# Patient Record
Sex: MALE | Race: WHITE | Employment: OTHER | URBAN - METROPOLITAN AREA
[De-identification: names, ages, dates, MRNs, and addresses within clinical notes are randomized per-mention and may not be internally consistent; named-entity substitution may affect disease eponyms.]

---

## 2017-06-04 ENCOUNTER — HOSPITAL ENCOUNTER (OUTPATIENT)
Dept: MRI IMAGING | Age: 62
Discharge: HOME | End: 2017-06-04
Attending: ORTHOPAEDIC SURGERY
Payer: MEDICARE

## 2017-06-04 DIAGNOSIS — M25.512 LEFT SHOULDER PAIN: ICD-10-CM

## 2017-06-04 DIAGNOSIS — M75.52 BURSITIS OF LEFT SHOULDER: ICD-10-CM

## 2017-06-04 PROCEDURE — 73221 MRI JOINT UPR EXTREM W/O DYE: CPT

## 2017-07-07 NOTE — PERIOP NOTES
[facility]  Ambulatory Surgery Unit  Pre-operative Instructions    Surgery/Procedure Date  07/14/2017            Tentative Arrival Time 1100      1. On the day of your surgery/procedure, please report to the Ambulatory Surgery Unit Registration Desk and sign in at your designated time. The Ambulatory Surgery Unit is located in [facility] on the [facility] side of the [facility] across from the [address] [place] [address]. Please have all of your health insurance cards and a photo ID. 2. You must have someone with you to drive you home, as you should not drive a car for 24 hours following anesthesia. Please make arrangements for a responsible adult friend or family member to stay with you for at least the first 24 hours after your surgery. 3. Do not have anything to eat or drink (including water, gum, mints, coffee, juice) after midnight   07/13/2017  . This may not apply to medications prescribed by your physician. (Please note below the special instructions with medications to take the morning of surgery, if applicable.)    4. We recommend you do not drink any alcoholic beverages for 24 hours before and after your surgery. 5. Stop all Aspirin and non-steroidal anti-inflammatory drugs (i.e. Advil, Aleve) as directed by your surgeon's office. Stop all vitamins and herbal supplements seven days prior to your surgery. If you are currently taking Plavix, Coumadin, or other blood-thinning agents, contact your surgeon for instructions. 6. In an effort to help prevent surgical site infection, we ask that you shower with an anti-bacterial soap (i.e. Dial or Safeguard) for 3 days prior to and on the morning of surgery, using a fresh towel after each shower. Do not apply any lotions, powders or deodorants after the shower on the day of your procedure. If applicable, please do not shave the operative site for 48 hours prior to surgery. 7. Wear comfortable clothes.   Wear glasses instead of contacts. Do not bring any money or jewelry. Do not wear make-up, particularly mascara, the morning of your surgery. Do not wear nail polish, particularly if you are having foot /hand surgery. Wear your hair loose or down, no ponytails, buns, mathew pins or clips. All body piercings must be removed. 8. You should understand that if you do not follow these instructions your surgery may be cancelled. If your physical condition changes (i.e. fever, cold or flu) please contact your surgeon as soon as possible. 9. It is important that you be on time. If a situation occurs where you may be late, or if you have any questions or problems, please call [phone].    10. Your surgery time may be subject to change. You will receive a phone call the day prior to surgery to confirm your arrival time. 11. Pediatric patients: please bring a change of clothes, diapers, bottle/sippy cup, pacifier, etc.      Special Instructions:    Please take morning meds as normal morning of surgery with a small sip of water. I understand a pre-operative phone call will be made to verify my surgery time. In the event that I am not available, I give permission for a message to be left on my answering service and/or with another person? yes         ___________________      ___________________  _________  Pt verbalized understanding of preop instructions via telephone.   (Signature of Patient)          (Witness)                              (Date and Time)

## 2017-07-10 ENCOUNTER — HOSPITAL ENCOUNTER (OUTPATIENT)
Dept: SURGERY | Age: 62
Setting detail: OUTPATIENT SURGERY
Discharge: HOME | End: 2017-07-10
Payer: MEDICARE

## 2017-07-10 ENCOUNTER — ANESTHESIA EVENT (OUTPATIENT)
Dept: SURGERY | Age: 62
End: 2017-07-10
Payer: MEDICARE

## 2017-07-10 VITALS
DIASTOLIC BLOOD PRESSURE: 58 MMHG | SYSTOLIC BLOOD PRESSURE: 115 MMHG | RESPIRATION RATE: 24 BRPM | TEMPERATURE: 97.9 F | HEART RATE: 52 BPM | OXYGEN SATURATION: 98 %

## 2017-07-10 LAB
ATRIAL RATE: 50 BPM
CALCULATED P AXIS, ECG09: 45 DEGREES
CALCULATED R AXIS, ECG10: 74 DEGREES
CALCULATED T AXIS, ECG11: 81 DEGREES
DIAGNOSIS, 93000: NORMAL
P-R INTERVAL, ECG05: 190 MS
Q-T INTERVAL, ECG07: 422 MS
QRS DURATION, ECG06: 92 MS
QTC CALCULATION (BEZET), ECG08: 384 MS
VENTRICULAR RATE, ECG03: 50 BPM

## 2017-07-10 PROCEDURE — 93005 ELECTROCARDIOGRAM TRACING: CPT

## 2017-07-10 NOTE — PERIOP NOTES
Dr. Patricia Curry reviewed todays EKG, and Ekg 8/22/15 EKG,:Mets >4, she's is aware patient in on O2 at night, still smoker, but will proceed with procedure per Dr. Farooq Cuevas.

## 2017-07-14 ENCOUNTER — HOSPITAL ENCOUNTER (OUTPATIENT)
Age: 62
Setting detail: OUTPATIENT SURGERY
Discharge: HOME | End: 2017-07-14
Attending: ORTHOPAEDIC SURGERY | Admitting: ORTHOPAEDIC SURGERY
Payer: MEDICARE

## 2017-07-14 ENCOUNTER — ANESTHESIA (OUTPATIENT)
Dept: SURGERY | Age: 62
End: 2017-07-14
Payer: MEDICARE

## 2017-07-14 VITALS
TEMPERATURE: 98 F | WEIGHT: 156 LBS | BODY MASS INDEX: 23.11 KG/M2 | HEIGHT: 69 IN | RESPIRATION RATE: 14 BRPM | SYSTOLIC BLOOD PRESSURE: 124 MMHG | OXYGEN SATURATION: 98 % | HEART RATE: 64 BPM | DIASTOLIC BLOOD PRESSURE: 71 MMHG

## 2017-07-14 PROCEDURE — 74011250636 HC RX REV CODE- 250/636

## 2017-07-14 PROCEDURE — 77030020255 HC SOL INJ LR 1000ML BG: Performed by: ORTHOPAEDIC SURGERY

## 2017-07-14 PROCEDURE — 76210000040 HC AMBSU PH I REC FIRST 0.5 HR: Performed by: ORTHOPAEDIC SURGERY

## 2017-07-14 PROCEDURE — 74011000250 HC RX REV CODE- 250: Performed by: ORTHOPAEDIC SURGERY

## 2017-07-14 PROCEDURE — 74011250636 HC RX REV CODE- 250/636: Performed by: ANESTHESIOLOGY

## 2017-07-14 PROCEDURE — 76210000050 HC AMBSU PH II REC 0.5 TO 1 HR: Performed by: ORTHOPAEDIC SURGERY

## 2017-07-14 PROCEDURE — 74011250636 HC RX REV CODE- 250/636: Performed by: ORTHOPAEDIC SURGERY

## 2017-07-14 PROCEDURE — 76060000073 HC AMB SURG ANES FIRST 0.5 HR: Performed by: ORTHOPAEDIC SURGERY

## 2017-07-14 PROCEDURE — 74011250637 HC RX REV CODE- 250/637: Performed by: ANESTHESIOLOGY

## 2017-07-14 PROCEDURE — 77030021352 HC CBL LD SYS DISP COVD -B: Performed by: ORTHOPAEDIC SURGERY

## 2017-07-14 PROCEDURE — 76010000093 HC SPECIAL PROCEDURE: Performed by: ORTHOPAEDIC SURGERY

## 2017-07-14 PROCEDURE — 74011000250 HC RX REV CODE- 250

## 2017-07-14 RX ORDER — SODIUM CHLORIDE 0.9 % (FLUSH) 0.9 %
5-10 SYRINGE (ML) INJECTION AS NEEDED
Status: DISCONTINUED | OUTPATIENT
Start: 2017-07-14 | End: 2017-07-14 | Stop reason: HOSPADM

## 2017-07-14 RX ORDER — LIDOCAINE HYDROCHLORIDE 20 MG/ML
INJECTION, SOLUTION EPIDURAL; INFILTRATION; INTRACAUDAL; PERINEURAL AS NEEDED
Status: DISCONTINUED | OUTPATIENT
Start: 2017-07-14 | End: 2017-07-14 | Stop reason: HOSPADM

## 2017-07-14 RX ORDER — FENTANYL CITRATE 50 UG/ML
INJECTION, SOLUTION INTRAMUSCULAR; INTRAVENOUS AS NEEDED
Status: DISCONTINUED | OUTPATIENT
Start: 2017-07-14 | End: 2017-07-14 | Stop reason: HOSPADM

## 2017-07-14 RX ORDER — MIDAZOLAM HYDROCHLORIDE 1 MG/ML
INJECTION, SOLUTION INTRAMUSCULAR; INTRAVENOUS AS NEEDED
Status: DISCONTINUED | OUTPATIENT
Start: 2017-07-14 | End: 2017-07-14 | Stop reason: HOSPADM

## 2017-07-14 RX ORDER — FENTANYL CITRATE 50 UG/ML
INJECTION, SOLUTION INTRAMUSCULAR; INTRAVENOUS
Status: DISCONTINUED
Start: 2017-07-14 | End: 2017-07-14 | Stop reason: HOSPADM

## 2017-07-14 RX ORDER — LIDOCAINE HYDROCHLORIDE 10 MG/ML
0.1 INJECTION, SOLUTION EPIDURAL; INFILTRATION; INTRACAUDAL; PERINEURAL AS NEEDED
Status: DISCONTINUED | OUTPATIENT
Start: 2017-07-14 | End: 2017-07-14 | Stop reason: HOSPADM

## 2017-07-14 RX ORDER — ONDANSETRON 2 MG/ML
4 INJECTION INTRAMUSCULAR; INTRAVENOUS AS NEEDED
Status: DISCONTINUED | OUTPATIENT
Start: 2017-07-14 | End: 2017-07-14 | Stop reason: HOSPADM

## 2017-07-14 RX ORDER — ROPIVACAINE HYDROCHLORIDE 5 MG/ML
20 INJECTION, SOLUTION EPIDURAL; INFILTRATION; PERINEURAL ONCE
Status: COMPLETED | OUTPATIENT
Start: 2017-07-14 | End: 2017-07-14

## 2017-07-14 RX ORDER — HYDROCODONE BITARTRATE AND ACETAMINOPHEN 5; 325 MG/1; MG/1
1-2 TABLET ORAL
Qty: 40 TAB | Refills: 0 | Status: SHIPPED | OUTPATIENT
Start: 2017-07-14 | End: 2017-08-02

## 2017-07-14 RX ORDER — DIPHENHYDRAMINE HYDROCHLORIDE 50 MG/ML
12.5 INJECTION, SOLUTION INTRAMUSCULAR; INTRAVENOUS AS NEEDED
Status: DISCONTINUED | OUTPATIENT
Start: 2017-07-14 | End: 2017-07-14 | Stop reason: HOSPADM

## 2017-07-14 RX ORDER — MORPHINE SULFATE 10 MG/ML
2 INJECTION, SOLUTION INTRAMUSCULAR; INTRAVENOUS
Status: DISCONTINUED | OUTPATIENT
Start: 2017-07-14 | End: 2017-07-14 | Stop reason: HOSPADM

## 2017-07-14 RX ORDER — HYDROMORPHONE HYDROCHLORIDE 1 MG/ML
.2-.5 INJECTION, SOLUTION INTRAMUSCULAR; INTRAVENOUS; SUBCUTANEOUS ONCE
Status: DISCONTINUED | OUTPATIENT
Start: 2017-07-14 | End: 2017-07-14 | Stop reason: HOSPADM

## 2017-07-14 RX ORDER — SODIUM CHLORIDE, SODIUM LACTATE, POTASSIUM CHLORIDE, CALCIUM CHLORIDE 600; 310; 30; 20 MG/100ML; MG/100ML; MG/100ML; MG/100ML
25 INJECTION, SOLUTION INTRAVENOUS CONTINUOUS
Status: DISCONTINUED | OUTPATIENT
Start: 2017-07-14 | End: 2017-07-14 | Stop reason: HOSPADM

## 2017-07-14 RX ORDER — HYDROCODONE BITARTRATE AND ACETAMINOPHEN 5; 325 MG/1; MG/1
1-2 TABLET ORAL
Qty: 40 TAB | Refills: 0 | Status: SHIPPED | OUTPATIENT
Start: 2017-07-14 | End: 2018-01-11

## 2017-07-14 RX ORDER — SODIUM CHLORIDE 0.9 % (FLUSH) 0.9 %
5-10 SYRINGE (ML) INJECTION EVERY 8 HOURS
Status: DISCONTINUED | OUTPATIENT
Start: 2017-07-14 | End: 2017-07-14 | Stop reason: HOSPADM

## 2017-07-14 RX ORDER — KETOROLAC TROMETHAMINE 30 MG/ML
INJECTION, SOLUTION INTRAMUSCULAR; INTRAVENOUS AS NEEDED
Status: DISCONTINUED | OUTPATIENT
Start: 2017-07-14 | End: 2017-07-14 | Stop reason: HOSPADM

## 2017-07-14 RX ORDER — PROPOFOL 10 MG/ML
INJECTION, EMULSION INTRAVENOUS AS NEEDED
Status: DISCONTINUED | OUTPATIENT
Start: 2017-07-14 | End: 2017-07-14 | Stop reason: HOSPADM

## 2017-07-14 RX ORDER — OXYCODONE AND ACETAMINOPHEN 5; 325 MG/1; MG/1
1 TABLET ORAL ONCE
Status: COMPLETED | OUTPATIENT
Start: 2017-07-14 | End: 2017-07-14

## 2017-07-14 RX ORDER — MIDAZOLAM HYDROCHLORIDE 1 MG/ML
INJECTION, SOLUTION INTRAMUSCULAR; INTRAVENOUS
Status: DISCONTINUED
Start: 2017-07-14 | End: 2017-07-14 | Stop reason: HOSPADM

## 2017-07-14 RX ORDER — FENTANYL CITRATE 50 UG/ML
25 INJECTION, SOLUTION INTRAMUSCULAR; INTRAVENOUS
Status: DISCONTINUED | OUTPATIENT
Start: 2017-07-14 | End: 2017-07-14 | Stop reason: HOSPADM

## 2017-07-14 RX ADMIN — PROPOFOL 50 MG: 10 INJECTION, EMULSION INTRAVENOUS at 12:44

## 2017-07-14 RX ADMIN — LIDOCAINE HYDROCHLORIDE 40 MG: 20 INJECTION, SOLUTION EPIDURAL; INFILTRATION; INTRACAUDAL; PERINEURAL at 12:43

## 2017-07-14 RX ADMIN — MIDAZOLAM HYDROCHLORIDE 2 MG: 1 INJECTION, SOLUTION INTRAMUSCULAR; INTRAVENOUS at 12:42

## 2017-07-14 RX ADMIN — OXYCODONE HYDROCHLORIDE AND ACETAMINOPHEN 1 TABLET: 5; 325 TABLET ORAL at 13:27

## 2017-07-14 RX ADMIN — FENTANYL CITRATE 100 MCG: 50 INJECTION, SOLUTION INTRAMUSCULAR; INTRAVENOUS at 12:42

## 2017-07-14 RX ADMIN — SODIUM CHLORIDE, SODIUM LACTATE, POTASSIUM CHLORIDE, AND CALCIUM CHLORIDE 25 ML/HR: 600; 310; 30; 20 INJECTION, SOLUTION INTRAVENOUS at 11:24

## 2017-07-14 RX ADMIN — KETOROLAC TROMETHAMINE 30 MG: 30 INJECTION, SOLUTION INTRAMUSCULAR; INTRAVENOUS at 12:53

## 2017-07-14 RX ADMIN — PROPOFOL 150 MG: 10 INJECTION, EMULSION INTRAVENOUS at 12:43

## 2017-07-14 NOTE — ANESTHESIA PREPROCEDURE EVALUATION
Anesthetic History   No history of anesthetic complications            Review of Systems / Medical History  Patient summary reviewed, nursing notes reviewed and pertinent labs reviewed    Pulmonary    COPD (on Home O2 at night): severe      Smoker (1/2 ppd)  Asthma        Neuro/Psych         Psychiatric history (Schizophrenia, depression)     Cardiovascular                  Exercise tolerance: <4 METS  Comments: Chronic MENDEZ   GI/Hepatic/Renal             Pertinent negatives: No GERD   Endo/Other  Within defined limits           Other Findings   Comments: Back pain           Physical Exam    Airway  Mallampati: II  TM Distance: < 4 cm  Neck ROM: normal range of motion   Mouth opening: Normal     Cardiovascular    Rhythm: regular  Rate: normal      Pertinent negatives: No murmur   Dental  No notable dental hx       Pulmonary    Rhonchi:bibasilar            Comments: Cleared with cough Abdominal  GI exam deferred       Other Findings            Anesthetic Plan    ASA: 3  Anesthesia type: MAC          Induction: Intravenous  Anesthetic plan and risks discussed with: Patient      Pt is not an appropriate block candidate due to risk of respiratory failure from phrenic nerve block

## 2017-07-14 NOTE — BRIEF OP NOTE
BRIEF OPERATIVE NOTE    Date of Procedure: 7/14/2017   Preoperative Diagnosis: LEFT SHOULDER IMPINGEMENT  Postoperative Diagnosis: LEFT SHOULDER IMPINGEMENT    Procedure(s):  MANIPULATION AND INJECTION LEFT SHOULDER (MAC/BLOCK)  Surgeon(s) and Role:     * Adriana Smith MD - Primary         Assistant Staff:       Surgical Staff:  Circ-1: Enrrique Ohara RN  Event Time In   Incision Start 1246   Incision Close 1250     Anesthesia: MAC   Estimated Blood Loss: 0  Specimens: * No specimens in log *   Findings: 0   Complications: 0  Implants: * No implants in log *

## 2017-07-14 NOTE — IP AVS SNAPSHOT
[address] [facility] 
[phone] Patient: Manuel Eisenberg MRN: [id number] You are allergic to the following Allergen Reactions Allergen (Antipyrine-Benzocaine) Unknown (comments) Avelox (Moxifloxacin) Unknown (comments) Codeine Sulfate Unknown (comments) Pcn (Penicillins) Other (comments) Coma Recent Documentation Height Weight BMI Smoking Status 1.753 m 70.8 kg 23.04 kg/m2 Current Every Day Smoker Emergency Contacts Name Discharge Info Relation Home Work Mobile Valeriy Fry     [phone] [employer] 68 CAREGIVER [3] Caregiver [13] Andreea Hogan NO [2] Sister [23] [phone] About your hospitalization You were admitted on:  July 14, 2017 You last received care in the:  [facility] PACU You were discharged on:  July 14, 2017 Unit phone number:  [phone] Why you were hospitalized Your primary diagnosis was:  Not on File Providers Seen During Your Hospitalizations Provider Role Specialty Primary office phone Leon Yeager MD Attending Provider Orthopedic Surgery [phone] Your Primary Care Physician (PCP) Primary Care Physician Office Phone Office Fax Liberty Núñez [phone] Follow-up Information Follow up With Details Comments Contact Info [address] [address] [phone] Current Discharge Medication List  
  
START taking these medications Dose & Instructions Dispensing Information Comments Morning Noon Evening Bedtime * HYDROcodone-acetaminophen 5-325 mg per tablet Commonly known as:  Alex Austin Your last dose was: Your next dose is:    
   
   
 Dose:  1-2 Tab Take 1-2 Tabs by mouth every four (4) hours as needed for Pain.  Max Daily Amount: 12 Tabs. Quantity:  40 Tab Refills:  0  
     
   
   
   
  
 * HYDROcodone-acetaminophen 5-325 mg per tablet Commonly known as:  Alex Austin Your last dose was: Your next dose is:    
   
   
 Dose:  1-2 Tab Take 1-2 Tabs by mouth every four (4) hours as needed for Pain. Max Daily Amount: 12 Tabs. Quantity:  40 Tab Refills:  0  
     
   
   
   
  
 * Notice: This list has 2 medication(s) that are the same as other medications prescribed for you. Read the directions carefully, and ask your doctor or other care provider to review them with you. CONTINUE these medications which have NOT CHANGED Dose & Instructions Dispensing Information Comments Morning Noon Evening Bedtime  
 albuterol 2.5 mg /3 mL (0.083 %) nebulizer solution Commonly known as:  PROVENTIL VENTOLIN Your last dose was: Your next dose is:    
   
   
 Dose:  1.25 mg  
1.25 mg by Nebulization route every four (4) hours as needed. Refills:  0  
     
   
   
   
  
 albuterol 90 mcg/actuation inhaler Commonly known as:  Coralee Pam Your last dose was: Your next dose is:    
   
   
 Dose:  2 Puff Take 2 Puffs by inhalation three (3) times daily as needed. Refills:  0  
     
   
   
   
  
 OTHER Your last dose was: Your next dose is:    
   
   
 oxygen Refills:  0 SEROquel 300 mg tablet Generic drug:  QUEtiapine Your last dose was: Your next dose is:    
   
   
 Dose:  300 mg Take 300 mg by mouth nightly. Refills:  0 WELLBUTRIN  mg SR tablet Generic drug:  buPROPion SR Your last dose was: Your next dose is: Take  by mouth two (2) times a day. Refills:  0 Where to Get Your Medications Information on where to get these meds will be given to you by the nurse or doctor. ! Ask your nurse or doctor about these medications HYDROcodone-acetaminophen 5-325 mg per tablet HYDROcodone-acetaminophen 5-325 mg per tablet Discharge Instructions Use ice as desired Stretch!!! 
PT is set for Monday at my office, first floor at 8 AM 
 
>>>You received Toradol during your surgery. You may not take any form of NSAIDS (non steroidal anti inflammatory drugs) such as Advil, Ibuprofen, Aleve, Motrin until 9 pm.<<< 
 
 
DO NOT TAKE TYLENOL/ACETAMINOPHEN WITH PERCOCET, LORTAB, NORCO OR VICODEN. TAKE NARCOTIC PAIN MEDICATIONS WITH FOOD Narcotics tend to be constipating, we suggest taking a stool softener such as Colace or Miralax (follow package instructions). DO NOT DRIVE WHILE TAKING NARCOTIC PAIN MEDICATIONS. DO NOT TAKE SLEEPING MEDICATIONS OR ANTIANXIETY MEDICATIONS WHILE TAKING NARCOTIC PAIN MEDICATIONS,  ESPECIALLY THE NIGHT OF ANESTHESIA. CPAP PATIENTS BE SURE TO WEAR MACHINE WHENEVER NAPPING OR SLEEPING. DISCHARGE SUMMARY from Nurse The following personal items collected during your admission are returned to you:  
Dental Appliance: Dental Appliances: None Vision: Visual Aid: Glasses, With patient Hearing Aid:   
Jewelry: Jewelry: None Clothing: Clothing: Other (comment) (belonging bag) Other Valuables: Other Valuables: None Valuables sent to safe:   
 
 
PATIENT INSTRUCTIONS: 
 
 
B - Balance E - Eyes F-  Face looks uneven A-  Arms unable to move or move even S-  Speech slurred or non-existent T-  Time-call 911 as soon as signs and symptoms begin-DO NOT go Back to bed or wait to see if you get better-TIME IS BRAIN. If you have not received your influenza and/or pneumococcal vaccine, please follow up with your primary care physician. The discharge information has been reviewed with the patient and spouse. The patient and spouse verbalized understanding. Discharge Orders None Introducing [facility]! [employer] introduces GPal patient portal. Now you can access parts of your medical record, email your doctor's office, and request medication refills online. 1. In your internet browser, go to [email or web address] 2. Click on the First Time User? Click Here link in the Sign In box. You will see the New Member Sign Up page. 3. Enter your GPal Access Code exactly as it appears below. You will not need to use this code after youve completed the sign-up process.  If you do not sign up before the expiration date, you must request a new code. · Lakala Access Code: [email or web address] Expires: 8/23/2017  2:42 PM 
 
4. Enter the last four digits of your Social Security Number (xxxx) and Date of Birth (mm/dd/yyyy) as indicated and click Submit. You will be taken to the next sign-up page. 5. Create a Lakala ID. This will be your Lakala login ID and cannot be changed, so think of one that is secure and easy to remember. 6. Create a Lakala password. You can change your password at any time. 7. Enter your Password Reset Question and Answer. This can be used at a later time if you forget your password. 8. Enter your e-mail address. You will receive e-mail notification when new information is available in [address] 9. Click Sign Up. You can now view and download portions of your medical record. 10. Click the Download Summary menu link to download a portable copy of your medical information. If you have questions, please visit the Frequently Asked Questions section of the Lakala website. Remember, Lakala is NOT to be used for urgent needs. For medical emergencies, dial 911. Now available from your iPhone and Android! General Information Please provide this summary of care documentation to your next provider. Patient Signature:  ____________________________________________________________ Date:  ____________________________________________________________  
  
Lelia Rob Provider Signature:  ____________________________________________________________ Date:  ____________________________________________________________

## 2017-07-14 NOTE — DISCHARGE INSTRUCTIONS
Use ice as desired  Stretch!!!  PT is set for Monday at my office, first floor at 8 AM    >>>You received Toradol during your surgery. You may not take any form of NSAIDS (non steroidal anti inflammatory drugs) such as Advil, Ibuprofen, Aleve, Motrin until 9 pm.<<<      DO NOT TAKE TYLENOL/ACETAMINOPHEN WITH PERCOCET, LORTAB, NORCO OR VICODEN. TAKE NARCOTIC PAIN MEDICATIONS WITH FOOD   Narcotics tend to be constipating, we suggest taking a stool softener such as Colace or Miralax (follow package instructions). DO NOT DRIVE WHILE TAKING NARCOTIC PAIN MEDICATIONS. DO NOT TAKE SLEEPING MEDICATIONS OR ANTIANXIETY MEDICATIONS WHILE TAKING NARCOTIC PAIN MEDICATIONS,  ESPECIALLY THE NIGHT OF ANESTHESIA. CPAP PATIENTS BE SURE TO WEAR MACHINE WHENEVER NAPPING OR SLEEPING. DISCHARGE SUMMARY from Nurse    The following personal items collected during your admission are returned to you:   Dental Appliance: Dental Appliances: None  Vision: Visual Aid: Glasses, With patient  Hearing Aid:    Jewelry: Jewelry: None  Clothing: Clothing: Other (comment) (belonging bag)  Other Valuables: Other Valuables: None  Valuables sent to safe:        PATIENT INSTRUCTIONS:    After General Anesthesia or Intravenous Sedation, for 24 hours or while taking prescription Narcotics:        Someone should be with you for the next 24 hours. For your own safety, a responsible adult must drive you home. · Limit your activities  · Recommended activity: Rest today, up with assistance today. Do not climb stairs or shower unattended for the next 24 hours. · Please start with a soft bland diet and advance as tolerated (no nausea) to regular diet. · If you have a sore throat you should try the following: fluids, warm salt water gargles, or throat lozenges. If it does not improve after several days please follow up with your primary physician.   · Do not drive and operate hazardous machinery  · Do not make important personal or business decisions  · Do  not drink alcoholic beverages  · If you have not urinated within 8 hours after discharge, please contact your surgeon on call. Report the following to your surgeon:  · Excessive pain, swelling, redness or odor of or around the surgical area  · Temperature over 100.5  · Nausea and vomiting lasting longer than 4 hours or if unable to take medications  · Any signs of decreased circulation or nerve impairment to extremity: change in color, persistent  numbness, tingling, coldness or increase pain      · You will receive a Post Operative Call from one of the Recovery Room Nurses on the day after your surgery to check on you. It is very important for us to know how you are recovering after your surgery. If you have an issue or need to speak with someone, please call your surgeon, do not wait for the post operative call. · You may receive an e-mail or letter in the mail from [facility] regarding your experience with us in the Ambulatory Surgery Unit. Your feedback is valuable to us and we appreciate your participation in the survey. · If the above instructions are not adequate, please contact Malinda Freire RN, Marcela anesthesia Nurse Manager or our Anesthesiologist, at [phone]. If you are having problems after your surgery, call the physician at his office number. · We wish you a speedy recovery ? What to do at Home:      *  Please give a list of your current medications to your Primary Care Provider. *  Please update this list whenever your medications are discontinued, doses are      changed, or new medications (including over-the-counter products) are added. *  Please carry medication information at all times in case of emergency situations.             These are general instructions for a healthy lifestyle:    No smoking/ No tobacco products/ Avoid exposure to second hand smoke    Surgeon General's Warning:  Quitting smoking now greatly reduces serious risk to your health. Obesity, smoking, and sedentary lifestyle greatly increases your risk for illness    A healthy diet, regular physical exercise & weight monitoring are important for maintaining a healthy lifestyle    You may be retaining fluid if you have a history of heart failure or if you experience any of the following symptoms:  Weight gain of 3 pounds or more overnight or 5 pounds in a week, increased swelling in our hands or feet or shortness of breath while lying flat in bed. Please call your doctor as soon as you notice any of these symptoms; do not wait until your next office visit. Recognize signs and symptoms of STROKE:    B - Balance  E - Eyes    F-  Face looks uneven    A-  Arms unable to move or move even    S-  Speech slurred or non-existent    T-  Time-call 911 as soon as signs and symptoms begin-DO NOT go       Back to bed or wait to see if you get better-TIME IS BRAIN. If you have not received your influenza and/or pneumococcal vaccine, please follow up with your primary care physician. The discharge information has been reviewed with the patient and spouse. The patient and spouse verbalized understanding.

## 2017-07-14 NOTE — PERIOP NOTES
Fletcher Barretolalo  1955  [id number]     Present at patient bedside for procedure. Situation:    Verbal report given from: Cristela Haines RN & OLIVE England CRNA  Procedure: Procedure(s):  MANIPULATION AND INJECTION LEFT SHOULDER (MAC/BLOCK)    Background:    Preoperative diagnosis: LEFT SHOULDER IMPINGEMENT    Postoperative diagnosis: LEFT SHOULDER IMPINGEMENT    :  Dr. Saw Pillai    Assistant(s): Circ-1: Marely Newberry RN    Specimens: * No specimens in log *    Assessment:  Intra-procedure medications         Anesthesia gave intra-procedure sedation and medications, see anesthesia flow sheet     Intravenous fluids: LR@ KVO     Vital signs stable       Recommendation:    Permission to share finding with family or friend yes

## 2017-07-14 NOTE — ANESTHESIA POSTPROCEDURE EVALUATION
Post-Anesthesia Evaluation and Assessment    Patient: Josephine Ashby MRN: [id number]  SSN: [id number]    YOB: 1955  Age: 57 y.o. Sex: male       Cardiovascular Function/Vital Signs  Visit Vitals    /71 (BP 1 Location: Right arm, BP Patient Position: Head of bed elevated (Comment degrees))    Pulse 64    Temp 36.7 °C (98 °F)    Resp 14    Ht 5' 9\" (1.753 m)    Wt 70.8 kg (156 lb)    SpO2 98%    BMI 23.04 kg/m2       Patient is status post total IV anesthesia, general anesthesia for Procedure(s):  MANIPULATION AND INJECTION LEFT SHOULDER (MAC/BLOCK). Nausea/Vomiting: None    Postoperative hydration reviewed and adequate. Pain:  Pain Scale 1: Numeric (0 - 10) (07/14/17 1345)  Pain Intensity 1: 8 (07/14/17 1315)   Managed    Neurological Status:   Neuro (WDL): Within Defined Limits (07/14/17 1345)  Neuro  Neurologic State: Drowsy (07/14/17 1300)  LUE Motor Response: Purposeful (07/14/17 1300)  LLE Motor Response: Purposeful (07/14/17 1300)  RUE Motor Response: Purposeful (07/14/17 1300)  RLE Motor Response: Purposeful (07/14/17 1300)   At baseline    Mental Status and Level of Consciousness: Arousable    Pulmonary Status:   O2 Device: Room air (07/14/17 1345)   Adequate oxygenation and airway patent    Complications related to anesthesia: None    Post-anesthesia assessment completed.  No concerns    Signed By: Joy Elliott MD     July 14, 2017

## 2017-07-15 NOTE — OP NOTES
[facility]   [address] [facility], [address]   OP NOTE       Name:  Marcie Brito   MR#:  [id number]   :  1955   Account #:  [de-identified]    Surgery Date:  2017   Date of Adm:  2017       PREOPERATIVE DIAGNOSIS:  Frozen left shoulder. POSTOPERATIVE DIAGNOSIS:  Frozen left shoulder. PROCEDURES PERFORMED:  Manipulation and injection of left   shoulder. ESTIMATED BLOOD LOSS: 0    SPECIMENS REMOVED: None. ANESTHESIA:  Block with MAC. SURGEON: Geronimo Andino. Marlene Valentin MD    DESCRIPTION OF PROCEDURE: The patient was given smooth   induction of IV sedation, supine position on the hospital stretcher. The   left shoulder was examined. He had only 90 degrees of passive   abduction. The shoulder was manipulated into full abduction with the   lysis of adhesions felt. Full abduction was obtained, internal and   external rotation were also manipulated, obtaining near full range of   motion. His shoulder joint was then injected with ropivacaine,   Xylocaine, and Depo-Medrol under sterile technique. He tolerated the   procedure well.          MD Елена Maxwell / Neptali.Jose Luis   D:  2017   12:52   T:  2017   22:31   Job #:  [id number]

## 2017-08-02 NOTE — PERIOP NOTES
[facility]  Ambulatory Surgery Unit  Pre-operative Instructions    Surgery/Procedure Date  08/11/2017            Tentative Arrival Time 8735      1. On the day of your surgery/procedure, please report to the Ambulatory Surgery Unit Registration Desk and sign in at your designated time. The Ambulatory Surgery Unit is located in [facility] on the [facility] side of the [facility] across from the [address] [place] [address]. Please have all of your health insurance cards and a photo ID. 2. You must have someone with you to drive you home, as you should not drive a car for 24 hours following anesthesia. Please make arrangements for a responsible adult friend or family member to stay with you for at least the first 24 hours after your surgery. 3. Do not have anything to eat or drink (including water, gum, mints, coffee, juice) after midnight   08/10/2017  . This may not apply to medications prescribed by your physician. (Please note below the special instructions with medications to take the morning of surgery, if applicable.)    4. We recommend you do not drink any alcoholic beverages for 24 hours before and after your surgery. 5. Stop all Aspirin and non-steroidal anti-inflammatory drugs (i.e. Advil, Aleve) as directed by your surgeon's office. Stop all vitamins and herbal supplements seven days prior to your surgery. If you are currently taking Plavix, Coumadin, or other blood-thinning agents, contact your surgeon for instructions. 6. In an effort to help prevent surgical site infection, we ask that you shower with an anti-bacterial soap (i.e. Dial or Safeguard) for 3 days prior to and on the morning of surgery, using a fresh towel after each shower. Do not apply any lotions, powders or deodorants after the shower on the day of your procedure. If applicable, please do not shave the operative site for 48 hours prior to surgery. 7. Wear comfortable clothes.   Wear glasses instead of contacts. Do not bring any money or jewelry. Do not wear make-up, particularly mascara, the morning of your surgery. Do not wear nail polish, particularly if you are having foot /hand surgery. Wear your hair loose or down, no ponytails, buns, mathew pins or clips. All body piercings must be removed. 8. You should understand that if you do not follow these instructions your surgery may be cancelled. If your physical condition changes (i.e. fever, cold or flu) please contact your surgeon as soon as possible. 9. It is important that you be on time. If a situation occurs where you may be late, or if you have any questions or problems, please call [phone].    10. Your surgery time may be subject to change. You will receive a phone call the day prior to surgery to confirm your arrival time. 11. Pediatric patients: please bring a change of clothes, diapers, bottle/sippy cup, pacifier, etc.      Special Instructions: Take morning meds as prescribed am of surgery with a small sip of water. I understand a pre-operative phone call will be made to verify my surgery time. In the event that I am not available, I give permission for a message to be left on my answering service and/or with another person? yes         ___________________      ___________________  _________  Pt verbalized understanding of preop instructions via telephone.   (Signature of Patient)          (Witness)                              (Date and Time)

## 2017-08-10 ENCOUNTER — ANESTHESIA EVENT (OUTPATIENT)
Dept: SURGERY | Age: 62
End: 2017-08-10
Payer: MEDICARE

## 2017-08-11 ENCOUNTER — HOSPITAL ENCOUNTER (OUTPATIENT)
Age: 62
Setting detail: OUTPATIENT SURGERY
Discharge: HOME | End: 2017-08-11
Attending: ORTHOPAEDIC SURGERY | Admitting: ORTHOPAEDIC SURGERY
Payer: MEDICARE

## 2017-08-11 ENCOUNTER — ANESTHESIA (OUTPATIENT)
Dept: SURGERY | Age: 62
End: 2017-08-11
Payer: MEDICARE

## 2017-08-11 VITALS
DIASTOLIC BLOOD PRESSURE: 82 MMHG | TEMPERATURE: 97.8 F | HEART RATE: 60 BPM | RESPIRATION RATE: 18 BRPM | HEIGHT: 69 IN | BODY MASS INDEX: 23.49 KG/M2 | SYSTOLIC BLOOD PRESSURE: 140 MMHG | WEIGHT: 158.6 LBS | OXYGEN SATURATION: 97 %

## 2017-08-11 PROCEDURE — 74011250636 HC RX REV CODE- 250/636: Performed by: ORTHOPAEDIC SURGERY

## 2017-08-11 PROCEDURE — 74011000250 HC RX REV CODE- 250

## 2017-08-11 PROCEDURE — 76210000046 HC AMBSU PH II REC FIRST 0.5 HR: Performed by: ORTHOPAEDIC SURGERY

## 2017-08-11 PROCEDURE — 76210000040 HC AMBSU PH I REC FIRST 0.5 HR: Performed by: ORTHOPAEDIC SURGERY

## 2017-08-11 PROCEDURE — 76010000093 HC SPECIAL PROCEDURE: Performed by: ORTHOPAEDIC SURGERY

## 2017-08-11 PROCEDURE — 77030020255 HC SOL INJ LR 1000ML BG: Performed by: ORTHOPAEDIC SURGERY

## 2017-08-11 PROCEDURE — 76060000073 HC AMB SURG ANES FIRST 0.5 HR: Performed by: ORTHOPAEDIC SURGERY

## 2017-08-11 PROCEDURE — 74011000250 HC RX REV CODE- 250: Performed by: ORTHOPAEDIC SURGERY

## 2017-08-11 PROCEDURE — 74011250636 HC RX REV CODE- 250/636

## 2017-08-11 PROCEDURE — 74011250636 HC RX REV CODE- 250/636: Performed by: ANESTHESIOLOGY

## 2017-08-11 RX ORDER — HYDROMORPHONE HYDROCHLORIDE 1 MG/ML
INJECTION, SOLUTION INTRAMUSCULAR; INTRAVENOUS; SUBCUTANEOUS AS NEEDED
Status: DISCONTINUED | OUTPATIENT
Start: 2017-08-11 | End: 2017-08-11 | Stop reason: HOSPADM

## 2017-08-11 RX ORDER — SODIUM CHLORIDE, SODIUM LACTATE, POTASSIUM CHLORIDE, CALCIUM CHLORIDE 600; 310; 30; 20 MG/100ML; MG/100ML; MG/100ML; MG/100ML
25 INJECTION, SOLUTION INTRAVENOUS CONTINUOUS
Status: DISCONTINUED | OUTPATIENT
Start: 2017-08-11 | End: 2017-08-11 | Stop reason: HOSPADM

## 2017-08-11 RX ORDER — SODIUM CHLORIDE 0.9 % (FLUSH) 0.9 %
5-10 SYRINGE (ML) INJECTION AS NEEDED
Status: DISCONTINUED | OUTPATIENT
Start: 2017-08-11 | End: 2017-08-11 | Stop reason: HOSPADM

## 2017-08-11 RX ORDER — MORPHINE SULFATE 10 MG/ML
2 INJECTION, SOLUTION INTRAMUSCULAR; INTRAVENOUS
Status: DISCONTINUED | OUTPATIENT
Start: 2017-08-11 | End: 2017-08-11 | Stop reason: HOSPADM

## 2017-08-11 RX ORDER — SODIUM CHLORIDE 0.9 % (FLUSH) 0.9 %
5-10 SYRINGE (ML) INJECTION EVERY 8 HOURS
Status: DISCONTINUED | OUTPATIENT
Start: 2017-08-11 | End: 2017-08-11 | Stop reason: HOSPADM

## 2017-08-11 RX ORDER — PROPOFOL 10 MG/ML
INJECTION, EMULSION INTRAVENOUS AS NEEDED
Status: DISCONTINUED | OUTPATIENT
Start: 2017-08-11 | End: 2017-08-11 | Stop reason: HOSPADM

## 2017-08-11 RX ORDER — METHYLPREDNISOLONE ACETATE 40 MG/ML
INJECTION, SUSPENSION INTRA-ARTICULAR; INTRALESIONAL; INTRAMUSCULAR; SOFT TISSUE AS NEEDED
Status: DISCONTINUED | OUTPATIENT
Start: 2017-08-11 | End: 2017-08-11 | Stop reason: HOSPADM

## 2017-08-11 RX ORDER — ACETAMINOPHEN 10 MG/ML
INJECTION, SOLUTION INTRAVENOUS
Status: DISCONTINUED
Start: 2017-08-11 | End: 2017-08-11 | Stop reason: HOSPADM

## 2017-08-11 RX ORDER — HYDROMORPHONE HYDROCHLORIDE 1 MG/ML
.2-.5 INJECTION, SOLUTION INTRAMUSCULAR; INTRAVENOUS; SUBCUTANEOUS ONCE
Status: DISCONTINUED | OUTPATIENT
Start: 2017-08-11 | End: 2017-08-11 | Stop reason: HOSPADM

## 2017-08-11 RX ORDER — LIDOCAINE HYDROCHLORIDE 20 MG/ML
INJECTION, SOLUTION EPIDURAL; INFILTRATION; INTRACAUDAL; PERINEURAL AS NEEDED
Status: DISCONTINUED | OUTPATIENT
Start: 2017-08-11 | End: 2017-08-11 | Stop reason: HOSPADM

## 2017-08-11 RX ORDER — HYDROMORPHONE HYDROCHLORIDE 1 MG/ML
INJECTION, SOLUTION INTRAMUSCULAR; INTRAVENOUS; SUBCUTANEOUS
Status: DISCONTINUED
Start: 2017-08-11 | End: 2017-08-11 | Stop reason: HOSPADM

## 2017-08-11 RX ORDER — DIPHENHYDRAMINE HYDROCHLORIDE 50 MG/ML
12.5 INJECTION, SOLUTION INTRAMUSCULAR; INTRAVENOUS AS NEEDED
Status: DISCONTINUED | OUTPATIENT
Start: 2017-08-11 | End: 2017-08-11 | Stop reason: HOSPADM

## 2017-08-11 RX ORDER — KETOROLAC TROMETHAMINE 30 MG/ML
INJECTION, SOLUTION INTRAMUSCULAR; INTRAVENOUS AS NEEDED
Status: DISCONTINUED | OUTPATIENT
Start: 2017-08-11 | End: 2017-08-11 | Stop reason: HOSPADM

## 2017-08-11 RX ORDER — FENTANYL CITRATE 50 UG/ML
25 INJECTION, SOLUTION INTRAMUSCULAR; INTRAVENOUS
Status: DISCONTINUED | OUTPATIENT
Start: 2017-08-11 | End: 2017-08-11 | Stop reason: HOSPADM

## 2017-08-11 RX ORDER — HYDROCODONE BITARTRATE AND ACETAMINOPHEN 5; 325 MG/1; MG/1
1-2 TABLET ORAL
Qty: 30 TAB | Refills: 0 | Status: SHIPPED | OUTPATIENT
Start: 2017-08-11 | End: 2017-09-19

## 2017-08-11 RX ORDER — LIDOCAINE HYDROCHLORIDE 10 MG/ML
0.1 INJECTION, SOLUTION EPIDURAL; INFILTRATION; INTRACAUDAL; PERINEURAL AS NEEDED
Status: DISCONTINUED | OUTPATIENT
Start: 2017-08-11 | End: 2017-08-11 | Stop reason: HOSPADM

## 2017-08-11 RX ORDER — ONDANSETRON 2 MG/ML
4 INJECTION INTRAMUSCULAR; INTRAVENOUS AS NEEDED
Status: DISCONTINUED | OUTPATIENT
Start: 2017-08-11 | End: 2017-08-11 | Stop reason: HOSPADM

## 2017-08-11 RX ORDER — LIDOCAINE HYDROCHLORIDE 10 MG/ML
INJECTION INFILTRATION; PERINEURAL AS NEEDED
Status: DISCONTINUED | OUTPATIENT
Start: 2017-08-11 | End: 2017-08-11 | Stop reason: HOSPADM

## 2017-08-11 RX ORDER — ACETAMINOPHEN 10 MG/ML
INJECTION, SOLUTION INTRAVENOUS AS NEEDED
Status: DISCONTINUED | OUTPATIENT
Start: 2017-08-11 | End: 2017-08-11 | Stop reason: HOSPADM

## 2017-08-11 RX ORDER — OXYCODONE AND ACETAMINOPHEN 5; 325 MG/1; MG/1
1 TABLET ORAL ONCE
Status: DISCONTINUED | OUTPATIENT
Start: 2017-08-11 | End: 2017-08-11 | Stop reason: HOSPADM

## 2017-08-11 RX ADMIN — ACETAMINOPHEN 1000 MG: 10 INJECTION, SOLUTION INTRAVENOUS at 12:32

## 2017-08-11 RX ADMIN — SODIUM CHLORIDE, POTASSIUM CHLORIDE, SODIUM LACTATE AND CALCIUM CHLORIDE: 600; 310; 30; 20 INJECTION, SOLUTION INTRAVENOUS at 12:30

## 2017-08-11 RX ADMIN — KETOROLAC TROMETHAMINE 30 MG: 30 INJECTION, SOLUTION INTRAMUSCULAR; INTRAVENOUS at 12:39

## 2017-08-11 RX ADMIN — SODIUM CHLORIDE, SODIUM LACTATE, POTASSIUM CHLORIDE, AND CALCIUM CHLORIDE 25 ML/HR: 600; 310; 30; 20 INJECTION, SOLUTION INTRAVENOUS at 12:24

## 2017-08-11 RX ADMIN — LIDOCAINE HYDROCHLORIDE 40 MG: 20 INJECTION, SOLUTION EPIDURAL; INFILTRATION; INTRACAUDAL; PERINEURAL at 12:35

## 2017-08-11 RX ADMIN — PROPOFOL 150 MG: 10 INJECTION, EMULSION INTRAVENOUS at 12:35

## 2017-08-11 RX ADMIN — HYDROMORPHONE HYDROCHLORIDE 0.5 MG: 1 INJECTION, SOLUTION INTRAMUSCULAR; INTRAVENOUS; SUBCUTANEOUS at 12:34

## 2017-08-11 NOTE — PERIOP NOTES
PACU~  1322-Pt is awake and alert, denies complaints. Discharge instructions reviewed with pt & friend. Encouraged pt to stretch/ do range of motion every hour while awake. Pt discharged home in stable condition via w/c to car.  Pt has his glasses

## 2017-08-11 NOTE — ANESTHESIA POSTPROCEDURE EVALUATION
Post-Anesthesia Evaluation and Assessment    Patient: Massimo Anderson MRN: [id number]  SSN: [id number]    YOB: 1955  Age: 57 y.o. Sex: male       Cardiovascular Function/Vital Signs  Visit Vitals    /82    Pulse 60    Temp 36.6 °C (97.8 °F)    Resp 18    Ht 5' 9\" (1.753 m)    Wt 71.9 kg (158 lb 9.6 oz)    SpO2 97%    BMI 23.42 kg/m2       Patient is status post MAC anesthesia for Procedure(s):  MANIPULATION AND INJECTION LEFT SHOULDER  . Nausea/Vomiting: None    Postoperative hydration reviewed and adequate. Pain:  Pain Scale 1: Numeric (0 - 10) (08/11/17 1308)  Pain Intensity 1: 0 (08/11/17 1308)   Managed    Neurological Status:   Neuro (WDL): Within Defined Limits (08/11/17 1308)  Neuro  Neurologic State: Alert (08/11/17 1308)   At baseline    Mental Status and Level of Consciousness: Arousable    Pulmonary Status:   O2 Device: Room air (08/11/17 1308)   Adequate oxygenation and airway patent    Complications related to anesthesia: None    Post-anesthesia assessment completed.  No concerns    Signed By: Marquita Gagnon MD     August 11, 2017

## 2017-08-11 NOTE — OP NOTES
[address]   OP NOTE       Name:  Carmen Christian   MR#:  [id number]   :  1955   Account #:  [de-identified]    Surgery Date:  2017   Date of Adm:  2017       PREOPERATIVE DIAGNOSIS: Frozen left shoulder. POSTOPERATIVE DIAGNOSIS: Frozen left shoulder. PROCEDURES PERFORMED: Manipulation and injection of left   shoulder. SURGEON: Janiya Mcgovern. Javy Ambriz MD    ANESTHESIA: MAC.    ESTIMATED BLOOD LOSS: **0    SPECIMENS REMOVED: None. DESCRIPTION OF PROCEDURE: The patient was given smooth   induction of IV sedation, supine position on his hospital stretcher. The   left shoulder was examined. He had only 90 degrees of passive   abduction. His shoulder was manipulated into full abduction, with the   lysis of adhesions felt. Internal and external rotation were also   manipulated. The humeral joint was then injected with Depo-  Medrol and Xylocaine under sterile technique. The patient tolerated the   procedure well.         MD KAREY Mccartney / [place]   D:  2017   13:56   T:  2017   14:10   Job #:  [id number]

## 2017-08-11 NOTE — IP AVS SNAPSHOT
[address] [facility] 
[phone] Patient: Anastacio Gentile MRN: [id number] ZPY:[id number] You are allergic to the following Allergen Reactions Allergen (Antipyrine-Benzocaine) Unknown (comments) Avelox (Moxifloxacin) Unknown (comments) Codeine Sulfate Unknown (comments) Pcn (Penicillins) Other (comments) Coma Recent Documentation Height Weight BMI Smoking Status 1.753 m 71.9 kg 23.42 kg/m2 Current Every Day Smoker Emergency Contacts Name Discharge Info Relation Home Work Mobile Valeriy Fry     [phone] [address] CAREGIVER [3] Caregiver [13] Andreea Hogan NO [2] Sister [23] [phone] About your hospitalization You were admitted on:  August 11, 2017 You last received care in the:  \[facility]\"" A[facility] PACU You were discharged on:  August 11, 2017 Unit phone number:  [phone] Why you were hospitalized Your primary diagnosis was:  Not on File Providers Seen During Your Hospitalizations Provider Role Specialty Primary office phone Jesse Duque MD Attending Provider Orthopedic Surgery [phone] Your Primary Care Physician (PCP) Primary Care Physician Office Phone Office Fax [facility] [phone] Follow-up Information Follow up With Details Comments Contact Info Jesse Duque MD Call in 2 week(s)  [address] [facility] [address] [facility] 
[phone] Current Discharge Medication List  
  
CONTINUE these medications which have CHANGED Dose & Instructions Dispensing Information Comments Morning Noon Evening Bedtime * HYDROcodone-acetaminophen 5-325 mg per tablet Commonly known as:  Clara Null What changed:  Another medication with the same name was added.  Make sure you understand how and when to take each. Your last dose was: Your next dose is:    
   
   
 Dose:  1-2 Tab Take 1-2 Tabs by mouth every four (4) hours as needed for Pain. Max Daily Amount: 12 Tabs. Quantity:  40 Tab Refills:  0  
     
   
   
   
  
 * HYDROcodone-acetaminophen 5-325 mg per tablet Commonly known as:  Teresita He What changed: You were already taking a medication with the same name, and this prescription was added. Make sure you understand how and when to take each. Your last dose was: Your next dose is:    
   
   
 Dose:  1-2 Tab Take 1-2 Tabs by mouth every four (4) hours as needed for Pain. Max Daily Amount: 12 Tabs. Quantity:  30 Tab Refills:  0  
     
   
   
   
  
 * Notice: This list has 2 medication(s) that are the same as other medications prescribed for you. Read the directions carefully, and ask your doctor or other care provider to review them with you. CONTINUE these medications which have NOT CHANGED Dose & Instructions Dispensing Information Comments Morning Noon Evening Bedtime  
 albuterol 2.5 mg /3 mL (0.083 %) nebulizer solution Commonly known as:  PROVENTIL VENTOLIN Your last dose was: Your next dose is:    
   
   
 Dose:  1.25 mg  
1.25 mg by Nebulization route every four (4) hours as needed. Refills:  0  
     
   
   
   
  
 albuterol 90 mcg/actuation inhaler Commonly known as:  Vernal Sinner Your last dose was: Your next dose is:    
   
   
 Dose:  2 Puff Take 2 Puffs by inhalation three (3) times daily as needed. Refills:  0  
     
   
   
   
  
 OTHER Your last dose was: Your next dose is:    
   
   
 oxygen Refills:  0 SEROquel 300 mg tablet Generic drug:  QUEtiapine Your last dose was: Your next dose is:    
   
   
 Dose:  300 mg Take 300 mg by mouth nightly. Refills:  0 WELLBUTRIN  mg SR tablet Generic drug:  buPROPion SR Your last dose was: Your next dose is: Take  by mouth two (2) times a day. Refills:  0 Where to Get Your Medications Information on where to get these meds will be given to you by the nurse or doctor. ! Ask your nurse or doctor about these medications HYDROcodone-acetaminophen 5-325 mg per tablet Discharge Instructions Use ice as needed Stretch!!! 
 
 
>>>You received an IV form of Tylenol 1000mg during your surgery, you may take tylenol (or pain medication containing Tylenol or Acetaminophen) in 6 hours at 6:30pm.<<< 
 
DO NOT TAKE TYLENOL/ACETAMINOPHEN WITH  NORCO 
 
TAKE NARCOTIC PAIN MEDICATIONS WITH FOOD Narcotics tend to be constipating, we suggest taking a stool softener such as Colace or Miralax (follow package instructions). DO NOT DRIVE WHILE TAKING NARCOTIC PAIN MEDICATIONS. DO NOT TAKE SLEEPING MEDICATIONS OR ANTIANXIETY MEDICATIONS WHILE TAKING NARCOTIC PAIN MEDICATIONS,  ESPECIALLY THE NIGHT OF ANESTHESIA. CPAP PATIENTS BE SURE TO WEAR MACHINE WHENEVER NAPPING OR SLEEPING. DISCHARGE SUMMARY from Nurse The following personal items collected during your admission are returned to you:  
Dental Appliance: Dental Appliances: None Vision: Visual Aid: Glasses (given to friend) RETURNED TO PT Hearing Aid:   
Jewelry: Jewelry: None Clothing: Clothing: Footwear, Pants, Shirt, Socks, Undergarments, With patient Other Valuables: Other Valuables: Lenoard Donnie, With patient (given to friend) Valuables sent to safe:   
 
 
PATIENT INSTRUCTIONS: 
 
 
B - Balance E - Eyes F-  Face looks uneven A-  Arms unable to move or move even S-  Speech slurred or non-existent T-  Time-call 911 as soon as signs and symptoms begin-DO NOT go Back to bed or wait to see if you get better-TIME IS BRAIN. If you have not received your influenza and/or pneumococcal vaccine, please follow up with your primary care physician. The discharge information has been reviewed with the patient and caregiver. The patient and caregiver verbalized understanding. Discharge Instructions Attachments/References MEFS - HYDROCODONE/ACETAMINOPHEN (VICODIN, Yeyo Zohra, LORTAB) - (BY MOUTH) (ENGLISH) Discharge Orders None Introducing [facility]! Jamil Willis introduces [employer] patient portal. Now you can access parts of your medical record, email your doctor's office, and request medication refills online. 1. In your internet browser, go to [email or web address]. [facility]/[employer] 2. Click on the First Time User? Click Here link in the Sign In box. You will see the New Member Sign Up page. 3. Enter your [employer] Access Code exactly as it appears below. You will not need to use this code after youve completed the sign-up process. If you do not sign up before the expiration date, you must request a new code. · [employer] Access Code: [email or web address] Expires: 8/23/2017  2:42 PM 
 
4. Enter the last four digits of your Social Security Number (xxxx) and Date of Birth (mm/dd/yyyy) as indicated and click Submit. You will be taken to the next sign-up page. 5. Create a [employer] ID. This will be your [employer] login ID and cannot be changed, so think of one that is secure and easy to remember. 6. Create a [employer] password. You can change your password at any time. 7. Enter your Password Reset Question and Answer. This can be used at a later time if you forget your password. 8. Enter your e-mail address. You will receive e-mail notification when new information is available in [address] 9. Click Sign Up. You can now view and download portions of your medical record. 10. Click the Download Summary menu link to download a portable copy of your medical information. If you have questions, please visit the Frequently Asked Questions section of the [employer] website. Remember, [employer] is NOT to be used for urgent needs. For medical emergencies, dial 911. Now available from your iPhone and Android! General Information Please provide this summary of care documentation to your next provider. Patient Signature:  ____________________________________________________________ Date:  ____________________________________________________________  
  
Robby Tank Provider Signature:  ____________________________________________________________ Date:  ____________________________________________________________ More Information Hydrocodone/Acetaminophen (Vicodin, Norco, Lortab) - (By mouth) Why this medicine is used:  
Treats pain. Contact a nurse or doctor right away if you have: · Blistering, peeling, red skin rash · Fast or slow heartbeat, shallow breathing, blue lips, fingernails, or skin · Anxiety, restlessness, muscle spasms, twitching, seeing or hearing things that are not there · Dark urine or pale stools, yellow skin or eyes · Extreme weakness, sweating, seizures, cold or clammy skin · Lightheadedness, dizziness, fainting, fever, sweating Common side effects: 
· Constipation, nausea, vomiting, loss of appetite, stomach pain · Tiredness or sleepiness © 2017 [address] Information is for End User's use only and may not be sold, redistributed or otherwise used for commercial purposes.

## 2017-08-11 NOTE — DISCHARGE INSTRUCTIONS
Use ice as needed  Stretch!!!      >>>You received an IV form of Tylenol 1000mg during your surgery, you may take tylenol (or pain medication containing Tylenol or Acetaminophen) in 6 hours at 6:30pm.<<<    DO NOT TAKE TYLENOL/ACETAMINOPHEN WITH  NORCO    TAKE NARCOTIC PAIN MEDICATIONS WITH FOOD   Narcotics tend to be constipating, we suggest taking a stool softener such as Colace or Miralax (follow package instructions). DO NOT DRIVE WHILE TAKING NARCOTIC PAIN MEDICATIONS. DO NOT TAKE SLEEPING MEDICATIONS OR ANTIANXIETY MEDICATIONS WHILE TAKING NARCOTIC PAIN MEDICATIONS,  ESPECIALLY THE NIGHT OF ANESTHESIA. CPAP PATIENTS BE SURE TO WEAR MACHINE WHENEVER NAPPING OR SLEEPING. DISCHARGE SUMMARY from Nurse    The following personal items collected during your admission are returned to you:   Dental Appliance: Dental Appliances: None  Vision: Visual Aid: Glasses (given to friend) RETURNED TO PT  Hearing Aid:    Jewelry: Jewelry: None  Clothing: Clothing: Footwear, Pants, Shirt, Socks, Undergarments, With patient  Other Valuables: Other Valuables: Jeanie Oneill, With patient (given to friend)  Valuables sent to safe:        PATIENT INSTRUCTIONS:    After General Anesthesia or Intravenous Sedation, for 24 hours or while taking prescription Narcotics:        Someone should be with you for the next 24 hours. For your own safety, a responsible adult must drive you home. · Limit your activities  · Recommended activity: Rest today, up with assistance today. Do not climb stairs or shower unattended for the next 24 hours. · Please start with a soft bland diet and advance as tolerated (no nausea) to regular diet. · If you have a sore throat you should try the following: fluids, warm salt water gargles, or throat lozenges. If it does not improve after several days please follow up with your primary physician.   · Do not drive and operate hazardous machinery  · Do not make important personal or business decisions  · Do  not drink alcoholic beverages  · If you have not urinated within 8 hours after discharge, please contact your surgeon on call. Report the following to your surgeon:  · Excessive pain, swelling, redness or odor of or around the surgical area  · Temperature over 100.5  · Nausea and vomiting lasting longer than 4 hours or if unable to take medications  · Any signs of decreased circulation or nerve impairment to extremity: change in color, persistent  numbness, tingling, coldness or increase pain      · You will receive a Post Operative Call from one of the Recovery Room Nurses on the day after your surgery to check on you. It is very important for us to know how you are recovering after your surgery. If you have an issue or need to speak with someone, please call your surgeon, do not wait for the post operative call. · You may receive an e-mail or letter in the mail from [facility] regarding your experience with us in the Ambulatory Surgery Unit. Your feedback is valuable to us and we appreciate your participation in the survey. · If the above instructions are not adequate, please contact Edenilson Parada RN, Marcela anesthesia Nurse Manager or our Anesthesiologist, at [phone]. If you are having problems after your surgery, call the physician at his office number. · We wish you a speedy recovery ? What to do at Home:      *  Please give a list of your current medications to your Primary Care Provider. *  Please update this list whenever your medications are discontinued, doses are      changed, or new medications (including over-the-counter products) are added. *  Please carry medication information at all times in case of emergency situations.             These are general instructions for a healthy lifestyle:    No smoking/ No tobacco products/ Avoid exposure to second hand smoke    Surgeon General's Warning:  Quitting smoking now greatly reduces serious risk to your health. Obesity, smoking, and sedentary lifestyle greatly increases your risk for illness    A healthy diet, regular physical exercise & weight monitoring are important for maintaining a healthy lifestyle    You may be retaining fluid if you have a history of heart failure or if you experience any of the following symptoms:  Weight gain of 3 pounds or more overnight or 5 pounds in a week, increased swelling in our hands or feet or shortness of breath while lying flat in bed. Please call your doctor as soon as you notice any of these symptoms; do not wait until your next office visit. Recognize signs and symptoms of STROKE:    B - Balance  E - Eyes    F-  Face looks uneven    A-  Arms unable to move or move even    S-  Speech slurred or non-existent    T-  Time-call 911 as soon as signs and symptoms begin-DO NOT go       Back to bed or wait to see if you get better-TIME IS BRAIN. If you have not received your influenza and/or pneumococcal vaccine, please follow up with your primary care physician. The discharge information has been reviewed with the patient and caregiver. The patient and caregiver verbalized understanding.

## 2017-08-11 NOTE — BRIEF OP NOTE
BRIEF OPERATIVE NOTE    Date of Procedure: 8/11/2017   Preoperative Diagnosis: LEFT SHOULDER IMPINGEMENT  Postoperative Diagnosis: LEFT SHOULDER IMPINGEMENT    Procedure(s):  MANIPULATION AND INJECTION LEFT SHOULDER    Surgeon(s) and Role:     * Zahra Bra MD - Primary         Assistant Staff:       Surgical Staff:  Circ-1: Steph Tellez RN  No case tracking events are documented in the log.   Anesthesia: MAC   Estimated Blood Loss: 0  Specimens: * No specimens in log *   Findings: 0   Complications: 0  Implants: * No implants in log *

## 2017-08-11 NOTE — PERIOP NOTES
Yonatan Mcconnell  1955  [id number]    Situation:  Verbal report given from: SHABANA Correia CRNA  Procedure: Procedure(s):  MANIPULATION AND INJECTION LEFT SHOULDER      Background:    Preoperative diagnosis: LEFT SHOULDER IMPINGEMENT    Postoperative diagnosis: LEFT SHOULDER IMPINGEMENT    :  Dr. Chandler Garrett    Assistant(s): Circ-1: Morgan Crespo RN    Specimens: * No specimens in log *    Assessment:  Intra-procedure medications         Anesthesia gave intra-procedure sedation and medications, see anesthesia flow sheet     Intravenous fluids: LR@ KVO     Vital signs stable       Recommendation:    Permission to share finding with family or friend yes

## 2017-09-21 NOTE — PERIOP NOTES
Spoke to the patient and gave TOA and instructions to come to the surgery center since surgery was moved to the Main OR. I also reviewed pre op instructions with patient and verbalized understanding.

## 2017-09-22 ENCOUNTER — HOSPITAL ENCOUNTER (OUTPATIENT)
Age: 62
Setting detail: OUTPATIENT SURGERY
Discharge: HOME | End: 2017-09-22
Attending: ORTHOPAEDIC SURGERY | Admitting: ORTHOPAEDIC SURGERY
Payer: MEDICARE

## 2017-09-22 ENCOUNTER — ANESTHESIA EVENT (OUTPATIENT)
Dept: SURGERY | Age: 62
End: 2017-09-22
Payer: MEDICARE

## 2017-09-22 ENCOUNTER — APPOINTMENT (OUTPATIENT)
Dept: GENERAL RADIOLOGY | Age: 62
End: 2017-09-22
Attending: ORTHOPAEDIC SURGERY
Payer: MEDICARE

## 2017-09-22 ENCOUNTER — ANESTHESIA (OUTPATIENT)
Dept: SURGERY | Age: 62
End: 2017-09-22
Payer: MEDICARE

## 2017-09-22 VITALS
SYSTOLIC BLOOD PRESSURE: 129 MMHG | OXYGEN SATURATION: 96 % | WEIGHT: 156.97 LBS | DIASTOLIC BLOOD PRESSURE: 70 MMHG | BODY MASS INDEX: 21.26 KG/M2 | RESPIRATION RATE: 14 BRPM | TEMPERATURE: 98 F | HEIGHT: 72 IN | HEART RATE: 79 BPM

## 2017-09-22 LAB
ANION GAP SERPL CALC-SCNC: 9 MMOL/L (ref 5–15)
BUN SERPL-MCNC: 17 MG/DL (ref 6–20)
BUN/CREAT SERPL: 18 (ref 12–20)
CALCIUM SERPL-MCNC: 8.9 MG/DL (ref 8.5–10.1)
CHLORIDE SERPL-SCNC: 100 MMOL/L (ref 97–108)
CO2 SERPL-SCNC: 29 MMOL/L (ref 21–32)
CREAT SERPL-MCNC: 0.92 MG/DL (ref 0.7–1.3)
ERYTHROCYTE [DISTWIDTH] IN BLOOD BY AUTOMATED COUNT: 16.7 % (ref 11.5–14.5)
GLUCOSE SERPL-MCNC: 87 MG/DL (ref 65–100)
HCT VFR BLD AUTO: 51.8 % (ref 36.6–50.3)
HGB BLD-MCNC: 17.4 G/DL (ref 12.1–17)
MCH RBC QN AUTO: 29.4 PG (ref 26–34)
MCHC RBC AUTO-ENTMCNC: 33.6 G/DL (ref 30–36.5)
MCV RBC AUTO: 87.6 FL (ref 80–99)
PLATELET # BLD AUTO: 173 K/UL (ref 150–400)
POTASSIUM SERPL-SCNC: 3.5 MMOL/L (ref 3.5–5.1)
RBC # BLD AUTO: 5.91 M/UL (ref 4.1–5.7)
SODIUM SERPL-SCNC: 138 MMOL/L (ref 136–145)
WBC # BLD AUTO: 6.1 K/UL (ref 4.1–11.1)

## 2017-09-22 PROCEDURE — 85027 COMPLETE CBC AUTOMATED: CPT | Performed by: ORTHOPAEDIC SURGERY

## 2017-09-22 PROCEDURE — 77030008684 HC TU ET CUF COVD -B: Performed by: ANESTHESIOLOGY

## 2017-09-22 PROCEDURE — 77030032497 HC WRP SHLDR WO BGS SOLM -B

## 2017-09-22 PROCEDURE — 76210000001 HC OR PH I REC 2.5 TO 3 HR: Performed by: ORTHOPAEDIC SURGERY

## 2017-09-22 PROCEDURE — 76210000020 HC REC RM PH II FIRST 0.5 HR: Performed by: ORTHOPAEDIC SURGERY

## 2017-09-22 PROCEDURE — 74011000250 HC RX REV CODE- 250

## 2017-09-22 PROCEDURE — 77030018835 HC SOL IRR LR ICUM -A: Performed by: ORTHOPAEDIC SURGERY

## 2017-09-22 PROCEDURE — 74011250636 HC RX REV CODE- 250/636

## 2017-09-22 PROCEDURE — 77030003666 HC NDL SPINAL BD -A: Performed by: ORTHOPAEDIC SURGERY

## 2017-09-22 PROCEDURE — 80048 BASIC METABOLIC PNL TOTAL CA: CPT | Performed by: ORTHOPAEDIC SURGERY

## 2017-09-22 PROCEDURE — 77030002916 HC SUT ETHLN J&J -A: Performed by: ORTHOPAEDIC SURGERY

## 2017-09-22 PROCEDURE — 77030006884 HC BLD SHV INCIS S&N -B: Performed by: ORTHOPAEDIC SURGERY

## 2017-09-22 PROCEDURE — 77030004451 HC BUR SHV S&N -B: Performed by: ORTHOPAEDIC SURGERY

## 2017-09-22 PROCEDURE — L3670 SO ACRO/CLAV CAN WEB PRE OTS: HCPCS

## 2017-09-22 PROCEDURE — 74011250637 HC RX REV CODE- 250/637

## 2017-09-22 PROCEDURE — 77030008495 HC TBNG ARTHSC IRR CNMD -B: Performed by: ORTHOPAEDIC SURGERY

## 2017-09-22 PROCEDURE — 77030019908 HC STETH ESOPH SIMS -A: Performed by: ANESTHESIOLOGY

## 2017-09-22 PROCEDURE — 74011250636 HC RX REV CODE- 250/636: Performed by: ANESTHESIOLOGY

## 2017-09-22 PROCEDURE — 74011250636 HC RX REV CODE- 250/636: Performed by: ORTHOPAEDIC SURGERY

## 2017-09-22 PROCEDURE — 76010000153 HC OR TIME 1.5 TO 2 HR: Performed by: ORTHOPAEDIC SURGERY

## 2017-09-22 PROCEDURE — 36415 COLL VENOUS BLD VENIPUNCTURE: CPT | Performed by: ORTHOPAEDIC SURGERY

## 2017-09-22 PROCEDURE — 71010 XR CHEST PORT: CPT

## 2017-09-22 PROCEDURE — 76060000034 HC ANESTHESIA 1.5 TO 2 HR: Performed by: ORTHOPAEDIC SURGERY

## 2017-09-22 PROCEDURE — 77030026438 HC STYL ET INTUB CARD -A: Performed by: ANESTHESIOLOGY

## 2017-09-22 PROCEDURE — 74011000250 HC RX REV CODE- 250: Performed by: ORTHOPAEDIC SURGERY

## 2017-09-22 RX ORDER — MIDAZOLAM HYDROCHLORIDE 1 MG/ML
INJECTION, SOLUTION INTRAMUSCULAR; INTRAVENOUS AS NEEDED
Status: DISCONTINUED | OUTPATIENT
Start: 2017-09-22 | End: 2017-09-22 | Stop reason: HOSPADM

## 2017-09-22 RX ORDER — ONDANSETRON 2 MG/ML
INJECTION INTRAMUSCULAR; INTRAVENOUS AS NEEDED
Status: DISCONTINUED | OUTPATIENT
Start: 2017-09-22 | End: 2017-09-22 | Stop reason: HOSPADM

## 2017-09-22 RX ORDER — BUPROPION HYDROCHLORIDE 150 MG/1
150 TABLET, EXTENDED RELEASE ORAL 2 TIMES DAILY
Status: CANCELLED | OUTPATIENT
Start: 2017-09-22

## 2017-09-22 RX ORDER — SODIUM CHLORIDE 0.9 % (FLUSH) 0.9 %
5-10 SYRINGE (ML) INJECTION EVERY 8 HOURS
Status: CANCELLED | OUTPATIENT
Start: 2017-09-22

## 2017-09-22 RX ORDER — SUCCINYLCHOLINE CHLORIDE 20 MG/ML
INJECTION INTRAMUSCULAR; INTRAVENOUS AS NEEDED
Status: DISCONTINUED | OUTPATIENT
Start: 2017-09-22 | End: 2017-09-22 | Stop reason: HOSPADM

## 2017-09-22 RX ORDER — SODIUM CHLORIDE FOR INHALATION 0.9 %
VIAL, NEBULIZER (ML) INHALATION
Status: COMPLETED
Start: 2017-09-22 | End: 2017-09-22

## 2017-09-22 RX ORDER — DIPHENHYDRAMINE HCL 25 MG
25 CAPSULE ORAL
Status: DISCONTINUED | OUTPATIENT
Start: 2017-09-22 | End: 2017-09-22 | Stop reason: HOSPADM

## 2017-09-22 RX ORDER — SODIUM CHLORIDE 0.9 % (FLUSH) 0.9 %
5-10 SYRINGE (ML) INJECTION AS NEEDED
Status: CANCELLED | OUTPATIENT
Start: 2017-09-22

## 2017-09-22 RX ORDER — OLANZAPINE 10 MG/1
20 TABLET ORAL
Status: CANCELLED | OUTPATIENT
Start: 2017-09-22

## 2017-09-22 RX ORDER — NALOXONE HYDROCHLORIDE 0.4 MG/ML
0.4 INJECTION, SOLUTION INTRAMUSCULAR; INTRAVENOUS; SUBCUTANEOUS AS NEEDED
Status: CANCELLED | OUTPATIENT
Start: 2017-09-22

## 2017-09-22 RX ORDER — ONDANSETRON 4 MG/1
4 TABLET, ORALLY DISINTEGRATING ORAL
Status: DISCONTINUED | OUTPATIENT
Start: 2017-09-22 | End: 2017-09-22 | Stop reason: HOSPADM

## 2017-09-22 RX ORDER — ROCURONIUM BROMIDE 10 MG/ML
INJECTION, SOLUTION INTRAVENOUS AS NEEDED
Status: DISCONTINUED | OUTPATIENT
Start: 2017-09-22 | End: 2017-09-22 | Stop reason: HOSPADM

## 2017-09-22 RX ORDER — PROPOFOL 10 MG/ML
INJECTION, EMULSION INTRAVENOUS AS NEEDED
Status: DISCONTINUED | OUTPATIENT
Start: 2017-09-22 | End: 2017-09-22 | Stop reason: HOSPADM

## 2017-09-22 RX ORDER — FENTANYL CITRATE 50 UG/ML
INJECTION, SOLUTION INTRAMUSCULAR; INTRAVENOUS AS NEEDED
Status: DISCONTINUED | OUTPATIENT
Start: 2017-09-22 | End: 2017-09-22 | Stop reason: HOSPADM

## 2017-09-22 RX ORDER — OXYCODONE HYDROCHLORIDE 5 MG/1
5-10 TABLET ORAL
Status: DISCONTINUED | OUTPATIENT
Start: 2017-09-22 | End: 2017-09-22 | Stop reason: HOSPADM

## 2017-09-22 RX ORDER — ALBUTEROL SULFATE 0.83 MG/ML
1.25 SOLUTION RESPIRATORY (INHALATION)
Status: CANCELLED | OUTPATIENT
Start: 2017-09-22

## 2017-09-22 RX ORDER — IPRATROPIUM BROMIDE AND ALBUTEROL SULFATE 2.5; .5 MG/3ML; MG/3ML
3 SOLUTION RESPIRATORY (INHALATION)
Status: COMPLETED | OUTPATIENT
Start: 2017-09-22 | End: 2017-09-22

## 2017-09-22 RX ORDER — EPINEPHRINE 1 MG/ML
INJECTION INTRAMUSCULAR; INTRAVENOUS; SUBCUTANEOUS AS NEEDED
Status: DISCONTINUED | OUTPATIENT
Start: 2017-09-22 | End: 2017-09-22 | Stop reason: HOSPADM

## 2017-09-22 RX ORDER — SODIUM CHLORIDE, SODIUM LACTATE, POTASSIUM CHLORIDE, CALCIUM CHLORIDE 600; 310; 30; 20 MG/100ML; MG/100ML; MG/100ML; MG/100ML
25 INJECTION, SOLUTION INTRAVENOUS CONTINUOUS
Status: DISCONTINUED | OUTPATIENT
Start: 2017-09-22 | End: 2017-09-22 | Stop reason: HOSPADM

## 2017-09-22 RX ORDER — BUPIVACAINE HYDROCHLORIDE AND EPINEPHRINE 5; 5 MG/ML; UG/ML
INJECTION, SOLUTION EPIDURAL; INTRACAUDAL; PERINEURAL AS NEEDED
Status: DISCONTINUED | OUTPATIENT
Start: 2017-09-22 | End: 2017-09-22 | Stop reason: HOSPADM

## 2017-09-22 RX ORDER — OXYCODONE HYDROCHLORIDE 5 MG/1
5-10 TABLET ORAL
Qty: 40 TAB | Refills: 0 | Status: SHIPPED | OUTPATIENT
Start: 2017-09-22 | End: 2018-01-11

## 2017-09-22 RX ORDER — GLYCOPYRROLATE 0.2 MG/ML
INJECTION INTRAMUSCULAR; INTRAVENOUS AS NEEDED
Status: DISCONTINUED | OUTPATIENT
Start: 2017-09-22 | End: 2017-09-22 | Stop reason: HOSPADM

## 2017-09-22 RX ORDER — NEOSTIGMINE METHYLSULFATE 1 MG/ML
INJECTION INTRAVENOUS AS NEEDED
Status: DISCONTINUED | OUTPATIENT
Start: 2017-09-22 | End: 2017-09-22 | Stop reason: HOSPADM

## 2017-09-22 RX ORDER — CLONAZEPAM 1 MG/1
1 TABLET ORAL
Status: CANCELLED | OUTPATIENT
Start: 2017-09-22

## 2017-09-22 RX ORDER — IPRATROPIUM BROMIDE AND ALBUTEROL SULFATE 2.5; .5 MG/3ML; MG/3ML
SOLUTION RESPIRATORY (INHALATION)
Status: COMPLETED
Start: 2017-09-22 | End: 2017-09-22

## 2017-09-22 RX ORDER — CLINDAMYCIN PHOSPHATE 900 MG/50ML
900 INJECTION INTRAVENOUS ONCE
Status: COMPLETED | OUTPATIENT
Start: 2017-09-22 | End: 2017-09-22

## 2017-09-22 RX ORDER — LIDOCAINE HYDROCHLORIDE 20 MG/ML
INJECTION, SOLUTION EPIDURAL; INFILTRATION; INTRACAUDAL; PERINEURAL AS NEEDED
Status: DISCONTINUED | OUTPATIENT
Start: 2017-09-22 | End: 2017-09-22 | Stop reason: HOSPADM

## 2017-09-22 RX ORDER — CLINDAMYCIN HYDROCHLORIDE 300 MG/1
300 CAPSULE ORAL 3 TIMES DAILY
Qty: 6 CAP | Refills: 0 | Status: SHIPPED | OUTPATIENT
Start: 2017-09-22 | End: 2017-09-24

## 2017-09-22 RX ADMIN — GLYCOPYRROLATE 0.4 MG: 0.2 INJECTION INTRAMUSCULAR; INTRAVENOUS at 13:48

## 2017-09-22 RX ADMIN — PROPOFOL 20 MG: 10 INJECTION, EMULSION INTRAVENOUS at 12:42

## 2017-09-22 RX ADMIN — SODIUM CHLORIDE, SODIUM LACTATE, POTASSIUM CHLORIDE, AND CALCIUM CHLORIDE 25 ML/HR: 600; 310; 30; 20 INJECTION, SOLUTION INTRAVENOUS at 11:44

## 2017-09-22 RX ADMIN — ROCURONIUM BROMIDE 20 MG: 10 INJECTION, SOLUTION INTRAVENOUS at 12:55

## 2017-09-22 RX ADMIN — NEOSTIGMINE METHYLSULFATE 3 MG: 1 INJECTION INTRAVENOUS at 13:48

## 2017-09-22 RX ADMIN — SODIUM CHLORIDE, SODIUM LACTATE, POTASSIUM CHLORIDE, AND CALCIUM CHLORIDE: 600; 310; 30; 20 INJECTION, SOLUTION INTRAVENOUS at 12:28

## 2017-09-22 RX ADMIN — PROPOFOL 140 MG: 10 INJECTION, EMULSION INTRAVENOUS at 12:40

## 2017-09-22 RX ADMIN — CLINDAMYCIN PHOSPHATE 900 MG: 18 INJECTION, SOLUTION INTRAVENOUS at 12:45

## 2017-09-22 RX ADMIN — FENTANYL CITRATE 50 MCG: 50 INJECTION, SOLUTION INTRAMUSCULAR; INTRAVENOUS at 12:30

## 2017-09-22 RX ADMIN — FENTANYL CITRATE 50 MCG: 50 INJECTION, SOLUTION INTRAMUSCULAR; INTRAVENOUS at 12:42

## 2017-09-22 RX ADMIN — ROCURONIUM BROMIDE 30 MG: 10 INJECTION, SOLUTION INTRAVENOUS at 12:45

## 2017-09-22 RX ADMIN — LIDOCAINE HYDROCHLORIDE 60 MG: 20 INJECTION, SOLUTION EPIDURAL; INFILTRATION; INTRACAUDAL; PERINEURAL at 12:40

## 2017-09-22 RX ADMIN — RACEPINEPHRINE HYDROCHLORIDE 0.5 ML: 11.25 SOLUTION RESPIRATORY (INHALATION) at 14:35

## 2017-09-22 RX ADMIN — FENTANYL CITRATE 100 MCG: 50 INJECTION, SOLUTION INTRAMUSCULAR; INTRAVENOUS at 13:15

## 2017-09-22 RX ADMIN — NEOSTIGMINE METHYLSULFATE 1 MG: 1 INJECTION INTRAVENOUS at 13:50

## 2017-09-22 RX ADMIN — MIDAZOLAM HYDROCHLORIDE 2 MG: 1 INJECTION, SOLUTION INTRAMUSCULAR; INTRAVENOUS at 12:30

## 2017-09-22 RX ADMIN — ONDANSETRON 4 MG: 2 INJECTION INTRAMUSCULAR; INTRAVENOUS at 13:31

## 2017-09-22 RX ADMIN — PROPOFOL 20 MG: 10 INJECTION, EMULSION INTRAVENOUS at 13:20

## 2017-09-22 RX ADMIN — Medication 3 ML: at 14:36

## 2017-09-22 RX ADMIN — GLYCOPYRROLATE 0.2 MG: 0.2 INJECTION INTRAMUSCULAR; INTRAVENOUS at 13:50

## 2017-09-22 RX ADMIN — IPRATROPIUM BROMIDE AND ALBUTEROL SULFATE 3 ML: 2.5; .5 SOLUTION RESPIRATORY (INHALATION) at 14:14

## 2017-09-22 RX ADMIN — SODIUM CHLORIDE, SODIUM LACTATE, POTASSIUM CHLORIDE, AND CALCIUM CHLORIDE: 600; 310; 30; 20 INJECTION, SOLUTION INTRAVENOUS at 13:15

## 2017-09-22 RX ADMIN — SUCCINYLCHOLINE CHLORIDE 100 MG: 20 INJECTION INTRAMUSCULAR; INTRAVENOUS at 12:40

## 2017-09-22 RX ADMIN — IPRATROPIUM BROMIDE AND ALBUTEROL SULFATE 3 ML: .5; 3 SOLUTION RESPIRATORY (INHALATION) at 14:14

## 2017-09-22 NOTE — DISCHARGE INSTRUCTIONS
[facility]  Sub-Acromial Decompression Post-Operative Instructions    1. Your first meal should be clear liquids. If you tolerate this without nausea you may resume your regular diet. 2.  Wear sling until nerve block wears off. After that, sling is optional.  3. Use ice for 30 minutes on/off for 48 hours. Remove ice at bedtime. 4. Keep dressing dry today. Remove dressing tomorrow and place band-aids over puncture sites. 5. May shower in 3 days. Change band aids daily after shower. 6. Start stretching shoulder tomorrow to regain full motion. No reaching or heavy lifting or straining. 7. For your safety, you must have a responsible adult drive you home. You can drive when you are no longer wearing the sling and no longer taking pain pills. 8. Someone responsible should stay with you for 24 hours after surgery. If you had medication, or general anesthesia, it is normal to feel drowsy & weak, therefore, it is not recommended that you operate machinery, walk or stand without assistance, drink alcoholic beverages or eat heavy meals (due to nausea), make important decisions/sign important papers; children should not ride bicycles or skateboards. Please do not climb stairs or shower/bathe unattended for at least 24 hours. 9. Pain pills cause constipation and upset stomach. May take ES Tylenol instead pain pills. For additional pain relief may alternate pain pills with Advil 400 mg every 4 hours. 8. Notify your physician if you show signs of infection such as swelling, redness, or pus formation, temperature of 100.5 or greater, or any other disruption of surgical site. Office phone number [phone]. 11. Follow up appointment                             Accompanying Adult     Date                    Nurse       Date    A common side effect of anesthesia following surgery is nausea and/or vomiting.  In order to decrease symptoms, it is wise to avoid foods that are high in fat, greasy foods, milk products, and spicy foods for the first 24 hours. Acceptable foods for the first 24 hours following surgery include but are not limited to:     soup   broth    toast    crackers    applesauce    bananas    mashed potatoes,   soft or scrambled eggs   oatmeal    jello    It is important to eat when taking your pain medication. This will help to prevent nausea. If possible, please try to time your meals with your medications. It is very important to stay hydrated following surgery. Sip fluids frequently while awake. Avoid acidic drinks such as citrus juices and soda for 24 hours. Carbonated beverages may cause bloating and gas. Acceptable fluids include:    - water (flavor packets may add variety)  - coffee or tea (in moderation)  - Gatorade  - Braulio-aid  - apple juice  - cranberry juice    You are encouraged to cough and deep breathe every hour when awake. This will help to prevent respiratory complications following anesthesia. You may want to hug a pillow when coughing and sneezing to add additional support to the surgical area and to decrease discomfort if you had abdominal or chest surgery. If you are discharged home with support stockings, you may remove them after 24 hours. Support stockings are used to help prevent blood clots in the legs following surgery. Please take time to review all of your Home Care Instructions and Medication Information sheets provided in your discharge packet. If you have any questions, please contact your surgeons office. Thank you.     DISCHARGE SUMMARY from Nurse    The following personal items are in your possession at time of discharge:    Dental Appliances: None  Visual Aid: Glasses (Glasses to PACU)  Hearing Aids/Status: Does not own  Home Medications: None  Jewelry: None  Clothing: Undergarments, Socks, Footwear, Pants, Shirt, Belt (To PACU)  Other Valuables: Eyeglasses (To PACU)  Personal Items Sent to Safe: Declines          PATIENT INSTRUCTIONS:    After general anesthesia or intravenous sedation, for 24 hours or while taking prescription Narcotics:  · Limit your activities  · Do not drive and operate hazardous machinery  · Do not make important personal or business decisions  · Do  not drink alcoholic beverages  · If you have not urinated within 8 hours after discharge, please contact your surgeon on call. Report the following to your surgeon:  · Excessive pain, swelling, redness or odor of or around the surgical area  · Temperature over 100.5  · Nausea and vomiting lasting longer than 4 hours or if unable to take medications  · Any signs of decreased circulation or nerve impairment to extremity: change in color, persistent  numbness, tingling, coldness or increase pain  · Any questions        What to do at Home:    These are general instructions for a healthy lifestyle:    No smoking/ No tobacco products/ Avoid exposure to second hand smoke    Surgeon General's Warning:  Quitting smoking now greatly reduces serious risk to your health. Obesity, smoking, and sedentary lifestyle greatly increases your risk for illness    A healthy diet, regular physical exercise & weight monitoring are important for maintaining a healthy lifestyle    You may be retaining fluid if you have a history of heart failure or if you experience any of the following symptoms:  Weight gain of 3 pounds or more overnight or 5 pounds in a week, increased swelling in our hands or feet or shortness of breath while lying flat in bed. Please call your doctor as soon as you notice any of these symptoms; do not wait until your next office visit. Recognize signs and symptoms of STROKE:    F-face looks uneven    A-arms unable to move or move unevenly    S-speech slurred or non-existent    T-time-call 911 as soon as signs and symptoms begin-DO NOT go       Back to bed or wait to see if you get better-TIME IS BRAIN.     Warning Signs of HEART ATTACK     Call 911 if you have these symptoms:   Chest discomfort. Most heart attacks involve discomfort in the center of the chest that lasts more than a few minutes, or that goes away and comes back. It can feel like uncomfortable pressure, squeezing, fullness, or pain.  Discomfort in other areas of the upper body. Symptoms can include pain or discomfort in one or both arms, the back, neck, jaw, or stomach.  Shortness of breath with or without chest discomfort.  Other signs may include breaking out in a cold sweat, nausea, or lightheadedness. Don't wait more than five minutes to call 911 - MINUTES MATTER! Fast action can save your life. Calling 911 is almost always the fastest way to get lifesaving treatment. Emergency Medical Services staff can begin treatment when they arrive -- up to an hour sooner than if someone gets to the hospital by car. The discharge information has been reviewed with the patient and caregiver. The patient and caregiver verbalized understanding. Discharge medications reviewed with the patient and caregiver and appropriate educational materials and side effects teaching were provided. Oxycodone, Rapid Release (By mouth)   Oxycodone Hydrochloride ([id number]-d-VIF-done [id number]-droe-KLOR-heather)  Treats moderate to severe pain. This medicine is a narcotic pain reliever. Brand Name(s): Oxaydo, Oxy IR, Roxicodone   There may be other brand names for this medicine. When This Medicine Should Not Be Used: This medicine is not right for everyone. Do not use it if you had an allergic reaction to oxycodone, codeine, hydrocodone, dihydrocodeine, or morphine, or you have a stomach or bowel blockage. How to Use This Medicine:   Capsule, Liquid, Tablet  · Take your medicine as directed. Your dose may need to be changed several times to find what works best for you. · An overdose can be dangerous. Follow directions carefully so you do not get too much medicine at one time.   · Oral liquid: Measure the oral liquid medicine with a marked measuring spoon, oral syringe, or medicine cup. · Oxaydo® tablet: Swallow it whole with enough water to swallow it completely. Do not break, crush, chew, or dissolve it. Do not wet the tablet before you put it in your mouth. · This medicine should come with a Medication Guide. Ask your pharmacist for a copy if you do not have one. · Missed dose: Take a dose as soon as you remember. If it is almost time for your next dose, wait until then and take a regular dose. Do not take extra medicine to make up for a missed dose. · Store the medicine in a closed container at room temperature, away from heat, moisture, and direct light. Store the medicine in a secure place to prevent others from getting it. Ask your pharmacist about the best way to dispose of medicine you do not use. Drugs and Foods to Avoid:   Ask your doctor or pharmacist before using any other medicine, including over-the-counter medicines, vitamins, and herbal products. · Do not use this medicine if you are using or have used an MAO inhibitor within the past 14 days. · Some medicines can affect how oxycodone works. Tell your doctor if you are using any of the following:  ¨ Amiodarone, carbamazepine, erythromycin, ketoconazole, phenytoin, quinidine, rifampin, ritonavir  ¨ Diuretic (water pill)  ¨ Medicine to treat depression or anxiety  ¨ Medicine to treat migraine headaches  ¨ Phenothiazine medicine  · Tell your doctor if you use anything else that makes you sleepy. Some examples are allergy medicine, narcotic pain medicine, and alcohol. Tell your doctor if you are using buprenorphine, butorphanol, nalbuphine, pentazocine, or a muscle relaxer. · Do not drink alcohol while you are using this medicine.   Warnings While Using This Medicine:   · Tell your doctor if you are pregnant or breastfeeding, or if you have kidney disease, liver disease, heart disease, low blood pressure, lung disease or breathing problems (such as asthma, COPD), scoliosis, an enlarged prostate or trouble urinating, an underactive thyroid, Jamar disease, gallbladder or pancreas problems, or digestion problems. Tell your doctor if you have a history of head injury, brain tumor, mental health problems, seizures, or alcohol or drug addiction. · This medicine may cause the following problems:  ¨ High risk of overdose, which can lead to death  ¨ Respiratory depression (serious breathing problem that can be life-threatening)  ¨ Serotonin syndrome, when used with certain medicines  · This medicine may make you dizzy, drowsy, or faint. Do not drive or do anything else that could be dangerous until you know how this medicine affects you. Sit or lie down if you feel dizzy. Stand up carefully. · This medicine can be habit-forming. Do not use more than your prescribed dose. Call your doctor if you think your medicine is not working. · Do not stop using this medicine suddenly. Your doctor will need to slowly decrease your dose before you stop it completely. · This medicine may cause constipation, especially with long-term use. Ask your doctor if you should use a laxative to prevent and treat constipation. Drink plenty of liquids to help avoid constipation. · This medicine could cause infertility. Talk with your doctor before using this medicine if you plan to have children. · Keep all medicine out of the reach of children. Never share your medicine with anyone.   Possible Side Effects While Using This Medicine:   Call your doctor right away if you notice any of these side effects:  · Allergic reaction: Itching or hives, swelling in your face or hands, swelling or tingling in your mouth or throat, chest tightness, trouble breathing  · Anxiety, restlessness, fast heartbeat, fever, sweating, muscle spasms, twitching, nausea, vomiting, diarrhea, seeing or hearing things that are not there  · Blue lips, fingernails, or skin, trouble breathing  · Extreme dizziness or weakness, shallow breathing, slow heartbeat, sweating, cold or clammy skin, seizures  · Lightheadedness, dizziness, fainting  · Severe constipation, stomach pain  If you notice these less serious side effects, talk with your doctor:   · Mild constipation  · Sleepiness, tiredness  If you notice other side effects that you think are caused by this medicine, tell your doctor. Call your doctor for medical advice about side effects. You may report side effects to FDA at 8-577-VAV-7883  © 2017 [facility] Information is for End User's use only and may not be sold, redistributed or otherwise used for commercial purposes. The above information is an  only. It is not intended as medical advice for individual conditions or treatments. Talk to your doctor, nurse or pharmacist before following any medical regimen to see if it is safe and effective for you.

## 2017-09-22 NOTE — IP AVS SNAPSHOT
[address] [facility] 
[phone] Patient: Nate Lazaro MRN: [id number] QOH:9/6/[id number] You are allergic to the following Allergen Reactions Allergen (Antipyrine-Benzocaine) Unknown (comments) Avelox (Moxifloxacin) Unknown (comments) Codeine Sulfate Unknown (comments) Pcn (Penicillins) Other (comments) Coma Recent Documentation Height Weight BMI Smoking Status 1.829 m 71.2 kg 21.29 kg/m2 Current Every Day Smoker Emergency Contacts Name Discharge Info Relation Home Work Mobile [address] CAREGIVER [3] Girlfriend [18]   [phone] Maksim Rosales DISCHARGE CAREGIVER [3] Caregiver [13] Verna Hogandy NO [2] Sister [23] [phone] About your hospitalization You were admitted on:  September 22, 2017 You last received care in the:  [facility] PACU You were discharged on:  September 22, 2017 Unit phone number:  [phone] Why you were hospitalized Your primary diagnosis was:  Not on File Providers Seen During Your Hospitalizations Provider Role Specialty Primary office phone Alon Cruz MD Attending Provider Orthopedic Surgery [phone] Your Primary Care Physician (PCP) Primary Care Physician Office Phone Office Fax Radha Johnston [phone] Follow-up Information Follow up With Details Comments Contact Info Ryne Fernandes [phone] Current Discharge Medication List  
  
START taking these medications Dose & Instructions Dispensing Information Comments Morning Noon Evening Bedtime  
 clindamycin 300 mg capsule Commonly known as:  CLEOCIN Your last dose was: Your next dose is:    
   
   
 Dose:  300 mg Take 1 Cap by mouth three (3) times daily for 2 days. Quantity:  6 Cap Refills:  0  
     
   
   
   
  
 oxyCODONE IR 5 mg immediate release tablet Commonly known as:  Grisel Frausto Your last dose was: Your next dose is:    
   
   
 Dose:  5-10 mg Take 1-2 Tabs by mouth every four (4) hours as needed for Pain. Max Daily Amount: 60 mg.  
 Quantity:  40 Tab Refills:  0 CONTINUE these medications which have NOT CHANGED Dose & Instructions Dispensing Information Comments Morning Noon Evening Bedtime  
 albuterol 2.5 mg /3 mL (0.083 %) nebulizer solution Commonly known as:  PROVENTIL VENTOLIN Your last dose was: Your next dose is:    
   
   
 Dose:  1.25 mg  
1.25 mg by Nebulization route every four (4) hours as needed. Refills:  0  
     
   
   
   
  
 albuterol 90 mcg/actuation inhaler Commonly known as:  Annemarie Núñez Your last dose was: Your next dose is:    
   
   
 Dose:  2 Puff Take 2 Puffs by inhalation three (3) times daily as needed. Refills:  0  
     
   
   
   
  
 clonazePAM 1 mg tablet Commonly known as:  Noé Rainey Your last dose was: Your next dose is:    
   
   
 Dose:  1 mg Take 1 mg by mouth nightly. Indications: FORREST ASSOCIATED WITH BIPOLAR DISORDER, ADJUNCT Refills:  0 HYDROcodone-acetaminophen 5-325 mg per tablet Commonly known as:  Martin Burdick Your last dose was: Your next dose is:    
   
   
 Dose:  1-2 Tab Take 1-2 Tabs by mouth every four (4) hours as needed for Pain. Max Daily Amount: 12 Tabs. Quantity:  40 Tab Refills:  0  
     
   
   
   
  
 OLANZapine 20 mg tablet Commonly known as:  ZyPREXA Your last dose was: Your next dose is:    
   
   
 Dose:  20 mg Take 20 mg by mouth nightly. Indications: SCHIZOPHRENIA Refills:  0  
     
   
   
   
  
 OTHER Your last dose was: Your next dose is:    
   
   
 oxygen Refills:  0 WELLBUTRIN  mg SR tablet Generic drug:  buPROPion SR Your last dose was: Your next dose is: Take  by mouth two (2) times a day. Refills:  0 Where to Get Your Medications Information on where to get these meds will be given to you by the nurse or doctor. ! Ask your nurse or doctor about these medications  
  clindamycin 300 mg capsule  
 oxyCODONE IR 5 mg immediate release tablet Discharge Instructions 01 [facility],4Th Floor Sub-Acromial Decompression Post-Operative Instructions 1. Your first meal should be clear liquids. If you tolerate this without nausea you may resume your regular diet. 2.  Wear sling until nerve block wears off. After that, sling is optional. 
3. Use ice for 30 minutes on/off for 48 hours. Remove ice at bedtime. 4. Keep dressing dry today. Remove dressing tomorrow and place band-aids over puncture sites. 5. May shower in 3 days. Change band aids daily after shower. 6. Start stretching shoulder tomorrow to regain full motion. No reaching or heavy lifting or straining. 7. For your safety, you must have a responsible adult drive you home. You can drive when you are no longer wearing the sling and no longer taking pain pills. 8. Someone responsible should stay with you for 24 hours after surgery. If you had medication, or general anesthesia, it is normal to feel drowsy & weak, therefore, it is not recommended that you operate machinery, walk or stand without assistance, drink alcoholic beverages or eat heavy meals (due to nausea), make important decisions/sign important papers; children should not ride bicycles or skateboards. Please do not climb stairs or shower/bathe unattended for at least 24 hours. 9. Pain pills cause constipation and upset stomach. May take ES Tylenol instead pain pills.   For additional pain relief may alternate pain pills with Advil 400 mg every 4 hours. 8. Notify your physician if you show signs of infection such as swelling, redness, or pus formation, temperature of 100.5 or greater, or any other disruption of surgical site. Office phone number [phone]. Follow up appointment Accompanying Adult     Date Nurse       Date A common side effect of anesthesia following surgery is nausea and/or vomiting. In order to decrease symptoms, it is wise to avoid foods that are high in fat, greasy foods, milk products, and spicy foods for the first 24 hours. Acceptable foods for the first 24 hours following surgery include but are not limited to: 
 
? soup 
? broth 
?  toast  
? crackers ? applesauce 
? bananas  
? mashed potatoes, 
? soft or scrambled eggs 
? oatmeal 
?  jello It is important to eat when taking your pain medication. This will help to prevent nausea. If possible, please try to time your meals with your medications. It is very important to stay hydrated following surgery. Sip fluids frequently while awake. Avoid acidic drinks such as citrus juices and soda for 24 hours. Carbonated beverages may cause bloating and gas. Acceptable fluids include: 
 
? water (flavor packets may add variety) ? coffee or tea (in moderation) ? Gatorade ? Joe Hilding ? apple juice 
? cranberry juice You are encouraged to cough and deep breathe every hour when awake. This will help to prevent respiratory complications following anesthesia. You may want to hug a pillow when coughing and sneezing to add additional support to the surgical area and to decrease discomfort if you had abdominal or chest surgery. If you are discharged home with support stockings, you may remove them after 24 hours. Support stockings are used to help prevent blood clots in the legs following surgery.  
 
Please take time to review all of your Home Care Instructions and Medication Information sheets provided in your discharge packet. If you have any questions, please contact your surgeons office. Thank you. DISCHARGE SUMMARY from Nurse The following personal items are in your possession at time of discharge: 
 
Dental Appliances: None Visual Aid: Glasses (Glasses to PACU) Hearing Aids/Status: Does not own Home Medications: None Jewelry: None Clothing: Undergarments, Socks, Footwear, Pants, Shirt, Belt (To PACU) Other Valuables: Eyeglasses (To PACU) Personal Items Sent to Safe: Declines PATIENT INSTRUCTIONS: 
 
After general anesthesia or intravenous sedation, for 24 hours or while taking prescription Narcotics: · Limit your activities · Do not drive and operate hazardous machinery · Do not make important personal or business decisions · Do  not drink alcoholic beverages · If you have not urinated within 8 hours after discharge, please contact your surgeon on call. Report the following to your surgeon: 
· Excessive pain, swelling, redness or odor of or around the surgical area · Temperature over 100.5 · Nausea and vomiting lasting longer than 4 hours or if unable to take medications · Any signs of decreased circulation or nerve impairment to extremity: change in color, persistent  numbness, tingling, coldness or increase pain · Any questions What to do at Home: These are general instructions for a healthy lifestyle: No smoking/ No tobacco products/ Avoid exposure to second hand smoke Surgeon General's Warning:  Quitting smoking now greatly reduces serious risk to your health. Obesity, smoking, and sedentary lifestyle greatly increases your risk for illness A healthy diet, regular physical exercise & weight monitoring are important for maintaining a healthy lifestyle You may be retaining fluid if you have a history of heart failure or if you experience any of the following symptoms:  Weight gain of 3 pounds or more overnight or 5 pounds in a week, increased swelling in our hands or feet or shortness of breath while lying flat in bed. Please call your doctor as soon as you notice any of these symptoms; do not wait until your next office visit. Recognize signs and symptoms of STROKE: 
 
F-face looks uneven A-arms unable to move or move unevenly S-speech slurred or non-existent T-time-call 911 as soon as signs and symptoms begin-DO NOT go Back to bed or wait to see if you get better-TIME IS BRAIN. Warning Signs of HEART ATTACK Call 911 if you have these symptoms: 
? Chest discomfort. Most heart attacks involve discomfort in the center of the chest that lasts more than a few minutes, or that goes away and comes back. It can feel like uncomfortable pressure, squeezing, fullness, or pain. ? Discomfort in other areas of the upper body. Symptoms can include pain or discomfort in one or both arms, the back, neck, jaw, or stomach. ? Shortness of breath with or without chest discomfort. ? Other signs may include breaking out in a cold sweat, nausea, or lightheadedness. Don't wait more than five minutes to call [address]! Fast action can save your life. Calling 911 is almost always the fastest way to get lifesaving treatment. Emergency Medical Services staff can begin treatment when they arrive  up to an hour sooner than if someone gets to the hospital by car. The discharge information has been reviewed with the patient and caregiver. The patient and caregiver verbalized understanding. Discharge medications reviewed with the patient and caregiver and appropriate educational materials and side effects teaching were provided. Oxycodone, Rapid Release (By mouth) Oxycodone Hydrochloride (pj-y-LKD-done quinn-droe-KLOR-heather) Treats moderate to severe pain. This medicine is a narcotic pain reliever. Brand Name(s): Oxaydo, Oxy IR, Roxicodone There may be other brand names for this medicine. When This Medicine Should Not Be Used: This medicine is not right for everyone. Do not use it if you had an allergic reaction to oxycodone, codeine, hydrocodone, dihydrocodeine, or morphine, or you have a stomach or bowel blockage. How to Use This Medicine:  
Capsule, Liquid, Tablet · Take your medicine as directed. Your dose may need to be changed several times to find what works best for you. · An overdose can be dangerous. Follow directions carefully so you do not get too much medicine at one time. · Oral liquid: Measure the oral liquid medicine with a marked measuring spoon, oral syringe, or medicine cup. · Oxaydo® tablet: Swallow it whole with enough water to swallow it completely. Do not break, crush, chew, or dissolve it. Do not wet the tablet before you put it in your mouth. · This medicine should come with a Medication Guide. Ask your pharmacist for a copy if you do not have one. · Missed dose: Take a dose as soon as you remember. If it is almost time for your next dose, wait until then and take a regular dose. Do not take extra medicine to make up for a missed dose. · Store the medicine in a closed container at room temperature, away from heat, moisture, and direct light. Store the medicine in a secure place to prevent others from getting it. Ask your pharmacist about the best way to dispose of medicine you do not use. Drugs and Foods to Avoid: Ask your doctor or pharmacist before using any other medicine, including over-the-counter medicines, vitamins, and herbal products. · Do not use this medicine if you are using or have used an MAO inhibitor within the past 14 days. · Some medicines can affect how oxycodone works. Tell your doctor if you are using any of the following: ¨ Amiodarone, carbamazepine, erythromycin, ketoconazole, phenytoin, quinidine, rifampin, ritonavir ¨ Diuretic (water pill) ¨ Medicine to treat depression or anxiety ¨ Medicine to treat migraine headaches ¨ Phenothiazine medicine · Tell your doctor if you use anything else that makes you sleepy. Some examples are allergy medicine, narcotic pain medicine, and alcohol. Tell your doctor if you are using buprenorphine, butorphanol, nalbuphine, pentazocine, or a muscle relaxer. · Do not drink alcohol while you are using this medicine. Warnings While Using This Medicine: · Tell your doctor if you are pregnant or breastfeeding, or if you have kidney disease, liver disease, heart disease, low blood pressure, lung disease or breathing problems (such as asthma, COPD), scoliosis, an enlarged prostate or trouble urinating, an underactive thyroid, Perris disease, gallbladder or pancreas problems, or digestion problems. Tell your doctor if you have a history of head injury, brain tumor, mental health problems, seizures, or alcohol or drug addiction. · This medicine may cause the following problems: 
¨ High risk of overdose, which can lead to death ¨ Respiratory depression (serious breathing problem that can be life-threatening) ¨ Serotonin syndrome, when used with certain medicines · This medicine may make you dizzy, drowsy, or faint. Do not drive or do anything else that could be dangerous until you know how this medicine affects you. Sit or lie down if you feel dizzy. Stand up carefully. · This medicine can be habit-forming. Do not use more than your prescribed dose. Call your doctor if you think your medicine is not working. · Do not stop using this medicine suddenly. Your doctor will need to slowly decrease your dose before you stop it completely. · This medicine may cause constipation, especially with long-term use. Ask your doctor if you should use a laxative to prevent and treat constipation. Drink plenty of liquids to help avoid constipation. · This medicine could cause infertility. Talk with your doctor before using this medicine if you plan to have children. · Keep all medicine out of the reach of children. Never share your medicine with anyone. Possible Side Effects While Using This Medicine:  
Call your doctor right away if you notice any of these side effects: · Allergic reaction: Itching or hives, swelling in your face or hands, swelling or tingling in your mouth or throat, chest tightness, trouble breathing · Anxiety, restlessness, fast heartbeat, fever, sweating, muscle spasms, twitching, nausea, vomiting, diarrhea, seeing or hearing things that are not there · Blue lips, fingernails, or skin, trouble breathing · Extreme dizziness or weakness, shallow breathing, slow heartbeat, sweating, cold or clammy skin, seizures · Lightheadedness, dizziness, fainting · Severe constipation, stomach pain If you notice these less serious side effects, talk with your doctor: · Mild constipation · Sleepiness, tiredness If you notice other side effects that you think are caused by this medicine, tell your doctor. Call your doctor for medical advice about side effects. You may report side effects to FDA at 5-068-FDA-5855 © 2017 [facility] Information is for End User's use only and may not be sold, redistributed or otherwise used for commercial purposes. The above information is an  only. It is not intended as medical advice for individual conditions or treatments. Talk to your doctor, nurse or pharmacist before following any medical regimen to see if it is safe and effective for you. Discharge Orders None Introducing [facility]! Ksenia Osman introduces [employer] patient portal. Now you can access parts of your medical record, email your doctor's office, and request medication refills online. 1. In your internet browser, go to [email or web address] 2. Click on the First Time User? Click Here link in the Sign In box. You will see the New Member Sign Up page. 3. Enter your [employer] Access Code exactly as it appears below. You will not need to use this code after youve completed the sign-up process. If you do not sign up before the expiration date, you must request a new code. · [employer] Access Code: [email or web address] Expires: 12/20/2017  8:05 AM 
 
4. Enter the last four digits of your Social Security Number (xxxx) and Date of Birth (mm/dd/yyyy) as indicated and click Submit. You will be taken to the next sign-up page. 5. Create a Pressglue ID. This will be your [employer] login ID and cannot be changed, so think of one that is secure and easy to remember. 6. Create a [employer] password. You can change your password at any time. 7. Enter your Password Reset Question and Answer. This can be used at a later time if you forget your password. 8. Enter your e-mail address. You will receive e-mail notification when new information is available in [address] 9. Click Sign Up. You can now view and download portions of your medical record. 10. Click the Download Summary menu link to download a portable copy of your medical information. If you have questions, please visit the Frequently Asked Questions section of the Press[employer] website. Remember, [employer] is NOT to be used for urgent needs. For medical emergencies, dial 911. Now available from your iPhone and Android! General Information Please provide this summary of care documentation to your next provider. Patient Signature:  ____________________________________________________________ Date:  ____________________________________________________________  
  
Adrienne Deencer Provider Signature:  ____________________________________________________________ Date:  ____________________________________________________________

## 2017-09-22 NOTE — PERIOP NOTES
Handoff Report from Operating Room to PACU    Report received from Amy Mar RN and Kandace Guevara CRNA regarding Nigel Price. Surgeon(s):  Gertrude Singh MD  And Procedure(s) (LRB):  ARTHROSCOPIC SUBACROMIAL DECOMPRESSION LEFT SHOULDER (Left)  confirmed   with allergies and dressings discussed. Anesthesia type, drugs, patient history, complications, estimated blood loss, vital signs, intake and output, and last pain medication, lines, reversal medications and temperature were reviewed.

## 2017-09-22 NOTE — BRIEF OP NOTE
BRIEF OPERATIVE NOTE    Date of Procedure: 9/22/2017   Preoperative Diagnosis: LEFT SHOULDER BURSITIS  Postoperative Diagnosis: LEFT SHOULDER BURSITIS    Procedure(s):  ARTHROSCOPIC SUBACROMIAL DECOMPRESSION LEFT SHOULDER  Surgeon(s) and Role:     * Kaila Villa MD - Primary         Assistant Staff:       Surgical Staff:  Circ-1: Roberto Crain RN  Scrub Tech-1: Oren Nuñez  Surg Asst-1: Zakia Salguero  Event Time In   Incision Start 1302   Incision Close 1342     Anesthesia: General   Estimated Blood Loss: 0  Specimens: * No specimens in log *   Findings: 0   Complications: 0  Implants: * No implants in log *

## 2017-09-22 NOTE — PERIOP NOTES
1455: Dr. Karmen Andersen notified of resp status of pt. Pt required Duo-neb breathing tx and racemic epi neb as well for stridor and difficulty breathing. Dr. Karmen Andersen will admit overnight, consult pulmonary and order CXR. 1520: Reassessment of pt, pt now alert, breathing feeling better, no complaints of chest tightness or pain, requesting to go home. 1608: Pt cont to feel better, o2 sats  on RA, alert and oriented, conversing with staff, requesting to go home. Dr. Karmen Andersen updated, ok per him for pt to dc. Call placed to Rita Robbins [place], awaiting callback. 1641: 2nd call placed to Rita Robbins, no answer. 1700: Rita Robbins returned phone call, on her way to pick pt back up. Pt VSS, cont to request to go home. 1710: Patient dressed self, taken to [facility], instructions provided. 1725: Instructions provided to Rita Robbins (pts ride/friend), questions answered, IV removed. 1740: Pt d/c home.

## 2017-09-22 NOTE — IP AVS SNAPSHOT
[address] [facility] 
[phone] Patient: Manuel Eisenberg MRN: [id number] DSO:[id number] Current Discharge Medication List  
  
START taking these medications Dose & Instructions Dispensing Information Comments Morning Noon Evening Bedtime  
 clindamycin 300 mg capsule Commonly known as:  CLEOCIN Your last dose was: Your next dose is:    
   
   
 Dose:  300 mg Take 1 Cap by mouth three (3) times daily for 2 days. Quantity:  6 Cap Refills:  0  
     
   
   
   
  
 oxyCODONE IR 5 mg immediate release tablet Commonly known as:  Rita Cristy Your last dose was: Your next dose is:    
   
   
 Dose:  5-10 mg Take 1-2 Tabs by mouth every four (4) hours as needed for Pain. Max Daily Amount: 60 mg.  
 Quantity:  40 Tab Refills:  0 CONTINUE these medications which have NOT CHANGED Dose & Instructions Dispensing Information Comments Morning Noon Evening Bedtime  
 albuterol 2.5 mg /3 mL (0.083 %) nebulizer solution Commonly known as:  PROVENTIL VENTOLIN Your last dose was: Your next dose is:    
   
   
 Dose:  1.25 mg  
1.25 mg by Nebulization route every four (4) hours as needed. Refills:  0  
     
   
   
   
  
 albuterol 90 mcg/actuation inhaler Commonly known as:  Coralee Comment Your last dose was: Your next dose is:    
   
   
 Dose:  2 Puff Take 2 Puffs by inhalation three (3) times daily as needed. Refills:  0  
     
   
   
   
  
 clonazePAM 1 mg tablet Commonly known as:  Meraanitha Carterimer Your last dose was: Your next dose is:    
   
   
 Dose:  1 mg Take 1 mg by mouth nightly. Indications: FORREST ASSOCIATED WITH BIPOLAR DISORDER, ADJUNCT Refills:  0 HYDROcodone-acetaminophen 5-325 mg per tablet Commonly known as:  Fredrica Austin Your last dose was: Your next dose is:    
   
   
 Dose:  1-2 Tab Take 1-2 Tabs by mouth every four (4) hours as needed for Pain. Max Daily Amount: 12 Tabs. Quantity:  40 Tab Refills:  0  
     
   
   
   
  
 OLANZapine 20 mg tablet Commonly known as:  ZyPREXA Your last dose was: Your next dose is:    
   
   
 Dose:  20 mg Take 20 mg by mouth nightly. Indications: SCHIZOPHRENIA Refills:  0  
     
   
   
   
  
 OTHER Your last dose was: Your next dose is:    
   
   
 oxygen Refills:  0 WELLBUTRIN  mg SR tablet Generic drug:  buPROPion SR Your last dose was: Your next dose is: Take  by mouth two (2) times a day. Refills:  0 Where to Get Your Medications Information on where to get these meds will be given to you by the nurse or doctor. ! Ask your nurse or doctor about these medications  
  clindamycin 300 mg capsule  
 oxyCODONE IR 5 mg immediate release tablet

## 2017-09-22 NOTE — ANESTHESIA PREPROCEDURE EVALUATION
Anesthetic History   No history of anesthetic complications            Review of Systems / Medical History  Patient summary reviewed, nursing notes reviewed and pertinent labs reviewed    Pulmonary    COPD (on Home O2 at night): severe      Smoker (1/2 ppd)  Asthma        Neuro/Psych         Psychiatric history (Schizophrenia, depression)     Cardiovascular                  Exercise tolerance: <4 METS  Comments: Chronic MENDEZ   GI/Hepatic/Renal             Pertinent negatives: No GERD   Endo/Other  Within defined limits           Other Findings   Comments: Back pain         Physical Exam    Airway  Mallampati: III  TM Distance: 4 - 6 cm  Neck ROM: normal range of motion   Mouth opening: Normal     Cardiovascular    Rhythm: regular  Rate: normal      Pertinent negatives: No murmur   Dental  No notable dental hx       Pulmonary    Rhonchi:bibasilar            Comments: Cleared with cough Abdominal  GI exam deferred       Other Findings            Anesthetic Plan    ASA: 3  Anesthesia type: general    Monitoring Plan: BIS      Induction: Intravenous  Anesthetic plan and risks discussed with: Patient      Pt is not an appropriate block candidate due to risk of respiratory failure from phrenic nerve block

## 2017-09-22 NOTE — OP NOTES
[facility] [address]   OP NOTE       Name:  Anupam Silva   MR#:  [id number]   :  1955   Account #:  [de-identified]    Surgery Date:  2017   Date of Adm:  2017       PREOPERATIVE DIAGNOSIS: Impingement syndrome and partial   rotator cuff tear, left shoulder. POSTOPERATIVE DIAGNOSIS: Impingement syndrome and partial   rotator cuff tear, left shoulder. PROCEDURES PERFORMED: Arthroscopic subacromial   decompression of left shoulder with debridement of rotator cuff tear. SURGEON: Bhupendra Hill MD    ANESTHESIA: General.    ESTIMATED BLOOD LOSS: Minimal.    SPECIMENS REMOVED: None. DESCRIPTION OF PROCEDURE: The patient was given smooth   induction of general anesthesia, placed in the right lateral decubitus   position on the bean bag. The left upper extremity was prepped and   draped with the extremity free and placed in 10 pounds of traction. A   posterior glenohumeral portal was established. Examination was   begun at the biceps anchor, which was intact. There was mild fraying   of the supraspinatus at the footprint and this area was debrided with a   sucker shaver through an anterior portal made with a switching stick. A   20% tear 5 mm in diameter was the result. Certainly, there was no full-  thickness tear. There was also some tearing of the superior labrum,   which was debrided with the sucker shaver. The joint surfaces were in   good condition, and there were no loose bodies in the inferior recess. The scope was then placed in the subacromial space and a lateral   portal made for the sucker shaver. A bursectomy was performed. There was a hook on the anterior acromion, which was flattened with   the bur as far over as the [facility] joint. The external surface of the rotator   cuff was intact. The bursectomy was completed and the arthroscopic   instruments removed.  The portals were held closed with 3-0 nylon sutures and the shoulder injected with Marcaine and epinephrine. Sterile dressings were applied.  The patient was taken to the recovery   room in stable extubated condition with a correct count and good pulse   to his wrist.        MD KAREY Verduzco / [facility]   D:  09/22/2017   13:57   T:  09/22/2017   14:13   Job #:  [id number]

## 2017-10-18 ENCOUNTER — HOSPITAL ENCOUNTER (OUTPATIENT)
Dept: CT IMAGING | Age: 62
Discharge: HOME | End: 2017-10-18
Attending: OTOLARYNGOLOGY
Payer: MEDICARE

## 2017-10-18 DIAGNOSIS — K11.8 PAROTID MASS: ICD-10-CM

## 2017-10-18 PROCEDURE — 74011250636 HC RX REV CODE- 250/636: Performed by: OTOLARYNGOLOGY

## 2017-10-18 PROCEDURE — 70491 CT SOFT TISSUE NECK W/DYE: CPT

## 2017-10-18 PROCEDURE — 74011636320 HC RX REV CODE- 636/320: Performed by: OTOLARYNGOLOGY

## 2017-10-18 RX ORDER — SODIUM CHLORIDE 9 MG/ML
50 INJECTION, SOLUTION INTRAVENOUS
Status: COMPLETED | OUTPATIENT
Start: 2017-10-18 | End: 2017-10-18

## 2017-10-18 RX ORDER — SODIUM CHLORIDE 0.9 % (FLUSH) 0.9 %
10 SYRINGE (ML) INJECTION
Status: COMPLETED | OUTPATIENT
Start: 2017-10-18 | End: 2017-10-18

## 2017-10-18 RX ADMIN — Medication 10 ML: at 13:02

## 2017-10-18 RX ADMIN — SODIUM CHLORIDE 50 ML/HR: 900 INJECTION, SOLUTION INTRAVENOUS at 13:03

## 2017-10-18 RX ADMIN — IOPAMIDOL 100 ML: 612 INJECTION, SOLUTION INTRAVENOUS at 13:02

## 2018-01-16 NOTE — PERIOP NOTES
[facility]  Preoperative Instructions        Surgery Date 1/19/18         Time of Arrival 9:00am    1. On the day of your surgery, please report to the Surgical Services Registration Desk and sign in at your designated time. The Surgery Center is located to the right of the Emergency Room. 2. You must have someone with you to drive you home. You should not drive a car for 24 hours following surgery. Please make arrangements for a friend or family member to stay with you for the first 24 hours after your surgery. 3. Do not have anything to eat or drink (including water, gum, mints, coffee, juice) after midnight 1/18/18   . ? This may not apply to medications prescribed by your physician. ?(Please note below the special instructions with medications to take the morning of your procedure.)    4. We recommend you do not drink any alcoholic beverages for 24 hours before and after your surgery. 5. Contact your surgeons office for instructions on the following medications: non-steroidal anti-inflammatory drugs (i.e. Advil, Aleve), vitamins, and supplements. (Some surgeons will want you to stop these medications prior to surgery and others may allow you to take them)  **If you are currently taking Plavix, Coumadin, Aspirin and/or other blood-thinning agents, contact your surgeon for instructions. ** Your surgeon will partner with the physician prescribing these medications to determine if it is safe to stop or if you need to continue taking. Please do not stop taking these medications without instructions from your surgeon    6. Wear comfortable clothes. Wear glasses instead of contacts. Do not bring any money or jewelry. Please bring picture ID, insurance card, and any prearranged co-payment or hospital payment. Do not wear make-up, particularly mascara the morning of your surgery. Do not wear nail polish, particularly if you are having foot /hand surgery.   Wear your hair loose or down, no ponytails, buns, mathew pins or clips. All body piercings must be removed. Please shower with antibacterial soap for three consecutive days before and on the morning of surgery, but do not apply any lotions, powders or deodorants after the shower on the day of surgery. Please use a fresh towels after each shower. Please sleep in clean clothes and change bed linens the night before surgery. Please do not shave for 48 hours prior to surgery. Shaving of the face is acceptable. 7. You should understand that if you do not follow these instructions your surgery may be cancelled. If your physical condition changes (I.e. fever, cold or flu) please contact your surgeon as soon as possible. 8. It is important that you be on time. If a situation occurs where you may be late, please call [phone] (OR Holding Area). 9. If you have any questions and or problems, please call [phone] (Pre-admission Testing). 10. Your surgery time may be subject to change. You will receive a phone call the evening prior if your time changes. 11.  If having outpatient surgery, you must have someone to drive you here, stay with you during the duration of your stay, and to drive you home at time of discharge. 12.   In an effort to improve the efficiency, privacy, and safety for all of our Pre-op patients visitors are not allowed in the Holding area. Once you arrive and are registered your family/visitors will be asked to remain in the waiting room. The Pre-op staff will get you from the Surgical Waiting Area and will explain to you and your family/visitors that the Pre-op phase is beginning. The staff will answer any questions and provide instructions for tracking of the patient, by use of the existing tracking number and color-coded status board in the waiting room.   At this time the staff will also ask for your designated spokesperson information in the event that the physician or staff need to provide an update or obtain any pertinent information. The designated spokesperson will be notified if the physician needs to speak to family during the pre-operative phase. If at any time your family/visitors has questions or concerns they may approach the volunteer desk in the waiting area for assistance. Special Instructions:    MEDICATIONS TO TAKE THE MORNING OF SURGERY WITH A SIP OF WATER:Wellbutrin      I understand a pre-operative phone call will be made to verify my surgery time. In the event that I am not available, I give permission for a message to be left on my answering service and/or with another person?  Yes          ___________________      __________   _________    (Signature of Patient)             (Witness)                (Date and Time)

## 2018-01-19 ENCOUNTER — ANESTHESIA (OUTPATIENT)
Dept: SURGERY | Age: 63
End: 2018-01-19
Payer: MEDICARE

## 2018-01-19 ENCOUNTER — HOSPITAL ENCOUNTER (OUTPATIENT)
Age: 63
Setting detail: OUTPATIENT SURGERY
Discharge: HOME | End: 2018-01-19
Attending: OTOLARYNGOLOGY | Admitting: OTOLARYNGOLOGY
Payer: MEDICARE

## 2018-01-19 ENCOUNTER — ANESTHESIA EVENT (OUTPATIENT)
Dept: SURGERY | Age: 63
End: 2018-01-19
Payer: MEDICARE

## 2018-01-19 VITALS
WEIGHT: 156.53 LBS | SYSTOLIC BLOOD PRESSURE: 110 MMHG | OXYGEN SATURATION: 96 % | HEART RATE: 74 BPM | TEMPERATURE: 97.8 F | BODY MASS INDEX: 23.18 KG/M2 | DIASTOLIC BLOOD PRESSURE: 56 MMHG | HEIGHT: 69 IN | RESPIRATION RATE: 16 BRPM

## 2018-01-19 PROCEDURE — 77030011640 HC PAD GRND REM COVD -A: Performed by: OTOLARYNGOLOGY

## 2018-01-19 PROCEDURE — 74011250636 HC RX REV CODE- 250/636: Performed by: OTOLARYNGOLOGY

## 2018-01-19 PROCEDURE — 77030020782 HC GWN BAIR PAWS FLX 3M -B

## 2018-01-19 PROCEDURE — 74011000250 HC RX REV CODE- 250

## 2018-01-19 PROCEDURE — 77010033678 HC OXYGEN DAILY

## 2018-01-19 PROCEDURE — 74011250637 HC RX REV CODE- 250/637: Performed by: ANESTHESIOLOGY

## 2018-01-19 PROCEDURE — 77030002996 HC SUT SLK J&J -A: Performed by: OTOLARYNGOLOGY

## 2018-01-19 PROCEDURE — 76010000161 HC OR TIME 1 TO 1.5 HR INTENSV-TIER 1: Performed by: OTOLARYNGOLOGY

## 2018-01-19 PROCEDURE — 88304 TISSUE EXAM BY PATHOLOGIST: CPT | Performed by: OTOLARYNGOLOGY

## 2018-01-19 PROCEDURE — 77030002888 HC SUT CHRMC J&J -A: Performed by: OTOLARYNGOLOGY

## 2018-01-19 PROCEDURE — 77030008684 HC TU ET CUF COVD -B: Performed by: ANESTHESIOLOGY

## 2018-01-19 PROCEDURE — 77030019655 HC PRB STIM CRAN MEDT -B: Performed by: OTOLARYNGOLOGY

## 2018-01-19 PROCEDURE — 74011250636 HC RX REV CODE- 250/636

## 2018-01-19 PROCEDURE — 77030018836 HC SOL IRR NACL ICUM -A: Performed by: OTOLARYNGOLOGY

## 2018-01-19 PROCEDURE — 74011250636 HC RX REV CODE- 250/636: Performed by: ANESTHESIOLOGY

## 2018-01-19 PROCEDURE — 77030037400 HC ADH TISS HI VISC EXOFIN CHMP -B: Performed by: OTOLARYNGOLOGY

## 2018-01-19 PROCEDURE — 74011000250 HC RX REV CODE- 250: Performed by: OTOLARYNGOLOGY

## 2018-01-19 PROCEDURE — 77030032490 HC SLV COMPR SCD KNE COVD -B: Performed by: OTOLARYNGOLOGY

## 2018-01-19 PROCEDURE — 76060000033 HC ANESTHESIA 1 TO 1.5 HR: Performed by: OTOLARYNGOLOGY

## 2018-01-19 PROCEDURE — 77030018846 HC SOL IRR STRL H20 ICUM -A: Performed by: OTOLARYNGOLOGY

## 2018-01-19 PROCEDURE — 77030019908 HC STETH ESOPH SIMS -A: Performed by: ANESTHESIOLOGY

## 2018-01-19 PROCEDURE — 77030026438 HC STYL ET INTUB CARD -A: Performed by: ANESTHESIOLOGY

## 2018-01-19 PROCEDURE — 76210000017 HC OR PH I REC 1.5 TO 2 HR: Performed by: OTOLARYNGOLOGY

## 2018-01-19 PROCEDURE — 76210000020 HC REC RM PH II FIRST 0.5 HR: Performed by: OTOLARYNGOLOGY

## 2018-01-19 RX ORDER — LIDOCAINE HYDROCHLORIDE AND EPINEPHRINE 10; 10 MG/ML; UG/ML
INJECTION, SOLUTION INFILTRATION; PERINEURAL AS NEEDED
Status: DISCONTINUED | OUTPATIENT
Start: 2018-01-19 | End: 2018-01-19 | Stop reason: HOSPADM

## 2018-01-19 RX ORDER — MIDAZOLAM HYDROCHLORIDE 1 MG/ML
1 INJECTION, SOLUTION INTRAMUSCULAR; INTRAVENOUS AS NEEDED
Status: DISCONTINUED | OUTPATIENT
Start: 2018-01-19 | End: 2018-01-19 | Stop reason: HOSPADM

## 2018-01-19 RX ORDER — FENTANYL CITRATE 50 UG/ML
50 INJECTION, SOLUTION INTRAMUSCULAR; INTRAVENOUS AS NEEDED
Status: DISCONTINUED | OUTPATIENT
Start: 2018-01-19 | End: 2018-01-19 | Stop reason: HOSPADM

## 2018-01-19 RX ORDER — MIDAZOLAM HYDROCHLORIDE 1 MG/ML
INJECTION, SOLUTION INTRAMUSCULAR; INTRAVENOUS AS NEEDED
Status: DISCONTINUED | OUTPATIENT
Start: 2018-01-19 | End: 2018-01-19 | Stop reason: HOSPADM

## 2018-01-19 RX ORDER — LIDOCAINE HYDROCHLORIDE 20 MG/ML
INJECTION, SOLUTION EPIDURAL; INFILTRATION; INTRACAUDAL; PERINEURAL AS NEEDED
Status: DISCONTINUED | OUTPATIENT
Start: 2018-01-19 | End: 2018-01-19 | Stop reason: HOSPADM

## 2018-01-19 RX ORDER — MIDAZOLAM HYDROCHLORIDE 1 MG/ML
0.5 INJECTION, SOLUTION INTRAMUSCULAR; INTRAVENOUS
Status: DISCONTINUED | OUTPATIENT
Start: 2018-01-19 | End: 2018-01-19 | Stop reason: HOSPADM

## 2018-01-19 RX ORDER — MORPHINE SULFATE 10 MG/ML
2 INJECTION, SOLUTION INTRAMUSCULAR; INTRAVENOUS
Status: DISCONTINUED | OUTPATIENT
Start: 2018-01-19 | End: 2018-01-19 | Stop reason: HOSPADM

## 2018-01-19 RX ORDER — FENTANYL CITRATE 50 UG/ML
25 INJECTION, SOLUTION INTRAMUSCULAR; INTRAVENOUS
Status: DISCONTINUED | OUTPATIENT
Start: 2018-01-19 | End: 2018-01-19 | Stop reason: HOSPADM

## 2018-01-19 RX ORDER — SODIUM CHLORIDE, SODIUM LACTATE, POTASSIUM CHLORIDE, CALCIUM CHLORIDE 600; 310; 30; 20 MG/100ML; MG/100ML; MG/100ML; MG/100ML
25 INJECTION, SOLUTION INTRAVENOUS CONTINUOUS
Status: DISCONTINUED | OUTPATIENT
Start: 2018-01-19 | End: 2018-01-19 | Stop reason: HOSPADM

## 2018-01-19 RX ORDER — PROPOFOL 10 MG/ML
INJECTION, EMULSION INTRAVENOUS AS NEEDED
Status: DISCONTINUED | OUTPATIENT
Start: 2018-01-19 | End: 2018-01-19 | Stop reason: HOSPADM

## 2018-01-19 RX ORDER — ONDANSETRON 2 MG/ML
4 INJECTION INTRAMUSCULAR; INTRAVENOUS AS NEEDED
Status: DISCONTINUED | OUTPATIENT
Start: 2018-01-19 | End: 2018-01-19 | Stop reason: HOSPADM

## 2018-01-19 RX ORDER — DIPHENHYDRAMINE HYDROCHLORIDE 50 MG/ML
12.5 INJECTION, SOLUTION INTRAMUSCULAR; INTRAVENOUS AS NEEDED
Status: DISCONTINUED | OUTPATIENT
Start: 2018-01-19 | End: 2018-01-19 | Stop reason: HOSPADM

## 2018-01-19 RX ORDER — SODIUM CHLORIDE 0.9 % (FLUSH) 0.9 %
5-10 SYRINGE (ML) INJECTION AS NEEDED
Status: DISCONTINUED | OUTPATIENT
Start: 2018-01-19 | End: 2018-01-19 | Stop reason: HOSPADM

## 2018-01-19 RX ORDER — LIDOCAINE HYDROCHLORIDE 10 MG/ML
0.1 INJECTION, SOLUTION EPIDURAL; INFILTRATION; INTRACAUDAL; PERINEURAL AS NEEDED
Status: DISCONTINUED | OUTPATIENT
Start: 2018-01-19 | End: 2018-01-19 | Stop reason: HOSPADM

## 2018-01-19 RX ORDER — CLINDAMYCIN PHOSPHATE 900 MG/50ML
900 INJECTION INTRAVENOUS ONCE
Status: COMPLETED | OUTPATIENT
Start: 2018-01-19 | End: 2018-01-19

## 2018-01-19 RX ORDER — FENTANYL CITRATE 50 UG/ML
INJECTION, SOLUTION INTRAMUSCULAR; INTRAVENOUS AS NEEDED
Status: DISCONTINUED | OUTPATIENT
Start: 2018-01-19 | End: 2018-01-19 | Stop reason: HOSPADM

## 2018-01-19 RX ORDER — SODIUM CHLORIDE, SODIUM LACTATE, POTASSIUM CHLORIDE, CALCIUM CHLORIDE 600; 310; 30; 20 MG/100ML; MG/100ML; MG/100ML; MG/100ML
125 INJECTION, SOLUTION INTRAVENOUS CONTINUOUS
Status: DISCONTINUED | OUTPATIENT
Start: 2018-01-19 | End: 2018-01-19 | Stop reason: HOSPADM

## 2018-01-19 RX ORDER — OXYCODONE AND ACETAMINOPHEN 5; 325 MG/1; MG/1
1 TABLET ORAL AS NEEDED
Status: DISCONTINUED | OUTPATIENT
Start: 2018-01-19 | End: 2018-01-19 | Stop reason: HOSPADM

## 2018-01-19 RX ORDER — SODIUM CHLORIDE 9 MG/ML
50 INJECTION, SOLUTION INTRAVENOUS CONTINUOUS
Status: DISCONTINUED | OUTPATIENT
Start: 2018-01-19 | End: 2018-01-19 | Stop reason: HOSPADM

## 2018-01-19 RX ORDER — ACETAMINOPHEN 10 MG/ML
INJECTION, SOLUTION INTRAVENOUS AS NEEDED
Status: DISCONTINUED | OUTPATIENT
Start: 2018-01-19 | End: 2018-01-19 | Stop reason: HOSPADM

## 2018-01-19 RX ORDER — DEXAMETHASONE SODIUM PHOSPHATE 4 MG/ML
10 INJECTION, SOLUTION INTRA-ARTICULAR; INTRALESIONAL; INTRAMUSCULAR; INTRAVENOUS; SOFT TISSUE ONCE
Status: COMPLETED | OUTPATIENT
Start: 2018-01-19 | End: 2018-01-19

## 2018-01-19 RX ORDER — SUCCINYLCHOLINE CHLORIDE 20 MG/ML
INJECTION INTRAMUSCULAR; INTRAVENOUS AS NEEDED
Status: DISCONTINUED | OUTPATIENT
Start: 2018-01-19 | End: 2018-01-19 | Stop reason: HOSPADM

## 2018-01-19 RX ORDER — SODIUM CHLORIDE 0.9 % (FLUSH) 0.9 %
5-10 SYRINGE (ML) INJECTION EVERY 8 HOURS
Status: DISCONTINUED | OUTPATIENT
Start: 2018-01-19 | End: 2018-01-19 | Stop reason: HOSPADM

## 2018-01-19 RX ORDER — ONDANSETRON 2 MG/ML
INJECTION INTRAMUSCULAR; INTRAVENOUS AS NEEDED
Status: DISCONTINUED | OUTPATIENT
Start: 2018-01-19 | End: 2018-01-19 | Stop reason: HOSPADM

## 2018-01-19 RX ORDER — SODIUM CHLORIDE 9 MG/ML
1000 INJECTION, SOLUTION INTRAVENOUS CONTINUOUS
Status: DISCONTINUED | OUTPATIENT
Start: 2018-01-19 | End: 2018-01-19 | Stop reason: HOSPADM

## 2018-01-19 RX ORDER — PHENYLEPHRINE HCL IN 0.9% NACL 0.4MG/10ML
SYRINGE (ML) INTRAVENOUS AS NEEDED
Status: DISCONTINUED | OUTPATIENT
Start: 2018-01-19 | End: 2018-01-19 | Stop reason: HOSPADM

## 2018-01-19 RX ADMIN — LIDOCAINE HYDROCHLORIDE 60 MG: 20 INJECTION, SOLUTION EPIDURAL; INFILTRATION; INTRACAUDAL; PERINEURAL at 10:57

## 2018-01-19 RX ADMIN — SUCCINYLCHOLINE CHLORIDE 160 MG: 20 INJECTION INTRAMUSCULAR; INTRAVENOUS at 10:57

## 2018-01-19 RX ADMIN — ACETAMINOPHEN 1000 MG: 10 INJECTION, SOLUTION INTRAVENOUS at 11:24

## 2018-01-19 RX ADMIN — ONDANSETRON 4 MG: 2 INJECTION INTRAMUSCULAR; INTRAVENOUS at 12:02

## 2018-01-19 RX ADMIN — PROPOFOL 130 MG: 10 INJECTION, EMULSION INTRAVENOUS at 10:57

## 2018-01-19 RX ADMIN — DEXAMETHASONE SODIUM PHOSPHATE 10 MG: 4 INJECTION, SOLUTION INTRAMUSCULAR; INTRAVENOUS at 09:37

## 2018-01-19 RX ADMIN — SODIUM CHLORIDE, SODIUM LACTATE, POTASSIUM CHLORIDE, AND CALCIUM CHLORIDE 25 ML/HR: 600; 310; 30; 20 INJECTION, SOLUTION INTRAVENOUS at 09:37

## 2018-01-19 RX ADMIN — CLINDAMYCIN PHOSPHATE 900 MG: 18 INJECTION, SOLUTION INTRAVENOUS at 11:12

## 2018-01-19 RX ADMIN — MIDAZOLAM HYDROCHLORIDE 2 MG: 1 INJECTION, SOLUTION INTRAMUSCULAR; INTRAVENOUS at 10:52

## 2018-01-19 RX ADMIN — Medication 80 MCG: at 11:30

## 2018-01-19 RX ADMIN — FENTANYL CITRATE 100 MCG: 50 INJECTION, SOLUTION INTRAMUSCULAR; INTRAVENOUS at 10:57

## 2018-01-19 RX ADMIN — OXYCODONE HYDROCHLORIDE AND ACETAMINOPHEN 1 TABLET: 5; 325 TABLET ORAL at 13:44

## 2018-01-19 NOTE — IP AVS SNAPSHOT
[address] [facility] 
[phone] Patient: Stella Sanches MRN: [id number] VALORIE:[birth year] About your hospitalization You were admitted on:  January 19, 2018 You last received care in the:  [facility] PACU You were discharged on:  January 19, 2018 Why you were hospitalized Your primary diagnosis was:  Not on File Follow-up Information Follow up With Details Comments Contact Info [address] [phone] 
[phone] Your Scheduled Appointments Thursday March 08, 2018 10:00 AM EST New Patient with Kinjal Faye, [address] [place] [address],[address] Floor [facility]) [address] [facility] [address] [facility]  
[phone] Discharge Orders None A check alek indicates which time of day the medication should be taken. My Medications CONTINUE taking these medications Instructions Each Dose to Equal  
 Morning Noon Evening Bedtime  
 albuterol 2.5 mg /3 mL (0.083 %) nebulizer solution Commonly known as:  PROVENTIL VENTOLIN Your last dose was: Your next dose is: 1.25 mg by Nebulization route every four (4) hours as needed. 1.25 mg  
    
   
   
   
  
 albuterol 90 mcg/actuation inhaler Commonly known as:  Jose Raul Wilburn Your last dose was: Your next dose is: Take 2 Puffs by inhalation three (3) times daily as needed. 2 Puff  
    
   
   
   
  
 clonazePAM 1 mg tablet Commonly known as:  Evan Merchantn Your last dose was: Your next dose is: Take 1 mg by mouth nightly. Indications: FORREST ASSOCIATED WITH BIPOLAR DISORDER, ADJUNCT  
 1 mg OLANZapine 20 mg tablet Commonly known as:  ZyPREXA Your last dose was: Your next dose is: Take 20 mg by mouth nightly.  Indications: SCHIZOPHRENIA  
 20 mg OTHER Your last dose was: Your next dose is:    
   
   
 oxygen WELLBUTRIN  mg SR tablet Generic drug:  buPROPion SR Your last dose was: Your next dose is: Take  by mouth two (2) times a day. Discharge Instructions [facility] ASSOCIATESSUKHWINDER. Tayler Burns M.D., Ph.D., F.A.C.S. Otolaryngology [address]  [facility] [place] [address] [place], [address] [facility]  
[phone] Facial Surgery Discharge Instructions: 
Prescriptions for Norco and Zithromax have been filled prior to surgery. Take antibiotic as prescribed. Take pain medication as needed. Call Dr. Tayler Burns' office at [phone] for any questions. DISCHARGE SUMMARY from Nurse PATIENT INSTRUCTIONS: 
 
After general anesthesia or intravenous sedation, for 24 hours or while taking prescription Narcotics: · Limit your activities · Do not drive and operate hazardous machinery · Do not make important personal or business decisions · Do  not drink alcoholic beverages · If you have not urinated within 8 hours after discharge, please contact your surgeon on call. Report the following to your surgeon: 
· Excessive pain, swelling, redness or odor of or around the surgical area · Temperature over 100.5 · Nausea and vomiting lasting longer than 4 hours or if unable to take medications · Any signs of decreased circulation or nerve impairment to extremity: change in color, persistent  numbness, tingling, coldness or increase pain · Any questions What to do at Home:A common side effect of anesthesia following surgery is nausea and/or vomiting. In order to decrease symptoms, it is wise to avoid foods that are high in fat, greasy foods, milk products, and spicy foods for the first 24 hours.  
 
Acceptable foods for the first 24 hours following surgery include but are not limited to: 
 
? soup 
? broth 
?  toast  
? crackers ? applesauce 
? bananas  
? mashed potatoes, 
? soft or scrambled eggs 
? oatmeal 
?  jello It is important to eat when taking your pain medication. This will help to prevent nausea. If possible, please try to time your meals with your medications. It is very important to stay hydrated following surgery. Sip fluids frequently while awake. Avoid acidic drinks such as citrus juices and soda for 24 hours. Carbonated beverages may cause bloating and gas. Acceptable fluids include: 
 
? water (flavor packets may add variety) ? coffee or tea (in moderation) ? Gatorade ? Deloris Do ? apple juice 
? cranberry juice You are encouraged to cough and deep breathe every hour when awake. This will help to prevent respiratory complications following anesthesia. You may want to hug a pillow when coughing and sneezing to add additional support to the surgical area and to decrease discomfort if you had abdominal or chest surgery. If you are discharged home with support stockings, you may remove them after 24 hours. Support stockings are used to help prevent blood clots in the legs following surgery. Please take time to review all of your Home Care Instructions and Medication Information sheets provided in your discharge packet. If you have any questions, please contact your surgeons office. Thank you. Narcotic-Analgesic/Acetaminophen (Percocet, Norco, Lorcet HD, Lortab 10/325) - (By mouth) Why this medicine is used:  
Relieves pain. Contact a nurse or doctor right away if you have: 
· Extreme weakness, shallow breathing, slow heartbeat · Severe confusion, lightheadedness, dizziness, fainting · Yellow skin or eyes, dark urine or pale stools · Severe constipation, severe stomach pain, nausea, vomiting, loss of appetite · Sweating or cold, clammy skin Common side effects: · Mild constipation, nausea, vomiting · Sleepiness, tiredness · Itching, rash © 2017 [facility] Information is for End User's use only and may not be sold, redistributed or otherwise used for commercial purposes. Please carry medication information at all times in case of emergency situations. These are general instructions for a healthy lifestyle: No smoking/ No tobacco products/ Avoid exposure to second hand smoke Surgeon General's Warning:  Quitting smoking now greatly reduces serious risk to your health. Obesity, smoking, and sedentary lifestyle greatly increases your risk for illness A healthy diet, regular physical exercise & weight monitoring are important for maintaining a healthy lifestyle You may be retaining fluid if you have a history of heart failure or if you experience any of the following symptoms:  Weight gain of 3 pounds or more overnight or 5 pounds in a week, increased swelling in our hands or feet or shortness of breath while lying flat in bed. Please call your doctor as soon as you notice any of these symptoms; do not wait until your next office visit. Recognize signs and symptoms of STROKE: 
 
F-face looks uneven A-arms unable to move or move unevenly S-speech slurred or non-existent T-time-call 911 as soon as signs and symptoms begin-DO NOT go Back to bed or wait to see if you get better-TIME IS BRAIN. Warning Signs of HEART ATTACK Call 911 if you have these symptoms: 
? Chest discomfort. Most heart attacks involve discomfort in the center of the chest that lasts more than a few minutes, or that goes away and comes back. It can feel like uncomfortable pressure, squeezing, fullness, or pain. ? Discomfort in other areas of the upper body. Symptoms can include pain or discomfort in one or both arms, the back, neck, jaw, or stomach. ? Shortness of breath with or without chest discomfort. ? Other signs may include breaking out in a cold sweat, nausea, or lightheadedness. Don't wait more than five minutes to call [address]! Fast action can save your life. Calling 911 is almost always the fastest way to get lifesaving treatment. Emergency Medical Services staff can begin treatment when they arrive  up to an hour sooner than if someone gets to the hospital by car. The discharge information has been reviewed with the patient and caregiver. The patient and caregiver verbalized understanding. Discharge medications reviewed with the patient and caregiver and appropriate educational materials and side effects teaching were provided. ___________________________________________________________________________________________________________________________________ Introducing [facility]! Susan Crandall introduces iHigh patient portal. Now you can access parts of your medical record, email your doctor's office, and request medication refills online. 1. In your internet browser, go to [email or web address] 2. Click on the First Time User? Click Here link in the Sign In box. You will see the New Member Sign Up page. 3. Enter your iHigh Access Code exactly as it appears below. You will not need to use this code after youve completed the sign-up process. If you do not sign up before the expiration date, you must request a new code. · iHigh Access Code: [email or web address] Expires: 4/9/2018  3:18 PM 
 
4. Enter the last four digits of your Social Security Number (xxxx) and Date of Birth (mm/dd/yyyy) as indicated and click Submit. You will be taken to the next sign-up page. 5. Create a [employer] ID. This will be your iHigh login ID and cannot be changed, so think of one that is secure and easy to remember. 6. Create a MyChart password. You can change your password at any time. 7. Enter your Password Reset Question and Answer. This can be used at a later time if you forget your password. 8. Enter your e-mail address. You will receive e-mail notification when new information is available in [address]. 9. Click Sign Up. You can now view and download portions of your medical record. 10. Click the Download Summary menu link to download a portable copy of your medical information. If you have questions, please visit the Frequently Asked Questions section of the MyChart website. Remember, [employer] is NOT to be used for urgent needs. For medical emergencies, dial 911. Now available from your iPhone and Android! Providers Seen During Your Hospitalization Provider Specialty Primary office phone Anila Joel MD Otolaryngology [phone] Your Primary Care Physician (PCP) Primary Care Physician Office Phone Office Fax Jaimee Cooley [phone] You are allergic to the following Allergen Reactions Allergen (Antipyrine-Benzocaine) Unknown (comments) Avelox (Moxifloxacin) Unknown (comments) Codeine Sulfate Unknown (comments) Pcn (Penicillins) Other (comments) Coma Recent Documentation Height Weight BMI Smoking Status 1.753 m 71 kg 23.11 kg/m2 Current Every Day Smoker Emergency Contacts Name Discharge Info Relation Home Work Mobile [address] [facility] CAREGIVER [3] Girlfriend [18] [phone] ConnieMaksim DISCHARGE CAREGIVER [3] Caregiver [13] [phone] Andreea Hogan N/A  AT THIS TIME [6] Sister [23] [phone] Patient Belongings The following personal items are in your possession at time of discharge: 
  Dental Appliances: None  Visual Aid: None      Home Medications: None   Jewelry: None  Clothing: Undergarments, Pants, Shirt, Footwear, Socks, Jacket/Coat    Other Valuables: None  Personal Items Sent to Safe: declined Please provide this summary of care documentation to your next provider. Signatures-by signing, you are acknowledging that this After Visit Summary has been reviewed with you and you have received a copy. Patient Signature:  ____________________________________________________________ Date:  ____________________________________________________________  
  
Jocelyne Ghassan Provider Signature:  ____________________________________________________________ Date:  ____________________________________________________________

## 2018-01-19 NOTE — BRIEF OP NOTE
BRIEF OPERATIVE NOTE    Date of Procedure: 1/19/2018   Preoperative Diagnosis: RIGHT FACIAL CYST  Postoperative Diagnosis: RIGHT FACIAL CYST    Procedure(s):  EXCISION RIGHT FACIAL CYST  Surgeon(s) and Role:     * Regina Alaniz MD - Primary         Assistant Staff:       Surgical Staff:  Circ-1: Jamila Murcia RN  Scrub Tech-1: Yarely Odonnell  Surg Asst-1: Tessa Payne  Event Time In   Incision Start 1123   Incision Close      Anesthesia: General   Estimated Blood Loss: 5 ml  Specimens:   ID Type Source Tests Collected by Time Destination   1 : RIGHT FACIAL LESION Preservative Face  Regina Alaniz MD 1/19/2018 1140 Pathology      Findings: as expected   Complications: none apparent  Implants: * No implants in log *

## 2018-01-19 NOTE — ANESTHESIA POSTPROCEDURE EVALUATION
Post-Anesthesia Evaluation and Assessment    Patient: Hernán Villanueva MRN: [id number]  SSN: [id number]    YOB: 1955  Age: 60 y.o. Sex: male       Cardiovascular Function/Vital Signs  Visit Vitals    /75    Pulse 83    Temp 37 °C (98.6 °F)    Resp 15    Ht 5' 9\" (1.753 m)    Wt 71 kg (156 lb 8.4 oz)    SpO2 98%    BMI 23.11 kg/m2       Patient is status post general anesthesia for Procedure(s):  EXCISION RIGHT FACE PAROTID CYST. Nausea/Vomiting: None    Postoperative hydration reviewed and adequate. Pain:  Pain Scale 1: Numeric (0 - 10) (01/19/18 0923)  Pain Intensity 1: 0 (01/19/18 0923)   Managed    Neurological Status:   Neuro (WDL): Within Defined Limits (01/19/18 0904)   At baseline    Mental Status and Level of Consciousness: Arousable    Pulmonary Status:   O2 Device: Nasal cannula (01/19/18 1225)   Adequate oxygenation and airway patent    Complications related to anesthesia: None    Post-anesthesia assessment completed.  No concerns    Signed By: Rosmery Ibarra MD     January 19, 2018

## 2018-01-19 NOTE — PERIOP NOTES
Patient meets criteria for discharge to home but friend who is transporting home has left the [facility] & has not yet returned. Phase 2 continues to be very busy. Continue to monitor patient in main PACU until transport available.

## 2018-01-19 NOTE — PERIOP NOTES
Handoff Report from Operating Room to PACU    Report received from JULISA Chand RN and [address] regarding Ryan Rincon. Surgeon(s):  Prashanth Garza MD  And Procedure(s) (LRB):  EXCISION RIGHT FACE PAROTID CYST (Right)  confirmed   with allergies, dressings and local anesthetic discussed. Anesthesia type, drugs, patient history, complications, estimated blood loss, vital signs, intake and output, and last pain medication, lines and temperature were reviewed.

## 2018-01-19 NOTE — ANESTHESIA PREPROCEDURE EVALUATION
Anesthetic History   No history of anesthetic complications            Review of Systems / Medical History  Patient summary reviewed, nursing notes reviewed and pertinent labs reviewed    Pulmonary    COPD: severe      Smoker  Asthma     Comments: Home o2 at bedtime   Neuro/Psych   Within defined limits           Cardiovascular  Within defined limits                     GI/Hepatic/Renal     GERD           Endo/Other  Within defined limits           Other Findings            Physical Exam    Airway  Mallampati: II  TM Distance: > 6 cm  Neck ROM: normal range of motion   Mouth opening: Normal     Cardiovascular  Regular rate and rhythm,  S1 and S2 normal,  no murmur, click, rub, or gallop             Dental  No notable dental hx       Pulmonary  Breath sounds clear to auscultation               Abdominal  GI exam deferred       Other Findings            Anesthetic Plan    ASA: 3  Anesthesia type: general          Induction: Intravenous  Anesthetic plan and risks discussed with: Patient

## 2018-01-19 NOTE — OP NOTES
[facility]  ACUTE CARE OP NOTE    Karen Swenson  MR#: [id number]  : 1955  ACCOUNT #: [de-identified]   DATE OF SERVICE: 2018    PREOPERATIVE DIAGNOSIS:  Right facial mass. POSTOPERATIVE DIAGNOSIS:  Right facial mass. PROCEDURE PERFORMED:  Excision of right facial mass. SURGEON:  Gauri Morton MD.     ANESTHESIA:  GETA. INDICATIONS:  The patient is a 69-year-old male with a long history of increasing difficulty with a right preauricular skin mass. The dermis displayed an indentation with a subcutaneous 2.5 cm ovoid mass lesion. This appeared separate from the parotid, but immediately overlying the parotid soft tissues. Preoperatively, the alternatives, potential benefits and possible risks of the procedure were explained to the patient who understood and requested to proceed. DESCRIPTION OF PROCEDURE:  The patient was brought to the operating room and placed supine on the operating table. Following the smooth induction of general endotracheal tube anesthesia, the table was turned and the patient was positioned for operation. The Xomed nerve integrity monitor was applied to the right facial region, tested, and found to function satisfactorily for facial nerve monitoring. The skin surrounding the palpable right neck lesion was injected with 1% Xylocaine with epinephrine after cleansing the skin with topical alcohol solution. A routine Betadine prep and drape was carried out. A 15 blade was used to make an elliptical incision in the skin overlying the obvious inferior based skin abnormality with an extension superiorly to allow for dissection of the subcutaneous mass. An elliptical portion of skin included the obvious skin abnormality before a capsular dissection allowed removal of the subcutaneous mass. Findings were consistent with a dermoid cyst, benign appearing.   After complete removal, the wound was copiously irrigated and closed using interrupted 3-0 chromic to approximate the deep layers. Care was taken to avoid deep sutures, which might encounter the facial nerve branches. Once the potential space was obliterated, a 4-0 chromic running suture was used to approximate the skin edges. Dermabond was applied to provide additional skin support. The patient was subsequently aroused from general anesthesia, extubated in the operating room and transferred to the recovery area in satisfactory condition. ESTIMATED BLOOD LOSS:  5 mL. COMPLICATIONS:  None apparent. SPECIMENS REMOVED:  Right facial mass. ANESTHESIA:  General endotracheal tube anesthesia.       Anna Solis MD       [address] [place] [address] / [facility]  D: 01/19/2018 12:14     T: 01/19/2018 13:10  JOB #: [id number]  CC: Nick Luz MD

## 2018-01-19 NOTE — PERIOP NOTES
Friend has returned to [address]. Phase 2 room unavailable at present. Monitors removed. Patient dressed with minimal assist.  VSS. Patient denies dizziness, nausea, pain or need to void. Transferred to phase 2 via wheelchair @ 1406.

## 2018-01-19 NOTE — DISCHARGE INSTRUCTIONS
[facility] Shelia Schlatter. Yunier Mcdonough M.D., Ph.D., F.A.C.S. [address] [facility] [place]   [place], [address] [facility]   [phone]       Facial Surgery Discharge Instructions:  Prescriptions for Norco and Zithromax have been filled prior to surgery. Take antibiotic as prescribed. Take pain medication as needed. Call Dr. Yunier Mcdonough' office at [phone] for any questions. DISCHARGE SUMMARY from Nurse    PATIENT INSTRUCTIONS:    After general anesthesia or intravenous sedation, for 24 hours or while taking prescription Narcotics:  · Limit your activities  · Do not drive and operate hazardous machinery  · Do not make important personal or business decisions  · Do  not drink alcoholic beverages  · If you have not urinated within 8 hours after discharge, please contact your surgeon on call. Report the following to your surgeon:  · Excessive pain, swelling, redness or odor of or around the surgical area  · Temperature over 100.5  · Nausea and vomiting lasting longer than 4 hours or if unable to take medications  · Any signs of decreased circulation or nerve impairment to extremity: change in color, persistent  numbness, tingling, coldness or increase pain  · Any questions    What to do at Home:A common side effect of anesthesia following surgery is nausea and/or vomiting. In order to decrease symptoms, it is wise to avoid foods that are high in fat, greasy foods, milk products, and spicy foods for the first 24 hours. Acceptable foods for the first 24 hours following surgery include but are not limited to:     soup   broth    toast    crackers    applesauce    bananas    mashed potatoes,   soft or scrambled eggs   oatmeal    jello    It is important to eat when taking your pain medication. This will help to prevent nausea. If possible, please try to time your meals with your medications.     It is very important to stay hydrated following surgery. Sip fluids frequently while awake. Avoid acidic drinks such as citrus juices and soda for 24 hours. Carbonated beverages may cause bloating and gas. Acceptable fluids include:    - water (flavor packets may add variety)  - coffee or tea (in moderation)  - Gatorade  - Braulio-aid  - apple juice  - cranberry juice    You are encouraged to cough and deep breathe every hour when awake. This will help to prevent respiratory complications following anesthesia. You may want to hug a pillow when coughing and sneezing to add additional support to the surgical area and to decrease discomfort if you had abdominal or chest surgery. If you are discharged home with support stockings, you may remove them after 24 hours. Support stockings are used to help prevent blood clots in the legs following surgery. Please take time to review all of your Home Care Instructions and Medication Information sheets provided in your discharge packet. If you have any questions, please contact your surgeons office. Thank you. Narcotic-Analgesic/Acetaminophen (Percocet, Norco, Lorcet HD, Lortab 10/325) - (By mouth)   Why this medicine is used:   Relieves pain. Contact a nurse or doctor right away if you have:  · Extreme weakness, shallow breathing, slow heartbeat  · Severe confusion, lightheadedness, dizziness, fainting  · Yellow skin or eyes, dark urine or pale stools  · Severe constipation, severe stomach pain, nausea, vomiting, loss of appetite  · Sweating or cold, clammy skin     Common side effects:  · Mild constipation, nausea, vomiting  · Sleepiness, tiredness  · Itching, rash  © 2017 [facility] Information is for End User's use only and may not be sold, redistributed or otherwise used for commercial purposes. Please carry medication information at all times in case of emergency situations.     These are general instructions for a healthy lifestyle:    No smoking/ No tobacco products/ Avoid exposure to second hand smoke  Surgeon General's Warning:  Quitting smoking now greatly reduces serious risk to your health. Obesity, smoking, and sedentary lifestyle greatly increases your risk for illness    A healthy diet, regular physical exercise & weight monitoring are important for maintaining a healthy lifestyle    You may be retaining fluid if you have a history of heart failure or if you experience any of the following symptoms:  Weight gain of 3 pounds or more overnight or 5 pounds in a week, increased swelling in our hands or feet or shortness of breath while lying flat in bed. Please call your doctor as soon as you notice any of these symptoms; do not wait until your next office visit. Recognize signs and symptoms of STROKE:    F-face looks uneven    A-arms unable to move or move unevenly    S-speech slurred or non-existent    T-time-call 911 as soon as signs and symptoms begin-DO NOT go       Back to bed or wait to see if you get better-TIME IS BRAIN. Warning Signs of HEART ATTACK     Call 911 if you have these symptoms:   Chest discomfort. Most heart attacks involve discomfort in the center of the chest that lasts more than a few minutes, or that goes away and comes back. It can feel like uncomfortable pressure, squeezing, fullness, or pain.  Discomfort in other areas of the upper body. Symptoms can include pain or discomfort in one or both arms, the back, neck, jaw, or stomach.  Shortness of breath with or without chest discomfort.  Other signs may include breaking out in a cold sweat, nausea, or lightheadedness. Don't wait more than five minutes to call 911 - MINUTES MATTER! Fast action can save your life. Calling 911 is almost always the fastest way to get lifesaving treatment. Emergency Medical Services staff can begin treatment when they arrive -- up to an hour sooner than if someone gets to the hospital by car.      The discharge information has been reviewed with the patient and caregiver. The patient and caregiver verbalized understanding. Discharge medications reviewed with the patient and caregiver and appropriate educational materials and side effects teaching were provided.   ___________________________________________________________________________________________________________________________________

## 2018-03-05 ENCOUNTER — TELEPHONE (OUTPATIENT)
Dept: INTERNAL MEDICINE CLINIC | Age: 63
End: 2018-03-05

## 2018-03-06 ENCOUNTER — TELEPHONE (OUTPATIENT)
Dept: INTERNAL MEDICINE CLINIC | Age: 63
End: 2018-03-06

## 2018-03-06 NOTE — TELEPHONE ENCOUNTER
Therese Chidi, girlfriend, is following up on if pt's medical records were received from Dr. eMlia Bai at [facility], [place] [address]P) [phone] ([phone]) [phone]. Pt has appt scheduled on 03/08/18, if records have not been received, would like to cancel and reschedule.      Records were originally sent on 01/08/18     Best contact # [phone]          Message copied/pasted from [facility]

## 2018-03-06 NOTE — TELEPHONE ENCOUNTER
Unable to reach patient LVM to return call.  Advised that we had not received his records yet from his former MD

## 2018-03-07 PROBLEM — M75.112 NONTRAUMATIC PARTIAL BILATERAL ROTATOR CUFF TEAR: Status: ACTIVE | Noted: 2017-09-22

## 2018-03-07 PROBLEM — M75.02 ADHESIVE CAPSULITIS OF LEFT SHOULDER: Status: ACTIVE | Noted: 2017-06-14

## 2018-03-07 PROBLEM — M75.111 NONTRAUMATIC PARTIAL BILATERAL ROTATOR CUFF TEAR: Status: ACTIVE | Noted: 2017-09-22

## 2018-03-07 NOTE — TELEPHONE ENCOUNTER
Unable to reach patient LVM to return call.  Advised that we have not yet received the records from his old PCP

## 2018-03-08 ENCOUNTER — OFFICE VISIT (OUTPATIENT)
Dept: INTERNAL MEDICINE CLINIC | Age: 63
End: 2018-03-08

## 2018-03-08 VITALS
HEIGHT: 71 IN | DIASTOLIC BLOOD PRESSURE: 75 MMHG | WEIGHT: 162 LBS | SYSTOLIC BLOOD PRESSURE: 116 MMHG | TEMPERATURE: 98 F | OXYGEN SATURATION: 97 % | HEART RATE: 83 BPM | BODY MASS INDEX: 22.68 KG/M2

## 2018-03-08 DIAGNOSIS — F20.9 SCHIZOPHRENIA, UNSPECIFIED TYPE (HCC): ICD-10-CM

## 2018-03-08 DIAGNOSIS — Z12.5 PROSTATE CANCER SCREENING: ICD-10-CM

## 2018-03-08 DIAGNOSIS — R41.3 MEMORY LOSS: ICD-10-CM

## 2018-03-08 DIAGNOSIS — J44.9 CHRONIC OBSTRUCTIVE PULMONARY DISEASE, UNSPECIFIED COPD TYPE (HCC): Primary | ICD-10-CM

## 2018-03-08 DIAGNOSIS — E78.00 PURE HYPERCHOLESTEROLEMIA: ICD-10-CM

## 2018-03-08 DIAGNOSIS — R07.9 CHEST PAIN, UNSPECIFIED TYPE: ICD-10-CM

## 2018-03-08 NOTE — PROGRESS NOTES
HISTORY OF PRESENT ILLNESS  Anisa Benitez is a 60 y.o. male. HPI   Pt here to establish care  Former PCP Dr Rossy Rivas records available  Retired [employer]- [employer]  Hx schizophrenia, copd on home 02 1l hs , depression, hx throat cancer s/p chemo and xrt--Dr Yunier Mcdonough and Dr Temi Sarkar 5 cigarretes/d --former 1ppd 30-40yrs, requests portable 02 tank  No recent flare of copd    Recent right facial mass excision--epidermal inclusion cyst      Here with friend Mrs Anne-Marie aponte  She is concerned about his forgetfullness  Pt able to live alone and take care of himself    Pt c/o chest pain last 20 min intermittently, not with exertion  Has hx HLD per pt    There are no active problems to display for this patient. Current Outpatient Prescriptions   Medication Sig Dispense Refill    OLANZapine (ZYPREXA) 20 mg tablet Take 20 mg by mouth nightly. Indications: SCHIZOPHRENIA      clonazePAM (KLONOPIN) 1 mg tablet Take 1 mg by mouth nightly. Indications: FORREST ASSOCIATED WITH BIPOLAR DISORDER, ADJUNCT      OTHER oxygen      albuterol (PROVENTIL, VENTOLIN) 90 mcg/Actuation inhaler Take 2 Puffs by inhalation three (3) times daily as needed.  albuterol (PROVENTIL VENTOLIN) 2.5 mg /3 mL (0.083 %) nebulizer solution 1.25 mg by Nebulization route every four (4) hours as needed.  buPROPion SR (WELLBUTRIN SR) 150 mg SR tablet Take  by mouth two (2) times a day.        Allergies   Allergen Reactions    Allergen [Antipyrine-Benzocaine] Unknown (comments)    Avelox [Moxifloxacin] Unknown (comments)    Codeine Sulfate Unknown (comments)    Pcn [Penicillins] Other (comments)     Coma     Past Medical History:   Diagnosis Date    Abdominal pain     Adverse effect of anesthesia     heart rate slows down    Asthma     Back pain     Cancer ([address] 2015    Throat cancer treated with chemo & radiation    Constipation     COPD     managed by PCP    Cyst of pharynx     Depression     Extrinsic asthma, unspecified     Fatigue     GERD (gastroesophageal reflux disease)     Insomnia, unspecified     On home O2     at bedtime    Other chest pain     since 2-    Pain in joint, forearm     Psychiatric disorder     schizophrenia    Smoker     1 ppw     Past Surgical History:   Procedure Laterality Date    HX HEENT      throat CA  - Dr. Rebeca Ames- r/t tumors    HX ORCHIECTOMY      tumors    HX ORTHOPAEDIC      wrist surgery    HX ORTHOPAEDIC Left 07/14/2017    manipulation and injection shoulder    PLACE PERCUT GASTROSTOMY TUBE  9/21/2015         SC COLONOSCOPY FLX DX W/COLLJ SPEC WHEN PFRMD  8/29/2011          Family History   Problem Relation Age of Onset    Cancer Mother     Cancer Father      Social History   Substance Use Topics    Smoking status: Current Every Day Smoker     Packs/day: 0.25     Years: 42.00     Last attempt to quit: 11/1/2010    Smokeless tobacco: Never Used    Alcohol use No      Lab Results  Component Value Date/Time   WBC 6.1 09/22/2017 11:45 AM   HGB 17.4 (H) 09/22/2017 11:45 AM   HCT 51.8 (H) 09/22/2017 11:45 AM   PLATELET 540 48/40/0427 11:45 AM   MCV 87.6 09/22/2017 11:45 AM     Lab Results  Component Value Date/Time   Glucose 87 09/22/2017 11:45 AM   Creatinine 0.92 09/22/2017 11:45 AM      No results found for: CHOL, CHOLPOCT, HDL, LDL, LDLC, LDLCPOC, LDLCEXT, TRIGL, TGLPOCT, CHHD, CHHDX  Lab Results  Component Value Date/Time   GFR est non-AA >60 09/22/2017 11:45 AM   GFR est AA >60 09/22/2017 11:45 AM   Creatinine 0.92 09/22/2017 11:45 AM   BUN 17 09/22/2017 11:45 AM   Sodium 138 09/22/2017 11:45 AM   Potassium 3.5 09/22/2017 11:45 AM   Chloride 100 09/22/2017 11:45 AM   CO2 29 09/22/2017 11:45 AM   Magnesium 2.0 08/22/2015 01:24 PM     No results found for: PSA, Louis Pounds, PSAR3, [id number], [id number], PSALT     Review of Systems   Constitutional: Negative for chills, fever, malaise/fatigue and weight loss. HENT: Negative. Eyes: Negative for blurred vision and double vision. Respiratory: Positive for shortness of breath. Negative for cough. Cardiovascular: Positive for chest pain. Negative for palpitations. Gastrointestinal: Negative. Negative for abdominal pain, blood in stool, constipation, diarrhea, melena, nausea and vomiting. Genitourinary: Negative. Negative for dysuria, frequency, hematuria and urgency. Musculoskeletal: Negative. Negative for back pain, falls, joint pain and myalgias. Skin: Negative. Neurological: Negative for dizziness, tremors and headaches. Endo/Heme/Allergies: Negative. Psychiatric/Behavioral: Positive for depression. Physical Exam   Constitutional: He appears well-developed and well-nourished. No distress. Appears stated age   HENT:   Head: Normocephalic. Mouth/Throat: Oropharynx is clear and moist.   Some induration right face at site of prior surgery   Eyes: Pupils are equal, round, and reactive to light. Neck: Normal range of motion. Cardiovascular: Normal rate, regular rhythm, normal heart sounds and intact distal pulses. Exam reveals no gallop and no friction rub. No murmur heard. Pulmonary/Chest: Effort normal and breath sounds normal. No respiratory distress. He has no wheezes. He has no rales. He exhibits no tenderness. Abdominal: Soft. He exhibits no distension and no mass. There is no rebound and no guarding. Musculoskeletal: He exhibits no edema. Neurological: He is alert. Skin: Skin is warm. Psychiatric: He has a normal mood and affect. Nursing note and vitals reviewed. ASSESSMENT and PLAN  Diagnoses and all orders for this visit:    1. Chronic obstructive pulmonary disease, unspecified COPD type ([facility] [address]   rx for portable 02 tanks   Needs to stop smoking -discussed   Refer to pulmonary MD to establish care  2. Schizophrenia, unspecified type ([facility] [address]  -     F/u hermelinda MURILLO q 3 mos   Stable per pt    3.  Memory loss  -     CBC W/O DIFF  -     METABOLIC PANEL, COMPREHENSIVE  -     TSH 3RD GENERATION  -     VITAMIN B12  -     Michelle Mcadams Neurology ref [facility]    4. Pure hypercholesterolemia  -     CBC W/O DIFF  -     METABOLIC PANEL, COMPREHENSIVE  -     TSH 3RD GENERATION  -     LIPID PANEL   Might need statin    5. Chest pain, unspecified type  -     CBC W/O DIFF  -     METABOLIC PANEL, COMPREHENSIVE  -     AMB POC EKG ROUTINE W/ 12 LEADS, INTER & REP--nsr , no st-t abnormality  -     [facility] Card [facility]    6. Prostate cancer screening  -     PSA SCREENING (SCREENING) ()  7. Colon screen    had colonoscopy 5 yrs ago per pt    Pt will sign release of records for Dr Carmona Shows office    Follow-up Disposition:  Return in about 3 months (around 6/8/2018) for f/u HLD chest pain ,memory loss copd.

## 2018-03-08 NOTE — PROGRESS NOTES
HISTORY OF PRESENT ILLNESS  Ebonie Verdugo is a 60 y.o. male. HPI       Patient Active Problem List    Diagnosis Date Noted    Nontraumatic partial bilateral rotator cuff tear 09/22/2017    Adhesive capsulitis of left shoulder 06/14/2017    Bursitis/tendonitis, shoulder 06/14/2017     Current Outpatient Prescriptions   Medication Sig Dispense Refill    OLANZapine (ZYPREXA) 20 mg tablet Take 20 mg by mouth nightly. Indications: SCHIZOPHRENIA      clonazePAM (KLONOPIN) 1 mg tablet Take 1 mg by mouth nightly. Indications: FORREST ASSOCIATED WITH BIPOLAR DISORDER, ADJUNCT      OTHER oxygen      albuterol (PROVENTIL, VENTOLIN) 90 mcg/Actuation inhaler Take 2 Puffs by inhalation three (3) times daily as needed.  albuterol (PROVENTIL VENTOLIN) 2.5 mg /3 mL (0.083 %) nebulizer solution 1.25 mg by Nebulization route every four (4) hours as needed.  buPROPion SR (WELLBUTRIN SR) 150 mg SR tablet Take  by mouth two (2) times a day.        Allergies   Allergen Reactions    Allergen [Antipyrine-Benzocaine] Unknown (comments)    Avelox [Moxifloxacin] Unknown (comments)    Codeine Sulfate Unknown (comments)    Pcn [Penicillins] Other (comments)     Coma     Past Medical History:   Diagnosis Date    Abdominal pain     Adverse effect of anesthesia     heart rate slows down    Asthma     Back pain     Cancer [address] [address] 2015    Throat cancer treated with chemo & radiation    Constipation     COPD     managed by PCP    Cyst of pharynx     Depression     Extrinsic asthma, unspecified     Fatigue     GERD (gastroesophageal reflux disease)     Insomnia, unspecified     On home O2     at bedtime    Other chest pain     since 2-    Pain in joint, forearm     Psychiatric disorder     schizophrenia    Smoker     1 ppw     Past Surgical History:   Procedure Laterality Date    HX HEENT      throat CA  - Dr. Tyree Klinefelter- r/t tumors    HX ORCHIECTOMY      tumors    HX ORTHOPAEDIC      wrist surgery    HX ORTHOPAEDIC Left 07/14/2017    manipulation and injection shoulder    PLACE PERCUT GASTROSTOMY TUBE  9/21/2015         ID COLONOSCOPY FLX DX W/COLLJ SPEC WHEN PFRMD  8/29/2011          Family History   Problem Relation Age of Onset    Cancer Mother     Cancer Father      Social History   Substance Use Topics    Smoking status: Current Every Day Smoker     Packs/day: 0.25     Years: 42.00     Last attempt to quit: 11/1/2010    Smokeless tobacco: Never Used    Alcohol use No      {Choose one or more Last Lab values; press DELETE if none desired:[id number]}     ROS    Physical Exam    ASSESSMENT and PLAN  {ASSESSMENT/PLAN:16589}

## 2018-03-08 NOTE — MR AVS SNAPSHOT
[address] [place] [address] 
[phone] Patient: Sweetie Rosario MRN: [id number] EUGENE:[birth year] Visit Information Date & Time Provider Department Dept. Phone Encounter #  
 3/8/2018 10:00 AM Wilber Stock, [address] [place] [address],[address] [phone] [id number] Follow-up Instructions Return in about 3 months (around 6/8/2018) for f/u HLD chest pain ,memory loss copd. Upcoming Health Maintenance Date Due Hepatitis C Screening 1955 DTaP/Tdap/Td series (1 - Tdap) 9/7/1975 FOBT Q 1 YEAR AGE 50-75 9/7/2004 ZOSTER VACCINE AGE 60> 7/7/2014 Allergies as of 3/8/2018  Review Complete On: 3/8/2018 By: Cristy Dumont LPN Severity Noted Reaction Type Reactions Allergen [Antipyrine-benzocaine]  11/12/2013    Unknown (comments) Avelox [Moxifloxacin]  11/12/2013    Unknown (comments) Codeine Sulfate  11/12/2013    Unknown (comments) Pcn [Penicillins]  06/01/2010    Other (comments) Coma Current Immunizations  Never Reviewed Name Date Influenza Vaccine 9/27/2017 Pneumococcal Conjugate (PCV-13) 9/10/2015 Not reviewed this visit You Were Diagnosed With   
  
 Codes Comments Chronic obstructive pulmonary disease, unspecified COPD type ([facility]    -  Primary ICD-10-CM: J44.9 ICD-9-CM: 530 Schizophrenia, unspecified type ([facility]     ICD-10-CM: F20.9 ICD-9-CM: 295.90 Memory loss     ICD-10-CM: R41.3 ICD-9-CM: 780.93 Pure hypercholesterolemia     ICD-10-CM: E78.00 ICD-9-CM: 272.0 Chest pain, unspecified type     ICD-10-CM: R07.9 ICD-9-CM: 786.50 Prostate cancer screening     ICD-10-CM: Z12.5 ICD-9-CM: V76.44 Vitals BP Pulse Temp Height(growth percentile) Weight(growth percentile) SpO2  
 116/75 (BP 1 Location: Right arm, BP Patient Position: Sitting) 83 98 °F (36.7 °C) (Oral) 5' 11\" (1.803 m) 162 lb (73.5 kg) 97% BMI Smoking Status 22.59 kg/m2 Current Every Day Smoker BMI and BSA Data Body Mass Index Body Surface Area  
 22.59 kg/m 2 1.92 m 2 Preferred Pharmacy Pharmacy Name Phone [address], [address] [facility] [phone] Your Updated Medication List  
  
   
This list is accurate as of 3/8/18 11:08 AM.  Always use your most recent med list.  
  
  
  
  
 albuterol 2.5 mg /3 mL (0.083 %) nebulizer solution Commonly known as:  PROVENTIL VENTOLIN  
1.25 mg by Nebulization route every four (4) hours as needed. albuterol 90 mcg/actuation inhaler Commonly known as:  Tina Jewel Take 2 Puffs by inhalation three (3) times daily as needed. clonazePAM 1 mg tablet Commonly known as:  Penelope Sues Take 1 mg by mouth nightly. Indications: FORREST ASSOCIATED WITH BIPOLAR DISORDER, ADJUNCT  
  
 OLANZapine 20 mg tablet Commonly known as:  ZyPREXA Take 20 mg by mouth nightly. Indications: SCHIZOPHRENIA  
  
 OTHER  
oxygen WELLBUTRIN  mg SR tablet Generic drug:  buPROPion SR Take  by mouth daily. We Performed the Following CBC W/O DIFF [95625 CPT(R)] LIPID PANEL [42328 CPT(R)] METABOLIC PANEL, COMPREHENSIVE [68974 CPT(R)] PSA SCREENING (SCREENING) [ [facility]] REFERRAL TO NEUROLOGY [PEW20 Custom] REFERRAL TO PULMONARY DISEASE [OUO17 Custom] TSH 3RD GENERATION [65813 CPT(R)] VITAMIN B12 [id number] CPT(R)] Follow-up Instructions Return in about 3 months (around 6/8/2018) for f/u HLD chest pain ,memory loss copd. Referral Information Referral ID Referred By Referred To  
  
 [id number] Aren VICTORIA [facility] [address] [facility] [address] [place], [address] Visits Status Start Date End Date 1 New Request 3/8/18 3/8/19  If your referral has a status of pending review or denied, additional information will be sent to support the outcome of this decision. Referral ID Referred By Referred To  
 [id number] Balta VICTORIA MD  
   [address] [place], [address] Phone: [phone] Fax: [phone] Visits Status Start Date End Date 1 New Request 3/8/18 3/8/19 If your referral has a status of pending review or denied, additional information will be sent to support the outcome of this decision. Introducing [facility]! Ti Walker introduces [employer] patient portal. Now you can access parts of your medical record, email your doctor's office, and request medication refills online. 1. In your internet browser, go to [email or web address] 2. Click on the First Time User? Click Here link in the Sign In box. You will see the New Member Sign Up page. 3. Enter your Barnana Access Code exactly as it appears below. You will not need to use this code after youve completed the sign-up process. If you do not sign up before the expiration date, you must request a new code. · Barn[employer] Access Code: [email or web address] Expires: 4/9/2018  3:18 PM 
 
4. Enter the last four digits of your Social Security Number (xxxx) and Date of Birth (mm/dd/yyyy) as indicated and click Submit. You will be taken to the next sign-up page. 5. Create a Barnana ID. This will be your Barnana login ID and cannot be changed, so think of one that is secure and easy to remember. 6. Create a Barnana password. You can change your password at any time. 7. Enter your Password Reset Question and Answer. This can be used at a later time if you forget your password. 8. Enter your e-mail address. You will receive e-mail notification when new information is available in [address] 9. Click Sign Up. You can now view and download portions of your medical record.  
10. Click the Download Summary menu link to download a portable copy of your medical information. If you have questions, please visit the Frequently Asked Questions section of the Iden[employer] website. Remember, [employer] is NOT to be used for urgent needs. For medical emergencies, dial 911. Now available from your iPhone and Android! Please provide this summary of care documentation to your next provider. Your primary care clinician is listed as Annie VICTORIA. If you have any questions after today's visit, please call [phone].

## 2018-03-09 LAB
ALBUMIN SERPL-MCNC: 4.3 G/DL (ref 3.6–4.8)
ALBUMIN/GLOB SERPL: 1.4 {RATIO} (ref 1.2–2.2)
ALP SERPL-CCNC: 82 IU/L (ref 39–117)
ALT SERPL-CCNC: 13 IU/L (ref 0–44)
AST SERPL-CCNC: 16 IU/L (ref 0–40)
BILIRUB SERPL-MCNC: 0.6 MG/DL (ref 0–1.2)
BUN SERPL-MCNC: 14 MG/DL (ref 8–27)
BUN/CREAT SERPL: 15 (ref 10–24)
CALCIUM SERPL-MCNC: 9.7 MG/DL (ref 8.6–10.2)
CHLORIDE SERPL-SCNC: 97 MMOL/L (ref 96–106)
CHOLEST SERPL-MCNC: 255 MG/DL (ref 100–199)
CO2 SERPL-SCNC: 27 MMOL/L (ref 18–29)
COMMENT, 011824: ABNORMAL
CREAT SERPL-MCNC: 0.94 MG/DL (ref 0.76–1.27)
ERYTHROCYTE [DISTWIDTH] IN BLOOD BY AUTOMATED COUNT: 15.8 % (ref 12.3–15.4)
GFR SERPLBLD CREATININE-BSD FMLA CKD-EPI: 86 ML/MIN/1.73
GFR SERPLBLD CREATININE-BSD FMLA CKD-EPI: 99 ML/MIN/1.73
GLOBULIN SER CALC-MCNC: 3.1 G/DL (ref 1.5–4.5)
GLUCOSE SERPL-MCNC: 82 MG/DL (ref 65–99)
HCT VFR BLD AUTO: 51.3 % (ref 37.5–51)
HDLC SERPL-MCNC: 36 MG/DL
HGB BLD-MCNC: 17.5 G/DL (ref 13–17.7)
LDLC SERPL CALC-MCNC: 198 MG/DL (ref 0–99)
MCH RBC QN AUTO: 30 PG (ref 26.6–33)
MCHC RBC AUTO-ENTMCNC: 34.1 G/DL (ref 31.5–35.7)
MCV RBC AUTO: 88 FL (ref 79–97)
PLATELET # BLD AUTO: 193 X10E3/UL (ref 150–379)
POTASSIUM SERPL-SCNC: 4.7 MMOL/L (ref 3.5–5.2)
PROT SERPL-MCNC: 7.4 G/DL (ref 6–8.5)
PSA SERPL-MCNC: 2.5 NG/ML (ref 0–4)
RBC # BLD AUTO: 5.83 X10E6/UL (ref 4.14–5.8)
SODIUM SERPL-SCNC: 140 MMOL/L (ref 134–144)
TRIGL SERPL-MCNC: 105 MG/DL (ref 0–149)
TSH SERPL DL<=0.005 MIU/L-ACNC: 5.6 UIU/ML (ref 0.45–4.5)
VIT B12 SERPL-MCNC: 1051 PG/ML (ref 232–1245)
VLDLC SERPL CALC-MCNC: 21 MG/DL (ref 5–40)
WBC # BLD AUTO: 5.3 X10E3/UL (ref 3.4–10.8)

## 2018-03-10 LAB
SPECIMEN STATUS REPORT, ROLRST: NORMAL
T4 FREE SERPL-MCNC: 1.25 NG/DL (ref 0.82–1.77)

## 2018-03-11 PROBLEM — R79.89 ELEVATED TSH: Status: ACTIVE | Noted: 2018-03-11

## 2018-03-11 PROBLEM — E78.00 PURE HYPERCHOLESTEROLEMIA: Status: ACTIVE | Noted: 2018-03-11

## 2018-03-11 NOTE — PROGRESS NOTES
Tell pt's friend Mrs Valdes Jovan labs---cbc cmp psa vit b12.   Thyroid level is borderline low but free thyroid level is wnl --I recommend repeatinng at next visit or in 3-6 months  Cholesterol levels are moderate to severely elevated--I recommend starting lipitor 10 mg every day and get repeat lipids and labs at next visit in 3 months--call in med if willing to start please

## 2018-03-11 NOTE — PROGRESS NOTES
Tell Mrs Darian Zaidi I get the chart and reviewed the prior labs---the Tsh is stable to slightly improved and we can monitor. The cholesterol levels have been too high and should be treated with lipitor.

## 2018-03-12 RX ORDER — ATORVASTATIN CALCIUM 10 MG/1
10 TABLET, FILM COATED ORAL DAILY
Qty: 30 TAB | Refills: 3 | Status: SHIPPED | OUTPATIENT
Start: 2018-03-12 | End: 2018-06-09 | Stop reason: DRUGHIGH

## 2018-03-12 NOTE — PROGRESS NOTES
Spoke with patient after 2 patient identifiers being note and advised per Dr. Don Raymond I get the chart and reviewed the prior labs---the Tsh is stable to slightly improved and we can monitor. The cholesterol levels have been too high and should be treated with lipitor. . Patient expressed understanding and has no further questions at this time.

## 2018-03-12 NOTE — PROGRESS NOTES
Spoke with patient after 2 patient identifiers being note and advised per Dr. Adri Marie level is borderline low but free thyroid level is wnl --I recommend repeatinng at next visit or in 3-6 months   Cholesterol levels are moderate to severely elevated--I recommend starting lipitor 10 mg every day and get repeat lipids and labs at next visit in 3 months--call in med if willing to start please  . Patient expressed understanding and has no further questions at this time.

## 2018-03-13 ENCOUNTER — TELEPHONE (OUTPATIENT)
Dept: INTERNAL MEDICINE CLINIC | Age: 63
End: 2018-03-13

## 2018-03-13 NOTE — TELEPHONE ENCOUNTER
Alonso Aldana is returning call for pt and needs to know why he needs medication due to lab results?

## 2018-04-03 ENCOUNTER — OFFICE VISIT (OUTPATIENT)
Dept: NEUROLOGY | Age: 63
End: 2018-04-03

## 2018-04-03 VITALS
DIASTOLIC BLOOD PRESSURE: 70 MMHG | SYSTOLIC BLOOD PRESSURE: 132 MMHG | WEIGHT: 168 LBS | OXYGEN SATURATION: 98 % | BODY MASS INDEX: 22.75 KG/M2 | HEIGHT: 72 IN | HEART RATE: 95 BPM

## 2018-04-03 DIAGNOSIS — R41.3 MEMORY LOSS: Primary | ICD-10-CM

## 2018-04-03 DIAGNOSIS — Q07.9 CONGENITAL ENCEPHALOPATHY (HCC): ICD-10-CM

## 2018-04-03 DIAGNOSIS — F33.1 MODERATE EPISODE OF RECURRENT MAJOR DEPRESSIVE DISORDER (HCC): ICD-10-CM

## 2018-04-03 PROBLEM — F33.9 RECURRENT DEPRESSION (HCC): Status: ACTIVE | Noted: 2018-04-03

## 2018-04-03 RX ORDER — VENLAFAXINE HYDROCHLORIDE 37.5 MG/1
CAPSULE, EXTENDED RELEASE ORAL
Qty: 60 CAP | Refills: 5 | Status: SHIPPED | OUTPATIENT
Start: 2018-04-03 | End: 2018-06-08

## 2018-04-03 NOTE — MR AVS SNAPSHOT
[address], 
[address], [address] [facility] 
[phone] Patient: Josephine Ashby MRN: [id number] FWO:[birth year] Visit Information Date & Time Provider Department Dept. Phone Encounter #  
 4/3/2018  3:00 PM Scooter Anderson MD Neurology [facility] at [facility] [phone] [id number] Your Appointments 5/7/2018  1:30 PM  
New Patient with Jasmin Fournier MD  
[facility]) Appt Note: new patient appt-  cr  
 [phone] [facility]  
[phone] [address]  
  
    
 6/8/2018 11:30 AM  
ROUTINE CARE with Blessing Dsouza, [address] [place] [address],[address] Floor [facility]) Appt Note: 3 month follow up  
 [address] [facility]  
[phone]  
  
   
 [address] [facility] [address] [address]  
  
    
 10/2/2018  3:00 PM  
Follow Up with Scooter Anderson MD  
Neurology Clinic at [facility]) Appt Note: follow up memory loss $ 0 CP jll 4/3/18  
 [address] [facility], 
[address] [place] [address], [address] [address]  
[address] [facility], [address] [place] [address], [address] [place] [address] [address] [address] Upcoming Health Maintenance Date Due Hepatitis C Screening 1955 DTaP/Tdap/Td series (1 - Tdap) 9/7/1975 FOBT Q 1 YEAR AGE 50-75 9/7/2004 ZOSTER VACCINE AGE 60> 7/7/2014 MEDICARE YEARLY EXAM 3/28/2018 Allergies as of 4/3/2018  Review Complete On: 3/8/2018 By: Raghu Soler LPN Severity Noted Reaction Type Reactions Allergen [Antipyrine-benzocaine]  11/12/2013    Unknown (comments) Avelox [Moxifloxacin]  11/12/2013    Unknown (comments) Codeine Sulfate  11/12/2013    Unknown (comments) Pcn [Penicillins]  06/01/2010    Other (comments) Coma Current Immunizations  Never Reviewed Name Date Influenza Vaccine 9/27/2017 Pneumococcal Conjugate (PCV-13) 9/10/2015 Not reviewed this visit You Were Diagnosed With   
  
 Codes Comments Memory loss    -  Primary ICD-10-CM: R41.3 ICD-9-CM: 780.93 Congenital encephalopathy ([facility]     ICD-10-CM: Q07.9 ICD-9-CM: 742.9 Moderate episode of recurrent major depressive disorder (HCC)     ICD-10-CM: F33.1 ICD-9-CM: 296.32 Vitals BP Pulse Height(growth percentile) Weight(growth percentile) SpO2 BMI  
 132/70 95 6' (1.829 m) 168 lb (76.2 kg) 98% 22.78 kg/m2 Smoking Status Current Every Day Smoker Vitals History BMI and BSA Data Body Mass Index Body Surface Area 22.78 kg/m 2 1.97 m 2 Preferred Pharmacy Pharmacy Name Phone [address], [address] [place] [address] [phone] Your Updated Medication List  
  
   
This list is accurate as of 4/3/18  3:54 PM.  Always use your most recent med list.  
  
  
  
  
 albuterol 2.5 mg /3 mL (0.083 %) nebulizer solution Commonly known as:  PROVENTIL VENTOLIN  
1.25 mg by Nebulization route every four (4) hours as needed. albuterol 90 mcg/actuation inhaler Commonly known as:  Andie Chimes Take 2 Puffs by inhalation three (3) times daily as needed. atorvastatin 10 mg tablet Commonly known as:  LIPITOR Take 1 Tab by mouth daily. clonazePAM 1 mg tablet Commonly known as:  Terryl Hamper Take 1 mg by mouth nightly. Indications: FORREST ASSOCIATED WITH BIPOLAR DISORDER, ADJUNCT  
  
 OLANZapine 20 mg tablet Commonly known as:  ZyPREXA Take 20 mg by mouth nightly. Indications: SCHIZOPHRENIA  
  
 OTHER  
oxygen  
  
 venlafaxine-SR 37.5 mg capsule Commonly known as:  EFFEXOR-XR  
1 p.o. every morning for 2 weeks then 2 p.o. every morning  Indications: major depressive disorder WELLBUTRIN  mg SR tablet Generic drug:  buPROPion SR Take  by mouth daily. Prescriptions Sent to Pharmacy Refills  
 venlafaxine-SR (EFFEXOR-XR) 37.5 mg capsule 5 Si p.o. every morning for 2 weeks then 2 p.o. every morning  Indications: major depressive disorder Class: Normal  
 Pharmacy: [address], [address] [place] [address] #: [phone] To-Do List   
 2018 Imaging:  MRI BRAIN WO CONT Patient Instructions PRESCRIPTION REFILL POLICY [facility] Statement to Patients 2014 In an effort to ensure the large volume of patient prescription refills is processed in the most efficient and expeditious manner, we are asking our patients to assist us by calling your Pharmacy for all prescription refills, this will include also your  Mail Order Pharmacy. The pharmacy will contact our office electronically to continue the refill process. Please do not wait until the last minute to call your pharmacy. We need at least 48 hours (2days) to fill prescriptions. We also encourage you to call your pharmacy before going to  your prescription to make sure it is ready. With regard to controlled substance prescription refill requests (narcotic refills) that need to be picked up at our office, we ask your cooperation by providing us with at least 72 hours (3days) notice that you will need a refill. We will not refill narcotic prescription refill requests after 4:00pm on any weekday, Monday through Thursday, or after 2:00pm on , or on the weekends. We encourage everyone to explore another way of getting your prescription refill request processed using C[employer], our patient web portal through our electronic medical record system. Cogito is an efficient and effective way to communicate your medication request directly to the office and  downloadable as an fan on your smart phone .  Cogito also features a review functionality that allows you to view your medication list as well as leave messages for your physician. Are you ready to get connected? If so please review the attatched instructions or speak to any of our staff to get you set up right away! Thank you so much for your cooperation. Should you have any questions please contact our Practice Administrator. The Physicians and Staff,  [facility] Introducing [facility]! [facility] introduces [employer] patient portal. Now you can access parts of your medical record, email your doctor's office, and request medication refills online. 1. In your internet browser, go to [email or web address] 2. Click on the First Time User? Click Here link in the Sign In box. You will see the New Member Sign Up page. 3. Enter your [employer] Access Code exactly as it appears below. You will not need to use this code after youve completed the sign-up process. If you do not sign up before the expiration date, you must request a new code. · [employer] Access Code: [email or web address] Expires: 4/9/2018  4:18 PM 
 
4. Enter the last four digits of your Social Security Number (xxxx) and Date of Birth (mm/dd/yyyy) as indicated and click Submit. You will be taken to the next sign-up page. 5. Create a [employer] ID. This will be your [employer] login ID and cannot be changed, so think of one that is secure and easy to remember. 6. Create a [employer] password. You can change your password at any time. 7. Enter your Password Reset Question and Answer. This can be used at a later time if you forget your password. 8. Enter your e-mail address. You will receive e-mail notification when new information is available in [address] 9. Click Sign Up. You can now view and download portions of your medical record. 10. Click the Download Summary menu link to download a portable copy of your medical information. If you have questions, please visit the Frequently Asked Questions section of the [employer] website. Remember, Data[employer] is NOT to be used for urgent needs. For medical emergencies, dial 911. Now available from your iPhone and Android! Please provide this summary of care documentation to your next provider. Your primary care clinician is listed as Rasheed VICTORIA. If you have any questions after today's visit, please call [phone].

## 2018-04-03 NOTE — PATIENT INSTRUCTIONS
[facility]   Statement to Patients  April 1, 2014      In an effort to ensure the large volume of patient prescription refills is processed in the most efficient and expeditious manner, we are asking our patients to assist us by calling your Pharmacy for all prescription refills, this will include also your  Mail Order Pharmacy. The pharmacy will contact our office electronically to continue the refill process. Please do not wait until the last minute to call your pharmacy. We need at least 48 hours (2days) to fill prescriptions. We also encourage you to call your pharmacy before going to  your prescription to make sure it is ready. With regard to controlled substance prescription refill requests (narcotic refills) that need to be picked up at our office, we ask your cooperation by providing us with at least 72 hours (3days) notice that you will need a refill. We will not refill narcotic prescription refill requests after 4:00pm on any weekday, Monday through Thursday, or after 2:00pm on Fridays, or on the weekends. We encourage everyone to explore another way of getting your prescription refill request processed using Homel[employer], our patient web portal through our electronic medical record system. Homel[employer] is an efficient and effective way to communicate your medication request directly to the office and  downloadable as an fan on your smart phone . [employer] also features a review functionality that allows you to view your medication list as well as leave messages for your physician. Are you ready to get connected? If so please review the attatched instructions or speak to any of our staff to get you set up right away! Thank you so much for your cooperation. Should you have any questions please contact our Practice Administrator.     The Physicians and Staff,  [facility]

## 2018-04-03 NOTE — PROGRESS NOTES
HISTORY OF PRESENT ILLNESS  Jessica Begum is a 60 y.o. male. 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 He is accompanied by a female friend who does not live with him. He does admit to poor memory. He has a history of depression for 15 years and has been taking bupropion 150 mg SR daily for that. He also does take Zyprexa 20 mg a day for an unknown psychiatric disturbance which apparently has been under control. He is on Lipitor as well. He does live by himself. He states that his memory is very poor. He does believe his depression is a little worse now than it was a year or 2 ago. Memory Loss    The history is provided by the patient and friend. This is a chronic problem. Review of Systems   Constitutional:        View of systems is positive for anxiety and depression, fatigue, memory loss, poor appetite, tinnitus, shortness of breath, skipped cardiac beats and weight loss 10 pounds. Complete review of systems done all others negative   Psychiatric/Behavioral: Positive for memory loss. Current Outpatient Prescriptions on File Prior to Visit   Medication Sig Dispense Refill    atorvastatin (LIPITOR) 10 mg tablet Take 1 Tab by mouth daily. 30 Tab 3    OLANZapine (ZYPREXA) 20 mg tablet Take 20 mg by mouth nightly. Indications: SCHIZOPHRENIA      clonazePAM (KLONOPIN) 1 mg tablet Take 1 mg by mouth nightly. Indications: FORREST ASSOCIATED WITH BIPOLAR DISORDER, ADJUNCT      OTHER oxygen      albuterol (PROVENTIL, VENTOLIN) 90 mcg/Actuation inhaler Take 2 Puffs by inhalation three (3) times daily as needed.  albuterol (PROVENTIL VENTOLIN) 2.5 mg /3 mL (0.083 %) nebulizer solution 1.25 mg by Nebulization route every four (4) hours as needed.  buPROPion SR (WELLBUTRIN SR) 150 mg SR tablet Take  by mouth daily. No current facility-administered medications on file prior to visit.       Past Medical History:   Diagnosis Date    Abdominal pain     Adverse effect of anesthesia     heart rate slows down    Asthma     Back pain     Cancer (HCC) 2015    Throat cancer treated with chemo & radiation    Constipation     COPD     managed by PCP    Cyst of pharynx     Depression     Extrinsic asthma, unspecified     Fatigue     GERD (gastroesophageal reflux disease)     Insomnia, unspecified     On home O2     at bedtime    Other chest pain     since 2-    Pain in joint, forearm     Psychiatric disorder     schizophrenia    Smoker     1 ppw     Family History   Problem Relation Age of Onset    Cancer Mother     Cancer Father      Social History   Substance Use Topics    Smoking status: Current Every Day Smoker     Packs/day: 0.25     Years: 42.00     Last attempt to quit: 11/1/2010    Smokeless tobacco: Never Used    Alcohol use No     /70  Pulse 95  Ht 6' (1.829 m)  Wt 168 lb (76.2 kg)  SpO2 98%  BMI 22.78 kg/m2    Physical Exam   Constitutional: He is oriented to person, place, and time. He appears well-developed and well-nourished. No distress. HENT:   Head: Normocephalic and atraumatic. Mouth/Throat: Oropharynx is clear and moist. No oropharyngeal exudate. Eyes: Conjunctivae and EOM are normal. Pupils are equal, round, and reactive to light. No scleral icterus. Neck: Normal range of motion. Neck supple. No thyromegaly present. Cardiovascular: Normal rate, regular rhythm and normal heart sounds. No murmur heard. Musculoskeletal: Normal range of motion. He exhibits no edema, tenderness or deformity. Lymphadenopathy:     He has no cervical adenopathy. Neurological: He is oriented to person, place, and time. He has normal reflexes. He displays normal reflexes. No cranial nerve deficit. He exhibits normal muscle tone.  Coordination normal.   MMSE was not particularly valid because the patient has congenital static encephalopathy and was in special education as a child.  His clock drawing was very good. Skin: Skin is warm and dry. No rash noted. He is not diaphoretic. No erythema. Psychiatric: He has a normal mood and affect. His behavior is normal. Judgment and thought content normal.       ASSESSMENT and PLAN  MEMORY LOSS  It is a little difficult to determine whether this patient has pathologic memory loss are not given his congenital static encephalopathy. I suspect that it is not pathologic, I am a little suspicious his depression is no longer responding to the bupropion which she has been on for 15 years. I am going to switch him over to Effexor XR 37.5 mg a day for 2 weeks and then 75 mg a day. I have asked his friend to call us in 5 weeks and let us know how he is doing. I will plan to see him back in 6 months. I do not believe that neuro imaging is required at this time  CONGENITAL  STATIC ENCEPHALOPATHY  Stable  HYPERLIPIDEMIA  Stroke risk, stable, managed by primary care  PSYCHIATRIC DISEASE  I am not sure what other psychiatric diagnoses besides depression he is on medication for. I do not see evidence that they are significantly active at this time. This note will not be viewable in [address] This note was created using voice recognition software. Despite editing, there may be syntax errors.

## 2018-04-03 NOTE — LETTER
4/3/2018 4:33 PM 
 
Patient:  Rosita Eisenmenger YOB: 1955 Date of Visit: 4/3/2018 Dear MD Boone Pang. Elizxeniadoreenbrianna IBETHharrietkylie [address] [address] VIA In Basket 
 : Thank you for referring Mr. Sunni Briseno to me for evaluation/treatment. Below are the relevant portions of my assessment and plan of care. HISTORY OF PRESENT ILLNESS Rosita Eisenmenger is a 60 y.o. male. 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 He is accompanied by a female friend who does not live with him. He does admit to poor memory. He has a history of depression for 15 years and has been taking bupropion 150 mg SR daily for that. He also does take Zyprexa 20 mg a day for an unknown psychiatric disturbance which apparently has been under control. He is on Lipitor as well. He does live by himself. He states that his memory is very poor. He does believe his depression is a little worse now than it was a year or 2 ago. Memory Loss The history is provided by the patient and friend. This is a chronic problem. Review of Systems Constitutional:  
     View of systems is positive for anxiety and depression, fatigue, memory loss, poor appetite, tinnitus, shortness of breath, skipped cardiac beats and weight loss 10 pounds. Complete review of systems done all others negative Psychiatric/Behavioral: Positive for memory loss. Current Outpatient Prescriptions on File Prior to Visit Medication Sig Dispense Refill  atorvastatin (LIPITOR) 10 mg tablet Take 1 Tab by mouth daily. 30 Tab 3  
 OLANZapine (ZYPREXA) 20 mg tablet Take 20 mg by mouth nightly. Indications: SCHIZOPHRENIA  clonazePAM (KLONOPIN) 1 mg tablet Take 1 mg by mouth nightly. Indications: FORREST ASSOCIATED WITH BIPOLAR DISORDER, ADJUNCT    
 OTHER oxygen  albuterol (PROVENTIL, VENTOLIN) 90 mcg/Actuation inhaler Take 2 Puffs by inhalation three (3) times daily as needed.  albuterol (PROVENTIL VENTOLIN) 2.5 mg /3 mL (0.083 %) nebulizer solution 1.25 mg by Nebulization route every four (4) hours as needed.  buPROPion SR (WELLBUTRIN SR) 150 mg SR tablet Take  by mouth daily. No current facility-administered medications on file prior to visit. Past Medical History:  
Diagnosis Date  Abdominal pain  Adverse effect of anesthesia   
 heart rate slows down  Asthma  Back pain  Cancer ([facility] [address] 2015 Throat cancer treated with chemo & radiation  Constipation  COPD   
 managed by PCP  Cyst of pharynx  Depression  Extrinsic asthma, unspecified  Fatigue  GERD (gastroesophageal reflux disease)  Insomnia, unspecified  On home O2   
 at bedtime  Other chest pain   
 since 2-  Pain in joint, forearm  Psychiatric disorder   
 schizophrenia  Smoker 1 ppw Family History Problem Relation Age of Onset  Cancer Mother  Cancer Father Social History Substance Use Topics  Smoking status: Current Every Day Smoker Packs/day: 0.25 Years: 42.00 Last attempt to quit: 11/1/2010  Smokeless tobacco: Never Used  Alcohol use No  
 
/70  Pulse 95  Ht 6' (1.829 m)  Wt 168 lb (76.2 kg)  SpO2 98%  BMI 22.78 kg/m2 Physical Exam  
Constitutional: He is oriented to person, place, and time. He appears well-developed and well-nourished. No distress. HENT:  
Head: Normocephalic and atraumatic. Mouth/Throat: Oropharynx is clear and moist. No oropharyngeal exudate. Eyes: Conjunctivae and EOM are normal. Pupils are equal, round, and reactive to light. No scleral icterus. Neck: Normal range of motion. Neck supple. No thyromegaly present. Cardiovascular: Normal rate, regular rhythm and normal heart sounds. No murmur heard. Musculoskeletal: Normal range of motion. He exhibits no edema, tenderness or deformity. Lymphadenopathy:  
  He has no cervical adenopathy. Neurological: He is oriented to person, place, and time. He has normal reflexes. He displays normal reflexes. No cranial nerve deficit. He exhibits normal muscle tone. Coordination normal.  
MMSE was not particularly valid because the patient has congenital static encephalopathy and was in special education as a child. His clock drawing was very good. Skin: Skin is warm and dry. No rash noted. He is not diaphoretic. No erythema. Psychiatric: He has a normal mood and affect. His behavior is normal. Judgment and thought content normal.  
 
 
ASSESSMENT and PLAN 
MEMORY LOSS It is a little difficult to determine whether this patient has pathologic memory loss are not given his congenital static encephalopathy. I suspect that it is not pathologic, I am a little suspicious his depression is no longer responding to the bupropion which she has been on for 15 years. I am going to switch him over to Effexor XR 37.5 mg a day for 2 weeks and then 75 mg a day. I have asked his friend to call us in 5 weeks and let us know how he is doing. I will plan to see him back in 6 months. I do not believe that neuro imaging is required at this time CONGENITAL  STATIC ENCEPHALOPATHY Stable HYPERLIPIDEMIA Stroke risk, stable, managed by primary care PSYCHIATRIC DISEASE I am not sure what other psychiatric diagnoses besides depression he is on medication for. I do not see evidence that they are significantly active at this time. This note will not be viewable in [address] This note was created using voice recognition software. Despite editing, there may be syntax errors. If you have questions, please do not hesitate to call me. I look forward to following Mr. Wong along with you. Sincerely, Mariel Johansen MD

## 2018-04-05 ENCOUNTER — HOSPITAL ENCOUNTER (OUTPATIENT)
Dept: GENERAL RADIOLOGY | Age: 63
Discharge: HOME | End: 2018-04-05
Payer: MEDICARE

## 2018-04-05 DIAGNOSIS — J44.9 CHRONIC OBSTRUCTIVE PULMONARY DISEASE (HCC): ICD-10-CM

## 2018-04-05 PROCEDURE — 71046 X-RAY EXAM CHEST 2 VIEWS: CPT

## 2018-04-11 ENCOUNTER — TELEPHONE (OUTPATIENT)
Dept: INTERNAL MEDICINE CLINIC | Age: 63
End: 2018-04-11

## 2018-04-11 NOTE — TELEPHONE ENCOUNTER
Patient's caregiver/girlfriend, Sophie Alonzo called to advise that patient has drainage coming from Ear & medication prescribed for memory loss is not working & if anything Nael Hirsch reports it seems to be worse. Patient has appt scheduled for 4/19/18 but Nael Hirsch would like to be seen sooner & could bring patient in today. Please call to advise.  Thank you

## 2018-04-14 ENCOUNTER — HOSPITAL ENCOUNTER (OUTPATIENT)
Dept: MRI IMAGING | Age: 63
Discharge: HOME | End: 2018-04-14
Attending: PSYCHIATRY & NEUROLOGY
Payer: MEDICARE

## 2018-04-14 DIAGNOSIS — F33.1 MODERATE EPISODE OF RECURRENT MAJOR DEPRESSIVE DISORDER (HCC): ICD-10-CM

## 2018-04-14 DIAGNOSIS — Q07.9 CONGENITAL ENCEPHALOPATHY (HCC): ICD-10-CM

## 2018-04-14 DIAGNOSIS — R41.3 MEMORY LOSS: ICD-10-CM

## 2018-04-14 PROCEDURE — 70551 MRI BRAIN STEM W/O DYE: CPT

## 2018-04-19 ENCOUNTER — OFFICE VISIT (OUTPATIENT)
Dept: INTERNAL MEDICINE CLINIC | Age: 63
End: 2018-04-19

## 2018-04-19 VITALS
HEART RATE: 68 BPM | DIASTOLIC BLOOD PRESSURE: 81 MMHG | TEMPERATURE: 98.1 F | OXYGEN SATURATION: 96 % | BODY MASS INDEX: 22.08 KG/M2 | WEIGHT: 163 LBS | SYSTOLIC BLOOD PRESSURE: 131 MMHG | HEIGHT: 72 IN

## 2018-04-19 DIAGNOSIS — Z11.59 NEED FOR HEPATITIS C SCREENING TEST: ICD-10-CM

## 2018-04-19 DIAGNOSIS — R73.09 ELEVATED GLUCOSE: ICD-10-CM

## 2018-04-19 DIAGNOSIS — F20.9 SCHIZOPHRENIA, UNSPECIFIED TYPE (HCC): ICD-10-CM

## 2018-04-19 DIAGNOSIS — F33.9 RECURRENT DEPRESSION (HCC): ICD-10-CM

## 2018-04-19 DIAGNOSIS — H60.503 ACUTE OTITIS EXTERNA OF BOTH EARS, UNSPECIFIED TYPE: Primary | ICD-10-CM

## 2018-04-19 RX ORDER — OLANZAPINE 20 MG/1
20 TABLET ORAL
Qty: 30 TAB | Refills: 0 | Status: SHIPPED | OUTPATIENT
Start: 2018-04-19 | End: 2018-05-29 | Stop reason: SDUPTHER

## 2018-04-19 RX ORDER — AZITHROMYCIN 250 MG/1
250 TABLET, FILM COATED ORAL SEE ADMIN INSTRUCTIONS
Qty: 6 TAB | Refills: 0 | Status: SHIPPED | OUTPATIENT
Start: 2018-04-19 | End: 2018-04-24

## 2018-04-19 RX ORDER — CLONAZEPAM 1 MG/1
1 TABLET ORAL
Qty: 30 TAB | Refills: 0 | Status: SHIPPED | OUTPATIENT
Start: 2018-04-19 | End: 2018-05-29 | Stop reason: SDUPTHER

## 2018-04-19 RX ORDER — NEOMYCIN SULFATE, POLYMYXIN B SULFATE AND HYDROCORTISONE 10; 3.5; 1 MG/ML; MG/ML; [USP'U]/ML
3 SUSPENSION/ DROPS AURICULAR (OTIC) 4 TIMES DAILY
Qty: 10 ML | Refills: 0 | Status: SHIPPED | OUTPATIENT
Start: 2018-04-19 | End: 2018-06-07 | Stop reason: ALTCHOICE

## 2018-04-19 NOTE — PROGRESS NOTES
HISTORY OF PRESENT ILLNESS  Elke Damian is a 60 y.o. male. HPI   Acute care    C/o brown discharge b/l ear x 5 days  , no hearing loss  His friend request labs for check for Hep C and l2z--zdqbxw has hep c and pt consumes too many carbs    Needs refill of clonazepam and zyprexa until sees new psych MD next month    Saw neurologist for memory loss  wellbutrin changed to effexor for depression, not much change per pt--MRI -no acute process  Thought to have congential static encephalopathy and not pathologic memory loss  lipitor was started for LDL > 190 last visit    Last visit  Pt here to establish care  Former PCP Dr Anthony Lyons records available  Retired [employer]- [employer]  Hx schizophrenia, copd on home 02 1l hs , depression, hx throat cancer s/p chemo and xrt--Dr Sarah Grubbs and Dr Delarosa Morning 5 cigarretes/d --former 1ppd 30-40yrs, requests portable 02 tank  No recent flare of copd     Recent right facial mass excision--epidermal inclusion cyst        Here with friend Mrs Stephanie dhillona  She is concerned about his forgetfullness  Pt able to live alone and take care of himself    Patient Active Problem List    Diagnosis Date Noted    Recurrent depression ([facility] [address] 04/03/2018    Pure hypercholesterolemia 03/11/2018    Elevated TSH 03/11/2018    Nontraumatic partial bilateral rotator cuff tear 09/22/2017    Adhesive capsulitis of left shoulder 06/14/2017    Bursitis/tendonitis, shoulder 06/14/2017     Current Outpatient Prescriptions   Medication Sig Dispense Refill    venlafaxine-SR (EFFEXOR-XR) 37.5 mg capsule 1 p.o. every morning for 2 weeks then 2 p.o. every morning  Indications: major depressive disorder 59 Cap 5    atorvastatin (LIPITOR) 10 mg tablet Take 1 Tab by mouth daily. 30 Tab 3    OLANZapine (ZYPREXA) 20 mg tablet Take 20 mg by mouth nightly. Indications: SCHIZOPHRENIA      clonazePAM (KLONOPIN) 1 mg tablet Take 1 mg by mouth nightly.  Indications: FORREST ASSOCIATED WITH BIPOLAR DISORDER, ADJUNCT      OTHER oxygen      albuterol (PROVENTIL, VENTOLIN) 90 mcg/Actuation inhaler Take 2 Puffs by inhalation three (3) times daily as needed.  albuterol (PROVENTIL VENTOLIN) 2.5 mg /3 mL (0.083 %) nebulizer solution 1.25 mg by Nebulization route every four (4) hours as needed.  buPROPion SR (WELLBUTRIN SR) 150 mg SR tablet Take  by mouth daily. Allergies   Allergen Reactions    Allergen [Antipyrine-Benzocaine] Unknown (comments)    Avelox [Moxifloxacin] Unknown (comments)    Codeine Sulfate Unknown (comments)    Pcn [Penicillins] Other (comments)     Coma      Lab Results  Component Value Date/Time   GFR est non-AA 86 03/08/2018 11:42 AM   GFR est AA 99 03/08/2018 11:42 AM   Creatinine 0.94 03/08/2018 11:42 AM   BUN 14 03/08/2018 11:42 AM   Sodium 140 03/08/2018 11:42 AM   Potassium 4.7 03/08/2018 11:42 AM   Chloride 97 03/08/2018 11:42 AM   CO2 27 03/08/2018 11:42 AM   Magnesium 2.0 08/22/2015 01:24 PM        ROS    Physical Exam   HENT:   TM's clear  Slight erythema right lower ear canal and tenderness b/l ear canals   Cardiovascular: Exam reveals no gallop and no friction rub. No murmur heard. Pulmonary/Chest: No respiratory distress. He has no wheezes. He has no rales. He exhibits no tenderness. ASSESSMENT and PLAN  Diagnoses and all orders for this visit:    1. Acute otitis externa of both ears, unspecified type   Cortisporin otic   zpak  2. Recurrent depression ([address]   Stable on effexor--continue  3. Schizophrenia, unspecified type ([address]  -     OLANZapine (ZYPREXA) 20 mg tablet; Take 1 Tab by mouth nightly. Indications: Schizophrenia  -     clonazePAM (KLONOPIN) 1 mg tablet; Take 1 Tab by mouth nightly. Max Daily Amount: 1 mg. Indications: FORRSET ASSOCIATED WITH BIPOLAR DISORDER, ADJUNCT   Refill for 1 month   hermelinda MURILLO appt next month to establish care  4. Need for hepatitis C screening test  -     HEPATITIS C AB    5.  Elevated glucose  -     HEMOGLOBIN A1C WITH EAG    Other orders  -     neomycin-polymyxin-hydrocortisone, buffered, (PEDIOTIC) 3.5-10,000-1 mg/mL-unit/mL-% otic suspension; Administer 3 Drops in left ear four (4) times daily. -     azithromycin (ZITHROMAX) 250 mg tablet; Take 1 Tab by mouth See Admin Instructions for 5 days. Follow-up Disposition:  Return if symptoms worsen or fail to improve.

## 2018-04-19 NOTE — PROGRESS NOTES
Chief Complaint   Patient presents with    Ear Fullness     both ears    Labs     requesting A1c and Hep.  C    Medication Refill     clonazepm and zyprexa

## 2018-04-19 NOTE — MR AVS SNAPSHOT
[address] [facility] 
[phone] Patient: Elke Damian MRN: [id number] Visit Information Date & Time Provider Department Dept. Phone Encounter #  
 4/19/2018 11:15 AM Barney Barrera, [address] [place] [address],[address] [phone] [id number] Follow-up Instructions Return if symptoms worsen or fail to improve. Your Appointments 5/7/2018  1:30 PM  
New Patient with Rafia Crawford MD  
[facility] [address] [facility]) Appt Note: new patient appt-  cr  
 [phone] [facility]  
[phone] [address]  
  
    
 6/8/2018 11:30 AM  
ROUTINE CARE with Barney , [address] [place] [address],[address]) Appt Note: 3 month follow up  
 [facility] [address] [facility]  
[phone]  
  
   
 [address] [address]  
  
    
 10/2/2018  3:00 PM  
Follow Up with Vishnu Tena MD  
[facility] [address] [facility]) Appt Note: follow up memory loss $ 0 CP jll 4/3/18  
 [address] [place] [address], [address] [address]  
[address] [facility], [address] [place] [address], [address] [place] [address] [address] [address] Upcoming Health Maintenance Date Due Hepatitis C Screening 1955 DTaP/Tdap/Td series (1 - Tdap) 9/7/1975 FOBT Q 1 YEAR AGE 50-75 9/7/2004 ZOSTER VACCINE AGE 60> 7/7/2014 MEDICARE YEARLY EXAM 3/28/2018 Allergies as of 4/19/2018  Review Complete On: 4/19/2018 By: Mala Ash LPN Severity Noted Reaction Type Reactions Allergen [Antipyrine-benzocaine]  11/12/2013    Unknown (comments) Avelox [Moxifloxacin]  11/12/2013    Unknown (comments) Codeine Sulfate  11/12/2013    Unknown (comments) Pcn [Penicillins]  06/01/2010    Other (comments) Coma Current Immunizations  Never Reviewed Name Date Influenza Vaccine 9/27/2017 Pneumococcal Conjugate (PCV-13) 9/10/2015 Not reviewed this visit You Were Diagnosed With   
  
 Codes Comments Acute otitis externa of both ears, unspecified type    -  Primary ICD-10-CM: H60.503 ICD-9-CM: 380.10 Recurrent depression ([facility]     ICD-10-CM: F33.9 ICD-9-CM: 296.30 Schizophrenia, unspecified type ([facility]     ICD-10-CM: F20.9 ICD-9-CM: 295.90 Need for hepatitis C screening test     ICD-10-CM: Z11.59 
ICD-9-CM: V73.89 Elevated glucose     ICD-10-CM: R73.09 
ICD-9-CM: 790.29 Vitals BP Pulse Temp Height(growth percentile) Weight(growth percentile) SpO2  
 131/81 (BP 1 Location: Left arm, BP Patient Position: Sitting) 68 98.1 °F (36.7 °C) (Oral) 6' (1.829 m) 163 lb (73.9 kg) 96% BMI Smoking Status 22.11 kg/m2 Current Every Day Smoker BMI and BSA Data Body Mass Index Body Surface Area  
 22.11 kg/m 2 1.94 m 2 Preferred Pharmacy Pharmacy Name Phone [address], [address] [place] [address] [phone] Your Updated Medication List  
  
   
This list is accurate as of 4/19/18 12:44 PM.  Always use your most recent med list.  
  
  
  
  
 albuterol 2.5 mg /3 mL (0.083 %) nebulizer solution Commonly known as:  PROVENTIL VENTOLIN  
1.25 mg by Nebulization route every four (4) hours as needed. albuterol 90 mcg/actuation inhaler Commonly known as:  [facility] Take 2 Puffs by inhalation three (3) times daily as needed. atorvastatin 10 mg tablet Commonly known as:  LIPITOR Take 1 Tab by mouth daily. azithromycin 250 mg tablet Commonly known as:  Joey Clos Take 1 Tab by mouth See Admin Instructions for 5 days. clonazePAM 1 mg tablet Commonly known as:  Noé Rainey  
 Take 1 Tab by mouth nightly. Max Daily Amount: 1 mg. Indications: FORREST ASSOCIATED WITH BIPOLAR DISORDER, ADJUNCT  
  
 neomycin-polymyxin-hydrocortisone (buffered) 3.5-10,000-1 mg/mL-unit/mL-% otic suspension Commonly known as:  Flaco Rom Administer 3 Drops in left ear four (4) times daily. OLANZapine 20 mg tablet Commonly known as:  ZyPREXA Take 1 Tab by mouth nightly. Indications: Schizophrenia OTHER  
oxygen  
  
 venlafaxine-SR 37.5 mg capsule Commonly known as:  EFFEXOR-XR  
1 p.o. every morning for 2 weeks then 2 p.o. every morning  Indications: major depressive disorder WELLBUTRIN  mg SR tablet Generic drug:  buPROPion SR Take  by mouth daily. Prescriptions Printed Refills  
 clonazePAM (KLONOPIN) 1 mg tablet 0 Sig: Take 1 Tab by mouth nightly. Max Daily Amount: 1 mg. Indications: FORREST ASSOCIATED WITH BIPOLAR DISORDER, ADJUNCT Class: Print Route: Oral  
  
Prescriptions Sent to Pharmacy Refills OLANZapine (ZYPREXA) 20 mg tablet 0 Sig: Take 1 Tab by mouth nightly. Indications: Schizophrenia Class: Normal  
 Pharmacy: [facility] [address] Ph #: [phone] Route: Oral  
 neomycin-polymyxin-hydrocortisone, buffered, (PEDIOTIC) 3.5-10,000-1 mg/mL-unit/mL-% otic suspension 0 Sig: Administer 3 Drops in left ear four (4) times daily. Class: Normal  
 Pharmacy: [address] [place] [address] Ph #: [phone] Route: Left Ear  
 azithromycin (ZITHROMAX) 250 mg tablet 0 Sig: Take 1 Tab by mouth See Admin Instructions for 5 days. Class: Normal  
 Pharmacy: [address] [place] [address] Ph #: [phone] Route: Oral  
  
We Performed the Following HEMOGLOBIN A1C WITH EAG [00601 CPT(R)] HEPATITIS C AB [55059 CPT(R)] Follow-up Instructions Return if symptoms worsen or fail to improve. Introducing [facility]! Michel Perez introduces Podotree patient portal. Now you can access parts of your medical record, email your doctor's office, and request medication refills online. 1. In your internet browser, go to [email or web address] 2. Click on the First Time User? Click Here link in the Sign In box. You will see the New Member Sign Up page. 3. Enter your Podotree Access Code exactly as it appears below. You will not need to use this code after youve completed the sign-up process. If you do not sign up before the expiration date, you must request a new code. · P[employer]ree Access Code: [email or web address] Expires: 7/10/2018  8:21 AM 
 
4. Enter the last four digits of your Social Security Number (xxxx) and Date of Birth (mm/dd/yyyy) as indicated and click Submit. You will be taken to the next sign-up page. 5. Create a Podotree ID. This will be your P[employer]ree login ID and cannot be changed, so think of one that is secure and easy to remember. 6. Create a Podotree password. You can change your password at any time. 7. Enter your Password Reset Question and Answer. This can be used at a later time if you forget your password. 8. Enter your e-mail address. You will receive e-mail notification when new information is available in [address] 9. Click Sign Up. You can now view and download portions of your medical record. 10. Click the Download Summary menu link to download a portable copy of your medical information. If you have questions, please visit the Frequently Asked Questions section of the P[employer]ree website. Remember, P[employer]ree is NOT to be used for urgent needs. For medical emergencies, dial 911. Now available from your iPhone and Android! Please provide this summary of care documentation to your next provider. Your primary care clinician is listed as Conchis VICTORIA.  If you have any questions after today's visit, please call [phone].

## 2018-04-20 LAB
EST. AVERAGE GLUCOSE BLD GHB EST-MCNC: 103 MG/DL
HBA1C MFR BLD: 5.2 % (ref 4.8–5.6)
HCV AB S/CO SERPL IA: <0.1 S/CO RATIO (ref 0–0.9)

## 2018-05-07 ENCOUNTER — OFFICE VISIT (OUTPATIENT)
Dept: CARDIOLOGY CLINIC | Age: 63
End: 2018-05-07

## 2018-05-07 VITALS
HEART RATE: 70 BPM | DIASTOLIC BLOOD PRESSURE: 80 MMHG | BODY MASS INDEX: 22.04 KG/M2 | SYSTOLIC BLOOD PRESSURE: 120 MMHG | HEIGHT: 72 IN | OXYGEN SATURATION: 97 % | RESPIRATION RATE: 16 BRPM | WEIGHT: 162.7 LBS

## 2018-05-07 DIAGNOSIS — Z76.89 ENCOUNTER TO ESTABLISH CARE: ICD-10-CM

## 2018-05-07 DIAGNOSIS — E78.01 FAMILIAL HYPERCHOLESTEROLEMIA: ICD-10-CM

## 2018-05-07 DIAGNOSIS — R07.9 CHEST PAIN, UNSPECIFIED TYPE: Primary | ICD-10-CM

## 2018-05-07 DIAGNOSIS — F17.200 CURRENT SMOKER: ICD-10-CM

## 2018-05-07 RX ORDER — GUAIFENESIN 100 MG/5ML
81 LIQUID (ML) ORAL DAILY
Qty: 30 TAB | Refills: 1 | Status: ON HOLD
Start: 2018-05-07 | End: 2020-01-30 | Stop reason: CLARIF

## 2018-05-07 NOTE — PROGRESS NOTES
[address] [facility] [address] [facility]  [phone]     Subjective:      Fletcher Thorpe is a 60 y.o. male with hx schizophrenia, HLD, COPD on nocturnal O2 therapy, throat cancer s/p chemo and XRT  is here to establish care and further evaluation of intermittent CP unrelated to activity, radiating to arm associated with SOB and dizziness. Denies orthopnea, PND, LE edema, palpitations, syncope, or presyncope. Cardiac risk factors: HLD, M, smoker, sedentary lifestyle  Hx smoking. Denies alcohol, illegal drug use  Family hx: cancer. No hx of heart ds.     Patient Active Problem List    Diagnosis Date Noted    Recurrent depression ([facility] 04/03/2018    Pure hypercholesterolemia 03/11/2018    Elevated TSH 03/11/2018    Nontraumatic partial bilateral rotator cuff tear 09/22/2017    Adhesive capsulitis of left shoulder 06/14/2017    Bursitis/tendonitis, shoulder 06/14/2017      Hernán Nieves MD  Past Medical History:   Diagnosis Date    Abdominal pain     Adverse effect of anesthesia     heart rate slows down    Asthma     Back pain     Cancer ([facility] 2015    Throat cancer treated with chemo & radiation    Constipation     COPD     managed by PCP    Cyst of pharynx     Depression     Extrinsic asthma, unspecified     Fatigue     GERD (gastroesophageal reflux disease)     Insomnia, unspecified     On home O2     at bedtime    Other chest pain     since 2-    Pain in joint, forearm     Psychiatric disorder     schizophrenia    Smoker     1 ppw      Past Surgical History:   Procedure Laterality Date    HX HEENT      throat CA  - Dr. Jacinto Been oncology    HX ORCHIECTOMY      Bilat- r/t tumors    HX ORCHIECTOMY      tumors    HX ORTHOPAEDIC      wrist surgery    HX ORTHOPAEDIC Left 07/14/2017    manipulation and injection shoulder    PLACE PERCUT GASTROSTOMY TUBE  9/21/2015         LA COLONOSCOPY FLX DX W/COLLJ SPEC WHEN PFRMD  8/29/2011          Allergies Allergen Reactions    Allergen [Antipyrine-Benzocaine] Unknown (comments)    Avelox [Moxifloxacin] Unknown (comments)    Codeine Sulfate Unknown (comments)    Pcn [Penicillins] Other (comments)     Coma      Family History   Problem Relation Age of Onset    Cancer Mother     Cancer Father       Social History     Social History    Marital status: SINGLE     Spouse name: N/A    Number of children: N/A    Years of education: N/A     Occupational History    Not on file. Social History Main Topics    Smoking status: Current Every Day Smoker     Packs/day: 0.25     Years: 42.00     Last attempt to quit: 11/1/2010    Smokeless tobacco: Never Used    Alcohol use No    Drug use: Yes     Special: Prescription, OTC    Sexual activity: Not Currently     Other Topics Concern    Not on file     Social History Narrative      Current Outpatient Prescriptions   Medication Sig    OLANZapine (ZYPREXA) 20 mg tablet Take 1 Tab by mouth nightly. Indications: Schizophrenia    clonazePAM (KLONOPIN) 1 mg tablet Take 1 Tab by mouth nightly. Max Daily Amount: 1 mg. Indications: FORREST ASSOCIATED WITH BIPOLAR DISORDER, ADJUNCT    neomycin-polymyxin-hydrocortisone, buffered, (PEDIOTIC) 3.5-10,000-1 mg/mL-unit/mL-% otic suspension Administer 3 Drops in left ear four (4) times daily.  venlafaxine-SR (EFFEXOR-XR) 37.5 mg capsule 1 p.o. every morning for 2 weeks then 2 p.o. every morning  Indications: major depressive disorder    atorvastatin (LIPITOR) 10 mg tablet Take 1 Tab by mouth daily.  OTHER 2 L. oxygen     albuterol (PROVENTIL, VENTOLIN) 90 mcg/Actuation inhaler Take 2 Puffs by inhalation three (3) times daily as needed.  albuterol (PROVENTIL VENTOLIN) 2.5 mg /3 mL (0.083 %) nebulizer solution 1.25 mg by Nebulization route every four (4) hours as needed. No current facility-administered medications for this visit.           Review of Symptoms:  11 systems reviewed, negative other than as stated in the HPI    Physical ExamPhysical Exam:    Vitals:    05/07/18 1548   BP: 120/80   Pulse: 70   Resp: 16   SpO2: 97%   Weight: 162 lb 11.2 oz (73.8 kg)   Height: 6' (1.829 m)     Body mass index is 22.07 kg/(m^2). General PE   Gen:  NAD  Mental Status - Alert. General Appearance - Not in acute distress. Chest and Lung Exam   Inspection: Accessory muscles - No use of accessory muscles in breathing. Auscultation:   Breath sounds: - Normal.   Cardiovascular   Inspection: Jugular vein - Bilateral - Inspection Normal.   Palpation/Percussion:   Apical Impulse: - Normal.   Auscultation: Rhythm - Regular. Heart Sounds - S1 WNL and S2 WNL. No S3 or S4. Murmurs & Other Heart Sounds: Auscultation of the heart reveals - No Murmurs. Peripheral Vascular   Upper Extremity: Inspection - Bilateral - No Cyanotic nailbeds or Digital clubbing. Lower Extremity:   Palpation: Edema - Bilateral - No edema. Abdomen:   Soft, non-tender, bowel sounds are active. Neuro: A&O times 3, CN and motor grossly WNL    Labs:   Lab Results   Component Value Date/Time    Cholesterol, total 255 (H) 03/08/2018 11:42 AM    HDL Cholesterol 36 (L) 03/08/2018 11:42 AM    LDL, calculated 198 (H) 03/08/2018 11:42 AM    Triglyceride 105 03/08/2018 11:42 AM     Lab Results   Component Value Date/Time    CK 80 08/22/2015 01:24 PM     Lab Results   Component Value Date/Time    Sodium 140 03/08/2018 11:42 AM    Potassium 4.7 03/08/2018 11:42 AM    Chloride 97 03/08/2018 11:42 AM    CO2 27 03/08/2018 11:42 AM    Anion gap 9 09/22/2017 11:45 AM    Glucose 82 03/08/2018 11:42 AM    BUN 14 03/08/2018 11:42 AM    Creatinine 0.94 03/08/2018 11:42 AM    BUN/Creatinine ratio 15 03/08/2018 11:42 AM    GFR est AA 99 03/08/2018 11:42 AM    GFR est non-AA 86 03/08/2018 11:42 AM    Calcium 9.7 03/08/2018 11:42 AM    Bilirubin, total 0.6 03/08/2018 11:42 AM    AST (SGOT) 16 03/08/2018 11:42 AM    Alk.  phosphatase 82 03/08/2018 11:42 AM    Protein, total 7.4 03/08/2018 11:42 AM    Albumin 4.3 03/08/2018 11:42 AM    Globulin 4.1 (H) 08/22/2015 01:24 PM    A-G Ratio 1.4 03/08/2018 11:42 AM    ALT (SGPT) 13 03/08/2018 11:42 AM       EKG:  SR     Assessment:     Assessment:      1. Chest pain, unspecified type    2. Familial hypercholesterolemia    3. Current smoker    4. Encounter to establish care        Orders Placed This Encounter    AMB POC EKG ROUTINE W/ 12 LEADS, INTER & REP     Order Specific Question:   Reason for Exam:     Answer:   routine        Plan: This is a a 60 y.o. male with hx schizophrenia, HLD, COPD on nocturnal O2 therapy, throat cancer s/p chemo and XRT  is here to establish care and further evaluation of intermittent CP unrelated to activity, radiating to L arm associated with SOB and dizziness, that occurs about 1-2 a week. Cardiac risk factors: HLD, M, < 0.5 PPD smoker (Tobacco cessation provided), sedentary lifestyle. Noted 3/18 FLP with LDL at 198, started on Atorvastatin 10 mg by PCP. BP in clinic normotensive. EKG showed SR. Start baby ASA. Will obtain baseline Echocardiogram and evaluate for ischemia with Lexiscan. Continue current care and f/u when testing complete. Gisele Cutler NP       [facility]    5/7/2018         Patient seen, examined by me personally. Plan discussed as detailed. Agree with note as outlined by  NP. I confirm findings in history and physical exam. No additional findings noted. Agree with plan as outlined above.      Bhupinder Reza MD

## 2018-05-07 NOTE — MR AVS SNAPSHOT
[address] [facility] 
[phone] Patient: Carla Stewart MRN: [id number] WPC:[birth year] Visit Information Date & Time Provider Department Dept. Phone Encounter #  
 5/7/2018  3:30 PM Nayely Godfrey Proctor, [facility] [phone] Your Appointments 5/17/2018  2:00 PM  
ESTABLISHED PATIENT with Nayely Proctor MD  
[facility] [address] [place] [address]) Appt Note: new patient appt-  cr; patient r.s because he needs a referral 5/7/18 [place]; patient states he is having symptoms and needs to be seen sooner 5/7/18 [facility] [address] [facility]  
[phone] [address] [facility]  
  
    
 5/23/2018 12:30 PM  
ECHO CARDIOGRAMS 2D with [address] [facility] [address] [place] [address]) Appt Note: ronny p/Dr R: 2D ECHO COMPLETE ADULT (TTE) W OR WO CONTR [[id number]] (Order [id number]) (162lbs 6ft) bg  
 [facility]  
[phone] [address] [facility] [address] [address]  
  
    
 5/23/2018  1:30 PM  
NUCLEAR MEDICINE with NUCLEAR, [facility] [address] [facility]) Appt Note: ronny p/ R: 2D ECHO COMPLETE ADULT (TTE) W OR WO CONTR [[id number]] (Order [id number]) (162lbs 6ft) bg  
 [facility]  
[phone] [address] [facility]  
  
    
 6/8/2018 11:30 AM  
ROUTINE CARE with Linwood Painter, [address] [place] [address],[address]) Appt Note: 3 month follow up  
 [facility] [address] [facility]  
[phone]  
  
   
 [facility] [address] [facility] [address] [address]  
  
    
 10/2/2018  3:00 PM  
Follow Up with Bhavya Plaza MD  
Neurology Clinic at [address]) Appt Note: follow up memory loss $ 0 CP jll 4/3/18  
 [address], [address] [phone]  
[address], [address] [place] [address] [address] [phone] Upcoming Health Maintenance Date Due DTaP/Tdap/Td series (1 - Tdap) 9/7/1975 FOBT Q 1 YEAR AGE 50-75 9/7/2004 ZOSTER VACCINE AGE 60> 7/7/2014 MEDICARE YEARLY EXAM 3/28/2018 Influenza Age 5 to Adult 8/1/2018 Allergies as of 5/7/2018  Review Complete On: 5/7/2018 By: Paulette Coleman LPN Severity Noted Reaction Type Reactions Allergen [Antipyrine-benzocaine]  11/12/2013    Unknown (comments) Avelox [Moxifloxacin]  11/12/2013    Unknown (comments) Codeine Sulfate  11/12/2013    Unknown (comments) Pcn [Penicillins]  06/01/2010    Other (comments) Coma Current Immunizations  Never Reviewed Name Date Influenza Vaccine 9/27/2017 Pneumococcal Conjugate (PCV-13) 9/10/2015 Not reviewed this visit You Were Diagnosed With   
  
 Codes Comments Chest pain, unspecified type    -  Primary ICD-10-CM: R07.9 ICD-9-CM: 786.50 Familial hypercholesterolemia     ICD-10-CM: E78.01 
ICD-9-CM: 272.0 Current smoker     ICD-10-CM: F17.200 ICD-9-CM: 305.1 Encounter to establish care     ICD-10-CM: Z76.89 
ICD-9-CM: V65.8 Vitals BP Pulse Resp Height(growth percentile) Weight(growth percentile) SpO2  
 120/80 (BP 1 Location: Right arm, BP Patient Position: Sitting) 70 16 6' (1.829 m) 162 lb 11.2 oz (73.8 kg) 97% BMI Smoking Status 22.07 kg/m2 Current Every Day Smoker BMI and BSA Data Body Mass Index Body Surface Area 22.07 kg/m 2 1.94 m 2 Preferred Pharmacy Pharmacy Name Phone [address], [address] [place] [address] [phone] Your Updated Medication List  
  
   
This list is accurate as of 5/7/18  4:44 PM.  Always use your most recent med list.  
  
  
 albuterol 2.5 mg /3 mL (0.083 %) nebulizer solution Commonly known as:  PROVENTIL VENTOLIN  
1.25 mg by Nebulization route every four (4) hours as needed. albuterol 90 mcg/actuation inhaler Commonly known as:  Jackson Encarnacion Take 2 Puffs by inhalation three (3) times daily as needed. aspirin 81 mg chewable tablet Take 1 Tab by mouth daily. atorvastatin 10 mg tablet Commonly known as:  LIPITOR Take 1 Tab by mouth daily. clonazePAM 1 mg tablet Commonly known as:  Alexia Banner Take 1 Tab by mouth nightly. Max Daily Amount: 1 mg. Indications: FORREST ASSOCIATED WITH BIPOLAR DISORDER, ADJUNCT  
  
 neomycin-polymyxin-hydrocortisone (buffered) 3.5-10,000-1 mg/mL-unit/mL-% otic suspension Commonly known as:  Nancy Lester Administer 3 Drops in left ear four (4) times daily. OLANZapine 20 mg tablet Commonly known as:  ZyPREXA Take 1 Tab by mouth nightly. Indications: Schizophrenia OTHER  
2 L. oxygen  
  
 venlafaxine-SR 37.5 mg capsule Commonly known as:  EFFEXOR-XR  
1 p.o. every morning for 2 weeks then 2 p.o. every morning  Indications: major depressive disorder We Performed the Following 2D ECHO COMPLETE ADULT (TTE) W OR WO CONTR [36416 CPT(R)] AMB POC EKG ROUTINE W/ 12 LEADS, INTER & REP [13295 CPT(R)] To-Do List   
 05/07/2018 ECG:  STRESS TEST LEXISCAN/CARDIOLITE Introducing [facility]! [facility] introduces [employer] patient portal. Now you can access parts of your medical record, email your doctor's office, and request medication refills online. 1. In your internet browser, go to [email or web address] 2. Click on the First Time User? Click Here link in the Sign In box. You will see the New Member Sign Up page. 3. Enter your [employer] Access Code exactly as it appears below. You will not need to use this code after youve completed the sign-up process.  If you do not sign up before the expiration date, you must request a new code. · Instagram Access Code: [email or web address] Expires: 7/10/2018  8:21 AM 
 
4. Enter the last four digits of your Social Security Number (xxxx) and Date of Birth (mm/dd/yyyy) as indicated and click Submit. You will be taken to the next sign-up page. 5. Create a Instagram ID. This will be your Instagram login ID and cannot be changed, so think of one that is secure and easy to remember. 6. Create a Instagram password. You can change your password at any time. 7. Enter your Password Reset Question and Answer. This can be used at a later time if you forget your password. 8. Enter your e-mail address. You will receive e-mail notification when new information is available in [address] 9. Click Sign Up. You can now view and download portions of your medical record. 10. Click the Download Summary menu link to download a portable copy of your medical information. If you have questions, please visit the Frequently Asked Questions section of the Instagram website. Remember, Instagram is NOT to be used for urgent needs. For medical emergencies, dial 911. Now available from your iPhone and Android! Please provide this summary of care documentation to your next provider. Your primary care clinician is listed as Vishnu VICTORIA. If you have any questions after today's visit, please call [phone].

## 2018-05-23 ENCOUNTER — CLINICAL SUPPORT (OUTPATIENT)
Dept: CARDIOLOGY CLINIC | Age: 63
End: 2018-05-23

## 2018-05-23 DIAGNOSIS — F17.200 SMOKER: ICD-10-CM

## 2018-05-23 DIAGNOSIS — R06.02 SOB (SHORTNESS OF BREATH): Primary | ICD-10-CM

## 2018-05-23 DIAGNOSIS — Z76.89 ENCOUNTER TO ESTABLISH CARE: ICD-10-CM

## 2018-05-23 DIAGNOSIS — E78.01 FAMILIAL HYPERCHOLESTEROLEMIA: ICD-10-CM

## 2018-05-23 DIAGNOSIS — R07.9 CHEST PAIN, UNSPECIFIED TYPE: ICD-10-CM

## 2018-05-23 DIAGNOSIS — F17.200 CURRENT SMOKER: ICD-10-CM

## 2018-05-29 DIAGNOSIS — F20.9 SCHIZOPHRENIA, UNSPECIFIED TYPE (HCC): ICD-10-CM

## 2018-05-29 RX ORDER — CLONAZEPAM 1 MG/1
1 TABLET ORAL
Qty: 30 TAB | Refills: 0 | Status: ON HOLD | OUTPATIENT
Start: 2018-05-29 | End: 2020-01-31

## 2018-05-29 RX ORDER — OLANZAPINE 20 MG/1
20 TABLET ORAL
Qty: 30 TAB | Refills: 0 | Status: SHIPPED | OUTPATIENT
Start: 2018-05-29 | End: 2018-06-08 | Stop reason: SDUPTHER

## 2018-06-07 ENCOUNTER — OFFICE VISIT (OUTPATIENT)
Dept: CARDIOLOGY CLINIC | Age: 63
End: 2018-06-07

## 2018-06-07 VITALS
HEIGHT: 72 IN | BODY MASS INDEX: 23.12 KG/M2 | SYSTOLIC BLOOD PRESSURE: 130 MMHG | RESPIRATION RATE: 20 BRPM | WEIGHT: 170.7 LBS | HEART RATE: 72 BPM | DIASTOLIC BLOOD PRESSURE: 62 MMHG | OXYGEN SATURATION: 93 %

## 2018-06-07 DIAGNOSIS — E78.00 PURE HYPERCHOLESTEROLEMIA: ICD-10-CM

## 2018-06-07 DIAGNOSIS — I20.8 OTHER FORMS OF ANGINA PECTORIS (HCC): Primary | ICD-10-CM

## 2018-06-07 RX ORDER — NITROGLYCERIN 0.3 MG/1
0.4 TABLET SUBLINGUAL
Qty: 1 BOTTLE | Refills: 6 | Status: SHIPPED | OUTPATIENT
Start: 2018-06-07 | End: 2019-02-15 | Stop reason: SDUPTHER

## 2018-06-07 NOTE — PROGRESS NOTES
NAME:  Indigo Barney   :   1955   MRN:   [id number]   PCP:  Sahra Coulter MD           Subjective: The patient is a 60y.o. year old male  who returns for a routine follow-up. Since the last visit, patient reports no change in exercise tolerance, chest pain, edema, medication intolerance, palpitations, shortness of breath, PND/orthopnea wheezing, sputum, syncope, dizziness or light headedness. Doing well. Past Medical History:   Diagnosis Date    Abdominal pain     Adverse effect of anesthesia     heart rate slows down    Asthma     Back pain     Cancer (HCC)     Throat cancer treated with chemo & radiation    Constipation     COPD     managed by PCP    Cyst of pharynx     Depression     Extrinsic asthma, unspecified     Fatigue     GERD (gastroesophageal reflux disease)     Insomnia, unspecified     On home O2     at bedtime    Other chest pain     since 2013    Pain in joint, forearm     Psychiatric disorder     schizophrenia    Smoker     1 ppw        ICD-10-CM ICD-9-CM    1. Other forms of angina pectoris ([facility]) I20.8 413.9       Social History   Substance Use Topics    Smoking status: Current Every Day Smoker     Packs/day: 0.25     Years: 42.00     Last attempt to quit: 2010    Smokeless tobacco: Never Used    Alcohol use No      Family History   Problem Relation Age of Onset    Cancer Mother     Cancer Father         Review of Systems  General: Pt denies excessive weight gain or loss. Pt is able to conduct ADL's  HEENT: Denies blurred vision, headaches, epistaxis and difficulty swallowing. Respiratory: Denies shortness of breath, MENDEZ, wheezing or stridor.   Cardiovascular: Denies precordial pain, palpitations, edema or PND  Gastrointestinal: Denies poor appetite, indigestion, abdominal pain or blood in stool  Musculoskeletal: Denies pain or swelling from muscles or joints  Neurologic: Denies tremor, paresthesias, or sensory motor disturbance  Skin: Denies rash, itching or texture change. Objective:       Vitals:    06/07/18 1528 06/07/18 1535   BP: 134/68 130/62   Pulse: 72    Resp: 20    SpO2: 93%    Weight: 170 lb 11.2 oz (77.4 kg)    Height: 6' (1.829 m)     Body mass index is 23.15 kg/(m^2). General PE  Mental Status - Alert. General Appearance - Not in acute distress. Chest and Lung Exam   Inspection: Accessory muscles - No use of accessory muscles in breathing. Auscultation:   Breath sounds: - Normal.    Cardiovascular   Inspection: Jugular vein - Bilateral - Inspection Normal.  Palpation/Percussion:   Apical Impulse: - Normal.  Auscultation: Rhythm - Regular. Heart Sounds - S1 WNL and S2 WNL. No S3 or S4. Murmurs & Other Heart Sounds: Auscultation of the heart reveals - No Murmurs. Peripheral Vascular   Upper Extremity: Inspection - Bilateral - No Cyanotic nailbeds or Digital clubbing. Lower Extremity:   Palpation: Edema - Bilateral - No edema. Data Review:     EKG -EKG: normal EKG, normal sinus rhythm, unchanged from previous tracings. Medications reviewed  Current Outpatient Prescriptions   Medication Sig    nitroglycerin (NITROSTAT) 0.3 mg SL tablet 1 Tab by SubLINGual route every five (5) minutes as needed for Chest Pain.  clonazePAM (KLONOPIN) 1 mg tablet Take 1 Tab by mouth nightly. Max Daily Amount: 1 mg. Indications: FORREST ASSOCIATED WITH BIPOLAR DISORDER, ADJUNCT    OLANZapine (ZYPREXA) 20 mg tablet Take 1 Tab by mouth nightly. Indications: Schizophrenia    aspirin 81 mg chewable tablet Take 1 Tab by mouth daily.  venlafaxine-SR (EFFEXOR-XR) 37.5 mg capsule 1 p.o. every morning for 2 weeks then 2 p.o. every morning  Indications: major depressive disorder    atorvastatin (LIPITOR) 10 mg tablet Take 1 Tab by mouth daily.  OTHER 2 L. oxygen     albuterol (PROVENTIL, VENTOLIN) 90 mcg/Actuation inhaler Take 2 Puffs by inhalation three (3) times daily as needed.     albuterol (PROVENTIL VENTOLIN) 2.5 mg /3 mL (0.083 %) nebulizer solution 1.25 mg by Nebulization route every four (4) hours as needed. No current facility-administered medications for this visit. Assessment:       ICD-10-CM ICD-9-CM    1. Other forms of [facility] p[facility] ([facility] I20.8 413.9         Plan:     Patient presents doing well and stable from cardiac standpoint. Continue current care and follow up in 6 months.     Kamille Ramírez MD

## 2018-06-07 NOTE — MR AVS SNAPSHOT
Ena Jensen Valentina [address] [facility] 
[phone] Patient: Damien Males MRN: [id number] OQC:4/2/6331 Visit Information Date & Time Provider Department Dept. Phone Encounter #  
 6/7/2018  3:00 PM [address], [address] [place] [address] [facility] [phone] [id number] Your Appointments 6/8/2018 11:30 AM  
ROUTINE CARE with Sharri Mendes, [address] [place] [address],[address] [place] [address] Road) Appt Note: 3 month follow up  
 [address] [facility]  
[phone]  
  
   
 [address] [facility] [address] [address]  
  
    
 10/2/2018  3:00 PM  
Follow Up with Ange Gilbert MD  
Neurology Clinic at [address]) Appt Note: follow up memory loss $ 0 CP jll 4/3/18  
 [address] [place] [address], 
[address] [place] [address], [address] [address]  
[address], [address] [place] [address], [address] [address]  
  
    
 12/20/2018  1:15 PM  
ESTABLISHED PATIENT with MD Stephie Flores [facility] [address] [place] [address]) [address]  
[phone] [address] [place] [address] Upcoming Health Maintenance Date Due DTaP/Tdap/Td series (1 - Tdap) 9/7/1975 FOBT Q 1 YEAR AGE 50-75 9/7/2004 ZOSTER VACCINE AGE 60> 7/7/2014 MEDICARE YEARLY EXAM 3/28/2018 Influenza Age 5 to Adult 8/1/2018 Allergies as of 6/7/2018  Review Complete On: 6/7/2018 By: Dominic Castano LPN Severity Noted Reaction Type Reactions Allergen [Antipyrine-benzocaine]  11/12/2013    Unknown (comments) Avelox [Moxifloxacin]  11/12/2013    Unknown (comments) Codeine Sulfate  11/12/2013    Unknown (comments) Pcn [Penicillins]  06/01/2010    Other (comments) Coma Current Immunizations  Never Reviewed Name Date Influenza Vaccine 9/27/2017 Pneumococcal Conjugate (PCV-13) 9/10/2015 Not reviewed this visit You Were Diagnosed With   
  
 Codes Comments Other forms of angina pectoris ([facility]    -  Primary ICD-10-CM: I20.8 ICD-9-CM: 413.9 Vitals BP Pulse Resp Height(growth percentile) Weight(growth percentile) SpO2  
 130/62 (BP 1 Location: Right arm, BP Patient Position: Sitting) 72 20 6' (1.829 m) 170 lb 11.2 oz (77.4 kg) 93% BMI Smoking Status 23.15 kg/m2 Current Every Day Smoker Vitals History BMI and BSA Data Body Mass Index Body Surface Area  
 23.15 kg/m 2 1.98 m 2 Preferred Pharmacy Pharmacy Name Phone [address], [address] [place] [address] [phone] Your Updated Medication List  
  
   
This list is accurate as of 6/7/18  3:51 PM.  Always use your most recent med list.  
  
  
  
  
 albuterol 2.5 mg /3 mL (0.083 %) nebulizer solution Commonly known as:  PROVENTIL VENTOLIN  
1.25 mg by Nebulization route every four (4) hours as needed. albuterol 90 mcg/actuation inhaler Commonly known as:  [place] Gain Take 2 Puffs by inhalation three (3) times daily as needed. aspirin 81 mg chewable tablet Take 1 Tab by mouth daily. atorvastatin 10 mg tablet Commonly known as:  LIPITOR Take 1 Tab by mouth daily. clonazePAM 1 mg tablet Commonly known as:  Marmitchel Micheal Take 1 Tab by mouth nightly. Max Daily Amount: 1 mg. Indications: FORREST ASSOCIATED WITH BIPOLAR DISORDER, ADJUNCT  
  
 OLANZapine 20 mg tablet Commonly known as:  ZyPREXA Take 1 Tab by mouth nightly. Indications: Schizophrenia OTHER  
2 L. oxygen  
  
 venlafaxine-SR 37.5 mg capsule Commonly known as:  EFFEXOR-XR  
1 p.o. every morning for 2 weeks then 2 p.o. every morning  Indications: major depressive disorder Introducing [facility] & HEALTH SERVICES! [employer] introduces [employer] patient portal. Now you can access parts of your medical record, email your doctor's office, and request medication refills online. 1. In your internet browser, go to [email or web address] 2. Click on the First Time User? Click Here link in the Sign In box. You will see the New Member Sign Up page. 3. Enter your [employer] Access Code exactly as it appears below. You will not need to use this code after youve completed the sign-up process. If you do not sign up before the expiration date, you must request a new code. · [employer] Access Code: [email or web address] Expires: 7/10/2018  8:21 AM 
 
4. Enter the last four digits of your Social Security Number (xxxx) and Date of Birth (mm/dd/yyyy) as indicated and click Submit. You will be taken to the next sign-up page. 5. Create a 3 day [employer] ID. This will be your [employer] day [employer] login ID and cannot be changed, so think of one that is secure and easy to remember. 6. Create a [employer] password. You can change your password at any time. 7. Enter your Password Reset Question and Answer. This can be used at a later time if you forget your password. 8. Enter your e-mail address. You will receive e-mail notification when new information is available in [address] 9. Click Sign Up. You can now view and download portions of your medical record. 10. Click the Download Summary menu link to download a portable copy of your medical information. If you have questions, please visit the Frequently Asked Questions section of the [employer] website. Remember, [employer] is NOT to be used for urgent needs. For medical emergencies, dial 911. Now available from your iPhone and Android! Please provide this summary of care documentation to your next provider. Your primary care clinician is listed as Vishnu VICTORIA. If you have any questions after today's visit, please call [phone].

## 2018-06-07 NOTE — PROGRESS NOTES
1. Have you been to the ER, urgent care clinic since your last visit? Hospitalized since your last visit? NO    2. Have you seen or consulted any other health care providers outside of the [address] [place] [address] since your last visit? Include any pap smears or colon screening. NO    RESULTS. NO ADDITIONAL CARDIAC C/O.

## 2018-06-08 ENCOUNTER — OFFICE VISIT (OUTPATIENT)
Dept: INTERNAL MEDICINE CLINIC | Age: 63
End: 2018-06-08

## 2018-06-08 VITALS
HEIGHT: 72 IN | BODY MASS INDEX: 23.16 KG/M2 | SYSTOLIC BLOOD PRESSURE: 115 MMHG | WEIGHT: 171 LBS | OXYGEN SATURATION: 97 % | DIASTOLIC BLOOD PRESSURE: 70 MMHG | TEMPERATURE: 98.4 F | HEART RATE: 67 BPM

## 2018-06-08 DIAGNOSIS — R26.9 GAIT ABNORMALITY: ICD-10-CM

## 2018-06-08 DIAGNOSIS — R79.89 ELEVATED TSH: ICD-10-CM

## 2018-06-08 DIAGNOSIS — R29.3 POOR POSTURE: ICD-10-CM

## 2018-06-08 DIAGNOSIS — Z12.11 COLON CANCER SCREENING: ICD-10-CM

## 2018-06-08 DIAGNOSIS — F20.9 SCHIZOPHRENIA, UNSPECIFIED TYPE (HCC): ICD-10-CM

## 2018-06-08 DIAGNOSIS — E78.01 FAMILIAL HYPERCHOLESTEROLEMIA: Primary | ICD-10-CM

## 2018-06-08 DIAGNOSIS — G93.49 STATIC ENCEPHALOPATHY: ICD-10-CM

## 2018-06-08 RX ORDER — OLANZAPINE 20 MG/1
20 TABLET ORAL
Qty: 30 TAB | Refills: 3 | Status: ON HOLD | OUTPATIENT
Start: 2018-06-08 | End: 2020-01-31

## 2018-06-08 NOTE — MR AVS SNAPSHOT
[address] [facility] 
[phone] Patient: Yonatan Mcconnell MRN: [id number] ZBC:8/1/7586 Visit Information Date & Time Provider Department Dept. Phone Encounter #  
 6/8/2018 11:30 AM Donavan Manriquez, [address] [phone] [id number] Follow-up Instructions Return in about 6 months (around 12/8/2018) for hld hypothyroid schizophrenia. Your Appointments 10/2/2018  3:00 PM  
Follow Up with Joseph Lesches, MD  
Neurology Clinic at [facility]) Appt Note: follow up memory loss $ 0 CP jll 4/3/18  
 [address], 
[address] [place] [address], [address] [address]  
[address] [facility], [address] [place] [address], [address] [facility] [address] [facility] [address] [address]  
  
    
 12/20/2018  1:15 PM  
ESTABLISHED PATIENT with Ellen Crowe MD  
[facility]) [address] [facility]  
[phone] [address] [facility] Upcoming Health Maintenance Date Due DTaP/Tdap/Td series (1 - Tdap) 9/7/1975 FOBT Q 1 YEAR AGE 50-75 9/7/2004 ZOSTER VACCINE AGE 60> 7/7/2014 MEDICARE YEARLY EXAM 3/28/2018 Influenza Age 5 to Adult 8/1/2018 Allergies as of 6/8/2018  Review Complete On: 6/8/2018 By: Jaylene Malone LPN Severity Noted Reaction Type Reactions Allergen [Antipyrine-benzocaine]  11/12/2013    Unknown (comments) Avelox [Moxifloxacin]  11/12/2013    Unknown (comments) Codeine Sulfate  11/12/2013    Unknown (comments) Pcn [Penicillins]  06/01/2010    Other (comments) Coma Current Immunizations  Never Reviewed Name Date Influenza Vaccine 9/27/2017 Pneumococcal Conjugate (PCV-13) 9/10/2015 Not reviewed this visit You Were Diagnosed With   
  
 Codes Comments Familial hypercholesterolemia    -  Primary ICD-10-CM: E78.01 
ICD-9-CM: 272.0 Elevated TSH     ICD-10-CM: R94.6 ICD-9-CM: 794.5 Static encephalopathy     ICD-10-CM: G93.49 
ICD-9-CM: 742.9 Schizophrenia, unspecified type ([facility]     ICD-10-CM: F20.9 ICD-9-CM: 295.90 Colon cancer screening     ICD-10-CM: Z12.11 ICD-9-CM: V76.51 Vitals BP Pulse Temp Height(growth percentile) Weight(growth percentile) SpO2  
 115/70 (BP 1 Location: Left arm, BP Patient Position: Sitting) 67 98.4 °F (36.9 °C) (Oral) 6' (1.829 m) 171 lb (77.6 kg) 97% BMI Smoking Status 23.19 kg/m2 Current Every Day Smoker BMI and BSA Data Body Mass Index Body Surface Area  
 23.19 kg/m 2 1.99 m 2 Preferred Pharmacy Pharmacy Name Phone [address] [place] [address] [phone] Your Updated Medication List  
  
   
This list is accurate as of 6/8/18 12:14 PM.  Always use your most recent med list.  
  
  
  
  
 albuterol 2.5 mg /3 mL (0.083 %) nebulizer solution Commonly known as:  PROVENTIL VENTOLIN  
1.25 mg by Nebulization route every four (4) hours as needed. albuterol 90 mcg/actuation inhaler Commonly known as:  [place] Take 2 Puffs by inhalation three (3) times daily as needed. aspirin 81 mg chewable tablet Take 1 Tab by mouth daily. atorvastatin 10 mg tablet Commonly known as:  LIPITOR Take 1 Tab by mouth daily. clonazePAM 1 mg tablet Commonly known as:  Michelle Fearing Take 1 Tab by mouth nightly. Max Daily Amount: 1 mg. Indications: FORREST ASSOCIATED WITH BIPOLAR DISORDER, ADJUNCT  
  
 nitroglycerin 0.3 mg SL tablet Commonly known as:  NITROSTAT  
1 Tab by SubLINGual route every five (5) minutes as needed for Chest Pain. OLANZapine 20 mg tablet Commonly known as:  ZyPREXA Take 1 Tab by mouth nightly. Indications: Schizophrenia OTHER  
2 L. oxygen Prescriptions Sent to Pharmacy Refills OLANZapine (ZYPREXA) 20 mg tablet 3 Sig: Take 1 Tab by mouth nightly. Indications: Schizophrenia Class: Normal  
 [facility]acy: [facility], [address] [facility] #: [phone] Route: Oral  
  
We Performed the Following ALT H6018490 CPT(R)] AST [id number] CPT(R)] LIPID PANEL [80547 CPT(R)] REFERRAL TO GASTROENTEROLOGY [RXK75 Custom] REFERRAL TO PSYCHIATRY [REF91 Custom] TSH 3RD GENERATION [45853 CPT(R)] Follow-up Instructions Return in about 6 months (around 12/8/2018) for hld hypothyroid schizophrenia. Referral Information Referral ID Referred By Referred To  
  
 [id number] Bakari Srivastava MD   
   [address] [place], [address] Phone: [phone] Fax: [phone] Visits Status Start Date End Date 1 New Request 6/8/18 6/8/19 If your referral has a status of pending review or denied, additional information will be sent to support the outcome of this decision. Referral ID Referred By Referred To  
 [id number] [facility], [address]  
   [address] [facility] [address] [place], [address] Visits Status Start Date End Date 1 New Request 6/8/18 6/8/19 If your referral has a status of pending review or denied, additional information will be sent to support the outcome of this decision. Introducing [facility]! [employer] introduces [employer] patient portal. Now you can access parts of your medical record, email your doctor's office, and request medication refills online. 1. In your internet browser, go to [email or web address] 2. Click on the First Time User? Click Here link in the Sign In box. You will see the New Member Sign Up page. 3. Enter your [employer] Access Code exactly as it appears below.  You will not need to use this code after youve completed the sign-up process. If you do not sign up before the expiration date, you must request a new code. · OnMyBlock Access Code: [email or web address] Expires: 7/10/2018  8:21 AM 
 
4. Enter the last four digits of your Social Security Number (xxxx) and Date of Birth (mm/dd/yyyy) as indicated and click Submit. You will be taken to the next sign-up page. 5. Create a OnMyBlock ID. This will be your OnMyBlock login ID and cannot be changed, so think of one that is secure and easy to remember. 6. Create a [employer] password. You can change your password at any time. 7. Enter your Password Reset Question and Answer. This can be used at a later time if you forget your password. 8. Enter your e-mail address. You will receive e-mail notification when new information is available in [address] 9. Click Sign Up. You can now view and download portions of your medical record. 10. Click the Download Summary menu link to download a portable copy of your medical information. If you have questions, please visit the Frequently Asked Questions section of the [employer] website. Remember, OnMy[employer] is NOT to be used for urgent needs. For medical emergencies, dial 911. Now available from your iPhone and Android! Please provide this summary of care documentation to your next provider. Your primary care clinician is listed as Mojgan VICTORIA. If you have any questions after today's visit, please call [phone].

## 2018-06-08 NOTE — PROGRESS NOTES
HISTORY OF PRESENT ILLNESS  Felix Kirby is a 60 y.o. male. HPI   F/u severe HLD ( ), elevated TSH 7 with normal free t4. Depression, memory loss her with female friend Mrs Dorothea Nicolas neurologist--congental static encephalopathy. Treated for MDD_-effexor started but pt stopped taking d/t nigtmares  Wellbutrin was stopped  Not seeing a pyschiatrist now since the office closed    Saw cardiologist for atypical CP--echo and NST ordered  Started on lipitor for HLD - last month    Friend is concerned about his stooped forward posture   Patient Active Problem List    Diagnosis Date Noted    Other forms of angina pectoris ([facility] 06/07/2018    Recurrent depression ([facility] 04/03/2018    Pure hypercholesterolemia 03/11/2018    Elevated TSH 03/11/2018    Nontraumatic partial bilateral rotator cuff tear 09/22/2017    Adhesive capsulitis of left shoulder 06/14/2017    Bursitis/tendonitis, shoulder 06/14/2017     Current Outpatient Prescriptions   Medication Sig Dispense Refill    nitroglycerin (NITROSTAT) 0.3 mg SL tablet 1 Tab by SubLINGual route every five (5) minutes as needed for Chest Pain. 1 Bottle 6    clonazePAM (KLONOPIN) 1 mg tablet Take 1 Tab by mouth nightly. Max Daily Amount: 1 mg. Indications: FORREST ASSOCIATED WITH BIPOLAR DISORDER, ADJUNCT 30 Tab 0    OLANZapine (ZYPREXA) 20 mg tablet Take 1 Tab by mouth nightly. Indications: Schizophrenia 30 Tab 0    aspirin 81 mg chewable tablet Take 1 Tab by mouth daily. 30 Tab 1    venlafaxine-SR (EFFEXOR-XR) 37.5 mg capsule 1 p.o. every morning for 2 weeks then 2 p.o. every morning  Indications: major depressive disorder 60 Cap 5    atorvastatin (LIPITOR) 10 mg tablet Take 1 Tab by mouth daily. 30 Tab 3    OTHER 2 L. oxygen       albuterol (PROVENTIL, VENTOLIN) 90 mcg/Actuation inhaler Take 2 Puffs by inhalation three (3) times daily as needed.       albuterol (PROVENTIL VENTOLIN) 2.5 mg /3 mL (0.083 %) nebulizer solution 1.25 mg by Nebulization route every four (4) hours as needed. Allergies   Allergen Reactions    Allergen [Antipyrine-Benzocaine] Unknown (comments)    Avelox [Moxifloxacin] Unknown (comments)    Codeine Sulfate Unknown (comments)    Pcn [Penicillins] Other (comments)     Coma     Social History   Substance Use Topics    Smoking status: Current Every Day Smoker     Packs/day: 0.25     Years: 42.00     Last attempt to quit: 11/1/2010    Smokeless tobacco: Never Used    Alcohol use No      Lab Results  Component Value Date/Time   Hemoglobin A1c 5.2 04/19/2018 12:53 PM   Glucose 82 03/08/2018 11:42 AM   LDL, calculated 198 (H) 03/08/2018 11:42 AM   Creatinine 0.94 03/08/2018 11:42 AM      Lab Results  Component Value Date/Time   Cholesterol, total 255 (H) 03/08/2018 11:42 AM   HDL Cholesterol 36 (L) 03/08/2018 11:42 AM   LDL, calculated 198 (H) 03/08/2018 11:42 AM   Triglyceride 105 03/08/2018 11:42 AM     Lab Results  Component Value Date/Time   GFR est non-AA 86 03/08/2018 11:42 AM   GFR est AA 99 03/08/2018 11:42 AM   Creatinine 0.94 03/08/2018 11:42 AM   BUN 14 03/08/2018 11:42 AM   Sodium 140 03/08/2018 11:42 AM   Potassium 4.7 03/08/2018 11:42 AM   Chloride 97 03/08/2018 11:42 AM   CO2 27 03/08/2018 11:42 AM   Magnesium 2.0 08/22/2015 01:24 PM        ROS    Physical Exam   Constitutional: He appears well-developed and well-nourished. No distress. Appears stated age   HENT:   Head: Normocephalic. Cardiovascular: Normal rate, regular rhythm and normal heart sounds. Exam reveals no gallop and no friction rub. No murmur heard. Pulmonary/Chest: Effort normal and breath sounds normal. No respiratory distress. He has no wheezes. He has no rales. He exhibits no tenderness. Abdominal: Soft. Musculoskeletal: He exhibits no edema. Neurological: He is alert. Psychiatric: He has a normal mood and affect. Nursing note and vitals reviewed. ASSESSMENT and PLAN  Diagnoses and all orders for this visit:    1.  Familial hypercholesterolemia  -     LIPID PANEL  -     AST  -     ALT   Tolerating lipitor  2. Elevated TSH  -     TSH 3RD GENERATION   subclinical    3. Static encephalopathy    4. Schizophrenia, unspecified type ([address]  -     [address] [facility]  -     OLANZapine (ZYPREXA) 20 mg tablet; Take 1 Tab by mouth nightly. Indications: Schizophrenia   Needs to establish wit psych MD --will refll medicines until then  5. Colon cancer screening  -     [facility]    6. Gait abnormality   Refer PT  7. Poor posture      Follow-up Disposition:  Return in about 6 months (around 12/8/2018) for hld hypothyroid schizophrenia.

## 2018-06-09 DIAGNOSIS — R79.89 ELEVATED TSH: ICD-10-CM

## 2018-06-09 DIAGNOSIS — E03.8 OTHER SPECIFIED HYPOTHYROIDISM: ICD-10-CM

## 2018-06-09 DIAGNOSIS — E78.00 PURE HYPERCHOLESTEROLEMIA: Primary | ICD-10-CM

## 2018-06-09 LAB
ALT SERPL-CCNC: 15 IU/L (ref 0–44)
AST SERPL-CCNC: 12 IU/L (ref 0–40)
CHOLEST SERPL-MCNC: 245 MG/DL (ref 100–199)
HDLC SERPL-MCNC: 40 MG/DL
LDLC SERPL CALC-MCNC: 185 MG/DL (ref 0–99)
TRIGL SERPL-MCNC: 100 MG/DL (ref 0–149)
TSH SERPL DL<=0.005 MIU/L-ACNC: 8.62 UIU/ML (ref 0.45–4.5)
VLDLC SERPL CALC-MCNC: 20 MG/DL (ref 5–40)

## 2018-06-09 RX ORDER — ATORVASTATIN CALCIUM 20 MG/1
20 TABLET, FILM COATED ORAL DAILY
Qty: 30 TAB | Refills: 5 | Status: SHIPPED | OUTPATIENT
Start: 2018-06-09 | End: 2021-10-22

## 2018-06-09 RX ORDER — LEVOTHYROXINE SODIUM 25 UG/1
25 TABLET ORAL
Qty: 30 TAB | Refills: 5 | Status: SHIPPED | OUTPATIENT
Start: 2018-06-09 | End: 2021-10-22

## 2018-06-09 NOTE — PROGRESS NOTES
D/w Mrs Gonzales Peeling lipitor 20 mg every day and stress med compliance and start levothyroxine 25 mcg every day , get labs again in 6 weeks

## 2018-08-17 ENCOUNTER — HOSPITAL ENCOUNTER (OUTPATIENT)
Dept: CT IMAGING | Age: 63
Discharge: HOME | End: 2018-08-17
Payer: MEDICARE

## 2018-08-17 DIAGNOSIS — C10.9 SQUAMOUS CELL CARCINOMA OF OROPHARYNX (HCC): ICD-10-CM

## 2018-08-17 PROCEDURE — 70491 CT SOFT TISSUE NECK W/DYE: CPT

## 2018-08-17 PROCEDURE — 74011250636 HC RX REV CODE- 250/636

## 2018-08-17 PROCEDURE — 74011636320 HC RX REV CODE- 636/320

## 2018-08-17 RX ORDER — SODIUM CHLORIDE 9 MG/ML
50 INJECTION, SOLUTION INTRAVENOUS
Status: COMPLETED | OUTPATIENT
Start: 2018-08-17 | End: 2018-08-17

## 2018-08-17 RX ORDER — SODIUM CHLORIDE 0.9 % (FLUSH) 0.9 %
10 SYRINGE (ML) INJECTION
Status: COMPLETED | OUTPATIENT
Start: 2018-08-17 | End: 2018-08-17

## 2018-08-17 RX ADMIN — Medication 10 ML: at 09:51

## 2018-08-17 RX ADMIN — IOPAMIDOL 100 ML: 755 INJECTION, SOLUTION INTRAVENOUS at 09:51

## 2018-08-17 RX ADMIN — SODIUM CHLORIDE 50 ML/HR: 900 INJECTION, SOLUTION INTRAVENOUS at 09:51

## 2018-12-05 ENCOUNTER — HOSPITAL ENCOUNTER (OUTPATIENT)
Dept: WOUND CARE | Age: 63
Discharge: HOME | End: 2018-12-05
Payer: MEDICARE

## 2018-12-05 VITALS
RESPIRATION RATE: 16 BRPM | TEMPERATURE: 97.8 F | SYSTOLIC BLOOD PRESSURE: 108 MMHG | DIASTOLIC BLOOD PRESSURE: 78 MMHG | HEART RATE: 92 BPM
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
## 2018-12-05 PROBLEM — M27.2 OSTEORADIONECROSIS OF JAW: Status: ACTIVE | Noted: 2018-12-05

## 2018-12-05 PROCEDURE — 99213 OFFICE O/P EST LOW 20 MIN: CPT

## 2018-12-05 RX ORDER — IBUPROFEN 800 MG/1
800 TABLET ORAL
Status: ON HOLD | COMMUNITY
End: 2020-01-31

## 2018-12-05 RX ORDER — ALBUTEROL SULFATE 90 UG/1
AEROSOL, METERED RESPIRATORY (INHALATION)
Status: ON HOLD | COMMUNITY
End: 2020-01-30 | Stop reason: CLARIF

## 2018-12-05 NOTE — WOUND CARE
OUTPATIENT WOUND CARE DISCHARGE NOTE Patient seen today for consult for Hyperbaric Oxygen Therapy for soft tissue radionecrosis. Pain -  Denies pain. No observable s/s distress or discomfort noted. Ambulatory Aid - None. Steady Gait. Accompanied by - Self Instructed to call The [address] with any questions or concerns. Patient Verbalizes understanding.

## 2018-12-05 NOTE — H&P
HBO Consult/ History and Physical 
 
Subjective:  
 
Rin Gaitan is a 58 y.o.  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 Prior radiation treatment to Jaw/throat on with 33 treatments  Total rads received are unknown. He has been referred here by Dr. Amber Sol. Review of Systems: A comprehensive review of systems was negative except for that written in the History of Present Illness. and PMH. Review of Contraindications: The absolute and relative contraindications for HBO have been reviewed. · No untreated pneumothorax, no history of spontaneous pneumothorax, use of Mafenite Acetate (Sulfamylon) · No history of Blemycin · No concomitant use of Sulfamylon, Duxorubicin, Disulfiram (Antabuse), and Cis-Platinum. · No retinal surgery in preceding 6 weeks with use of intraocular gas. · No current Upper respiratory tract infection or chronic sinusitis. · No known seizure disorder. · No history of Emphysema with CO2 retention. · No high fever > 100 F 
· Not pregnant · Medications: Steroids No (decreases threshold for oxygen seizure), Narcotics No (may lead to decreased respiratory drive), Phenergan No  (predisposes to oxygen toxicity) · Not currently receiving chemotherapy Objective:  
 
Physical Exam:  
Head: Normocephalic, without obvious abnormality, atraumatic Heart: RRR Lungs: No audible wheezing. Abdomen: nondistended. Data Review: No results found for this or any previous visit (from the past 24 hour(s)). Assessment:  
 
Patient candidate for HBO for treatment of osteoradionecrosis per [facility] protocol. Patient to proceed with baseline HBO work-up Plan:  
 
Treatment: HBO at 2.5 SHONDA for 90 min with 2 10-min air breaks. Re-evaluate at 20-30 treatments for improvements. Risks and benefits discussed with patient: including fire, barotrauma, seizure, confinement anxiety and visual changes. Patient understood risks, benefits and common complications. Past Medical History:  
Diagnosis Date  Abdominal pain  Adverse effect of anesthesia   
 heart rate slows down  Asthma  Back pain  Cancer ([address]  Throat cancer treated with chemo & radiation  Constipation  COPD   
 managed by PCP  Cyst of pharynx  Depression  Extrinsic asthma, unspecified  Fatigue  GERD (gastroesophageal reflux disease)  Insomnia, unspecified  On home O2   
 at bedtime  Other chest pain   
 since 2013  Pain in joint, forearm  Psychiatric disorder   
 schizophrenia  Smoker 1 ppw Past Surgical History:  
Procedure Laterality Date  HX HEENT    
 throat CA  - Dr. Eliud Humphries oncology  HX ORCHIECTOMY Bilat- r/t tumors  HX ORCHIECTOMY    
 tumors  HX ORTHOPAEDIC Left   
 wrist surgery  HX ORTHOPAEDIC Left 2017  
 manipulation and injection shoulder  PLACE PERCUT GASTROSTOMY TUBE  2015  NE COLONOSCOPY FLX DX W/COLLJ SPEC WHEN PFRMD  2011 Family History Problem Relation Age of Onset  Cancer Mother  Cancer Father Social History Tobacco Use  Smoking status: Current Every Day Smoker Packs/day: 1.00 Years: 52.00 Pack years: 52.00 Last attempt to quit: 2010 Years since quittin.0  Smokeless tobacco: Never Used  Tobacco comment: Discussed importance of smoking cessation for healing. Substance Use Topics  Alcohol use: No  
   
Prior to Admission medications Medication Sig Start Date End Date Taking? Authorizing Provider  
ibuprofen (MOTRIN) 800 mg tablet Take 800 mg by mouth every eight (8) hours as needed for Pain. Yes Provider, Historical  
albuterol (PROAIR HFA) 90 mcg/actuation inhaler Take  by inhalation.    Yes Provider, Historical  
atorvastatin (LIPITOR) 20 mg tablet Take 1 Tab by mouth daily. 6/9/18   Liset Selby MD  
levothyroxine (SYNTHROID) 25 mcg tablet Take 1 Tab by mouth Daily (before breakfast). 6/9/18   Liset Selby MD  
OLANZapine (ZYPREXA) 20 mg tablet Take 1 Tab by mouth nightly. Indications: Schizophrenia 6/8/18   Liset Selby MD  
nitroglycerin (NITROSTAT) 0.3 mg SL tablet 1 Tab by SubLINGual route every five (5) minutes as needed for Chest Pain. 6/7/18   Haritha Christensen MD  
clonazePAM (KLONOPIN) 1 mg tablet Take 1 Tab by mouth nightly. Max Daily Amount: 1 mg. Indications: FORREST ASSOCIATED WITH BIPOLAR DISORDER, ADJUNCT 5/29/18   Liset Selby MD  
aspirin 81 mg chewable tablet Take 1 Tab by mouth daily. 5/7/18   Keyona Ricci., NP  
albuterol (PROVENTIL, VENTOLIN) 90 mcg/Actuation inhaler Take 2 Puffs by inhalation three (3) times daily as needed. Provider, Historical  
albuterol (PROVENTIL VENTOLIN) 2.5 mg /3 mL (0.083 %) nebulizer solution 1.25 mg by Nebulization route every four (4) hours as needed. Provider, Historical  
 
Allergies Allergen Reactions  Allergen [Antipyrine-Benzocaine] Unknown (comments)  Avelox [Moxifloxacin] Unknown (comments)  Codeine Sulfate Unknown (comments)  Pcn [Penicillins] Other (comments) Coma Signed By: Luisito Zhao MD   
 December 5, 2018

## 2018-12-26 ENCOUNTER — TELEPHONE (OUTPATIENT)
Dept: WOUND CARE | Age: 63
End: 2018-12-26

## 2019-01-26 ENCOUNTER — HOSPITAL ENCOUNTER (EMERGENCY)
Age: 64
Discharge: HOME | End: 2019-01-26
Attending: EMERGENCY MEDICINE
Payer: MEDICARE

## 2019-01-26 ENCOUNTER — APPOINTMENT (OUTPATIENT)
Dept: GENERAL RADIOLOGY | Age: 64
End: 2019-01-26
Attending: EMERGENCY MEDICINE
Payer: MEDICARE

## 2019-01-26 VITALS
OXYGEN SATURATION: 98 % | TEMPERATURE: 98.1 F | HEIGHT: 72 IN | HEART RATE: 100 BPM | SYSTOLIC BLOOD PRESSURE: 137 MMHG | RESPIRATION RATE: 18 BRPM | DIASTOLIC BLOOD PRESSURE: 88 MMHG | BODY MASS INDEX: 20.75 KG/M2 | WEIGHT: 153.22 LBS

## 2019-01-26 DIAGNOSIS — J20.9 ACUTE BRONCHITIS, UNSPECIFIED ORGANISM: Primary | ICD-10-CM

## 2019-01-26 LAB
ALBUMIN SERPL-MCNC: 3.8 G/DL (ref 3.5–5)
ALBUMIN/GLOB SERPL: 0.8 {RATIO} (ref 1.1–2.2)
ALP SERPL-CCNC: 59 U/L (ref 45–117)
ALT SERPL-CCNC: 23 U/L (ref 12–78)
ANION GAP SERPL CALC-SCNC: 5 MMOL/L (ref 5–15)
AST SERPL-CCNC: 21 U/L (ref 15–37)
BASOPHILS # BLD: 0 K/UL (ref 0–0.1)
BASOPHILS NFR BLD: 0 % (ref 0–1)
BILIRUB SERPL-MCNC: 0.8 MG/DL (ref 0.2–1)
BNP SERPL-MCNC: 37 PG/ML (ref 0–125)
BUN SERPL-MCNC: 10 MG/DL (ref 6–20)
BUN/CREAT SERPL: 10 (ref 12–20)
CALCIUM SERPL-MCNC: 9.2 MG/DL (ref 8.5–10.1)
CHLORIDE SERPL-SCNC: 98 MMOL/L (ref 97–108)
CK SERPL-CCNC: 186 U/L (ref 39–308)
CO2 SERPL-SCNC: 29 MMOL/L (ref 21–32)
CREAT SERPL-MCNC: 1.04 MG/DL (ref 0.7–1.3)
DIFFERENTIAL METHOD BLD: ABNORMAL
EOSINOPHIL # BLD: 0 K/UL (ref 0–0.4)
EOSINOPHIL NFR BLD: 1 % (ref 0–7)
ERYTHROCYTE [DISTWIDTH] IN BLOOD BY AUTOMATED COUNT: 15.3 % (ref 11.5–14.5)
FLUAV AG NPH QL IA: NEGATIVE
FLUBV AG NOSE QL IA: NEGATIVE
GLOBULIN SER CALC-MCNC: 4.5 G/DL (ref 2–4)
GLUCOSE SERPL-MCNC: 106 MG/DL (ref 65–100)
HCT VFR BLD AUTO: 51.1 % (ref 36.6–50.3)
HGB BLD-MCNC: 16.7 G/DL (ref 12.1–17)
IMM GRANULOCYTES # BLD AUTO: 0 K/UL (ref 0–0.04)
IMM GRANULOCYTES NFR BLD AUTO: 0 % (ref 0–0.5)
LYMPHOCYTES # BLD: 1 K/UL (ref 0.8–3.5)
LYMPHOCYTES NFR BLD: 24 % (ref 12–49)
MAGNESIUM SERPL-MCNC: 2 MG/DL (ref 1.6–2.4)
MCH RBC QN AUTO: 30.4 PG (ref 26–34)
MCHC RBC AUTO-ENTMCNC: 32.7 G/DL (ref 30–36.5)
MCV RBC AUTO: 93.1 FL (ref 80–99)
MONOCYTES # BLD: 0.4 K/UL (ref 0–1)
MONOCYTES NFR BLD: 9 % (ref 5–13)
NEUTS SEG # BLD: 2.7 K/UL (ref 1.8–8)
NEUTS SEG NFR BLD: 66 % (ref 32–75)
NRBC # BLD: 0 K/UL (ref 0–0.01)
NRBC BLD-RTO: 0 PER 100 WBC
PLATELET # BLD AUTO: 207 K/UL (ref 150–400)
PMV BLD AUTO: 9.4 FL (ref 8.9–12.9)
POTASSIUM SERPL-SCNC: 3.5 MMOL/L (ref 3.5–5.1)
PROT SERPL-MCNC: 8.3 G/DL (ref 6.4–8.2)
RBC # BLD AUTO: 5.49 M/UL (ref 4.1–5.7)
SODIUM SERPL-SCNC: 132 MMOL/L (ref 136–145)
TROPONIN I SERPL-MCNC: <0.05 NG/ML
WBC # BLD AUTO: 4.1 K/UL (ref 4.1–11.1)

## 2019-01-26 PROCEDURE — 71046 X-RAY EXAM CHEST 2 VIEWS: CPT

## 2019-01-26 PROCEDURE — 74011000250 HC RX REV CODE- 250: Performed by: EMERGENCY MEDICINE

## 2019-01-26 PROCEDURE — 82550 ASSAY OF CK (CPK): CPT

## 2019-01-26 PROCEDURE — 83735 ASSAY OF MAGNESIUM: CPT

## 2019-01-26 PROCEDURE — 77030029684 HC NEB SM VOL KT MONA -A

## 2019-01-26 PROCEDURE — 80053 COMPREHEN METABOLIC PANEL: CPT

## 2019-01-26 PROCEDURE — 85025 COMPLETE CBC W/AUTO DIFF WBC: CPT

## 2019-01-26 PROCEDURE — 84484 ASSAY OF TROPONIN QUANT: CPT

## 2019-01-26 PROCEDURE — 99283 EMERGENCY DEPT VISIT LOW MDM: CPT

## 2019-01-26 PROCEDURE — 93005 ELECTROCARDIOGRAM TRACING: CPT

## 2019-01-26 PROCEDURE — 94640 AIRWAY INHALATION TREATMENT: CPT

## 2019-01-26 PROCEDURE — 87804 INFLUENZA ASSAY W/OPTIC: CPT

## 2019-01-26 PROCEDURE — 74011250636 HC RX REV CODE- 250/636: Performed by: EMERGENCY MEDICINE

## 2019-01-26 PROCEDURE — 96374 THER/PROPH/DIAG INJ IV PUSH: CPT

## 2019-01-26 PROCEDURE — 36415 COLL VENOUS BLD VENIPUNCTURE: CPT

## 2019-01-26 PROCEDURE — 83880 ASSAY OF NATRIURETIC PEPTIDE: CPT

## 2019-01-26 RX ORDER — SODIUM CHLORIDE 0.9 % (FLUSH) 0.9 %
5-40 SYRINGE (ML) INJECTION EVERY 8 HOURS
Status: DISCONTINUED | OUTPATIENT
Start: 2019-01-26 | End: 2019-01-26 | Stop reason: HOSPADM

## 2019-01-26 RX ORDER — PREDNISONE 20 MG/1
20 TABLET ORAL 2 TIMES DAILY
Qty: 10 TAB | Refills: 0 | Status: SHIPPED | OUTPATIENT
Start: 2019-01-26 | End: 2019-01-31

## 2019-01-26 RX ORDER — SODIUM CHLORIDE 0.9 % (FLUSH) 0.9 %
5-40 SYRINGE (ML) INJECTION AS NEEDED
Status: DISCONTINUED | OUTPATIENT
Start: 2019-01-26 | End: 2019-01-26 | Stop reason: HOSPADM

## 2019-01-26 RX ORDER — AZITHROMYCIN 250 MG/1
TABLET, FILM COATED ORAL
Qty: 6 TAB | Refills: 0 | Status: SHIPPED | OUTPATIENT
Start: 2019-01-26 | End: 2019-01-31

## 2019-01-26 RX ORDER — IPRATROPIUM BROMIDE AND ALBUTEROL SULFATE 2.5; .5 MG/3ML; MG/3ML
3 SOLUTION RESPIRATORY (INHALATION) ONCE
Status: COMPLETED | OUTPATIENT
Start: 2019-01-26 | End: 2019-01-26

## 2019-01-26 RX ADMIN — METHYLPREDNISOLONE SODIUM SUCCINATE 125 MG: 125 INJECTION, POWDER, FOR SOLUTION INTRAMUSCULAR; INTRAVENOUS at 12:59

## 2019-01-26 RX ADMIN — IPRATROPIUM BROMIDE AND ALBUTEROL SULFATE 3 ML: .5; 3 SOLUTION RESPIRATORY (INHALATION) at 12:59

## 2019-01-26 NOTE — ED NOTES
Dr. Sherren Scala at bedside to provide discharge paperwork. Vital signs stable. Pt in no apparent distress at this time. Mental status at baseline. Ambulatory to waiting room with steady gate, discharge paperwork in hand.

## 2019-01-26 NOTE — ED PROVIDER NOTES
EMERGENCY DEPARTMENT HISTORY AND PHYSICAL EXAM 
 
 
Date: 1/26/2019 Patient Name: Melvi Roberts History of Presenting Illness Chief Complaint Patient presents with  Wheezing Chest tightening with some SOB x7 days. Reports a hx of COPD & pulmonolgist is Dr Markos Cat. Has been using nebulizers and inhalers without relief  Cough Productive cough History Provided By: Patient HPI: Melvi Roberts, 58 y.o. male with PMHx significant for COPD, GERD, presents ambulatory to the ED with cc of persistent SOB, dry cough and chest tightness since the past Monday (1/21/19). Pt notes that the SOB is alleviated when laying down flat on the back, and is aggravated by laying down on the side. Currently, however, pt notes that he is symptom-free and denies any pain. Pt notes that he is followed by a cardiologist, and also has PMHx of COPD and emphysema. Pt notes that he may have had a sick contact. Pt notes regular tobacco use. Pt specifically denies chest pain and fever. There are no other complaints, changes, or physical findings at this time. PCP: Louie Mariscal MD 
 
No current facility-administered medications on file prior to encounter. Current Outpatient Medications on File Prior to Encounter Medication Sig Dispense Refill  ibuprofen (MOTRIN) 800 mg tablet Take 800 mg by mouth every eight (8) hours as needed for Pain.  albuterol (PROAIR HFA) 90 mcg/actuation inhaler Take  by inhalation.  atorvastatin (LIPITOR) 20 mg tablet Take 1 Tab by mouth daily. 30 Tab 5  levothyroxine (SYNTHROID) 25 mcg tablet Take 1 Tab by mouth Daily (before breakfast). 30 Tab 5  OLANZapine (ZYPREXA) 20 mg tablet Take 1 Tab by mouth nightly. Indications: Schizophrenia 30 Tab 3  
 nitroglycerin (NITROSTAT) 0.3 mg SL tablet 1 Tab by SubLINGual route every five (5) minutes as needed for Chest Pain. 1 Bottle 6  clonazePAM (KLONOPIN) 1 mg tablet Take 1 Tab by mouth nightly.  Max Daily Amount: 1 mg. Indications: FORREST ASSOCIATED WITH BIPOLAR DISORDER, ADJUNCT 30 Tab 0  
 aspirin 81 mg chewable tablet Take 1 Tab by mouth daily. 30 Tab 1  
 albuterol (PROVENTIL, VENTOLIN) 90 mcg/Actuation inhaler Take 2 Puffs by inhalation three (3) times daily as needed.  albuterol (PROVENTIL VENTOLIN) 2.5 mg /3 mL (0.083 %) nebulizer solution 1.25 mg by Nebulization route every four (4) hours as needed. Past History Past Medical History: 
Past Medical History:  
Diagnosis Date  Abdominal pain  Adverse effect of anesthesia   
 heart rate slows down  Asthma  Back pain  Cancer ([facility] [address]  Throat cancer treated with chemo & radiation  Constipation  COPD   
 managed by PCP  Cyst of pharynx  Depression  Extrinsic asthma, unspecified  Fatigue  GERD (gastroesophageal reflux disease)  Insomnia, unspecified  On home O2   
 at bedtime  Other chest pain   
 since 2013  Pain in joint, forearm  Psychiatric disorder   
 schizophrenia  Smoker 1 ppw Past Surgical History: 
Past Surgical History:  
Procedure Laterality Date  HX HEENT    
 throat CA  - Dr. [facility]  HX ORCHIECTOMY Bilat- r/t tumors  HX ORCHIECTOMY    
 tumors  HX ORTHOPAEDIC Left   
 wrist surgery  HX ORTHOPAEDIC Left 2017  
 manipulation and injection shoulder  PLACE PERCUT GASTROSTOMY TUBE  2015  [place] COLONOSCOPY FLX DX W/COLLJ SPEC WHEN PFRMD  2011 Family History: 
Family History Problem Relation Age of Onset  Cancer Mother  Cancer Father Social History: 
Social History Tobacco Use  Smoking status: Current Every Day Smoker Packs/day: 1.00 Years: 52.00 Pack years: 52.00 Last attempt to quit: 2010 Years since quittin.2  Smokeless tobacco: Never Used  Tobacco comment: Discussed importance of smoking cessation for healing. Substance Use Topics  Alcohol use: No  
 Drug use: Yes Types: Prescription, OTC Allergies: Allergies Allergen Reactions  Allergen [Antipyrine-Benzocaine] Unknown (comments)  Avelox [Moxifloxacin] Unknown (comments)  Codeine Sulfate Unknown (comments)  Pcn [Penicillins] Other (comments) Coma Review of Systems Review of Systems Constitutional: Negative. Negative for chills and fever. HENT: Negative. Negative for congestion, rhinorrhea and sinus pressure. Eyes: Negative. Negative for discharge and redness. Respiratory: Positive for cough (Dry), chest tightness and shortness of breath. Cardiovascular: Negative. Negative for chest pain. Gastrointestinal: Negative. Negative for abdominal pain and blood in stool. Endocrine: Negative. Genitourinary: Negative. Negative for flank pain and hematuria. Musculoskeletal: Negative. Negative for back pain. Skin: Negative. Negative for rash. Neurological: Negative. Negative for dizziness, seizures, weakness, numbness and headaches. Hematological: Negative. All other systems reviewed and are negative. Physical Exam  
Physical Exam  
Constitutional: He is oriented to person, place, and time. He appears well-developed and well-nourished. No distress. HENT:  
Head: Normocephalic and atraumatic. Nose: Nose normal.  
Mouth/Throat: No oropharyngeal exudate. Eyes: Conjunctivae and EOM are normal. Pupils are equal, round, and reactive to light. No scleral icterus. Neck: Normal range of motion. Neck supple. No JVD present. No thyromegaly present. Cardiovascular: Normal rate, regular rhythm, normal heart sounds, intact distal pulses and normal pulses. PMI is not displaced. Exam reveals no gallop and no friction rub. No murmur heard. Pulmonary/Chest: Effort normal. No stridor. No respiratory distress. He has no decreased breath sounds. He has wheezes. He has no rhonchi. He has no rales. He exhibits no tenderness. Abdominal: Soft. Normal aorta and bowel sounds are normal. He exhibits no distension, no abdominal bruit and no mass. There is no hepatosplenomegaly. There is no tenderness. There is no rebound, no guarding and no CVA tenderness. No hernia. Neurological: He is alert and oriented to person, place, and time. He has normal reflexes. He displays no atrophy and no tremor. No cranial nerve deficit or sensory deficit. He exhibits normal muscle tone. He displays no seizure activity. Coordination normal. GCS eye subscore is 4. GCS verbal subscore is 5. GCS motor subscore is 6. Reflex Scores: 
     Patellar reflexes are 2+ on the right side and 2+ on the left side. Skin: Skin is warm. No rash noted. He is not diaphoretic. No erythema. Nursing note and vitals reviewed. Diagnostic Study Results Labs - Recent Results (from the past 12 hour(s)) EKG, 12 LEAD, INITIAL Collection Time: 01/26/19 11:43 AM  
Result Value Ref Range Ventricular Rate 112 BPM  
 Atrial Rate 112 BPM  
 P-R Interval 148 ms QRS Duration 94 ms Q-T Interval 336 ms  
 QTC Calculation (Bezet) 458 ms Calculated P Axis 58 degrees Calculated R Axis 76 degrees Calculated T Axis 62 degrees Diagnosis Sinus tachycardia Anterior infarct , age undetermined When compared with ECG of 10-JUL-2017 10:32, 
Vent. rate has increased BY  62 BPM 
Anterior infarct is now present CBC WITH AUTOMATED DIFF Collection Time: 01/26/19 11:49 AM  
Result Value Ref Range WBC 4.1 4.1 - 11.1 K/uL  
 RBC 5.49 4. 10 - 5.70 M/uL  
 HGB 16.7 12.1 - 17.0 g/dL HCT 51.1 (H) 36.6 - 50.3 % MCV 93.1 80.0 - 99.0 FL  
 MCH 30.4 26.0 - 34.0 PG  
 MCHC 32.7 30.0 - 36.5 g/dL  
 RDW 15.3 (H) 11.5 - 14.5 % PLATELET 046 946 - 251 K/uL MPV 9.4 8.9 - 12.9 FL  
 NRBC 0.0 0  WBC ABSOLUTE NRBC 0.00 0.00 - 0.01 K/uL NEUTROPHILS 66 32 - 75 % LYMPHOCYTES 24 12 - 49 % MONOCYTES 9 5 - 13 % EOSINOPHILS 1 0 - 7 % BASOPHILS 0 0 - 1 % IMMATURE GRANULOCYTES 0 0.0 - 0.5 % ABS. NEUTROPHILS 2.7 1.8 - 8.0 K/UL  
 ABS. LYMPHOCYTES 1.0 0.8 - 3.5 K/UL  
 ABS. MONOCYTES 0.4 0.0 - 1.0 K/UL  
 ABS. EOSINOPHILS 0.0 0.0 - 0.4 K/UL  
 ABS. BASOPHILS 0.0 0.0 - 0.1 K/UL  
 ABS. IMM. GRANS. 0.0 0.00 - 0.04 K/UL  
 DF AUTOMATED METABOLIC PANEL, COMPREHENSIVE Collection Time: 01/26/19 11:49 AM  
Result Value Ref Range Sodium 132 (L) 136 - 145 mmol/L Potassium 3.5 3.5 - 5.1 mmol/L Chloride 98 97 - 108 mmol/L  
 CO2 29 21 - 32 mmol/L Anion gap 5 5 - 15 mmol/L Glucose 106 (H) 65 - 100 mg/dL BUN 10 6 - 20 MG/DL Creatinine 1.04 0.70 - 1.30 MG/DL  
 BUN/Creatinine ratio 10 (L) 12 - 20 GFR est AA >60 >60 ml/min/1.73m2 GFR est non-AA >60 >60 ml/min/1.73m2 Calcium 9.2 8.5 - 10.1 MG/DL Bilirubin, total 0.8 0.2 - 1.0 MG/DL  
 ALT (SGPT) 23 12 - 78 U/L  
 AST (SGOT) 21 15 - 37 U/L Alk. phosphatase 59 45 - 117 U/L Protein, total 8.3 (H) 6.4 - 8.2 g/dL Albumin 3.8 3.5 - 5.0 g/dL Globulin 4.5 (H) 2.0 - 4.0 g/dL A-G Ratio 0.8 (L) 1.1 - 2.2    
TROPONIN I Collection Time: 01/26/19 11:49 AM  
Result Value Ref Range Troponin-I, Qt. <0.05 <0.05 ng/mL CK W/ REFLX CKMB Collection Time: 01/26/19 11:49 AM  
Result Value Ref Range  39 - 308 U/L  
MAGNESIUM Collection Time: 01/26/19 11:49 AM  
Result Value Ref Range Magnesium 2.0 1.6 - 2.4 mg/dL NT-PRO BNP Collection Time: 01/26/19 11:49 AM  
Result Value Ref Range NT pro-BNP 37 0 - 125 PG/ML  
INFLUENZA A & B AG (RAPID TEST) Collection Time: 01/26/19  1:06 PM  
Result Value Ref Range Influenza A Antigen NEGATIVE  NEG Influenza B Antigen NEGATIVE  NEG Radiologic Studies - CXR Results  (Last 48 hours) 01/26/19 1200  XR CHEST PA LAT Final result Impression:  IMPRESSION: No acute abnormality and no change. Narrative:  EXAM:  XR CHEST PA LAT. INDICATION: SOB. COMPARISON: 4/5/2018. FINDINGS:   
PA and lateral radiographs of the chest were obtained. Lungs: The lungs are clear of mass, nodule, airspace disease or edema. Pleura: There is no pleural effusion or pneumothorax. There is some pleural  
thickening in the apices. Mediastinum: The cardiac and mediastinal contours and pulmonary vascularity are  
normal.  The aorta is atherosclerotic. Bones and soft tissues: There are degenerative changes of the spine. Medical Decision Making I am the first provider for this patient. I reviewed the vital signs, available nursing notes, past medical history, past surgical history, family history and social history. Vital Signs-Reviewed the patient's vital signs. Patient Vitals for the past 12 hrs: 
 Temp Pulse Resp BP SpO2  
01/26/19 1400  100 18 137/88 98 % 01/26/19 1139 98.1 °F (36.7 °C) (!) 117 18 (!) 147/97 98 % Pulse Oximetry Analysis - 98% on RA Records Reviewed: Nursing Notes and Old Medical Records Provider Notes (Medical Decision Making): DDx: CHF, PNA, bronchitis, PE, influenza IP: history of tobacco abuse, COPD, to the ER with one week of worsening SOB, and orthopnea, in the ER is clinically tight with wheezing, will treat with nebulization steroids, and obtain labs and x-ray to assist with disposition ED Course:  
Initial assessment performed. The patients presenting problems have been discussed, and they are in agreement with the care plan formulated and outlined with them. I have encouraged them to ask questions as they arise throughout their visit. PROGRESS NOTE: 
1:58 PM 
Has re-evaluated the pt. Pt reports feeling much better. Pt walked back from x-ray with no desaturation or respiratory distress. Written by Ines Isaac ED Scribe, as dictated by Tono Forte MD. Critical Care Time:  
None Disposition: 
DISCHARGE NOTE: 
1:59 PM 
 The patient is ready for discharge. The patients signs, symptoms, diagnosis, and instructions for discharge have been discussed and the pt has conveyed their understanding. The patient is to follow up as recommended with PCP or return to the ER should their symptoms worsen. Plan has been discussed and patient has conveyed their agreement. PLAN: 
1. Discharge Home Current Discharge Medication List  
  
START taking these medications Details  
predniSONE (DELTASONE) 20 mg tablet Take 20 mg by mouth two (2) times a day for 10 doses. With Breakfast 
Qty: 10 Tab, Refills: 0  
  
azithromycin (ZITHROMAX Z-JLUIS) 250 mg tablet Take 2 tablets by mouth today and then 1 tablet by mouth every day 
Qty: 6 Tab, Refills: 0  
  
  
 
2. Follow-up Information Follow up With Specialties Details Why Contact Info Jose House MD Internal Medicine Schedule an appointment as soon as possible for a visit in 2 days  [address] [facility]. 
[phone] [facility] EMERGENCY DEPT Emergency Medicine  If symptoms worsen [address] [facility] 
[phone] Return to ED if worse Diagnosis Clinical Impression: 1. Acute bronchitis, unspecified organism Attestations: This note is prepared by Lisbet Kate, acting as Scribe for Nicole Mcdonough MD. Nicole Mcdonough MD: The scribe's documentation has been prepared under my direction and personally reviewed by me in its entirety. I confirm that the note above accurately reflects all work, treatment, procedures, and medical decision making performed by me.

## 2019-01-26 NOTE — DISCHARGE INSTRUCTIONS
Patient Education        Bronchitis: Care Instructions  Your Care Instructions    Bronchitis is inflammation of the bronchial tubes, which carry air to the lungs. The tubes swell and produce mucus, or phlegm. The mucus and inflamed bronchial tubes make you cough. You may have trouble breathing. Most cases of bronchitis are caused by viruses like those that cause colds. Antibiotics usually do not help and they may be harmful. Bronchitis usually develops rapidly and lasts about 2 to 3 weeks in otherwise healthy people. Follow-up care is a key part of your treatment and safety. Be sure to make and go to all appointments, and call your doctor if you are having problems. It's also a good idea to know your test results and keep a list of the medicines you take. How can you care for yourself at home? · Take all medicines exactly as prescribed. Call your doctor if you think you are having a problem with your medicine. · Get some extra rest.  · Take an over-the-counter pain medicine, such as acetaminophen (Tylenol), ibuprofen (Advil, Motrin), or naproxen (Aleve) to reduce fever and relieve body aches. Read and follow all instructions on the label. · Do not take two or more pain medicines at the same time unless the doctor told you to. Many pain medicines have acetaminophen, which is Tylenol. Too much acetaminophen (Tylenol) can be harmful. · Take an over-the-counter cough medicine that contains dextromethorphan to help quiet a dry, hacking cough so that you can sleep. Avoid cough medicines that have more than one active ingredient. Read and follow all instructions on the label. · Breathe moist air from a humidifier, hot shower, or sink filled with hot water. The heat and moisture will thin mucus so you can cough it out. · Do not smoke. Smoking can make bronchitis worse. If you need help quitting, talk to your doctor about stop-smoking programs and medicines.  These can increase your chances of quitting for good.  When should you call for help? Call 911 anytime you think you may need emergency care. For example, call if:    · You have severe trouble breathing.    Call your doctor now or seek immediate medical care if:    · You have new or worse trouble breathing.     · You cough up dark brown or bloody mucus (sputum).     · You have a new or higher fever.     · You have a new rash.    Watch closely for changes in your health, and be sure to contact your doctor if:    · You cough more deeply or more often, especially if you notice more mucus or a change in the color of your mucus.     · You are not getting better as expected. Where can you learn more? Go to http://merlene-sierra.info/. Enter H333 in the search box to learn more about \"Bronchitis: Care Instructions. \"  Current as of: September 5, 2018  Content Version: 11.9  © 8724-2868 [employer]. Care instructions adapted under license by [employer] (which disclaims liability or warranty for this information). If you have questions about a medical condition or this instruction, always ask your healthcare professional. [address] any warranty or liability for your use of this information. BEKIZ Activation    Thank you for requesting access to BEKIZ. Please follow the instructions below to securely access and download your online medical record. BEKIZ allows you to send messages to your doctor, view your test results, renew your prescriptions, schedule appointments, and more. How Do I Sign Up? 1. In your internet browser, go to [email or web address]  2. Click on the First Time User? Click Here link in the Sign In box. You will be redirect to the New Member Sign Up page. 3. Enter your BEKIZ Access Code exactly as it appears below. You will not need to use this code after youve completed the sign-up process.  If you do not sign up before the expiration date, you must request a new code.    MarketLive Access Code: [email or web address]  Expires: 3/12/2019 11:39 AM (This is the date your MarketLive access code will )    4. Enter the last four digits of your Social Security Number (xxxx) and Date of Birth (mm/dd/yyyy) as indicated and click Submit. You will be taken to the next sign-up page. 5. Create a MarketLive ID. This will be your MarketLive login ID and cannot be changed, so think of one that is secure and easy to remember. 6. Create a MarketLive password. You can change your password at any time. 7. Enter your Password Reset Question and Answer. This can be used at a later time if you forget your password. 8. Enter your e-mail address. You will receive e-mail notification when new information is available in [address]. 9. Click Sign Up. You can now view and download portions of your medical record. 10. Click the Download Summary menu link to download a portable copy of your medical information. Additional Information    If you have questions, please visit the Frequently Asked Questions section of the MarketLive website at [email or web address] Remember, MarketLive is NOT to be used for urgent needs. For medical emergencies, dial 911.

## 2019-01-27 LAB
ATRIAL RATE: 112 BPM
CALCULATED P AXIS, ECG09: 58 DEGREES
CALCULATED R AXIS, ECG10: 76 DEGREES
CALCULATED T AXIS, ECG11: 62 DEGREES
DIAGNOSIS, 93000: NORMAL
P-R INTERVAL, ECG05: 148 MS
Q-T INTERVAL, ECG07: 336 MS
QRS DURATION, ECG06: 94 MS
QTC CALCULATION (BEZET), ECG08: 458 MS
VENTRICULAR RATE, ECG03: 112 BPM

## 2019-02-14 ENCOUNTER — HOSPITAL ENCOUNTER (OUTPATIENT)
Dept: CT IMAGING | Age: 64
Discharge: HOME | End: 2019-02-14
Attending: UROLOGY
Payer: MEDICARE

## 2019-02-14 DIAGNOSIS — R31.9 HEMATURIA: ICD-10-CM

## 2019-02-14 PROCEDURE — 74011636320 HC RX REV CODE- 636/320: Performed by: UROLOGY

## 2019-02-14 PROCEDURE — 74178 CT ABD&PLV WO CNTR FLWD CNTR: CPT

## 2019-02-14 RX ORDER — SODIUM CHLORIDE 0.9 % (FLUSH) 0.9 %
10 SYRINGE (ML) INJECTION
Status: COMPLETED | OUTPATIENT
Start: 2019-02-14 | End: 2019-02-14

## 2019-02-14 RX ADMIN — IOPAMIDOL 100 ML: 755 INJECTION, SOLUTION INTRAVENOUS at 12:25

## 2019-02-14 RX ADMIN — Medication 10 ML: at 12:25

## 2019-02-15 ENCOUNTER — OFFICE VISIT (OUTPATIENT)
Dept: CARDIOLOGY CLINIC | Age: 64
End: 2019-02-15

## 2019-02-15 VITALS
DIASTOLIC BLOOD PRESSURE: 78 MMHG | SYSTOLIC BLOOD PRESSURE: 130 MMHG | HEIGHT: 72 IN | HEART RATE: 105 BPM | OXYGEN SATURATION: 91 % | WEIGHT: 153.8 LBS | BODY MASS INDEX: 20.83 KG/M2 | RESPIRATION RATE: 16 BRPM

## 2019-02-15 DIAGNOSIS — E78.00 PURE HYPERCHOLESTEROLEMIA: ICD-10-CM

## 2019-02-15 DIAGNOSIS — I20.9 ANGINA, CLASS III (HCC): Primary | ICD-10-CM

## 2019-02-15 DIAGNOSIS — R07.9 CHEST PAIN, UNSPECIFIED TYPE: ICD-10-CM

## 2019-02-15 RX ORDER — ISOSORBIDE MONONITRATE 30 MG/1
30 TABLET, EXTENDED RELEASE ORAL DAILY
Qty: 30 TAB | Refills: 6 | Status: SHIPPED | OUTPATIENT
Start: 2019-02-15 | End: 2020-08-03 | Stop reason: DRUGHIGH

## 2019-02-15 RX ORDER — NITROGLYCERIN 0.4 MG/1
0.4 TABLET SUBLINGUAL
Qty: 1 BOTTLE | Refills: 12 | Status: SHIPPED | OUTPATIENT
Start: 2019-02-15 | End: 2020-06-26

## 2019-02-15 NOTE — PROGRESS NOTES
1. Have you been to the ER, urgent care clinic since your last visit? Hospitalized since your last visit? YES, MONTH AGO, [facility], [facility]. 2. Have you seen or consulted any other health care providers outside of the [address] [place] [address] since your last visit? Include any pap smears or colon screening. YES, PCP. C/O  PRESSURE IN CHEST OFF AND ON, HAVE TAKEN NITRO SEVERAL TIMES, NEED A REFILL, SOB.

## 2019-02-15 NOTE — PROGRESS NOTES
NAME:  Hugo Dai   :   1955   MRN:   [id number]   PCP:  Keanu Armando MD           Subjective: The patient is a 58y.o. year old male  who returns for a routine follow-up. Since the last visit, patient reports no change in exercise tolerance, edema, medication intolerance, palpitations, shortness of breath, PND/orthopnea wheezing, sputum, syncope, dizziness or light headedness. c/o daily chest pains. Relieved with NTG. No rest symptoms. Past Medical History:   Diagnosis Date    Abdominal pain     Adverse effect of anesthesia     heart rate slows down    Asthma     Back pain     Cancer (HCC)     Throat cancer treated with chemo & radiation    Constipation     COPD     managed by PCP    Cyst of pharynx     Depression     Extrinsic asthma, unspecified     Fatigue     GERD (gastroesophageal reflux disease)     Insomnia, unspecified     On home O2     at bedtime    Other chest pain     since 2013    Pain in joint, forearm     Psychiatric disorder     schizophrenia    Smoker     1 ppw        ICD-10-CM ICD-9-CM    1. Angina, class III ([facility]) I20.9 413.9    2. Chest pain, unspecified type R07.9 786.50 AMB POC EKG ROUTINE W/ 12 LEADS, INTER & REP      NUCLEAR CARDIAC STRESS TEST   3. Pure hypercholesterolemia E78.00 272.0       Social History     Tobacco Use    Smoking status: Current Every Day Smoker     Packs/day: 1.00     Years: 52.00     Pack years: 52.00     Last attempt to quit: 2010     Years since quittin.3    Smokeless tobacco: Never Used    Tobacco comment: Discussed importance of smoking cessation for healing. Substance Use Topics    Alcohol use: No      Family History   Problem Relation Age of Onset    Cancer Mother     Cancer Father         Review of Systems  General: Pt denies excessive weight gain or loss.  Pt is able to conduct ADL's  HEENT: Denies blurred vision, headaches, epistaxis and difficulty swallowing. Respiratory: Denies shortness of breath, MENDEZ, wheezing or stridor. Cardiovascular: Denies precordial pain, palpitations, edema or PND  Gastrointestinal: Denies poor appetite, indigestion, abdominal pain or blood in stool  Musculoskeletal: Denies pain or swelling from muscles or joints  Neurologic: Denies tremor, paresthesias, or sensory motor disturbance  Skin: Denies rash, itching or texture change. Objective:       Vitals:    02/15/19 1103 02/15/19 1113   BP: 130/80 130/78   Pulse: (!) 105    Resp: 16    SpO2: 91%    Weight: 153 lb 12.8 oz (69.8 kg)    Height: 6' (1.829 m)     Body mass index is 20.86 kg/m². General PE  Mental Status - Alert. General Appearance - Not in acute distress. Chest and Lung Exam   Inspection: Accessory muscles - No use of accessory muscles in breathing. Auscultation:   Breath sounds: - Normal.    Cardiovascular   Inspection: Jugular vein - Bilateral - Inspection Normal.  Palpation/Percussion:   Apical Impulse: - Normal.  Auscultation: Rhythm - Regular. Heart Sounds - S1 WNL and S2 WNL. No S3 or S4. Murmurs & Other Heart Sounds: Auscultation of the heart reveals - No Murmurs. Peripheral Vascular   Upper Extremity: Inspection - Bilateral - No Cyanotic nailbeds or Digital clubbing. Lower Extremity:   Palpation: Edema - Bilateral - No edema. Data Review:     EKG -EKG: normal EKG, normal sinus rhythm, unchanged from previous tracings. Medications reviewed  Current Outpatient Medications   Medication Sig    nitroglycerin (NITROSTAT) 0.4 mg SL tablet 1 Tab by SubLINGual route every five (5) minutes as needed for Chest Pain.  isosorbide mononitrate ER (IMDUR) 30 mg tablet Take 1 Tab by mouth daily.  albuterol (PROAIR HFA) 90 mcg/actuation inhaler Take  by inhalation.  atorvastatin (LIPITOR) 20 mg tablet Take 1 Tab by mouth daily.  clonazePAM (KLONOPIN) 1 mg tablet Take 1 Tab by mouth nightly. Max Daily Amount: 1 mg.  Indications: FORREST ASSOCIATED WITH BIPOLAR DISORDER, ADJUNCT    aspirin 81 mg chewable tablet Take 1 Tab by mouth daily.  albuterol (PROVENTIL VENTOLIN) 2.5 mg /3 mL (0.083 %) nebulizer solution 1.25 mg by Nebulization route every four (4) hours as needed.  ibuprofen (MOTRIN) 800 mg tablet Take 800 mg by mouth every eight (8) hours as needed for Pain.  levothyroxine (SYNTHROID) 25 mcg tablet Take 1 Tab by mouth Daily (before breakfast).  OLANZapine (ZYPREXA) 20 mg tablet Take 1 Tab by mouth nightly. Indications: Schizophrenia     No current facility-administered medications for this visit. Assessment:       ICD-10-CM ICD-9-CM    1. Angina, class III (HCC) I20.9 413.9    2. Chest pain, unspecified type R07.9 786.50 AMB POC EKG ROUTINE W/ 12 LEADS, INTER & REP      NUCLEAR CARDIAC STRESS TEST   3. Pure hypercholesterolemia E78.00 272.0         Plan:     Patient presents with new onset again. Has known CAD. Will add imdur, prn NTG. Stress test. Advised to go to ER if symptoms worse.  Continue current care and follow up after test.    Owen Alas MD

## 2019-04-25 ENCOUNTER — HOSPITAL ENCOUNTER (OUTPATIENT)
Dept: NON INVASIVE DIAGNOSTICS | Age: 64
Discharge: HOME | End: 2019-04-25
Attending: INTERNAL MEDICINE
Payer: MEDICARE

## 2019-04-25 ENCOUNTER — HOSPITAL ENCOUNTER (OUTPATIENT)
Dept: NUCLEAR MEDICINE | Age: 64
Discharge: HOME | End: 2019-04-25
Attending: INTERNAL MEDICINE
Payer: MEDICARE

## 2019-04-25 DIAGNOSIS — R07.9 CHEST PAIN, UNSPECIFIED TYPE: ICD-10-CM

## 2019-04-25 PROCEDURE — A9500 TC99M SESTAMIBI: HCPCS

## 2019-04-25 PROCEDURE — 93017 CV STRESS TEST TRACING ONLY: CPT

## 2019-04-25 PROCEDURE — 74011250636 HC RX REV CODE- 250/636: Performed by: INTERNAL MEDICINE

## 2019-04-25 RX ORDER — SODIUM CHLORIDE 0.9 % (FLUSH) 0.9 %
10 SYRINGE (ML) INJECTION AS NEEDED
Status: DISCONTINUED | OUTPATIENT
Start: 2019-04-25 | End: 2019-04-28 | Stop reason: HOSPADM

## 2019-04-25 RX ADMIN — REGADENOSON 0.4 MG: 0.08 INJECTION, SOLUTION INTRAVENOUS at 08:29

## 2019-04-25 RX ADMIN — Medication 10 ML: at 08:29

## 2019-04-26 ENCOUNTER — DOCUMENTATION ONLY (OUTPATIENT)
Dept: CARDIOLOGY CLINIC | Age: 64
End: 2019-04-26

## 2019-04-26 LAB
STRESS BASELINE HR: 50 BPM
STRESS ESTIMATED WORKLOAD: 1 METS
STRESS EXERCISE DUR MIN: NORMAL
STRESS PEAK DIAS BP: 76 MMHG
STRESS PEAK SYS BP: 144 MMHG
STRESS PERCENT HR ACHIEVED: 55 %
STRESS POST PEAK HR: 86 BPM
STRESS RATE PRESSURE PRODUCT: NORMAL BPM*MMHG
STRESS TARGET HR: 156 BPM

## 2019-04-26 NOTE — PROGRESS NOTES
Faxed clearance note, stress test results, last office note and ekg to [facility] @ [phone]. Pt is cleared for procedure, needs to hold aspirin 5 days prior to procedure and resume the following day.

## 2019-05-16 ENCOUNTER — HOSPITAL ENCOUNTER (OUTPATIENT)
Dept: CT IMAGING | Age: 64
Discharge: HOME | End: 2019-05-16
Attending: UROLOGY
Payer: MEDICARE

## 2019-05-16 DIAGNOSIS — R10.2 SUPRAPUBIC PAIN, ACUTE: ICD-10-CM

## 2019-05-16 PROCEDURE — 74176 CT ABD & PELVIS W/O CONTRAST: CPT

## 2019-09-17 ENCOUNTER — HOSPITAL ENCOUNTER (OUTPATIENT)
Dept: CT IMAGING | Age: 64
Discharge: HOME | End: 2019-09-17
Attending: RADIOLOGY
Payer: MEDICARE

## 2019-09-17 DIAGNOSIS — C76.0 HEAD AND NECK MALIGNANCY (HCC): ICD-10-CM

## 2019-09-17 LAB — CREAT BLD-MCNC: 1 MG/DL (ref 0.6–1.3)

## 2019-09-17 PROCEDURE — 74011636320 HC RX REV CODE- 636/320: Performed by: RADIOLOGY

## 2019-09-17 PROCEDURE — 82565 ASSAY OF CREATININE: CPT

## 2019-09-17 PROCEDURE — 71260 CT THORAX DX C+: CPT

## 2019-09-17 RX ORDER — SODIUM CHLORIDE 0.9 % (FLUSH) 0.9 %
10 SYRINGE (ML) INJECTION
Status: COMPLETED | OUTPATIENT
Start: 2019-09-17 | End: 2019-09-17

## 2019-09-17 RX ADMIN — IOPAMIDOL 100 ML: 755 INJECTION, SOLUTION INTRAVENOUS at 09:00

## 2019-09-17 RX ADMIN — Medication 10 ML: at 09:00

## 2019-10-23 ENCOUNTER — ANESTHESIA (OUTPATIENT)
Dept: ENDOSCOPY | Age: 64
End: 2019-10-23
Payer: MEDICARE

## 2019-10-23 ENCOUNTER — HOSPITAL ENCOUNTER (OUTPATIENT)
Age: 64
Setting detail: OUTPATIENT SURGERY
Discharge: HOME | End: 2019-10-23
Attending: INTERNAL MEDICINE | Admitting: INTERNAL MEDICINE
Payer: MEDICARE

## 2019-10-23 ENCOUNTER — ANESTHESIA EVENT (OUTPATIENT)
Dept: ENDOSCOPY | Age: 64
End: 2019-10-23
Payer: MEDICARE

## 2019-10-23 VITALS
OXYGEN SATURATION: 98 % | WEIGHT: 142.19 LBS | BODY MASS INDEX: 19.26 KG/M2 | RESPIRATION RATE: 18 BRPM | DIASTOLIC BLOOD PRESSURE: 77 MMHG | TEMPERATURE: 97.6 F | HEIGHT: 72 IN | HEART RATE: 74 BPM | SYSTOLIC BLOOD PRESSURE: 129 MMHG

## 2019-10-23 PROCEDURE — 76040000019: Performed by: INTERNAL MEDICINE

## 2019-10-23 PROCEDURE — 88305 TISSUE EXAM BY PATHOLOGIST: CPT

## 2019-10-23 PROCEDURE — 74011250636 HC RX REV CODE- 250/636: Performed by: ANESTHESIOLOGY

## 2019-10-23 PROCEDURE — 74011000250 HC RX REV CODE- 250: Performed by: ANESTHESIOLOGY

## 2019-10-23 PROCEDURE — 76060000031 HC ANESTHESIA FIRST 0.5 HR: Performed by: INTERNAL MEDICINE

## 2019-10-23 PROCEDURE — 77030019988 HC FCPS ENDOSC DISP BSC -B: Performed by: INTERNAL MEDICINE

## 2019-10-23 PROCEDURE — 74011250636 HC RX REV CODE- 250/636: Performed by: INTERNAL MEDICINE

## 2019-10-23 RX ORDER — DEXTROMETHORPHAN/PSEUDOEPHED 2.5-7.5/.8
1.2 DROPS ORAL
Status: DISCONTINUED | OUTPATIENT
Start: 2019-10-23 | End: 2019-10-23 | Stop reason: HOSPADM

## 2019-10-23 RX ORDER — PROPOFOL 10 MG/ML
INJECTION, EMULSION INTRAVENOUS AS NEEDED
Status: DISCONTINUED | OUTPATIENT
Start: 2019-10-23 | End: 2019-10-23 | Stop reason: HOSPADM

## 2019-10-23 RX ORDER — SODIUM CHLORIDE 0.9 % (FLUSH) 0.9 %
5-40 SYRINGE (ML) INJECTION EVERY 8 HOURS
Status: DISCONTINUED | OUTPATIENT
Start: 2019-10-23 | End: 2019-10-23 | Stop reason: HOSPADM

## 2019-10-23 RX ORDER — SODIUM CHLORIDE 0.9 % (FLUSH) 0.9 %
5-40 SYRINGE (ML) INJECTION AS NEEDED
Status: DISCONTINUED | OUTPATIENT
Start: 2019-10-23 | End: 2019-10-23 | Stop reason: HOSPADM

## 2019-10-23 RX ORDER — EPINEPHRINE 0.1 MG/ML
1 INJECTION INTRACARDIAC; INTRAVENOUS
Status: DISCONTINUED | OUTPATIENT
Start: 2019-10-23 | End: 2019-10-23 | Stop reason: HOSPADM

## 2019-10-23 RX ORDER — ATROPINE SULFATE 0.1 MG/ML
0.5 INJECTION INTRAVENOUS
Status: DISCONTINUED | OUTPATIENT
Start: 2019-10-23 | End: 2019-10-23 | Stop reason: HOSPADM

## 2019-10-23 RX ORDER — NALOXONE HYDROCHLORIDE 0.4 MG/ML
0.4 INJECTION, SOLUTION INTRAMUSCULAR; INTRAVENOUS; SUBCUTANEOUS
Status: DISCONTINUED | OUTPATIENT
Start: 2019-10-23 | End: 2019-10-23 | Stop reason: HOSPADM

## 2019-10-23 RX ORDER — FLUMAZENIL 0.1 MG/ML
0.2 INJECTION INTRAVENOUS
Status: DISCONTINUED | OUTPATIENT
Start: 2019-10-23 | End: 2019-10-23 | Stop reason: HOSPADM

## 2019-10-23 RX ORDER — SODIUM CHLORIDE 9 MG/ML
75 INJECTION, SOLUTION INTRAVENOUS CONTINUOUS
Status: DISCONTINUED | OUTPATIENT
Start: 2019-10-23 | End: 2019-10-23 | Stop reason: HOSPADM

## 2019-10-23 RX ORDER — LIDOCAINE HYDROCHLORIDE 20 MG/ML
INJECTION, SOLUTION EPIDURAL; INFILTRATION; INTRACAUDAL; PERINEURAL AS NEEDED
Status: DISCONTINUED | OUTPATIENT
Start: 2019-10-23 | End: 2019-10-23 | Stop reason: HOSPADM

## 2019-10-23 RX ADMIN — PROPOFOL 120 MG: 10 INJECTION, EMULSION INTRAVENOUS at 12:09

## 2019-10-23 RX ADMIN — SODIUM CHLORIDE 75 ML/HR: 900 INJECTION, SOLUTION INTRAVENOUS at 11:35

## 2019-10-23 RX ADMIN — LIDOCAINE HYDROCHLORIDE 80 MG: 20 INJECTION, SOLUTION EPIDURAL; INFILTRATION; INTRACAUDAL; PERINEURAL at 11:55

## 2019-10-23 NOTE — PROCEDURES
NAME:  Inna Venegas   :   1955   MRN:   [id number]     Date/Time:  10/23/2019 12:07 PM    Esophagogastroduodenoscopy (EGD) Procedure Note    Procedure: Esophagogastroduodenoscopy with biopsy    Indication: Abnormal GI imaging  Pre-operative Diagnosis: see indication above  Post-operative Diagnosis: see findings below  :  Rosio Hollingsworth MD  Referring Provider:   --Shobha Rizo MD    Exam:  Airway: clear, no airway problems anticipated  Heart: RRR, without gallops or rubs  Lungs: clear bilaterally without wheezes, crackles, or rhonchi  Abdomen: soft, nontender, nondistended, bowel sounds present  Mental Status: awake, alert and oriented to person, place and time     Anethesia/Sedation:  MAC anesthesia Propofol  Procedure Details   After informed consent was obtained for the procedure, with all risks and benefits of procedure explained the patient was taken to the endoscopy suite and placed in the left lateral decubitus position. Following sequential administration of sedation as per above, the [id number] gastroscope was inserted into the mouth and advanced under direct vision to second portion of the duodenum. A careful inspection was made as the gastroscope was withdrawn, including a retroflexed view of the proximal stomach; findings and interventions are described below. Findings:   1. Normal proximal and mid esophagus  2. Z-line normal. Notably no mass lesions or esophagitis in distal esophagus. Biopsied  3. Small, sliding hiatal hernia  4. Moderate, patchy, non-erosive antral gastropathy. Biopsied  5. Stomach otherwise normal, including retroflexion  6. Normal duodenal bulb and 2nd portion of the dudoenum    Therapies:  1. Biopsies    Specimens: 1. Gastric 2. GE junction    EBL:  None. Complications:   None; patient tolerated the procedure well. Impression:    1.  Normal proximal and mid esophagus  2. Z-line normal. Notably no mass lesions or esophagitis in distal esophagus. Biopsied  3. Small, sliding hiatal hernia  4. Moderate, patchy, non-erosive antral gastropathy. Biopsied  5. Stomach otherwise normal, including retroflexion  6. Normal duodenal bulb and 2nd portion of the dudoenum    Recommendations:  1. Follow up pathology  2.  Follow up with PCP as scheduled    Discharge disposition:  Home in the company of  when able to ambulate    Ajay Barraza MD

## 2019-10-23 NOTE — PROGRESS NOTES
Anesthesia reports 120mg Propofol, 80mg Lidocaine and 150  mL NS given during procedure. Received report from anesthesia staff on vital signs and status of patient.

## 2019-10-23 NOTE — ANESTHESIA PREPROCEDURE EVALUATION
Anesthetic History   No history of anesthetic complications       Comments: Bradycardia and slow to emerge.      Review of Systems / Medical History  Patient summary reviewed, nursing notes reviewed and pertinent labs reviewed    Pulmonary    COPD: severe      Smoker  Asthma     Comments: Home o2 at bedtime   Neuro/Psych         Psychiatric history     Cardiovascular              CAD and hyperlipidemia    Exercise tolerance: <4 METS     GI/Hepatic/Renal     GERD           Endo/Other      Hypothyroidism  Arthritis     Other Findings              Physical Exam    Airway  Mallampati: II  TM Distance: > 6 cm  Neck ROM: normal range of motion   Mouth opening: Normal     Cardiovascular  Regular rate and rhythm,  S1 and S2 normal,  no murmur, click, rub, or gallop             Dental    Dentition: Edentulous     Pulmonary  Breath sounds clear to auscultation               Abdominal  GI exam deferred       Other Findings            Anesthetic Plan    ASA: 3  Anesthesia type: MAC          Induction: Intravenous  Anesthetic plan and risks discussed with: Patient

## 2019-10-23 NOTE — DISCHARGE INSTRUCTIONS
Beena Virajsuukmar  [id number]  1955    EGD DISCHARGE INSTRUCTIONS  Discomfort:  Sore throat- throat lozenges or warm salt water gargle  redness at IV site- apply warm compress to area; if redness or soreness persist- contact your physician  Gaseous discomfort- walking, belching will help relieve any discomfort  You may not operate a vehicle for 12 hours  You may not engage in an occupation involving machinery or appliances for rest of today  You may not drink alcoholic beverages for at least 12 hours  Avoid making any critical decisions for at least 24 hour  DIET  You may resume your regular diet - however -  remember your colon is empty and a heavy meal will produce gas. Avoid these foods:  vegetables, fried / greasy foods, carbonated drinks    MEDICATIONS:  Per Medication Reconciliation      ACTIVITY  You may resume your normal daily activities until tomorrow AM;  Spend the remainder of the day resting -  avoid any strenuous activity. CALL M.D. ANY SIGN OF   Increasing pain, nausea, vomiting  Abdominal distension (swelling)  New increased bleeding (oral or rectal)  Fever (chills)  Pain in chest area  Bloody discharge from nose or mouth  Shortness of breath      IMPRESSION:  Impression:    1. Normal proximal and mid esophagus  2. Z-line normal. Notably no mass lesions or esophagitis in distal esophagus. Biopsied  3. Small, sliding hiatal hernia  4. Moderate, patchy, non-erosive antral gastropathy. Biopsied  5. Stomach otherwise normal, including retroflexion  6. Normal duodenal bulb and 2nd portion of the dudoenum    Recommendations:  1. Follow up pathology  2.  Follow up with PCP as scheduled      Follow-up Instructions:   Call Dr. Carolina Mckeon for the results of procedure / biopsy in 7-10 days   Telephone # [phone]    Elias Bence, MD

## 2019-10-23 NOTE — ANESTHESIA POSTPROCEDURE EVALUATION
Procedure(s):  ESOPHAGOGASTRODUODENOSCOPY (EGD)  ESOPHAGOGASTRODUODENAL (EGD) BIOPSY. total IV anesthesia    Anesthesia Post Evaluation        Patient location during evaluation: PACU  Note status: Adequate. Level of consciousness: responsive to verbal stimuli and sleepy but conscious  Pain management: satisfactory to patient  Airway patency: patent  Anesthetic complications: no  Cardiovascular status: acceptable  Respiratory status: acceptable  Hydration status: acceptable  Comments: +Post-Anesthesia Evaluation and Assessment    Patient: Uzma Velasco MRN: [id number]  SSN: [id number]   YOB: 1955  Age: 71 y.o. Sex: male      Cardiovascular Function/Vital Signs    /77   Pulse 74   Temp 36.4 °C (97.6 °F)   Resp 18   Ht 6' (1.829 m)   Wt 64.5 kg (142 lb 3 oz)   SpO2 98%   BMI 19.28 kg/m²     Patient is status post Procedure(s):  ESOPHAGOGASTRODUODENOSCOPY (EGD)  ESOPHAGOGASTRODUODENAL (EGD) BIOPSY. Nausea/Vomiting: Controlled. Postoperative hydration reviewed and adequate. Pain:  Pain Scale 1: Numeric (0 - 10) (10/23/19 1236)  Pain Intensity 1: 0 (10/23/19 1236)   Managed. Neurological Status: At baseline. Mental Status and Level of Consciousness: Arousable. Pulmonary Status:   O2 Device: Room air (10/23/19 1236)   Adequate oxygenation and airway patent. Complications related to anesthesia: None    Post-anesthesia assessment completed. No concerns. Signed By: Dorina Garcia DO    10/23/2019  Post anesthesia nausea and vomiting:  controlled      Vitals Value Taken Time   /75 10/23/2019 12:38 PM   Temp     Pulse 0 10/23/2019 12:42 PM   Resp 0 10/23/2019 12:42 PM   SpO2 99 % 10/23/2019 12:38 PM   Vitals shown include unvalidated device data.

## 2019-10-23 NOTE — ROUTINE PROCESS
Wilbert Silva 1955 
[id number] Situation: 
Verbal report received from: Houston Rockwell Procedure: Procedure(s): ESOPHAGOGASTRODUODENOSCOPY (EGD) ESOPHAGOGASTRODUODENAL (EGD) BIOPSY Background: 
 
Preoperative diagnosis: ABNORMAL GI FINDINGS Postoperative diagnosis: gastritis, hiatal hernia :  Dr. Júnior Altamirano Assistant(s): Endoscopy RN-1: Tito Howard RN Endoscopy RN-2: Tremaine Morrison RN Specimens:  
ID Type Source Tests Collected by Time Destination 1 : gastric bx Preservative Gastric  Nancy Holland MD 10/23/2019 1204 Pathology 2 : ge junction  Preservative   Nancy Holland MD 10/23/2019 1207 Pathology H. Pylori  no Assessment: 
Intra-procedure medications Anesthesia gave intra-procedure sedation and medications, see anesthesia flow sheet yes Intravenous fluids: NS@ Magno Cornea Vital signs stable Abdominal assessment: round and soft Recommendation: 
Discharge patient per MD order. Family or Friend friend, Peggy Tobin to share finding with family or friend yes

## 2020-01-30 ENCOUNTER — HOSPITAL ENCOUNTER (INPATIENT)
Age: 65
LOS: 11 days | Discharge: HOME | DRG: 885 | End: 2020-02-10
Attending: PSYCHIATRY & NEUROLOGY | Admitting: PSYCHIATRY & NEUROLOGY
Payer: MEDICARE

## 2020-01-30 ENCOUNTER — HOSPITAL ENCOUNTER (EMERGENCY)
Age: 65
Discharge: HOME | End: 2020-01-30
Attending: EMERGENCY MEDICINE
Payer: MEDICARE

## 2020-01-30 VITALS
RESPIRATION RATE: 16 BRPM | WEIGHT: 142.42 LBS | HEART RATE: 110 BPM | DIASTOLIC BLOOD PRESSURE: 72 MMHG | OXYGEN SATURATION: 100 % | HEIGHT: 69 IN | TEMPERATURE: 98.1 F | BODY MASS INDEX: 21.09 KG/M2 | SYSTOLIC BLOOD PRESSURE: 150 MMHG

## 2020-01-30 DIAGNOSIS — R25.1 TREMOR: ICD-10-CM

## 2020-01-30 DIAGNOSIS — G95.9 CERVICAL MYELOPATHY (HCC): ICD-10-CM

## 2020-01-30 DIAGNOSIS — R45.851 SUICIDAL IDEATIONS: Primary | ICD-10-CM

## 2020-01-30 DIAGNOSIS — F43.21 GRIEF REACTION: ICD-10-CM

## 2020-01-30 DIAGNOSIS — G56.03 BILATERAL CARPAL TUNNEL SYNDROME: ICD-10-CM

## 2020-01-30 DIAGNOSIS — G62.9 NEUROPATHY: ICD-10-CM

## 2020-01-30 LAB
ALBUMIN SERPL-MCNC: 3.5 G/DL (ref 3.5–5)
ALBUMIN/GLOB SERPL: 0.9 {RATIO} (ref 1.1–2.2)
ALP SERPL-CCNC: 61 U/L (ref 45–117)
ALT SERPL-CCNC: 23 U/L (ref 12–78)
AMPHET UR QL SCN: NEGATIVE
ANION GAP SERPL CALC-SCNC: 3 MMOL/L (ref 5–15)
APAP SERPL-MCNC: <2 UG/ML (ref 10–30)
AST SERPL-CCNC: 15 U/L (ref 15–37)
BARBITURATES UR QL SCN: NEGATIVE
BASOPHILS # BLD: 0.1 K/UL (ref 0–0.1)
BASOPHILS NFR BLD: 1 % (ref 0–1)
BENZODIAZ UR QL: NEGATIVE
BILIRUB SERPL-MCNC: 0.6 MG/DL (ref 0.2–1)
BUN SERPL-MCNC: 19 MG/DL (ref 6–20)
BUN/CREAT SERPL: 21 (ref 12–20)
CALCIUM SERPL-MCNC: 9.3 MG/DL (ref 8.5–10.1)
CANNABINOIDS UR QL SCN: POSITIVE
CHLORIDE SERPL-SCNC: 106 MMOL/L (ref 97–108)
CO2 SERPL-SCNC: 31 MMOL/L (ref 21–32)
COCAINE UR QL SCN: NEGATIVE
CREAT SERPL-MCNC: 0.92 MG/DL (ref 0.7–1.3)
DIFFERENTIAL METHOD BLD: ABNORMAL
DRUG SCRN COMMENT,DRGCM: ABNORMAL
EOSINOPHIL # BLD: 0.1 K/UL (ref 0–0.4)
EOSINOPHIL NFR BLD: 1 % (ref 0–7)
ERYTHROCYTE [DISTWIDTH] IN BLOOD BY AUTOMATED COUNT: 14.7 % (ref 11.5–14.5)
ETHANOL SERPL-MCNC: <10 MG/DL
GLOBULIN SER CALC-MCNC: 4 G/DL (ref 2–4)
GLUCOSE SERPL-MCNC: 117 MG/DL (ref 65–100)
HCT VFR BLD AUTO: 47.6 % (ref 36.6–50.3)
HGB BLD-MCNC: 15.4 G/DL (ref 12.1–17)
IMM GRANULOCYTES # BLD AUTO: 0 K/UL (ref 0–0.04)
IMM GRANULOCYTES NFR BLD AUTO: 0 % (ref 0–0.5)
LYMPHOCYTES # BLD: 1.3 K/UL (ref 0.8–3.5)
LYMPHOCYTES NFR BLD: 19 % (ref 12–49)
MCH RBC QN AUTO: 30.6 PG (ref 26–34)
MCHC RBC AUTO-ENTMCNC: 32.4 G/DL (ref 30–36.5)
MCV RBC AUTO: 94.4 FL (ref 80–99)
METHADONE UR QL: NEGATIVE
MONOCYTES # BLD: 0.5 K/UL (ref 0–1)
MONOCYTES NFR BLD: 7 % (ref 5–13)
NEUTS SEG # BLD: 5 K/UL (ref 1.8–8)
NEUTS SEG NFR BLD: 72 % (ref 32–75)
NRBC # BLD: 0 K/UL (ref 0–0.01)
NRBC BLD-RTO: 0 PER 100 WBC
OPIATES UR QL: NEGATIVE
PCP UR QL: NEGATIVE
PLATELET # BLD AUTO: 203 K/UL (ref 150–400)
PMV BLD AUTO: 9 FL (ref 8.9–12.9)
POTASSIUM SERPL-SCNC: 4 MMOL/L (ref 3.5–5.1)
PROT SERPL-MCNC: 7.5 G/DL (ref 6.4–8.2)
RBC # BLD AUTO: 5.04 M/UL (ref 4.1–5.7)
SALICYLATES SERPL-MCNC: 1.9 MG/DL (ref 2.8–20)
SODIUM SERPL-SCNC: 140 MMOL/L (ref 136–145)
WBC # BLD AUTO: 7 K/UL (ref 4.1–11.1)

## 2020-01-30 PROCEDURE — 99283 EMERGENCY DEPT VISIT LOW MDM: CPT

## 2020-01-30 PROCEDURE — 80307 DRUG TEST PRSMV CHEM ANLYZR: CPT

## 2020-01-30 PROCEDURE — 65220000003 HC RM SEMIPRIVATE PSYCH

## 2020-01-30 PROCEDURE — 80053 COMPREHEN METABOLIC PANEL: CPT

## 2020-01-30 PROCEDURE — 74011250637 HC RX REV CODE- 250/637: Performed by: NURSE PRACTITIONER

## 2020-01-30 PROCEDURE — 85025 COMPLETE CBC W/AUTO DIFF WBC: CPT

## 2020-01-30 PROCEDURE — 36415 COLL VENOUS BLD VENIPUNCTURE: CPT

## 2020-01-30 PROCEDURE — 74011250637 HC RX REV CODE- 250/637: Performed by: EMERGENCY MEDICINE

## 2020-01-30 RX ORDER — HYDROXYZINE 25 MG/1
50 TABLET, FILM COATED ORAL
Status: DISCONTINUED | OUTPATIENT
Start: 2020-01-30 | End: 2020-02-10 | Stop reason: HOSPADM

## 2020-01-30 RX ORDER — DIAZEPAM 5 MG/1
5 TABLET ORAL
Status: COMPLETED | OUTPATIENT
Start: 2020-01-30 | End: 2020-01-30

## 2020-01-30 RX ORDER — DIPHENHYDRAMINE HYDROCHLORIDE 50 MG/ML
25 INJECTION, SOLUTION INTRAMUSCULAR; INTRAVENOUS
Status: DISCONTINUED | OUTPATIENT
Start: 2020-01-30 | End: 2020-02-10 | Stop reason: HOSPADM

## 2020-01-30 RX ORDER — BENZTROPINE MESYLATE 1 MG/1
0.5 TABLET ORAL
Status: DISCONTINUED | OUTPATIENT
Start: 2020-01-30 | End: 2020-02-10 | Stop reason: HOSPADM

## 2020-01-30 RX ORDER — GABAPENTIN 300 MG/1
300 CAPSULE ORAL 3 TIMES DAILY
Status: DISCONTINUED | OUTPATIENT
Start: 2020-01-30 | End: 2020-02-07

## 2020-01-30 RX ORDER — ISOSORBIDE MONONITRATE 30 MG/1
30 TABLET, EXTENDED RELEASE ORAL DAILY
Status: DISCONTINUED | OUTPATIENT
Start: 2020-01-31 | End: 2020-02-10 | Stop reason: HOSPADM

## 2020-01-30 RX ORDER — FLUTICASONE PROPIONATE 50 MCG
2 SPRAY, SUSPENSION (ML) NASAL DAILY
COMMUNITY
End: 2020-02-10

## 2020-01-30 RX ORDER — NITROGLYCERIN 0.4 MG/1
0.4 TABLET SUBLINGUAL AS NEEDED
Status: DISCONTINUED | OUTPATIENT
Start: 2020-01-30 | End: 2020-02-10 | Stop reason: HOSPADM

## 2020-01-30 RX ORDER — PANTOPRAZOLE SODIUM 40 MG/1
40 TABLET, DELAYED RELEASE ORAL DAILY
COMMUNITY
End: 2020-02-10

## 2020-01-30 RX ORDER — ADHESIVE BANDAGE
30 BANDAGE TOPICAL DAILY PRN
Status: DISCONTINUED | OUTPATIENT
Start: 2020-01-30 | End: 2020-02-10 | Stop reason: HOSPADM

## 2020-01-30 RX ORDER — PANTOPRAZOLE SODIUM 40 MG/1
40 TABLET, DELAYED RELEASE ORAL
Status: DISCONTINUED | OUTPATIENT
Start: 2020-01-31 | End: 2020-02-10 | Stop reason: HOSPADM

## 2020-01-30 RX ORDER — OLANZAPINE 2.5 MG/1
2.5 TABLET ORAL
Status: DISCONTINUED | OUTPATIENT
Start: 2020-01-30 | End: 2020-02-10 | Stop reason: HOSPADM

## 2020-01-30 RX ORDER — GABAPENTIN 300 MG/1
300 CAPSULE ORAL 3 TIMES DAILY
Status: DISCONTINUED | OUTPATIENT
Start: 2020-01-31 | End: 2020-01-30

## 2020-01-30 RX ORDER — FLUTICASONE PROPIONATE 50 MCG
1 SPRAY, SUSPENSION (ML) NASAL DAILY
Status: DISCONTINUED | OUTPATIENT
Start: 2020-01-31 | End: 2020-02-10 | Stop reason: HOSPADM

## 2020-01-30 RX ORDER — BUDESONIDE AND FORMOTEROL FUMARATE DIHYDRATE 160; 4.5 UG/1; UG/1
2 AEROSOL RESPIRATORY (INHALATION) 2 TIMES DAILY
Status: DISCONTINUED | OUTPATIENT
Start: 2020-01-30 | End: 2020-02-10 | Stop reason: HOSPADM

## 2020-01-30 RX ORDER — GABAPENTIN 300 MG/1
300 CAPSULE ORAL 3 TIMES DAILY
COMMUNITY
End: 2020-02-10

## 2020-01-30 RX ORDER — ATORVASTATIN CALCIUM 10 MG/1
20 TABLET, FILM COATED ORAL
Status: DISCONTINUED | OUTPATIENT
Start: 2020-01-30 | End: 2020-02-10 | Stop reason: HOSPADM

## 2020-01-30 RX ORDER — IBUPROFEN 200 MG
1 TABLET ORAL DAILY
Status: DISCONTINUED | OUTPATIENT
Start: 2020-01-31 | End: 2020-02-10 | Stop reason: HOSPADM

## 2020-01-30 RX ORDER — TRAZODONE HYDROCHLORIDE 50 MG/1
50 TABLET ORAL
Status: DISCONTINUED | OUTPATIENT
Start: 2020-01-30 | End: 2020-02-03

## 2020-01-30 RX ORDER — HALOPERIDOL 5 MG/ML
2.5 INJECTION INTRAMUSCULAR
Status: DISCONTINUED | OUTPATIENT
Start: 2020-01-30 | End: 2020-02-10 | Stop reason: HOSPADM

## 2020-01-30 RX ORDER — ACETAMINOPHEN 325 MG/1
650 TABLET ORAL
Status: DISCONTINUED | OUTPATIENT
Start: 2020-01-30 | End: 2020-02-10 | Stop reason: HOSPADM

## 2020-01-30 RX ADMIN — BUDESONIDE AND FORMOTEROL FUMARATE DIHYDRATE 2 PUFF: 160; 4.5 AEROSOL RESPIRATORY (INHALATION) at 21:04

## 2020-01-30 RX ADMIN — NITROGLYCERIN 0.4 MG: 0.4 TABLET, ORALLY DISINTEGRATING SUBLINGUAL at 19:50

## 2020-01-30 RX ADMIN — DIAZEPAM 5 MG: 5 TABLET ORAL at 10:47

## 2020-01-30 RX ADMIN — ATORVASTATIN CALCIUM 20 MG: 10 TABLET, FILM COATED ORAL at 21:05

## 2020-01-30 RX ADMIN — IPRATROPIUM BROMIDE AND ALBUTEROL 1 PUFF: 20; 100 SPRAY, METERED RESPIRATORY (INHALATION) at 21:05

## 2020-01-30 RX ADMIN — TRAZODONE HYDROCHLORIDE 50 MG: 50 TABLET ORAL at 21:06

## 2020-01-30 RX ADMIN — GABAPENTIN 300 MG: 300 CAPSULE ORAL at 20:38

## 2020-01-30 NOTE — ED PROVIDER NOTES
EMERGENCY DEPARTMENT HISTORY AND PHYSICAL EXAM      Date: 2020  Patient Name: Merline Shaver  Patient Age and Sex: 71 y.o. male    History of Presenting Illness     Chief Complaint   Patient presents with    Mental Health Problem     pt's girlfriend passed away last month and pt has been depressed with si       History Provided By: Patient    Ability to gather history was limited by:     HPI: Merline Shaver, 71 y.o. male complains of suicidal ideations, with vague plans of perhaps jumping off a bridge or overdosing on antifreeze. He states that both his girlfriend and a close friend  in the past couple weeks, he has been unable to sleep and has been grieving constantly. He has a somewhat questionable history of schizophrenia, is not currently managed with any medications. .  He denies any actual toxic ingestions or self injuries. Location:    Quality:      Severity:    Duration:   Timing:      Context:    Modifying factors:   Associated symptoms:     Pt denies any other alleviating or exacerbating factors. There are no other complaints, changes or physical findings at this time.      Past Medical History:   Diagnosis Date    Abdominal pain     Adverse effect of anesthesia     heart rate slows down    Asthma     Back pain     Cancer (HCC)     Throat cancer treated with chemo & radiation    Cancer [facility])     testicular cancer    Constipation     COPD     managed by PCP    Cyst of pharynx     Depression     Extrinsic asthma, unspecified     Fatigue     GERD (gastroesophageal reflux disease)     Insomnia, unspecified     On home O2     at bedtime    Other chest pain     since 2013    Pain in joint, forearm     Psychiatric disorder     schizophrenia    Smoker     1 ppw     Past Surgical History:   Procedure Laterality Date    HX HEENT      throat CA  - Dr. Martines President- r/t tumors    HX ORCHIECTOMY      tumors    HX ORTHOPAEDIC Left wrist surgery    HX ORTHOPAEDIC Left 2017    manipulation and injection shoulder    PLACE PERCUT GASTROSTOMY TUBE  2015         ND COLONOSCOPY FLX DX W/COLLJ SPEC WHEN PFRMD  2011            PCP: Peggy aLng MD    Past History     Past Medical History:  Past Medical History:   Diagnosis Date    Abdominal pain     Adverse effect of anesthesia     heart rate slows down    Asthma     Back pain     Cancer ([address]     Throat cancer treated with chemo & radiation    Cancer [facility])     testicular cancer    Constipation     COPD     managed by PCP    Cyst of pharynx     Depression     Extrinsic asthma, unspecified     Fatigue     GERD (gastroesophageal reflux disease)     Insomnia, unspecified     On home O2     at bedtime    Other chest pain     since 2013    Pain in joint, forearm     Psychiatric disorder     schizophrenia    Smoker     1 ppw       Past Surgical History:  Past Surgical History:   Procedure Laterality Date    HX HEENT      throat CA  - Dr. Mamie Valdez oncology    HX ORCHIECTOMY      Bilat- r/t tumors    HX ORCHIECTOMY      tumors    HX ORTHOPAEDIC Left     wrist surgery    HX ORTHOPAEDIC Left 2017    manipulation and injection shoulder    PLACE PERCUT GASTROSTOMY TUBE  2015         ND COLONOSCOPY FLX DX W/COLLJ SPEC WHEN PFRMD  2011            Family History:  Family History   Problem Relation Age of Onset    Cancer Mother     Cancer Father        Social History:  Social History     Tobacco Use    Smoking status: Current Every Day Smoker     Packs/day: 1.00     Years: 52.00     Pack years: 52.00     Last attempt to quit: 2010     Years since quittin.2    Smokeless tobacco: Never Used    Tobacco comment: Discussed importance of smoking cessation for healing. Substance Use Topics    Alcohol use: No    Drug use: Yes     Types: Prescription, OTC       Allergies:   Allergies   Allergen Reactions    Allergen [Antipyrine-Benzocaine] Unknown (comments)    Avelox [Moxifloxacin] Unknown (comments)    Codeine Sulfate Unknown (comments)    Pcn [Penicillins] Other (comments)     Coma       Current Medications:  No current facility-administered medications on file prior to encounter. Current Outpatient Medications on File Prior to Encounter   Medication Sig Dispense Refill    nitroglycerin (NITROSTAT) 0.4 mg SL tablet 1 Tab by SubLINGual route every five (5) minutes as needed for Chest Pain. 1 Bottle 12    isosorbide mononitrate ER (IMDUR) 30 mg tablet Take 1 Tab by mouth daily. 30 Tab 6    ibuprofen (MOTRIN) 800 mg tablet Take 800 mg by mouth every eight (8) hours as needed for Pain.  albuterol (PROAIR HFA) 90 mcg/actuation inhaler Take  by inhalation.  atorvastatin (LIPITOR) 20 mg tablet Take 1 Tab by mouth daily. 30 Tab 5    levothyroxine (SYNTHROID) 25 mcg tablet Take 1 Tab by mouth Daily (before breakfast). 30 Tab 5    OLANZapine (ZYPREXA) 20 mg tablet Take 1 Tab by mouth nightly. Indications: Schizophrenia 30 Tab 3    clonazePAM (KLONOPIN) 1 mg tablet Take 1 Tab by mouth nightly. Max Daily Amount: 1 mg. Indications: FORREST ASSOCIATED WITH BIPOLAR DISORDER, ADJUNCT 30 Tab 0    aspirin 81 mg chewable tablet Take 1 Tab by mouth daily. 30 Tab 1    albuterol (PROVENTIL VENTOLIN) 2.5 mg /3 mL (0.083 %) nebulizer solution 1.25 mg by Nebulization route every four (4) hours as needed. Review of Systems   Review of Systems   Psychiatric/Behavioral: Positive for dysphoric mood, sleep disturbance and suicidal ideas. All other systems reviewed and are negative. Physical Exam   Vital Signs  Patient Vitals for the past 24 hrs:   Temp Pulse Resp BP SpO2   01/30/20 0919 98.1 °F (36.7 °C) (!) 110 16 150/72 100 %       Physical Exam  Vitals signs and nursing note reviewed. Constitutional:       General: He is not in acute distress. Appearance: He is well-developed.    HENT:      Head: Normocephalic and atraumatic. Eyes:      General:         Right eye: No discharge. Left eye: No discharge. Conjunctiva/sclera: Conjunctivae normal.   Neck:      Musculoskeletal: Normal range of motion and neck supple. Cardiovascular:      Rate and Rhythm: Normal rate and regular rhythm. Heart sounds: Normal heart sounds. No murmur. Pulmonary:      Effort: Pulmonary effort is normal. No respiratory distress. Breath sounds: Normal breath sounds. No wheezing. Abdominal:      General: There is no distension. Palpations: Abdomen is soft. Tenderness: There is no abdominal tenderness. Musculoskeletal: Normal range of motion. General: No deformity. Skin:     General: Skin is warm and dry. Findings: No rash. Neurological:      Mental Status: He is alert and oriented to person, place, and time. Psychiatric:         Behavior: Behavior normal.         Thought Content: Thought content includes suicidal ideation. Cognition and Memory: Cognition normal.      Comments: Tearful. Diagnostic Study Results   Labs  Recent Results (from the past 24 hour(s))   CBC WITH AUTOMATED DIFF    Collection Time: 01/30/20 10:30 AM   Result Value Ref Range    WBC 7.0 4.1 - 11.1 K/uL    RBC 5.04 4.10 - 5.70 M/uL    HGB 15.4 12.1 - 17.0 g/dL    HCT 47.6 36.6 - 50.3 %    MCV 94.4 80.0 - 99.0 FL    MCH 30.6 26.0 - 34.0 PG    MCHC 32.4 30.0 - 36.5 g/dL    RDW 14.7 (H) 11.5 - 14.5 %    PLATELET 589 608 - 092 K/uL    MPV 9.0 8.9 - 12.9 FL    NRBC 0.0 0  WBC    ABSOLUTE NRBC 0.00 0.00 - 0.01 K/uL    NEUTROPHILS 72 32 - 75 %    LYMPHOCYTES 19 12 - 49 %    MONOCYTES 7 5 - 13 %    EOSINOPHILS 1 0 - 7 %    BASOPHILS 1 0 - 1 %    IMMATURE GRANULOCYTES 0 0.0 - 0.5 %    ABS. NEUTROPHILS 5.0 1.8 - 8.0 K/UL    ABS. LYMPHOCYTES 1.3 0.8 - 3.5 K/UL    ABS. MONOCYTES 0.5 0.0 - 1.0 K/UL    ABS. EOSINOPHILS 0.1 0.0 - 0.4 K/UL    ABS. BASOPHILS 0.1 0.0 - 0.1 K/UL    ABS. IMM.  GRANS. 0.0 0.00 - 0.04 K/UL    DF AUTOMATED     METABOLIC PANEL, COMPREHENSIVE    Collection Time: 01/30/20 10:30 AM   Result Value Ref Range    Sodium 140 136 - 145 mmol/L    Potassium 4.0 3.5 - 5.1 mmol/L    Chloride 106 97 - 108 mmol/L    CO2 31 21 - 32 mmol/L    Anion gap 3 (L) 5 - 15 mmol/L    Glucose 117 (H) 65 - 100 mg/dL    BUN 19 6 - 20 MG/DL    Creatinine 0.92 0.70 - 1.30 MG/DL    BUN/Creatinine ratio 21 (H) 12 - 20      GFR est AA >60 >60 ml/min/1.73m2    GFR est non-AA >60 >60 ml/min/1.73m2    Calcium 9.3 8.5 - 10.1 MG/DL    Bilirubin, total 0.6 0.2 - 1.0 MG/DL    ALT (SGPT) 23 12 - 78 U/L    AST (SGOT) 15 15 - 37 U/L    Alk.  phosphatase 61 45 - 117 U/L    Protein, total 7.5 6.4 - 8.2 g/dL    Albumin 3.5 3.5 - 5.0 g/dL    Globulin 4.0 2.0 - 4.0 g/dL    A-G Ratio 0.9 (L) 1.1 - 2.2     SALICYLATE    Collection Time: 01/30/20 10:30 AM   Result Value Ref Range    Salicylate level 1.9 (L) 2.8 - 20.0 MG/DL   ACETAMINOPHEN    Collection Time: 01/30/20 10:30 AM   Result Value Ref Range    Acetaminophen level <2 (L) 10 - 30 ug/mL   ETHYL ALCOHOL    Collection Time: 01/30/20 10:30 AM   Result Value Ref Range    ALCOHOL(ETHYL),SERUM <10 <10 MG/DL   DRUG SCREEN, URINE    Collection Time: 01/30/20 11:19 AM   Result Value Ref Range    AMPHETAMINES NEGATIVE  NEG      BARBITURATES NEGATIVE  NEG      BENZODIAZEPINES NEGATIVE  NEG      COCAINE NEGATIVE  NEG      METHADONE NEGATIVE  NEG      OPIATES NEGATIVE  NEG      PCP(PHENCYCLIDINE) NEGATIVE  NEG      THC (TH-CANNABINOL) POSITIVE (A) NEG      Drug screen comment (NOTE)        Radiologic Studies  No orders to display     CT Results  (Last 48 hours)    None        CXR Results  (Last 48 hours)    None          Procedures   Procedures    Medical Decision Making     Provider Notes (Medical Decision Making):   70-year-old male complaining of suicidal ideations with some specific plans, in the setting of grieving over the death of his girlfriend and another close friend this month.    No actual signs of any self injury. Patient is tearful but cooperative. His laboratories are all reassuring. He is medically cleared for transfer to psychiatric hospital.  He was seen by [facility], they suggest admission and anticipate admission to [facility] when a bed is available. Taisha Domínguez MD  2:19 PM          Consult required? Yes BSMART, suggest admission for SI      Medications Administered During ED Course:  Medications   diazePAM (VALIUM) tablet 5 mg (5 mg Oral Given 1/30/20 1047)          Diagnosis and Disposition     Disposition:      Clinical Impression:   1. Suicidal ideations    2. Grief reaction        Attestation:  I personally performed the services described in this documentation on this date 1/30/2020 for patient Jules Black. Taisha Domínguez MD        I was the first provider for this patient on this visit. To the best of my ability I reviewed relevant prior medical records, electrocardiograms, laboratories, and radiologic studies. The patient's presenting problems were discussed, and the patient was in agreement with the care plan formulated and outlined with them. Taisha Domínguez MD    Please note that this dictation was completed with Dragon voice recognition software. Quite often unanticipated grammatical, syntax, homophones, and other interpretive errors are inadvertently transcribed by the computer software. Please disregard these errors and excuse any errors that have escaped final proofreading.

## 2020-01-30 NOTE — GROUP NOTE
PATRIZIA  GROUP DOCUMENTATION INDIVIDUAL Group Therapy Note Date: 1/30/2020 Group Start Time: 1500 Group End Time: 7100 Group Topic: Recreational/Music Therapy [facility] [place] [address] ACUTE BEHAV [facility] [place], [address] GROUP DOCUMENTATION GROUP Group Therapy Note Attendees: 5 Attendance: Did not attend Patient's Goal: Interventions/techniques Mariusz Dyson

## 2020-01-30 NOTE — PROGRESS NOTES
Report obtained from [address] [place] [address]  [address] at [facility] on a volunteer admission. Principal diagnosis: SI/Depression  Patient admitted to room 321  Oriented to the unit  PMH:COPD/Emphysema/ Neck Spasms/GERD/Chest Pain/Hyperlipidemia   Patient is ambulatory  Admission completed by writer assisted by Charanjit Renteria RN for skin assessment. Tattoo on Rt. Lower FA  Rash noted on Lt. Buttock   Bruised area noted on Rt.  Buttock   Ht 5'9\" weight 140.9 lbs  Labs ordered per protocol  Belongings checked for contraband  Valuables given to security   Home medications noted and given to pharmacy

## 2020-01-30 NOTE — PROGRESS NOTES
Problem: Suicide  Goal: *STG: Remains safe in hospital  Outcome: Progressing Towards Goal  Goal: *STG: Seeks staff when feelings of self harm or harm towards others arise  Outcome: Progressing Towards Goal  Goal: *STG: Attends activities and groups  Outcome: Progressing Towards Goal  Goal: *STG:  Verbalizes alternative ways of dealing with maladaptive feelings/behaviors  Outcome: Progressing Towards Goal  Goal: *STG/LTG: Complies with medication therapy  Outcome: Progressing Towards Goal  Goal: *STG/LTG: No longer expresses self destructive or suicidal thoughts  Outcome: Progressing Towards Goal  Goal: *LTG:  Identifies available community resources  Outcome: Progressing Towards Goal  Goal: *LTG:  Develops proactive suicide prevention plan  Outcome: Progressing Towards Goal  Goal: Interventions  Outcome: Progressing Towards Goal     Problem: Patient Education: Go to Patient Education Activity  Goal: Patient/Family Education  Outcome: Progressing Towards Goal     Problem: Falls - Risk of  Goal: *Absence of Falls  Description  Document Mario Tadeo Fall Risk and appropriate interventions in the flowsheet.   Outcome: Progressing Towards Goal  Note: Fall Risk Interventions:     Problem: Patient Education: Go to Patient Education Activity  Goal: Patient/Family Education  Outcome: Progressing Towards Goal

## 2020-01-30 NOTE — BSMART NOTE
Comprehensive Assessment Form Part 1 Section I - Disposition Axis I - Major Depressive Disorder Axis II - Deferred Axis III - Asthma, COPD, GERD, Hx of cancer Axis IV - Grief/loss, Relationship stressors, Lack of structure [place] V - 35 The Medical Doctor to Psychiatrist conference was not completed. The Medical Doctor is in agreement with Psychiatrist disposition because of (reason) patient is willing to be admitted voluntarily. The plan is admit to [facility], 321 bed A. The on-call Psychiatrist consulted was Dr. Mau Elizalde. The admitting Psychiatrist will be Dr. Mau Elizalde. The admitting Diagnosis is Major Depression. The Payor source is Medicare and Medicaid. Section II - Integrated Summary Summary:  Patient is a 71year old male seen face to face in the ER. He came to the ER reporting suicidal ideation with a plan to drink antifreeze. He reported his girlfriend passed away earlier this month (2020) and then his best friend  in his apartment when he was asleep last Tuesday. He denied homicidal ideation and symptoms of psychosis. He reported he was diagnosed with schizophrenia \"years ago\" and took Zyprexa for some time, however his psychiatrist retired and he has not taken psychiatric medications for \"a while. \"  He reported he is not sleeping and his appetite is poor. He lives alone and has no children. He reported his main support system is a couple friends. He is willing to be admitted voluntarily. The patient has demonstrated mental capacity to provide informed consent. The information is given by the patient. The Chief Complaint is mental health problem. The Precipitant Factors are grief/loss, relationship stressors, lack of structure. Previous Hospitalizations: no The patient has not previously been in restraints. Current Psychiatrist and/or  is NA.  
 
Lethality Assessment: 
 
The potential for suicide is noted by the following: defined plan and ideation. The potential for homicide is not noted. The patient has not been a perpetrator of sexual or physical abuse. There are not pending charges. The patient is felt to be at risk for self harm or harm to others. The attending nurse was advised the patient needs supervision. Section III - Psychosocial 
The patient's overall mood and attitude is depressed. Feelings of helplessness and hopelessness are not observed. Generalized anxiety is not observed. Panic is not observed. Phobias are not observed. Obsessive compulsive tendencies are not observed. Section IV - Mental Status Exam 
The patient's appearance shows no evidence of impairment. The patient's behavior shows no evidence of impairment. The patient is oriented to time, place, person and situation. The patient's speech shows no evidence of impairment. The patient's mood is depressed. The range of affect is flat. The patient's thought content demonstrates no evidence of impairment. The thought process shows no evidence of impairment. The patient's perception shows no evidence of impairment. The patient's memory shows no evidence of impairment. The patient's appetite is decreased. The patient's sleep has evidence of insomnia. The patient's insight shows no evidence of impairment. The patient's judgement shows no evidence of impairment. Section V - Substance Abuse The patient is using substances. The patient is using cannabis by inhalation for greater than 10 years with last use on unknown. The patient has experienced the following withdrawal symptoms: N/A. Section VI - Living Arrangements The patient is single. The patient lives alone. The patient has no children. The patient does plan to return home upon discharge. The patient does not have legal issues pending. The patient's source of income comes from social security. Anglican and cultural practices have not been voiced at this time. The patient's greatest support comes from a friend and this person will not be involved with the treatment. The patient has not been in an event described as horrible or outside the realm of ordinary life experience either currently or in the past. 
The patient has not been a victim of sexual/physical abuse. Section VII - Other Areas of Clinical Concern The highest grade achieved is not assessed with the overall quality of school experience being described as unknown. The patient is currently disabled and speaks [place] as a primary language. The patient has no communication impairments affecting communication. The patient's preference for learning can be described as: can read and write adequately.   The patient's hearing is normal.  The patient's vision is normal. 
 
 
Oriana Macedo MA

## 2020-01-31 PROBLEM — Z63.4 BEREAVEMENT: Status: ACTIVE | Noted: 2020-01-31

## 2020-01-31 PROCEDURE — 74011250637 HC RX REV CODE- 250/637: Performed by: PSYCHIATRY & NEUROLOGY

## 2020-01-31 PROCEDURE — 65220000003 HC RM SEMIPRIVATE PSYCH

## 2020-01-31 PROCEDURE — 74011250637 HC RX REV CODE- 250/637: Performed by: STUDENT IN AN ORGANIZED HEALTH CARE EDUCATION/TRAINING PROGRAM

## 2020-01-31 PROCEDURE — 74011250637 HC RX REV CODE- 250/637: Performed by: NURSE PRACTITIONER

## 2020-01-31 RX ORDER — LEVOTHYROXINE SODIUM 25 UG/1
25 TABLET ORAL
Status: DISCONTINUED | OUTPATIENT
Start: 2020-02-01 | End: 2020-02-10 | Stop reason: HOSPADM

## 2020-01-31 RX ORDER — IBUPROFEN 400 MG/1
400 TABLET ORAL
COMMUNITY
End: 2020-02-10

## 2020-01-31 RX ORDER — ALBUTEROL SULFATE 90 UG/1
2 AEROSOL, METERED RESPIRATORY (INHALATION)
Status: DISCONTINUED | OUTPATIENT
Start: 2020-01-31 | End: 2020-02-10 | Stop reason: HOSPADM

## 2020-01-31 RX ORDER — MIRTAZAPINE 15 MG/1
30 TABLET, FILM COATED ORAL
Status: DISCONTINUED | OUTPATIENT
Start: 2020-01-31 | End: 2020-02-10 | Stop reason: HOSPADM

## 2020-01-31 RX ORDER — MIRTAZAPINE 15 MG/1
15 TABLET, FILM COATED ORAL
COMMUNITY
End: 2020-02-10

## 2020-01-31 RX ORDER — MIRTAZAPINE 15 MG/1
15 TABLET, FILM COATED ORAL
Status: DISCONTINUED | OUTPATIENT
Start: 2020-01-31 | End: 2020-01-31

## 2020-01-31 RX ADMIN — MIRTAZAPINE 30 MG: 15 TABLET, FILM COATED ORAL at 21:03

## 2020-01-31 RX ADMIN — ATORVASTATIN CALCIUM 20 MG: 10 TABLET, FILM COATED ORAL at 21:03

## 2020-01-31 RX ADMIN — PANTOPRAZOLE SODIUM 40 MG: 40 TABLET, DELAYED RELEASE ORAL at 08:28

## 2020-01-31 RX ADMIN — TIOTROPIUM BROMIDE INHALATION SPRAY 2 PUFF: 3.12 SPRAY, METERED RESPIRATORY (INHALATION) at 15:08

## 2020-01-31 RX ADMIN — MAGNESIUM HYDROXIDE 30 ML: 400 SUSPENSION ORAL at 12:26

## 2020-01-31 RX ADMIN — SODIUM PHOSPHATE, DIBASIC AND SODIUM PHOSPHATE, MONOBASIC 1 ENEMA: 7; 19 ENEMA RECTAL at 18:15

## 2020-01-31 RX ADMIN — GABAPENTIN 300 MG: 300 CAPSULE ORAL at 08:28

## 2020-01-31 RX ADMIN — BUDESONIDE AND FORMOTEROL FUMARATE DIHYDRATE 2 PUFF: 160; 4.5 AEROSOL RESPIRATORY (INHALATION) at 08:33

## 2020-01-31 RX ADMIN — ISOSORBIDE MONONITRATE 30 MG: 30 TABLET, EXTENDED RELEASE ORAL at 08:28

## 2020-01-31 RX ADMIN — FLUTICASONE PROPIONATE 1 SPRAY: 50 SPRAY, METERED NASAL at 08:34

## 2020-01-31 RX ADMIN — GABAPENTIN 300 MG: 300 CAPSULE ORAL at 11:30

## 2020-01-31 RX ADMIN — HYDROXYZINE HYDROCHLORIDE 50 MG: 25 TABLET, FILM COATED ORAL at 11:30

## 2020-01-31 RX ADMIN — HYDROXYZINE HYDROCHLORIDE 50 MG: 25 TABLET, FILM COATED ORAL at 04:10

## 2020-01-31 RX ADMIN — NITROGLYCERIN 0.4 MG: 0.4 TABLET, ORALLY DISINTEGRATING SUBLINGUAL at 18:23

## 2020-01-31 RX ADMIN — GABAPENTIN 300 MG: 300 CAPSULE ORAL at 16:40

## 2020-01-31 RX ADMIN — BUDESONIDE AND FORMOTEROL FUMARATE DIHYDRATE 2 PUFF: 160; 4.5 AEROSOL RESPIRATORY (INHALATION) at 16:42

## 2020-01-31 RX ADMIN — NITROGLYCERIN 0.4 MG: 0.4 TABLET, ORALLY DISINTEGRATING SUBLINGUAL at 04:11

## 2020-01-31 NOTE — GROUP NOTE
PATRIZIA  GROUP DOCUMENTATION INDIVIDUAL Group Therapy Note Date: 1/31/2020 Group Start Time: 1100 Group End Time: 2611 Group Topic: Discharge Planning [facility] Michel Mcdonnell [facility] GROUP DOCUMENTATION GROUP Group Therapy Note Attendees: 4 Attendance: Attended Patient's Goal:  Pt processed a \"road map\" of discharge, future plans, and pitfalls of recovery. Pt designed a \"recovery road\" of future plans and goals. Interventions/techniques: Art integration, Informed, Validated and Promoted peer support Follows Directions: Followed directions Interactions: Interacted appropriately Mental Status: Anxious and Depressed Behavior/appearance: Disheveled, Negative and Withdrawn/quiet Goals Achieved: Able to engage in interactions, Able to self-disclose and Discussed discharge plans Additional Notes:  Pt was highly anxious, and left group early. Pt disclosed his past struggles, and stated he has been \"crying all the time\" Candice Rob

## 2020-01-31 NOTE — INTERDISCIPLINARY ROUNDS
Patient goal(s) for today: Meet with treatment team; engage in unit activities Treatment team focus/goals: Complete psychosocial; start medications Progress note: Patient reports symptoms of depression associated with complicated grief. States numerous losses and feeling lonely and hopeless. LOS:  1  Expected LOS: TBD Financial concerns/prescription coverage: [place]; Reasult Medicaid Family contact: None Family requesting physician contact today: No 
Discharge plan: Return home Access to weapons: No 
Outpatient provider(s): To be linked Patient's preferred phone number for follow up call: [phone] Participating treatment team members: ISA Baca; Dr. Chaka Driver MD; Ryan Brennan, KristenD

## 2020-01-31 NOTE — BH NOTES
1930 Assumed care of patient from day shift nurse. 1945 Patient c/o mild chest pain. VSS. Patient requesting Nitrostat. Patient stating <I get this pain at least once a day>  1950 Nitrostat given. 1955 Medication effective. Relief after 5 minutes. Will continue to monitor patient. 2104 patient is med compliant. No c/o pain or discomfort. Patient denies SI/HI/AVH. Patient states <<I don't want to harm myself, I'm just so lonely and sad. Just feel depressed. I really miss my girlfriend>>  Patient is future focused and wants to receive outside services when he leaves the facility. No voiced concerns at this time. 2105 PRN trazodone requested and given. Will monitor for efficacy. 0330 Patient awake. Patient reports that this is usually the time he gets up. No voiced concerns. 0345 Patient sitting in the day room. 0410 Patient c/o mild chest pain. VSS. Nitrostat requested. 0411 Nitrostat given. Patient felt relief after 5 minutes. No other voiced concerns at this time. 0600 Patient slept for 5hrs this shift.

## 2020-01-31 NOTE — BH NOTES
Report received from ANA Xiong. Patient currently in [facility]. 0730  Patient tearful when spoken to, wanting to remove memories of the recent deaths of his fiance and best friend. Patient denies SI/HI at this time. Recommend patient attend groups today, verbalize feelings. 0830  Patient compliant with morning medications. 95 [facility] with Peg Dears regarding patient attending her upcoming coping group. 1133  Patient left group tearful after speaking with SW.  Patient given Atarax for anxiety. 1530  Patient attending coping group with Edwardo Lagunas, 203 SLila Fernández  Patient has remained pleasant and calm throughout entire shift. No issues or concerns. Remains tearful at times. 1815  Patient complained of chest pain, given Nitrostat. When reassessed in 5 minutes, patient stated the pain has subsided. 1820  Patient given Fleet enema, given by Tran Jones RN.      3608  Patient stated enema worked, patient had BM.

## 2020-01-31 NOTE — GROUP NOTE
[facility] GROUP DOCUMENTATION INDIVIDUAL Group Therapy Note Date: 1/31/2020 Group Start Time: 1500 Group End Time: 3622 Group Topic: Process Group - Inpatient [facility] Nevaeh Bustos [facility] GROUP DOCUMENTATION GROUP Group Therapy Note Attendees: 6 Attendance: Attended Patient's Goal:  Pt will attend process group to identify his emotions of grief and loss, as well as how to cope with his daily challenges. Pt will learn to identify ways to manage his emotions in a positive manner. Interventions/techniques: Art integration, Challenged, Promoted peer support and Provide feedback Follows Directions: Followed directions Interactions: Interacted appropriately Mental Status: Anxious and Depressed Behavior/appearance: Attentive and Disheveled Goals Achieved: Able to engage in interactions, Able to listen to others, Able to give feedback to another, Able to receive feedback and Able to self-disclose Additional Notes:  Pt was tearful often. Pt stated that he is frustrated because he can't get a job because he cannot read. Pt stated that he wants to get better. Pt offered feedback to peers, and was able to receive feedback and relate to peers in a positive manner. Brion Ahumada

## 2020-01-31 NOTE — H&P
[address] [facility] HISTORY AND PHYSICAL    Name:  Gordo Mandujano  MR#:  [id number]  :  1955  ACCOUNT #:  [de-identified]  ADMIT DATE:  2020    PSYCHIATRIC INTAKE NOTE    CHIEF COMPLAINT:  \"My fiancee  recently and then my friend  in my home just a few days ago, in my apartment, I don't know what to do with myself. \"    HISTORY OF PRESENT ILLNESS:  This is a 48-year-old  male with history of depression, prior treatments, who reports in the past month he had 2 major losses in his life, and he has no more friends, no more family left to support him. He is helpless, hopeless. He had thoughts of suicide, but no specific plan to kill himself during this interview, per the reports. He is thinking about drinking antifreeze, but he had multiple plans, none specific that he had betted on at this time. He knows that his girlfriend passed away of diabetes at [facility] earlier this month where she was supposedly brain dead, so he got to watch her suffer before they could pull the plug on her, and then eight days after, they pulled the plug, then she passed away, and just buried her and he broke down. His friend was comforting him in his home, and he went to bed one night, the next morning he woke up and his friend was dead in his apartment couch area. He called the police and they came in and got him, but he was already dead. He is very tearful and became distraught during the conversation, and had to take a break before we could continue. He says maybe 4 years ago, his mother  and that was the last person in his family that was living. He no longer has any supports or anyone in his community. He lives alone now, and he feels helpless, hopeless, lost, and so he was brought in to get help.     PAST PSYCHIATRIC HISTORY:  Prior hospitalizations for suicide attempt after his mother passed away back in , so he was started on treatment, no prior hospitalization, so clarification, just prior treatment, where he thought about suicide, but he did not do it. He was on medications temporarily, but not on any currently. He said just Seroquel and some other medication he could not recall. PAST MEDICAL HISTORY:  COPD, testicular cancer with bilateral orchiectomy, he is on testosterone treatments to replenish. He has also had throat cancer. He has had multiple radiation therapies, infertile, which makes him also sad that he is unable to have a kid and the one wife that he had  him because she wanted to have kids and he could not. ALLERGIES:  TO PENICILLIN, CODEINE, AVELOX. SOCIAL HISTORY:  . On disability. No children. Denies alcohol, drugs, or cigarettes. Had occasional marijuana in the past, but none recently. He lives alone. No supports as recently the last that he had passed away. MENTAL STATUS EXAMINATION:  Adult male. Calm and cooperative. Clear, coherent speech of average rate, volume, and tone, with a periodic appropriate tearfulness related to discussion and content of speech. Denies active suicidal or homicidal ideations, no auditory or visual hallucinations at this time. Aware of his surroundings, location, and situation. Here for management of his condition. DIAGNOSES:  Complicated bereavement, rule out major depressive disorder, recurrent, severe versus adjustment disorder, mixed features. PLAN:  Admit for safety and stabilization, medication modification as needed, group therapy, individual therapy. ESTIMATED LENGTH OF STAY:  5-7 days. DISPOSITION:  Planning with Social Work. STRENGTHS:  Willingness for treatment. Collateral information. DISABILITIES:  No support system. Recent losses. VIRIDIANA Gold MD      [id number]  D:  01/31/2020 12:41  T:  01/31/2020 18:09  JOB #:  [id number]

## 2020-01-31 NOTE — PROGRESS NOTES
[facility] Admission Pharmacy Medication Reconciliation     Recommendations/Findings:   1) Suspect patient is non-compliant with medications. Atorvastatin was last filled 19 for 30 day supply  No fill on file for Trelegy Ellipta, however, inhaler was present in belongings  Isosorbide mononitrate ER was last filled 2019. However, patient reports daily chest pain and should likely be taking this medication. Levothyroxine was last filled 19 for 30 day supply  Pantoprazole was last filled 10/11/19 for 90 day supply  2) Patient is a poor medication historian, reports he cannot read or write. 3) Updated and confirmed outpatient [address]    Total Time Spent: 20 minutes    Information obtained from: Patient interview, patient's medication bottles, [facility], [address]    Patient allergies: Allergies as of 2020 - Review Complete 2020   Allergen Reaction Noted    Allergen [antipyrine-benzocaine] Unknown (comments) 2013    Avelox [moxifloxacin] Unknown (comments) 2013    Codeine sulfate Unknown (comments) 2013    Pcn [penicillins] Other (comments) 2010       Prior to Admission Medications   Prescriptions Last Dose Informant Patient Reported? Taking?   atorvastatin (LIPITOR) 20 mg tablet 2020 at Unknown time  No Yes   Sig: Take 1 Tab by mouth daily. fluticasone propionate (FLONASE) 50 mcg/actuation nasal spray 2020 at Unknown time  Yes Yes   Si Sprays by Both Nostrils route daily. Indications: inflammation of the nose due to an allergy   fluticasone-umeclidinium-vilanterol (TRELEGY ELLIPTA) 100-62.5-25 mcg inhaler 2020 at Unknown time  Yes Yes   Sig: Take 1 Puff by inhalation two (2) times a day. Indications: prevention of bronchospasms with emphysema   gabapentin (NEURONTIN) 300 mg capsule 2020 at Unknown time  Yes Yes   Sig: Take 300 mg by mouth three (3) times daily.  Indications: neuropathic pain, neck pain   ibuprofen (MOTRIN) 400 mg tablet   Yes Yes   Sig: Take 400 mg by mouth every six (6) hours as needed for Pain.   ipratropium-albuteroL (COMBIVENT RESPIMAT)  mcg/actuation inhaler 2020 at Unknown time  Yes Yes   Sig: Take 1 Puff by inhalation every six (6) hours as needed. isosorbide mononitrate ER (IMDUR) 30 mg tablet 2020 at Unknown time  No Yes   Sig: Take 1 Tab by mouth daily. levothyroxine (SYNTHROID) 25 mcg tablet   No Yes   Sig: Take 1 Tab by mouth Daily (before breakfast). mirtazapine (REMERON) 15 mg tablet   Yes Yes   Sig: Take 15 mg by mouth nightly. Indications: major depressive disorder   nitroglycerin (NITROSTAT) 0.4 mg SL tablet 2020 at Unknown time  No Yes   Si Tab by SubLINGual route every five (5) minutes as needed for Chest Pain.   pantoprazole (PROTONIX) 40 mg tablet 2020 at Unknown time  Yes Yes   Sig: Take 40 mg by mouth daily.  Indications: gastroesophageal reflux disease      Facility-Administered Medications: None       Thank you,  Molly Murillo [place], [facility]  Desk: [phone] ([id number])  Pharmacy: [phone] ([id number])

## 2020-01-31 NOTE — BH NOTES
PSYCHOSOCIAL ASSESSMENT  :Patient identifying info:  Kathryn Muller is a 71 y.o., male admitted 2020  4:50 PM     Presenting problem and precipitating factors: Patient was transferred to [facility] from [facility] ED for SI with multiple plans, including plan to drink antifreeze. Pt reported that his girlfriend passed away earlier in the month, his friend passed away in his apartment last week, and his mother  in his arms last year. He endorses symptoms of complicated grief. He reports feelings of hopelessness, helplessness, and loneliness. He states, \"I have no one. \"  Pt reported poor sleep, as well. Mental status assessment: Alert, oriented, tearful, cooperative    Strengths: Seeks help    Collateral information: None    Current psychiatric /substance abuse providers and contact info: To be linked    Previous psychiatric/substance abuse providers and response to treatment: 1 ED visit for panic and anxiety    Family history of mental illness or substance abuse: None indicated    Substance abuse history:  None  Social History     Tobacco Use    Smoking status: Current Every Day Smoker     Packs/day: 1.00     Years: 52.00     Pack years: 52.00     Last attempt to quit: 2010     Years since quittin.2    Smokeless tobacco: Never Used    Tobacco comment: Discussed importance of smoking cessation for healing. Substance Use Topics    Alcohol use: No       History of biomedical complications associated with substance abuse: Denies    Patient's current acceptance of treatment or motivation for change: Fair    Family constellation: None    Is significant other involved? No, recent loss      Describe support system: \"No one\"    Describe living arrangements and home environment: Patient lives alone.     Health issues:   Hospital Problems  Date Reviewed: 3/7/2018          Codes Class Noted POA    Bereavement ICD-10-CM: Z63.4  ICD-9-CM: V62.82  2020 Unknown        Suicidal ideations ICD-10-CM: M10.309  ICD-9-CM: V62.84  2020 Unknown        * (Principal) [facility] ([facility] ICD-10-CM: F33.9  ICD-9-CM: 296.30  4/3/2018 Yes              Trauma history: Multiple losses (Patient reports his girlfriend passed away last week, his friend  in his apartment last night, and his mother passed away last year in his arms);  Pt reports a history of bullying as a child    Legal issues: None indicated    History of  service: No    Financial status: SSI    Yarsani/cultural factors: None indicated    Education/work history: Unemployed on disability; special ed classes while in school (never graduated)    Have you been licensed as a health care professional (current or ): No    Leisure and recreation preferences: \"None\"    Describe coping skills: Limited and poor judgement    [employer]  2020

## 2020-01-31 NOTE — GROUP NOTE
PATRIZIA  GROUP DOCUMENTATION INDIVIDUAL Group Therapy Note Date: 1/31/2020 Group Start Time: 1000 Group End Time: 1100 Group Topic: Topic Group [facility] 3 ACUTE BEHAV [facility], [address] GROUP DOCUMENTATION GROUP Group Therapy Note Attendees: 5 Attendance: Attended Patient's Goal:  To identify personal affirmations Interventions/techniques: Supported-worksheet Follows Directions: Followed directions Interactions: Interacted appropriately Mental Status: Calm and Flat Behavior/appearance: Attentive, Cooperative and Needed prompting Goals Achieved: Able to engage in interactions and Able to listen to others Additional Notes:  Pleasant -able to identify a personal affirmation from the reading out loud of handout-pt shared limitations with reading and writing Melvina Chow

## 2020-01-31 NOTE — H&P
Hospitalist Admission Note    NAME: Santos An   :  1955   MRN:  [id number]   Room Number: 132/09  @ [facility]     Date/Time:  2020 6:27 PM    Patient PCP: Freddie Moncada MD  ______________________________________________________________________  Given the patient's current clinical presentation, I have a high level of concern for decompensation if discharged from the emergency department. Complex decision making was performed, which includes reviewing the patient's available past medical records, laboratory results, and x-ray films. My assessment of this patient's clinical condition and my plan of care is as follows. Assessment / Plan:       Principal Problem:    Recurrent depression ([facility] [address] (4/3/2018)    Active Problems:    Suicidal ideations (2020)      Bereavement (2020)        Medical Clearance for psychiatric admission  Constipation   COPD  GERD  tobacco dependence   HLD  HTN     -Psychiatric treatment and management of health issues,Defer to psychiatrist for further management.  -Administer FLEET enema. Senna-docusate 2 tab QHS, Miralax PRN daily use 1st, bisacodyl suppository prn use 2nd.   - Continue home medications  -Medically stable at this time. We will follow up on a p.r.n. basis.  -No VTE prophylaxis indicated or warranted at this time. Subjective:   CHIEF COMPLAINT: depression     HISTORY OF PRESENT ILLNESS:     Kirti Weeks is a 71 y.o.  male with PMH of COPD, depression, GERD, tobacco dependence who presents to ED with c/o suicidal thoughts. Patient has prior history of depression and reports compliance of medications. He recently lost to loved ones last month and since then has been feeling helpless hopeless with suicidal ideation with no specific plan. He had thought about drinking antifreeze but did not go through with it.   His fiancée passed away from complications of diabetes this month after being comatose for few days which is a very traumatic event for him. The next day his friend was found dead in his home which has also traumatized him. Reports longstanding history of constipation and is on daily stool softeners. He has not had a bowel movement for the last 4 days and says \"I am hurting, I feel really backed up \". Patient denies any past medical history except as listed above. Denies fever,chills,chest pain, sob,abd pan,diarrhea,constipation,lighhadedness. No Hx DM  No current medical concerns at this time. Past Medical History:   Diagnosis Date    Abdominal pain     Adverse effect of anesthesia     heart rate slows down    Asthma     Back pain     Cancer (HCC)     Throat cancer treated with chemo & radiation    Cancer [facility])     testicular cancer    Constipation     COPD     managed by PCP    Cyst of pharynx     Depression     Emphysema of lung ([facility] [address]     managed by PCP    Extrinsic asthma, unspecified     Fatigue     GERD (gastroesophageal reflux disease)     Insomnia, unspecified     On home O2     at bedtime    Other chest pain     since 2013    Pain in joint, forearm     Psychiatric disorder     schizophrenia    Smoker     1 ppw        Past Surgical History:   Procedure Laterality Date    HX HEENT      throat CA  - Dr. Ankit Oneill- r/t tumors    HX ORCHIECTOMY      tumors    HX ORTHOPAEDIC Left     wrist surgery    HX ORTHOPAEDIC Left 2017    manipulation and injection shoulder    PLACE PERCUT GASTROSTOMY TUBE  2015         CO COLONOSCOPY FLX DX W/COLLJ SPEC WHEN PFRMD  2011            Social History     Tobacco Use    Smoking status: Current Every Day Smoker     Packs/day: 1.00     Years: 52.00     Pack years: 52.00     Last attempt to quit: 2010     Years since quittin.2    Smokeless tobacco: Never Used    Tobacco comment: Discussed importance of smoking cessation for healing. Substance Use Topics    Alcohol use: No        Family History   Problem Relation Age of Onset    Cancer Mother     Cancer Father      Allergies   Allergen Reactions    Allergen [Antipyrine-Benzocaine] Unknown (comments)    Avelox [Moxifloxacin] Unknown (comments)    Codeine Sulfate Nausea and Vomiting    Pcn [Penicillins] Other (comments)     Coma        Prior to Admission medications    Medication Sig Start Date End Date Taking? Authorizing Provider   mirtazapine (REMERON) 15 mg tablet Take 15 mg by mouth nightly. Indications: major depressive disorder   Yes Provider, Historical   ibuprofen (MOTRIN) 400 mg tablet Take 400 mg by mouth every six (6) hours as needed for Pain. Yes Provider, Historical   gabapentin (NEURONTIN) 300 mg capsule Take 300 mg by mouth three (3) times daily. Indications: neuropathic pain, neck pain   Yes Provider, Historical   pantoprazole (PROTONIX) 40 mg tablet Take 40 mg by mouth daily. Indications: gastroesophageal reflux disease   Yes Provider, Historical   fluticasone propionate (FLONASE) 50 mcg/actuation nasal spray 2 Sprays by Both Nostrils route daily. Indications: inflammation of the nose due to an allergy   Yes Provider, Historical   ipratropium-albuteroL (COMBIVENT RESPIMAT)  mcg/actuation inhaler Take 1 Puff by inhalation every six (6) hours as needed. Yes Provider, Historical   fluticasone-umeclidinium-vilanterol (TRELEGY ELLIPTA) 100-62.5-25 mcg inhaler Take 1 Puff by inhalation two (2) times a day. Indications: prevention of bronchospasms with emphysema   Yes Provider, Historical   nitroglycerin (NITROSTAT) 0.4 mg SL tablet 1 Tab by SubLINGual route every five (5) minutes as needed for Chest Pain. 2/15/19  Yes Walt Alvarez MD   isosorbide mononitrate ER (IMDUR) 30 mg tablet Take 1 Tab by mouth daily. 2/15/19  Yes Walt Alvarez MD   atorvastatin (LIPITOR) 20 mg tablet Take 1 Tab by mouth daily.  6/9/18  Yes Bhakti Bowser MD levothyroxine (SYNTHROID) 25 mcg tablet Take 1 Tab by mouth Daily (before breakfast). 6/9/18  Yes Jake Ross MD       REVIEW OF SYSTEMS:     Total of 12 systems reviewed as follows:         General:  Negative for fever, chills, sweats, generalized weakness, weight loss/gain,      loss of appetite   Eyes:    Negative forblurred vision, eye pain, loss of vision, double vision  ENT:    Negative for rhinorrhea, pharyngitis   Respiratory:   Negative for cough, sputum production, SOB, MENDEZ, wheezing, pleuritic pain   Cardiology:   Negative for chest pain, palpitations, orthopnea, PND, edema, syncope   Gastrointestinal:  + constipation. Negative for abdominal pain , N/V, diarrhea, dysphagia,  bleeding   Genitourinary:  Negative for frequency, urgency, dysuria, hematuria, incontinence   Muskuloskeletal :  Negative for arthralgia, myalgia, back pain  Hematology:  Negative for easy bruising, nose or gum bleeding, lymphadenopathy   Dermatological: Negative for rash, ulceration, pruritis, color change / jaundice  Endocrine:   Negative for hot flashes or polydipsia   Neurological:  Negative for headache, dizziness, confusion, focal weakness, paresthesia,     Speech difficulties, memory loss, gait difficulty  Psychological: + anxiety, depression, agitation    Objective:   VITALS:    Visit Vitals  /87   Pulse 91   Temp 97.6 °F (36.4 °C)   Resp 16   SpO2 100%       PHYSICAL EXAM:    General:    Alert, cooperative, no distress, appears stated age. HEENT: Atraumatic, anicteric sclerae, pink conjunctivae     No oral ulcers, mucosa moist, throat clear, dentition fair  Neck:  Supple, symmetrical,  no JVD, thyroid: non tender  Lungs:   Clear to auscultation bilaterally. No Wheezing or Rhonchi. No rales. Chest wall:  No tenderness  No Accessory muscle use. Heart:   Regular  rhythm,  No  murmur   No edema  Abdomen:   Soft, non-tender. Not distended. Bowel sounds normal  Extremities: No cyanosis.   No clubbing,      Skin turgor normal, Capillary refill normal, Radial dial pulse 2+  Skin:     Not pale. Not Jaundiced  No rashes   Psychiatric:   Mood: depressed  Affect: appropriate  Thoughts: logical  Speech: normal  Sensorium: No A/V hallucinations  Safety: no SI/HI    Neurologic: EOMs intact. No facial asymmetry. No aphasia or slurred speech. Symmetrical strength, Sensation grossly intact. Alert and oriented X 4.     ______________________________________________________________________    Care Plan discussed with:  Patient/Family and Nurse    Expected  Disposition: per primary attending   ________________________________________________________________________  TOTAL TIME:  20 Minutes    Critical Care Provided     Minutes non procedure based      Comments     Reviewed previous records   >50% of visit spent in counseling and coordination of care  Discussion with patient and/or family and questions answered       ________________________________________________________________________  Signed: Yissel Ku MD    Procedures: see electronic medical records for all procedures/Xrays and details which were not copied into this note but were reviewed prior to creation of Plan. LAB DATA REVIEWED:    No results found for this or any previous visit (from the past 24 hour(s)).

## 2020-01-31 NOTE — GROUP NOTE
PATRIZIA  GROUP DOCUMENTATION INDIVIDUAL Group Therapy Note Date: 1/31/2020 Group Start Time: 1400 Group End Time: 1500 Group Topic: Recreational/Music Therapy [address] ACUTE BEHAV [facility] [place], [address] [facility] GROUP DOCUMENTATION GROUP Group Therapy Note Attendees: 4 Attendance: Did not attend Patient's Goal: Interventions/techniques    Mary Raymond

## 2020-01-31 NOTE — PROGRESS NOTES
Problem: Suicide  Goal: *STG: Remains safe in hospital  Outcome: Progressing Towards Goal  Goal: *STG: Attends activities and groups  Outcome: Progressing Towards Goal  Goal: *STG/LTG: No longer expresses self destructive or suicidal thoughts  Outcome: Progressing Towards Goal     Problem: Suicide  Goal: *STG/LTG: Complies with medication therapy  Outcome: Resolved/Met

## 2020-02-01 ENCOUNTER — APPOINTMENT (OUTPATIENT)
Dept: GENERAL RADIOLOGY | Age: 65
DRG: 885 | End: 2020-02-01
Attending: STUDENT IN AN ORGANIZED HEALTH CARE EDUCATION/TRAINING PROGRAM
Payer: MEDICARE

## 2020-02-01 LAB
ANION GAP SERPL CALC-SCNC: 3 MMOL/L (ref 5–15)
BASOPHILS # BLD: 0 K/UL (ref 0–0.1)
BASOPHILS NFR BLD: 1 % (ref 0–1)
BUN SERPL-MCNC: 18 MG/DL (ref 6–20)
BUN/CREAT SERPL: 19 (ref 12–20)
CALCIUM SERPL-MCNC: 9.3 MG/DL (ref 8.5–10.1)
CHLORIDE SERPL-SCNC: 105 MMOL/L (ref 97–108)
CO2 SERPL-SCNC: 34 MMOL/L (ref 21–32)
CREAT SERPL-MCNC: 0.95 MG/DL (ref 0.7–1.3)
DIFFERENTIAL METHOD BLD: ABNORMAL
EOSINOPHIL # BLD: 0.1 K/UL (ref 0–0.4)
EOSINOPHIL NFR BLD: 1 % (ref 0–7)
ERYTHROCYTE [DISTWIDTH] IN BLOOD BY AUTOMATED COUNT: 14.6 % (ref 11.5–14.5)
GLUCOSE SERPL-MCNC: 91 MG/DL (ref 65–100)
HCT VFR BLD AUTO: 48.8 % (ref 36.6–50.3)
HGB BLD-MCNC: 15.7 G/DL (ref 12.1–17)
IMM GRANULOCYTES # BLD AUTO: 0 K/UL (ref 0–0.04)
IMM GRANULOCYTES NFR BLD AUTO: 0 % (ref 0–0.5)
LYMPHOCYTES # BLD: 1.5 K/UL (ref 0.8–3.5)
LYMPHOCYTES NFR BLD: 29 % (ref 12–49)
MCH RBC QN AUTO: 30.7 PG (ref 26–34)
MCHC RBC AUTO-ENTMCNC: 32.2 G/DL (ref 30–36.5)
MCV RBC AUTO: 95.3 FL (ref 80–99)
MONOCYTES # BLD: 0.4 K/UL (ref 0–1)
MONOCYTES NFR BLD: 8 % (ref 5–13)
NEUTS SEG # BLD: 3.1 K/UL (ref 1.8–8)
NEUTS SEG NFR BLD: 61 % (ref 32–75)
NRBC # BLD: 0 K/UL (ref 0–0.01)
NRBC BLD-RTO: 0 PER 100 WBC
PLATELET # BLD AUTO: 213 K/UL (ref 150–400)
PMV BLD AUTO: 9.2 FL (ref 8.9–12.9)
POTASSIUM SERPL-SCNC: 4.8 MMOL/L (ref 3.5–5.1)
RBC # BLD AUTO: 5.12 M/UL (ref 4.1–5.7)
SODIUM SERPL-SCNC: 142 MMOL/L (ref 136–145)
TROPONIN I SERPL-MCNC: <0.05 NG/ML
WBC # BLD AUTO: 5.2 K/UL (ref 4.1–11.1)

## 2020-02-01 PROCEDURE — 36415 COLL VENOUS BLD VENIPUNCTURE: CPT

## 2020-02-01 PROCEDURE — 80048 BASIC METABOLIC PNL TOTAL CA: CPT

## 2020-02-01 PROCEDURE — 85025 COMPLETE CBC W/AUTO DIFF WBC: CPT

## 2020-02-01 PROCEDURE — 84484 ASSAY OF TROPONIN QUANT: CPT

## 2020-02-01 PROCEDURE — 65220000003 HC RM SEMIPRIVATE PSYCH

## 2020-02-01 PROCEDURE — 93005 ELECTROCARDIOGRAM TRACING: CPT

## 2020-02-01 PROCEDURE — 74011250637 HC RX REV CODE- 250/637: Performed by: NURSE PRACTITIONER

## 2020-02-01 PROCEDURE — 74011250637 HC RX REV CODE- 250/637: Performed by: PSYCHIATRY & NEUROLOGY

## 2020-02-01 PROCEDURE — 71045 X-RAY EXAM CHEST 1 VIEW: CPT

## 2020-02-01 RX ADMIN — FLUTICASONE PROPIONATE 1 SPRAY: 50 SPRAY, METERED NASAL at 08:36

## 2020-02-01 RX ADMIN — NITROGLYCERIN 0.4 MG: 0.4 TABLET, ORALLY DISINTEGRATING SUBLINGUAL at 11:24

## 2020-02-01 RX ADMIN — PANTOPRAZOLE SODIUM 40 MG: 40 TABLET, DELAYED RELEASE ORAL at 08:16

## 2020-02-01 RX ADMIN — MIRTAZAPINE 30 MG: 15 TABLET, FILM COATED ORAL at 21:35

## 2020-02-01 RX ADMIN — TIOTROPIUM BROMIDE INHALATION SPRAY 2 PUFF: 3.12 SPRAY, METERED RESPIRATORY (INHALATION) at 11:09

## 2020-02-01 RX ADMIN — HYDROXYZINE HYDROCHLORIDE 50 MG: 25 TABLET, FILM COATED ORAL at 21:35

## 2020-02-01 RX ADMIN — GABAPENTIN 300 MG: 300 CAPSULE ORAL at 08:16

## 2020-02-01 RX ADMIN — ATORVASTATIN CALCIUM 20 MG: 10 TABLET, FILM COATED ORAL at 21:35

## 2020-02-01 RX ADMIN — GABAPENTIN 300 MG: 300 CAPSULE ORAL at 16:41

## 2020-02-01 RX ADMIN — BUDESONIDE AND FORMOTEROL FUMARATE DIHYDRATE 2 PUFF: 160; 4.5 AEROSOL RESPIRATORY (INHALATION) at 08:36

## 2020-02-01 RX ADMIN — ACETAMINOPHEN 650 MG: 325 TABLET ORAL at 21:51

## 2020-02-01 RX ADMIN — TRAZODONE HYDROCHLORIDE 50 MG: 50 TABLET ORAL at 21:35

## 2020-02-01 RX ADMIN — ISOSORBIDE MONONITRATE 30 MG: 30 TABLET, EXTENDED RELEASE ORAL at 08:17

## 2020-02-01 RX ADMIN — LEVOTHYROXINE SODIUM 25 MCG: 25 TABLET ORAL at 08:16

## 2020-02-01 RX ADMIN — GABAPENTIN 300 MG: 300 CAPSULE ORAL at 11:10

## 2020-02-01 NOTE — PROGRESS NOTES
Problem: Suicide  Goal: *STG: Remains safe in hospital  Outcome: Not Progressing Towards Goal     Problem: Suicide  Goal: *STG: Seeks staff when feelings of self harm or harm towards others arise  Outcome: Progressing Towards Goal     Problem: Suicide  Goal: *STG: Attends activities and groups  Outcome: Progressing Towards Goal     Problem: Suicide  Goal: *STG:  Verbalizes alternative ways of dealing with maladaptive feelings/behaviors  Outcome: Progressing Towards Goal     Problem: Suicide  Goal: *STG/LTG: No longer expresses self destructive or suicidal thoughts  Outcome: Progressing Towards Goal  1935- Report was given by Sherry Blackburn. I assume care of the patient. The day shift nurse stated that she gave Nitro earlier for the patient because of chest pain. Patient is in the day room talking to another peer. I asked him if he's experiencing any pain and the patient patient stated \"NO'. Patient denies chest pain at this time. I will continue to monitor the patient. Patient stated that when he leaves the hospital then he will never smoke again. He stated that smoking makes him SOB. Patient denies SI/HI/AH/VH. Patient is discharge focused. 2035- Patient is in the day room talking and snacks. 2100- Patient complies with medication. Patient still denies chest pain at this time. 2200- Patient is in the day room coloring. 2300- Patient is now resting in bed.    0100- Patient is resting in bed.    0200- 0400- Patient is resting in bed.    0600- Patient slept approx 6 hrs.

## 2020-02-01 NOTE — PROGRESS NOTES
Hospitalist   Rapid Response Note    NAME: Jaja Fuentes   :  1955   MRN:  [id number]   Room Number:  949/12  @ [facility]       Interim Hospital Summary: 71 y.o. male whom presented on 2020 with depression w/ suicidal ideation ,treated for constipation yesterday. Rapid response called for chest pain. Assessment / Plan:  Chest pain, likely non cardiac in origin  Atypical chest pain, outpatient cardiology negative. Pain is likely related to anxiety. - EKG, Troponin, CXR to complete work up. - Would try Hydroxyzine/Lorazepam next time instead of nitroglycerin to see response. - Does not need rapid response called each time for chest pain unless there are signs of hemodynamic compromise, symptoms different from prior episodes. Subjective:     Rapid response called at approximately 11:25 AM for chest pain. Patient is laying down in bed. He has had intermittent transient episodes of stabbing chest pain on precordium, non radiating, resolving with nitroglycerin for the past several years with no association with exertion. He has had outpatient cardiology work up with treadmill stress test which has been negative. He has been taking nitroglycerin for intermittent chest pain since then. This morning he had some shortness of breath and coughing d/t underlying COPD but does not feel like he has muscle spasm. Pain started a few minutes before RRT called, 9/10 similar in nature to previous episodes, currently at a 6/10 and decreasing in intensity. He is not diaphoretic. Vital signs within normal limits. He has had such episodes multiple times yesterday. Discussed with RN events overnight. Review of Systems:  No fevers, chills, appetite change, cough, sputum production, shortness of breath, dyspnea on exertion, nausea, vomitting, diarrhea, constipation, leg edema, abdominal pain, joint pain, rash, itching. Tolerating diet.        Objective:     VITALS:   Last 24hrs VS reviewed since prior progress note. Most recent are:  Patient Vitals for the past 24 hrs:   Temp Pulse Resp BP SpO2   02/01/20 0758 98.1 °F (36.7 °C) 88 16 120/83 100 %   01/31/20 2142 98.1 °F (36.7 °C) 92 16 120/78 100 %     No intake or output data in the 24 hours ending 02/01/20 1141     PHYSICAL EXAM:  General: WD, WN. Alert, cooperative, no acute distress    EENT:  EOMI. Anicteric sclerae. MMM  Resp:  CTA bilaterally, no wheezing or rales. No accessory muscle use  CV:  Regular  rhythm,  normal S1/S2, no murmurs rubs gallops, tenderness to palpation on ribs 4-6 in mid clavicular line, No edema  GI:  Soft, Non distended, Non tender. +Bowel sounds  Neurologic:  Alert and oriented X 3, normal speech,   Psych:   Good insight. Not anxious nor agitated  Skin:  No rashes. No jaundice    Reviewed most current lab test results and cultures  YES  Reviewed most current radiology test results   YES  Review and summation of old records today    NO  Reviewed patient's current orders and MAR    YES  PMH/ reviewed - no change compared to H&P  ________________________________________________________________________  Care Plan discussed with:    Comments   Patient x    Family      RN x    Care Manager x    Consultant                        Multidiciplinary team rounds were held today with , nursing, pharmacist and clinical coordinator. Patient's plan of care was discussed; medications were reviewed and discharge planning was addressed.      ________________________________________________________________________  Total NON critical care TIME:   20  Minutes    Total CRITICAL CARE TIME Spent:   Minutes non procedure based      Comments   >50% of visit spent in counseling and coordination of care     ________________________________________________________________________  Bhanu Sinha MD     Procedures: see electronic medical records for all procedures/Xrays and details which were not copied into this note but were reviewed prior to creation of Plan. LABS:  I reviewed today's most current labs and imaging studies.   Pertinent labs include:  Recent Labs     01/30/20  1030   WBC 7.0   HGB 15.4   HCT 47.6        Recent Labs     01/30/20  1030      K 4.0      CO2 31   *   BUN 19   CREA 0.92   CA 9.3   ALB 3.5   TBILI 0.6   SGOT 15   ALT 23       Signed: Trish Flynn MD

## 2020-02-01 NOTE — BH NOTES
PSYCHIATRIC PROGRESS NOTE         Patient Name  Chinmay Gunter   Date of Birth 1955   [facility] [id number]   Medical Record Number  [id number]      Age  71 y.o. PCP Sarah Head MD   Admit date:  2020    Room Number  321/01  @ [facility]   Date of Service  2020         E & M PROGRESS NOTE:         HISTORY       CC:  \"suicidal ideation\"  HISTORY OF PRESENT ILLNESS/INTERVAL HISTORY:  (reviewed/updated 2020). per initial evaluation: This is a 69-year-old  male with history of depression, prior treatments, who reports in the past month he had 2 major losses in his life, and he has no more friends, no more family left to support him. He is helpless, hopeless. He had thoughts of suicide, but no specific plan to kill himself during this interview, per the reports. He is thinking about drinking antifreeze, but he had multiple plans, none specific that he had betted on at this time. He knows that his girlfriend passed away of diabetes at [facility] earlier this month where she was supposedly brain dead, so he got to watch her suffer before they could pull the plug on her, and then eight days after, they pulled the plug, then she passed away, and just buried her and he broke down. His friend was comforting him in his home, and he went to bed one night, the next morning he woke up and his friend was dead in his apartment couch area. He called the police and they came in and got him, but he was already dead. He is very tearful and became distraught during the conversation, and had to take a break before we could continue. He says maybe 4 years ago, his mother  and that was the last person in his family that was living. He no longer has any supports or anyone in his community. He lives alone now, and he feels helpless, hopeless, lost, and so he was brought in to get help.     - patient complained of anxiety and received Atarax. He slept 6 hours overnight.  Patient c/o chest pain, sharp and stabbing was given nitroglycerin with resolution. He denied SI/HI/AVH. This morning, the patient was visible, anxious, and spontaneously c/o chest pain, which he described as a sharp, substernal pain. The patient denied any pressure or radiation. EKG unremarkable. Troponin I negative. SIDE EFFECTS: (reviewed/updated 2/1/2020)  None reported or admitted to. ALLERGIES:(reviewed/updated 2/1/2020)  Allergies   Allergen Reactions    Allergen [Antipyrine-Benzocaine] Unknown (comments)    Avelox [Moxifloxacin] Unknown (comments)    Codeine Sulfate Nausea and Vomiting    Pcn [Penicillins] Other (comments)     Coma      MEDICATIONS PRIOR TO ADMISSION:(reviewed/updated 2/1/2020)  Medications Prior to Admission   Medication Sig    mirtazapine (REMERON) 15 mg tablet Take 15 mg by mouth nightly. Indications: major depressive disorder    ibuprofen (MOTRIN) 400 mg tablet Take 400 mg by mouth every six (6) hours as needed for Pain.  gabapentin (NEURONTIN) 300 mg capsule Take 300 mg by mouth three (3) times daily. Indications: neuropathic pain, neck pain    pantoprazole (PROTONIX) 40 mg tablet Take 40 mg by mouth daily. Indications: gastroesophageal reflux disease    fluticasone propionate (FLONASE) 50 mcg/actuation nasal spray 2 Sprays by Both Nostrils route daily. Indications: inflammation of the nose due to an allergy    ipratropium-albuteroL (COMBIVENT RESPIMAT)  mcg/actuation inhaler Take 1 Puff by inhalation every six (6) hours as needed.  fluticasone-umeclidinium-vilanterol (TRELEGY ELLIPTA) 100-62.5-25 mcg inhaler Take 1 Puff by inhalation two (2) times a day. Indications: prevention of bronchospasms with emphysema    nitroglycerin (NITROSTAT) 0.4 mg SL tablet 1 Tab by SubLINGual route every five (5) minutes as needed for Chest Pain.  isosorbide mononitrate ER (IMDUR) 30 mg tablet Take 1 Tab by mouth daily.     atorvastatin (LIPITOR) 20 mg tablet Take 1 Tab by mouth daily.    levothyroxine (SYNTHROID) 25 mcg tablet Take 1 Tab by mouth Daily (before breakfast). PAST MEDICAL HISTORY: Past medical history from the initial psychiatric evaluation has been reviewed (reviewed/updated 2/1/2020) with no additional updates (I asked patient and no additional past medical history provided). Past Medical History:   Diagnosis Date    Abdominal pain     Adverse effect of anesthesia     heart rate slows down    Asthma     Back pain     Cancer (HCC) 2015    Throat cancer treated with chemo & radiation    Cancer [facility])     testicular cancer    Constipation     COPD     managed by PCP    Cyst of pharynx     Depression     Emphysema of lung ([facility] [address]     managed by PCP    Extrinsic asthma, unspecified     Fatigue     GERD (gastroesophageal reflux disease)     Insomnia, unspecified     On home O2     at bedtime    Other chest pain     since 2-    Pain in joint, forearm     Psychiatric disorder     schizophrenia    Smoker     1 ppw     Past Surgical History:   Procedure Laterality Date    HX HEENT      throat CA  - Dr. Martinez Cantu oncology    HX ORCHIECTOMY      Bilat- r/t tumors    HX ORCHIECTOMY      tumors    HX ORTHOPAEDIC Left     wrist surgery    HX ORTHOPAEDIC Left 07/14/2017    manipulation and injection shoulder    PLACE PERCUT GASTROSTOMY TUBE  9/21/2015         AL COLONOSCOPY FLX DX W/COLLJ SPEC WHEN PFRMD  8/29/2011           SOCIAL HISTORY: Social history from the initial psychiatric evaluation has been reviewed (reviewed/updated 2/1/2020) with no additional updates (I asked patient and no additional social history provided).  Social History     Socioeconomic History    Marital status: SINGLE     Spouse name: Not on file    Number of children: Not on file    Years of education: Not on file    Highest education level: Not on file   Occupational History    Not on file   Social Needs    Financial resource strain: Not on file    Food insecurity: Worry: Not on file     Inability: Not on file    Transportation needs:     Medical: No     Non-medical: No   Tobacco Use    Smoking status: Current Every Day Smoker     Packs/day: 1.00     Years: 52.00     Pack years: 52.00     Last attempt to quit: 2010     Years since quittin.2    Smokeless tobacco: Never Used    Tobacco comment: Discussed importance of smoking cessation for healing. Substance and Sexual Activity    Alcohol use: No    Drug use: Yes     Types: Prescription, OTC    Sexual activity: Not Currently   Lifestyle    Physical activity:     Days per week: Not on file     Minutes per session: Not on file    Stress: Not on file   Relationships    Social connections:     Talks on phone: Not on file     Gets together: Not on file     Attends Restorationist service: Not on file     Active member of club or organization: Not on file     Attends meetings of clubs or organizations: Not on file     Relationship status: Not on file    Intimate partner violence:     Fear of current or ex partner: Not on file     Emotionally abused: Not on file     Physically abused: Not on file     Forced sexual activity: Not on file   Other Topics Concern     Service Not Asked    Blood Transfusions Not Asked    Caffeine Concern Not Asked    Occupational Exposure Not Asked   Ilsa Lorton Hazards Not Asked    Sleep Concern Not Asked    Stress Concern Not Asked    Weight Concern Not Asked    Special Diet Not Asked    Back Care Not Asked    Exercise Not Asked    Bike Helmet Not Asked    Seat Belt Not Asked    Self-Exams Not Asked   Social History Narrative    Not on file      FAMILY HISTORY: Family history from the initial psychiatric evaluation has been reviewed (reviewed/updated 2020) with no additional updates (I asked patient and no additional family history provided).  Family History   Problem Relation Age of Onset    Cancer Mother     Cancer Father        REVIEW OF SYSTEMS: (reviewed/updated 2/1/2020)  Appetite:no change from normal   Sleep: poor with DIMS (difficulty initiating & maintaining sleep)   All other Review of Systems: Negative except per HPI         [facility] (MSE):    MSE FINDINGS ARE WITHIN NORMAL LIMITS (WNL) UNLESS OTHERWISE STATED BELOW. ( ALL OF THE BELOW CATEGORIES OF THE MSE HAVE BEEN REVIEWED (reviewed 2/1/2020) AND UPDATED AS DEEMED APPROPRIATE )  General Presentation age appropriate, cooperative   Orientation oriented to time, place and person   Vital Signs  See below (reviewed 2/1/2020); Vital Signs (BP, Pulse, & Temp) are within normal limits if not listed below.    Gait and Station Stable/steady, no ataxia   Musculoskeletal System No extrapyramidal symptoms (EPS); no abnormal muscular movements or Tardive Dyskinesia (TD); muscle strength and tone are within normal limits   Language No aphasia or dysarthria   Speech:  normal volume and non-pressured   Thought Processes logical; normal rate of thoughts; good abstract reasoning/computation   Thought Associations goal directed   Thought Content internally preoccupied   Suicidal Ideations contracts for safety   Homicidal Ideations none   Mood:  depressed and anhedonia   Affect:  constricted and mood-congruent   Memory recent  intact   Memory remote:  intact   Concentration/Attention:  intact   Fund of Knowledge average   Insight:  limited   Reliability fair   Judgment:  limited          VITALS:     Patient Vitals for the past 24 hrs:   Temp Pulse Resp BP SpO2   02/01/20 0758 98.1 °F (36.7 °C) 88 16 120/83 100 %   01/31/20 2142 98.1 °F (36.7 °C) 92 16 120/78 100 %     Wt Readings from Last 3 Encounters:   01/30/20 64.6 kg (142 lb 6.7 oz)   10/23/19 64.5 kg (142 lb 3 oz)   02/15/19 69.8 kg (153 lb 12.8 oz)     Temp Readings from Last 3 Encounters:   02/01/20 98.1 °F (36.7 °C)   01/30/20 98.1 °F (36.7 °C)   10/23/19 97.6 °F (36.4 °C)     BP Readings from Last 3 Encounters:   02/01/20 120/83 01/30/20 150/72   10/23/19 129/77     Pulse Readings from Last 3 Encounters:   02/01/20 88   01/30/20 (!) 110   10/23/19 74            DATA     LABORATORY DATA:(reviewed/updated 2/1/2020)  No results found for this or any previous visit (from the past 24 hour(s)). No results found for: VALF2, VALAC, VALP, VALPR, DS6, CRBAM, CRBAMP, CARB2, XCRBAM  No results found for: LITHM   RADIOLOGY REPORTS:(reviewed/updated 2/1/2020)  No results found.        MEDICATIONS     ALL MEDICATIONS:   Current Facility-Administered Medications   Medication Dose Route Frequency    albuterol (PROVENTIL HFA, VENTOLIN HFA, PROAIR HFA) inhaler 2 Puff  2 Puff Inhalation Q4H PRN    levothyroxine (SYNTHROID) tablet 25 mcg  25 mcg Oral ACB    mirtazapine (REMERON) tablet 30 mg  30 mg Oral QHS    OLANZapine (ZyPREXA) tablet 2.5 mg  2.5 mg Oral Q6H PRN    haloperidol lactate (HALDOL) injection 2.5 mg  2.5 mg IntraMUSCular Q6H PRN    benztropine (COGENTIN) tablet 0.5 mg  0.5 mg Oral BID PRN    diphenhydrAMINE (BENADRYL) injection 25 mg  25 mg IntraMUSCular BID PRN    acetaminophen (TYLENOL) tablet 650 mg  650 mg Oral Q4H PRN    magnesium hydroxide (MILK OF MAGNESIA) 400 mg/5 mL oral suspension 30 mL  30 mL Oral DAILY PRN    nicotine (NICODERM CQ) 21 mg/24 hr patch 1 Patch  1 Patch TransDERmal DAILY    isosorbide mononitrate ER (IMDUR) tablet 30 mg  30 mg Oral DAILY    atorvastatin (LIPITOR) tablet 20 mg  20 mg Oral QHS    pantoprazole (PROTONIX) tablet 40 mg  40 mg Oral DAILY AFTER BREAKFAST    nitroglycerin (NITROSTAT) tablet 0.4 mg  0.4 mg SubLINGual PRN    fluticasone propionate (FLONASE) 50 mcg/actuation nasal spray 1 Spray  1 Spray Both Nostrils DAILY    budesonide-formoteroL (SYMBICORT) 160-4.5 mcg/actuation HFA inhaler 2 Puff  2 Puff Inhalation BID    tiotropium bromide (SPIRIVA RESPIMAT) 2.5 mcg /actuation  2 Puff Inhalation DAILY    gabapentin (NEURONTIN) capsule 300 mg  300 mg Oral TID    hydroxyzine HCL (ATARAX) tablet 50 mg  50 mg Oral TID PRN    traZODone (DESYREL) tablet 50 mg  50 mg Oral QHS PRN      SCHEDULED MEDICATIONS:   Current Facility-Administered Medications   Medication Dose Route Frequency    levothyroxine (SYNTHROID) tablet 25 mcg  25 mcg Oral ACB    mirtazapine (REMERON) tablet 30 mg  30 mg Oral QHS    nicotine (NICODERM CQ) 21 mg/24 hr patch 1 Patch  1 Patch TransDERmal DAILY    isosorbide mononitrate ER (IMDUR) tablet 30 mg  30 mg Oral DAILY    atorvastatin (LIPITOR) tablet 20 mg  20 mg Oral QHS    pantoprazole (PROTONIX) tablet 40 mg  40 mg Oral DAILY AFTER BREAKFAST    fluticasone propionate (FLONASE) 50 mcg/actuation nasal spray 1 Spray  1 Spray Both Nostrils DAILY    budesonide-formoteroL (SYMBICORT) 160-4.5 mcg/actuation HFA inhaler 2 Puff  2 Puff Inhalation BID    tiotropium bromide (SPIRIVA RESPIMAT) 2.5 mcg /actuation  2 Puff Inhalation DAILY    gabapentin (NEURONTIN) capsule 300 mg  300 mg Oral TID          ASSESSMENT & PLAN     DIAGNOSES REQUIRING ACTIVE TREATMENT AND MONITORING: (reviewed/updated 2/1/2020)  Patient Active Hospital Problem List:  Major depressive disorder    Assessment: patient with ongoing somatic symptoms, is taking nitroglycerin for what appears to be pleuritic pain vs stable angina. Will continue current course of treatment, limit RRTs as patient's symptoms do not appear to be consistent with an MI or dissection; managed with PRN medication without any issue thus far. Suspect somatic anxiety. Plan:  - CONTINUE REMERON 30 mg QHS for MDD  - IGM therapy as tolerated  - Dispo planning    In summary, Oscar Herman, is a 71 y.o.  male who presents with a severe exacerbation of the principal diagnosis of Recurrent depression ([facility] [address]    Patient's condition is not improving. Patient requires continued inpatient hospitalization for further stabilization, safety monitoring and medication management.   I will continue to coordinate the provision of individual, milieu, occupational, group, and substance abuse therapies to address target symptoms/diagnoses as deemed appropriate for the individual patient. A coordinated, multidisplinary treatment team round was conducted with the patient (this team consists of the nurse, psychiatric unit pharmcist,  and writer). Complete current electronic health record for patient has been reviewed today including consultant notes, ancillary staff notes, nurses and psychiatric tech notes. Suicide risk assessment completed and patient deemed to be of low risk for suicide at this time. The following regarding medications was addressed during rounds with patient:   the risks and benefits of the proposed medication. The patient was given the opportunity to ask questions. Informed consent given to the use of the above medications. Will continue to adjust psychiatric and non-psychiatric medications (see above \"medication\" section and orders section for details) as deemed appropriate & based upon diagnoses and response to treatment. I will continue to order blood tests/labs and diagnostic tests as deemed appropriate and review results as they become available (see orders for details and above listed lab/test results). I will order psychiatric records from previous [facility] hospitals to further elucidate the nature of patient's psychopathology and review once available. I will gather additional collateral information from friends, family and o/p treatment team to further elucidate the nature of patient's psychopathology and baselline level of psychiatric functioning.          I certify that this patient's inpatient psychiatric hospital services furnished since the previous certification were, and continue to be, required for treatment that could reasonably be expected to improve the patient's condition, or for diagnostic study, and that the patient continues to need, on a daily basis, active treatment furnished directly by or requiring the supervision of inpatient psychiatric facility personnel. In addition, the hospital records show that services furnished were intensive treatment services, admission or related services, or equivalent services.     EXPECTED DISCHARGE DATE/DAY: TBD     DISPOSITION: Home       Signed By:   Argentina Arcos MD  2/1/2020

## 2020-02-01 NOTE — PROGRESS NOTES
Problem: Suicide  Goal: *STG: Remains safe in hospital  Outcome: Progressing Towards Goal     Problem: Falls - Risk of  Goal: *Absence of Falls  Description  Document Wayne Cabrera Fall Risk and appropriate interventions in the flowsheet.   Outcome: Progressing Towards Goal  Note: Fall Risk Interventions:

## 2020-02-01 NOTE — BH NOTES
0700-Report received from off going nurse. Assumed care of patient    0800-Resident currently denies pain and discomfort. Resident confirms feelings of anxiety. Breakfast was eaten in dayroom with peers. Resident accepted PO medications without issue Will continue to monitor. 0900-Resident in dayroom watching television no issues to note    1000-Resident currently denies all pain and discomfort. Watching tv in dayroom. Will continue to monitor    1124-Resident stated that he had chest pain and a rapid response was called. Resident given nitrotab. VS were WNL /78 HR-81 R-16 [id number] on Room Air. Dr Rhea Fletcher and Dr Carmen Lazaro responded- EKG was ordered and performed by this . EKG showed Normal Sinus Rhythm. CBC, Troponin, and BMP drawn and sent to lab. Chest pains subsided within 10 minutes. No other issues were noted. Will continue to monitor for changes in condition. [id number] resting in room with eyes closed. No issues to note. Will continue to monitor. 1500-Resident in dayroom interacting with peers. Will continue to monitor for changes. 1600-Resident remains in dayroom, watching tv, no issues noted.

## 2020-02-01 NOTE — GROUP NOTE
[facility] GROUP DOCUMENTATION INDIVIDUAL Group Therapy Note Date: 2/1/2020 Group Start Time: 1000 Group End Time: 1100 Group Topic: Social Work Group [facility] Odalys Palomo [facility] GROUP DOCUMENTATION GROUP Group Therapy Note Attendees: 7 Attendance: Attended Patient's Goal:   Pt discussed goals for the day, goals about discharge, and future goals. Pt will identify ways to progress towards those goals. Interventions/techniques: Challenged, Validated and Promoted peer support Follows Directions: Followed directions Interactions: Interacted appropriately Mental Status: Depressed Behavior/appearance: Cooperative Goals Achieved: Able to listen to others Additional Notes:  Pt was actively listening, but needed prompting to respond. Pt offered good feedback to peers. Navin Bullock

## 2020-02-02 PROBLEM — R45.851 SUICIDAL IDEATION: Status: ACTIVE | Noted: 2020-02-02

## 2020-02-02 PROCEDURE — 65220000003 HC RM SEMIPRIVATE PSYCH

## 2020-02-02 PROCEDURE — 74011250637 HC RX REV CODE- 250/637: Performed by: NURSE PRACTITIONER

## 2020-02-02 PROCEDURE — 74011250637 HC RX REV CODE- 250/637: Performed by: PSYCHIATRY & NEUROLOGY

## 2020-02-02 RX ORDER — PRAZOSIN HYDROCHLORIDE 1 MG/1
2 CAPSULE ORAL
Status: DISCONTINUED | OUTPATIENT
Start: 2020-02-02 | End: 2020-02-10 | Stop reason: HOSPADM

## 2020-02-02 RX ADMIN — PANTOPRAZOLE SODIUM 40 MG: 40 TABLET, DELAYED RELEASE ORAL at 08:07

## 2020-02-02 RX ADMIN — ATORVASTATIN CALCIUM 20 MG: 10 TABLET, FILM COATED ORAL at 21:27

## 2020-02-02 RX ADMIN — GABAPENTIN 300 MG: 300 CAPSULE ORAL at 07:46

## 2020-02-02 RX ADMIN — PRAZOSIN HYDROCHLORIDE 2 MG: 1 CAPSULE ORAL at 21:28

## 2020-02-02 RX ADMIN — ISOSORBIDE MONONITRATE 30 MG: 30 TABLET, EXTENDED RELEASE ORAL at 07:46

## 2020-02-02 RX ADMIN — LEVOTHYROXINE SODIUM 25 MCG: 25 TABLET ORAL at 06:29

## 2020-02-02 RX ADMIN — BUDESONIDE AND FORMOTEROL FUMARATE DIHYDRATE 2 PUFF: 160; 4.5 AEROSOL RESPIRATORY (INHALATION) at 14:05

## 2020-02-02 RX ADMIN — MIRTAZAPINE 30 MG: 15 TABLET, FILM COATED ORAL at 21:28

## 2020-02-02 RX ADMIN — GABAPENTIN 300 MG: 300 CAPSULE ORAL at 13:25

## 2020-02-02 RX ADMIN — FLUTICASONE PROPIONATE 1 SPRAY: 50 SPRAY, METERED NASAL at 07:47

## 2020-02-02 RX ADMIN — HYDROXYZINE HYDROCHLORIDE 50 MG: 25 TABLET, FILM COATED ORAL at 21:27

## 2020-02-02 RX ADMIN — GABAPENTIN 300 MG: 300 CAPSULE ORAL at 17:09

## 2020-02-02 RX ADMIN — BUDESONIDE AND FORMOTEROL FUMARATE DIHYDRATE 2 PUFF: 160; 4.5 AEROSOL RESPIRATORY (INHALATION) at 21:29

## 2020-02-02 RX ADMIN — TIOTROPIUM BROMIDE INHALATION SPRAY 2 PUFF: 3.12 SPRAY, METERED RESPIRATORY (INHALATION) at 08:08

## 2020-02-02 RX ADMIN — BUDESONIDE AND FORMOTEROL FUMARATE DIHYDRATE 2 PUFF: 160; 4.5 AEROSOL RESPIRATORY (INHALATION) at 07:47

## 2020-02-02 NOTE — GROUP NOTE
[facility] GROUP DOCUMENTATION INDIVIDUAL Group Therapy Note Date: 2/2/2020 Group Start Time: 1000 Group End Time: 1100 Group Topic: Process Group - Inpatient [address] [facility] Pernell Velazquez [facility] GROUP DOCUMENTATION GROUP Group Therapy Note Attendees: 5 Attendance: Attended Patient's Goal:  Pt discussed and process the topic of suicide with peers. Pt identified moments of regret and shame, moments of despair, and what to do with the recovery process. Interventions/techniques: Challenged, Informed, Validated, Promoted peer support and Provide feedback Follows Directions: Followed directions Interactions: Interacted appropriately Mental Status: Calm and Congruent Behavior/appearance: Attentive and Cooperative Goals Achieved: Able to engage in interactions, Able to listen to others and Able to manage/cope with feelings Additional Notes:  Pt stated \"I'm no longer grieving. I'm over that now. I feel better. \" Pt was informed about the stages of grief, and how he may be moving through the stages. Pt offered good feedback to peers. Marylee Edman

## 2020-02-02 NOTE — BH NOTES
0700-Report received from off going nurse, assumed care of patient     0800-Patient assessment complete, resident currently denies SI/HI, AVH, Resident denies feelings of depression and anxiety. 0900-Resident completed breakfast in dayroom, interacted well with peers. There are no current complaints of pain. Will continue to monitor for changes in condition. 1230-1400-Resting in room during quiet hours, no issues noted. 1500-Currently in dayroom watching television. No issues noted     1600-Resident is interacting with peers in dayroom. There are no issues to note. 1700-Resident having dinner in dayroom with peers. This  viewed a boil on patients left buttock that is currently closed, no pus, broken skin, or drainage to note at this time. Instructed to apply warm compresses. 1800-Currently there are no issues to note. All hourly rounds completed during this shift.

## 2020-02-02 NOTE — BH NOTES
Assumed care and given report by off going nurse Royal Hernandez RN. Pt was in the dayroom coloring, watching tv and socializing with peers. He was in a pleasant mood, smiling and appropriate with staff and peers tonight. He had no episodes of being tearful this shift as he has been. He had no c/o chest pain, VSWNL. Pt does report anxiety and depression that lingers but does better when he is around others and distracted. He has attending groups today. He is eating well. Pt denies any SI/HI or AVH, and had pain 8/10 to lower L buttock where he has two slightly red bumps that are hard and tender. He reports having these before and had to have them drained and given ATB for them to resolve and wanted to have them looked at tomorrow. Pt did have PRN tylenol at 2151; he was resting with eyes closed when I went back to check on him. He also had prn vistaril and trazodone. He has no labs scheduled for am.  Pt resting quietly with eyes close. He remains on 15 min safety checks. Will continue to monitor and treat. Pt slept 7 hours.

## 2020-02-02 NOTE — PROGRESS NOTES
Problem: Suicide  Goal: *STG: Remains safe in hospital  Outcome: Progressing Towards Goal     Problem: Falls - Risk of  Goal: *Absence of Falls  Description  Document Bobby Kirkpatrick Fall Risk and appropriate interventions in the flowsheet.   Outcome: Progressing Towards Goal  Note: Fall Risk Interventions: 08-Dec-2017 00:26

## 2020-02-02 NOTE — GROUP NOTE
[facility]  GROUP DOCUMENTATION INDIVIDUAL Group Therapy Note Date: 2/1/2020 Group Start Time: 1400 Group End Time: 1500 Group Topic: Process Group - Inpatient [facility] Faith Mejia [facility] GROUP DOCUMENTATION GROUP Group Therapy Note Attendees: 7 Attendance: Attended Patient's Goal:  Pt will identify examples of how emotions can take over and how to cope with them in a positive manner. Pt will process times where he experienced difficult emotions. Interventions/techniques: Art integration, Challenged, Validated and Promoted peer support Follows Directions: Followed directions Interactions: Interacted appropriately Mental Status: Anxious and Depressed Behavior/appearance: Attentive and Cooperative Goals Achieved: Able to engage in interactions, Able to listen to others and Able to reflect/comment on own behavior Additional Notes:  Pt stated \"I feel like that sometimes\" (referring to some of the examples) and stated that he was able to put words to some of those emotions and cope with them better. Jessee Ferrera

## 2020-02-02 NOTE — PROGRESS NOTES
Problem: Suicide  Goal: *STG: Remains safe in hospital  Outcome: Progressing Towards Goal  Goal: *STG: Seeks staff when feelings of self harm or harm towards others arise  Outcome: Progressing Towards Goal  Goal: *STG: Attends activities and groups  Outcome: Progressing Towards Goal     Problem: Falls - Risk of  Goal: *Absence of Falls  Description  Document En Severino Fall Risk and appropriate interventions in the flowsheet.   Outcome: Progressing Towards Goal  Note: Fall Risk Interventions:

## 2020-02-02 NOTE — BH NOTES
Assumed care for the patient. Patient was out in the milieu, interacting well with the peers and the staffs. Patient denied S.I/H.I/A//V/H. and depression. Patient stated that he was doing good. Patient is medicine and meal compliant. Patient received PRN Albuterol 2 puffs at 2117 and 0314 for shortness of breath. Will continue to monitor. Patient slept for about 7 hours.

## 2020-02-02 NOTE — BH NOTES
PSYCHIATRIC PROGRESS NOTE         Patient Name  Michael Mcnally   Date of Birth 1955   [facility] [id number]   Medical Record Number  [id number]      Age  71 y.o. PCP Travis Guadalupe MD   Admit date:  2020    Room Number  321/01  @ [facility]   Date of Service  2020         E & M PROGRESS NOTE:         HISTORY       CC:  \"suicidal ideation\"  HISTORY OF PRESENT ILLNESS/INTERVAL HISTORY:  (reviewed/updated 2020). per initial evaluation: This is a 58-year-old  male with history of depression, prior treatments, who reports in the past month he had 2 major losses in his life, and he has no more friends, no more family left to support him. He is helpless, hopeless. He had thoughts of suicide, but no specific plan to kill himself during this interview, per the reports. He is thinking about drinking antifreeze, but he had multiple plans, none specific that he had betted on at this time. He knows that his girlfriend passed away of diabetes at [facility] earlier this month where she was supposedly brain dead, so he got to watch her suffer before they could pull the plug on her, and then eight days after, they pulled the plug, then she passed away, and just buried her and he broke down. His friend was comforting him in his home, and he went to bed one night, the next morning he woke up and his friend was dead in his apartment couch area. He called the police and they came in and got him, but he was already dead. He is very tearful and became distraught during the conversation, and had to take a break before we could continue. He says maybe 4 years ago, his mother  and that was the last person in his family that was living. He no longer has any supports or anyone in his community. He lives alone now, and he feels helpless, hopeless, lost, and so he was brought in to get help.     - patient complained of anxiety and received Atarax. He slept 6 hours overnight.  Patient c/o chest pain, sharp and stabbing was given nitroglycerin with resolution. He denied SI/HI/AVH. This morning, the patient was visible, anxious, and spontaneously c/o chest pain, which he described as a sharp, substernal pain. The patient denied any pressure or radiation. EKG unremarkable. Troponin I negative. 2/02 - no acute overnight events. Patient with no new episodes of chest pain this AM. He is visible, pleasant and cooperative, c/o nightmares and depression. Patient requests restating his Seroquel which he took at 3-400 mg for several years. Discussed cardiac concerns on such an agent and the patient is amenable to trying prazosin before restarting Seroquel. He denies SI/HI/AVH. SIDE EFFECTS: (reviewed/updated 2/2/2020)  None reported or admitted to. ALLERGIES:(reviewed/updated 2/2/2020)  Allergies   Allergen Reactions    Allergen [Antipyrine-Benzocaine] Unknown (comments)    Avelox [Moxifloxacin] Unknown (comments)    Codeine Sulfate Nausea and Vomiting    Pcn [Penicillins] Other (comments)     Coma      MEDICATIONS PRIOR TO ADMISSION:(reviewed/updated 2/2/2020)  Medications Prior to Admission   Medication Sig    mirtazapine (REMERON) 15 mg tablet Take 15 mg by mouth nightly. Indications: major depressive disorder    ibuprofen (MOTRIN) 400 mg tablet Take 400 mg by mouth every six (6) hours as needed for Pain.  gabapentin (NEURONTIN) 300 mg capsule Take 300 mg by mouth three (3) times daily. Indications: neuropathic pain, neck pain    pantoprazole (PROTONIX) 40 mg tablet Take 40 mg by mouth daily. Indications: gastroesophageal reflux disease    fluticasone propionate (FLONASE) 50 mcg/actuation nasal spray 2 Sprays by Both Nostrils route daily. Indications: inflammation of the nose due to an allergy    ipratropium-albuteroL (COMBIVENT RESPIMAT)  mcg/actuation inhaler Take 1 Puff by inhalation every six (6) hours as needed.     fluticasone-umeclidinium-vilanterol (Blessing Cortes) 100-62.5-25 mcg inhaler Take 1 Puff by inhalation two (2) times a day. Indications: prevention of bronchospasms with emphysema    nitroglycerin (NITROSTAT) 0.4 mg SL tablet 1 Tab by SubLINGual route every five (5) minutes as needed for Chest Pain.  isosorbide mononitrate ER (IMDUR) 30 mg tablet Take 1 Tab by mouth daily.  atorvastatin (LIPITOR) 20 mg tablet Take 1 Tab by mouth daily.  levothyroxine (SYNTHROID) 25 mcg tablet Take 1 Tab by mouth Daily (before breakfast). PAST MEDICAL HISTORY: Past medical history from the initial psychiatric evaluation has been reviewed (reviewed/updated 2/2/2020) with no additional updates (I asked patient and no additional past medical history provided).    Past Medical History:   Diagnosis Date    Abdominal pain     Adverse effect of anesthesia     heart rate slows down    Asthma     Back pain     Cancer (HCC) 2015    Throat cancer treated with chemo & radiation    Cancer [facility])     testicular cancer    Constipation     COPD     managed by PCP    Cyst of pharynx     Depression     Emphysema of lung ([facility] [address]     managed by PCP    Extrinsic asthma, unspecified     Fatigue     GERD (gastroesophageal reflux disease)     Insomnia, unspecified     On home O2     at bedtime    Other chest pain     since 2-    Pain in joint, forearm     Psychiatric disorder     schizophrenia    Smoker     1 ppw     Past Surgical History:   Procedure Laterality Date    HX HEENT      throat CA  - Dr. Rosanna Michael oncology    HX ORCHIECTOMY      Bilat- r/t tumors    HX ORCHIECTOMY      tumors    HX ORTHOPAEDIC Left     wrist surgery    HX ORTHOPAEDIC Left 07/14/2017    manipulation and injection shoulder    PLACE PERCUT GASTROSTOMY TUBE  9/21/2015         DE COLONOSCOPY FLX DX W/COLLJ SPEC WHEN PFRMD  8/29/2011           SOCIAL HISTORY: Social history from the initial psychiatric evaluation has been reviewed (reviewed/updated 2/2/2020) with no additional updates (I asked patient and no additional social history provided). Social History     Socioeconomic History    Marital status: SINGLE     Spouse name: Not on file    Number of children: Not on file    Years of education: Not on file    Highest education level: Not on file   Occupational History    Not on file   Social Needs    Financial resource strain: Not on file    Food insecurity:     Worry: Not on file     Inability: Not on file    Transportation needs:     Medical: No     Non-medical: No   Tobacco Use    Smoking status: Current Every Day Smoker     Packs/day: 1.00     Years: 52.00     Pack years: 52.00     Last attempt to quit: 2010     Years since quittin.2    Smokeless tobacco: Never Used    Tobacco comment: Discussed importance of smoking cessation for healing.    Substance and Sexual Activity    Alcohol use: No    Drug use: Yes     Types: Prescription, OTC    Sexual activity: Not Currently   Lifestyle    Physical activity:     Days per week: Not on file     Minutes per session: Not on file    Stress: Not on file   Relationships    Social connections:     Talks on phone: Not on file     Gets together: Not on file     Attends Scientologist service: Not on file     Active member of club or organization: Not on file     Attends meetings of clubs or organizations: Not on file     Relationship status: Not on file    Intimate partner violence:     Fear of current or ex partner: Not on file     Emotionally abused: Not on file     Physically abused: Not on file     Forced sexual activity: Not on file   Other Topics Concern     Service Not Asked    Blood Transfusions Not Asked    Caffeine Concern Not Asked    Occupational Exposure Not Asked   Rina Dusty Hazards Not Asked    Sleep Concern Not Asked    Stress Concern Not Asked    Weight Concern Not Asked    Special Diet Not Asked    Back Care Not Asked    Exercise Not Asked    Bike Helmet Not Asked    [address] Not Asked    Self-Exams Not Asked   Social History Narrative    Not on file      FAMILY HISTORY: Family history from the initial psychiatric evaluation has been reviewed (reviewed/updated 2/2/2020) with no additional updates (I asked patient and no additional family history provided). Family History   Problem Relation Age of Onset    Cancer Mother     Cancer Father        REVIEW OF SYSTEMS: (reviewed/updated 2/2/2020)  Appetite:no change from normal   Sleep: poor with DIMS (difficulty initiating & maintaining sleep)   All other Review of Systems: Negative except per HPI         [address] [facility] (MSE):    MSE FINDINGS ARE WITHIN NORMAL LIMITS (WNL) UNLESS OTHERWISE STATED BELOW. ( ALL OF THE BELOW CATEGORIES OF THE MSE HAVE BEEN REVIEWED (reviewed 2/2/2020) AND UPDATED AS DEEMED APPROPRIATE )  General Presentation age appropriate, cooperative   Orientation oriented to time, place and person   Vital Signs  See below (reviewed 2/2/2020); Vital Signs (BP, Pulse, & Temp) are within normal limits if not listed below.    Gait and Station Stable/steady, no ataxia   Musculoskeletal System No extrapyramidal symptoms (EPS); no abnormal muscular movements or Tardive Dyskinesia (TD); muscle strength and tone are within normal limits   Language No aphasia or dysarthria   Speech:  normal volume and non-pressured   Thought Processes logical; normal rate of thoughts; good abstract reasoning/computation   Thought Associations goal directed   Thought Content internally preoccupied   Suicidal Ideations contracts for safety   Homicidal Ideations none   Mood:  depressed and anhedonia   Affect:  constricted and mood-congruent   Memory recent  intact   Memory remote:  intact   Concentration/Attention:  intact   Fund of Knowledge average   Insight:  limited   Reliability fair   Judgment:  limited          VITALS:     Patient Vitals for the past 24 hrs:   Temp Pulse Resp BP SpO2   02/02/20 0814 97.2 °F (36.2 °C) 97 18 (!) 135/93 100 %   02/01/20 1900 97.8 °F (36.6 °C) 88 20 137/89 100 %     Wt Readings from Last 3 Encounters:   02/01/20 64.6 kg (142 lb 6.7 oz)   01/30/20 64.6 kg (142 lb 6.7 oz)   10/23/19 64.5 kg (142 lb 3 oz)     Temp Readings from Last 3 Encounters:   02/02/20 97.2 °F (36.2 °C)   01/30/20 98.1 °F (36.7 °C)   10/23/19 97.6 °F (36.4 °C)     BP Readings from Last 3 Encounters:   02/02/20 (!) 135/93   01/30/20 150/72   10/23/19 129/77     Pulse Readings from Last 3 Encounters:   02/02/20 97   01/30/20 (!) 110   10/23/19 74            DATA     LABORATORY DATA:(reviewed/updated 2/2/2020)  No results found for this or any previous visit (from the past 24 hour(s)). No results found for: VALF2, VALAC, VALP, VALPR, DS6, CRBAM, CRBAMP, CARB2, XCRBAM  No results found for: LITHM   RADIOLOGY REPORTS:(reviewed/updated 2/2/2020)  No results found.        MEDICATIONS     ALL MEDICATIONS:   Current Facility-Administered Medications   Medication Dose Route Frequency    albuterol (PROVENTIL HFA, VENTOLIN HFA, PROAIR HFA) inhaler 2 Puff  2 Puff Inhalation Q4H PRN    levothyroxine (SYNTHROID) tablet 25 mcg  25 mcg Oral ACB    mirtazapine (REMERON) tablet 30 mg  30 mg Oral QHS    OLANZapine (ZyPREXA) tablet 2.5 mg  2.5 mg Oral Q6H PRN    haloperidol lactate (HALDOL) injection 2.5 mg  2.5 mg IntraMUSCular Q6H PRN    benztropine (COGENTIN) tablet 0.5 mg  0.5 mg Oral BID PRN    diphenhydrAMINE (BENADRYL) injection 25 mg  25 mg IntraMUSCular BID PRN    acetaminophen (TYLENOL) tablet 650 mg  650 mg Oral Q4H PRN    magnesium hydroxide (MILK OF MAGNESIA) 400 mg/5 mL oral suspension 30 mL  30 mL Oral DAILY PRN    nicotine (NICODERM CQ) 21 mg/24 hr patch 1 Patch  1 Patch TransDERmal DAILY    isosorbide mononitrate ER (IMDUR) tablet 30 mg  30 mg Oral DAILY    atorvastatin (LIPITOR) tablet 20 mg  20 mg Oral QHS    pantoprazole (PROTONIX) tablet 40 mg  40 mg Oral DAILY AFTER BREAKFAST    nitroglycerin (NITROSTAT) tablet 0.4 mg  0.4 mg SubLINGual PRN    fluticasone propionate (FLONASE) 50 mcg/actuation nasal spray 1 Spray  1 Spray Both Nostrils DAILY    budesonide-formoteroL (SYMBICORT) 160-4.5 mcg/actuation HFA inhaler 2 Puff  2 Puff Inhalation BID    tiotropium bromide (SPIRIVA RESPIMAT) 2.5 mcg /actuation  2 Puff Inhalation DAILY    gabapentin (NEURONTIN) capsule 300 mg  300 mg Oral TID    hydroxyzine HCL (ATARAX) tablet 50 mg  50 mg Oral TID PRN    traZODone (DESYREL) tablet 50 mg  50 mg Oral QHS PRN      SCHEDULED MEDICATIONS:   Current Facility-Administered Medications   Medication Dose Route Frequency    levothyroxine (SYNTHROID) tablet 25 mcg  25 mcg Oral ACB    mirtazapine (REMERON) tablet 30 mg  30 mg Oral QHS    nicotine (NICODERM CQ) 21 mg/24 hr patch 1 Patch  1 Patch TransDERmal DAILY    isosorbide mononitrate ER (IMDUR) tablet 30 mg  30 mg Oral DAILY    atorvastatin (LIPITOR) tablet 20 mg  20 mg Oral QHS    pantoprazole (PROTONIX) tablet 40 mg  40 mg Oral DAILY AFTER BREAKFAST    fluticasone propionate (FLONASE) 50 mcg/actuation nasal spray 1 Spray  1 Spray Both Nostrils DAILY    budesonide-formoteroL (SYMBICORT) 160-4.5 mcg/actuation HFA inhaler 2 Puff  2 Puff Inhalation BID    tiotropium bromide (SPIRIVA RESPIMAT) 2.5 mcg /actuation  2 Puff Inhalation DAILY    gabapentin (NEURONTIN) capsule 300 mg  300 mg Oral TID          ASSESSMENT & PLAN     DIAGNOSES REQUIRING ACTIVE TREATMENT AND MONITORING: (reviewed/updated 2/2/2020)  Patient Active Hospital Problem List:  Major depressive disorder    Assessment: patient with ongoing somatic symptoms, is taking nitroglycerin for what appears to be pleuritic pain vs stable angina. Will continue current course of treatment, limit RRTs as patient's symptoms do not appear to be consistent with an MI or dissection; managed with PRN medication without any issue thus far. Suspect somatic anxiety.     Plan:  - CONTINUE REMERON 30 mg QHS for MDD  - START Prazosin 2 mg QHS for nightmares  - Consider Seroquel for MDD adjunct  - IGM therapy as tolerated  - Dispo planning    In summary, Kareen Hidalgo, is a 71 y.o.  male who presents with a severe exacerbation of the principal diagnosis of Recurrent depression ([address]    Patient's condition is not improving. Patient requires continued inpatient hospitalization for further stabilization, safety monitoring and medication management. I will continue to coordinate the provision of individual, milieu, occupational, group, and substance abuse therapies to address target symptoms/diagnoses as deemed appropriate for the individual patient. A coordinated, multidisplinary treatment team round was conducted with the patient (this team consists of the nurse, psychiatric unit pharmcist,  and writer). Complete current electronic health record for patient has been reviewed today including consultant notes, ancillary staff notes, nurses and psychiatric tech notes. Suicide risk assessment completed and patient deemed to be of low risk for suicide at this time. The following regarding medications was addressed during rounds with patient:   the risks and benefits of the proposed medication. The patient was given the opportunity to ask questions. Informed consent given to the use of the above medications. Will continue to adjust psychiatric and non-psychiatric medications (see above \"medication\" section and orders section for details) as deemed appropriate & based upon diagnoses and response to treatment. I will continue to order blood tests/labs and diagnostic tests as deemed appropriate and review results as they become available (see orders for details and above listed lab/test results). I will order psychiatric records from previous [facility] hospitals to further elucidate the nature of patient's psychopathology and review once available.     I will gather additional collateral information from friends, family and o/p treatment team to further elucidate the nature of patient's psychopathology and baselline level of psychiatric functioning. I certify that this patient's inpatient psychiatric hospital services furnished since the previous certification were, and continue to be, required for treatment that could reasonably be expected to improve the patient's condition, or for diagnostic study, and that the patient continues to need, on a daily basis, active treatment furnished directly by or requiring the supervision of inpatient psychiatric facility personnel. In addition, the hospital records show that services furnished were intensive treatment services, admission or related services, or equivalent services.     EXPECTED DISCHARGE DATE/DAY: TBD     DISPOSITION: Home       Signed By:   Phil Redding MD  2/2/2020

## 2020-02-03 LAB
ATRIAL RATE: 81 BPM
CALCULATED P AXIS, ECG09: 67 DEGREES
CALCULATED R AXIS, ECG10: 82 DEGREES
CALCULATED T AXIS, ECG11: 75 DEGREES
DIAGNOSIS, 93000: NORMAL
P-R INTERVAL, ECG05: 166 MS
Q-T INTERVAL, ECG07: 374 MS
QRS DURATION, ECG06: 98 MS
QTC CALCULATION (BEZET), ECG08: 434 MS
VENTRICULAR RATE, ECG03: 81 BPM

## 2020-02-03 PROCEDURE — 74011250637 HC RX REV CODE- 250/637: Performed by: PSYCHIATRY & NEUROLOGY

## 2020-02-03 PROCEDURE — 74011250637 HC RX REV CODE- 250/637: Performed by: NURSE PRACTITIONER

## 2020-02-03 PROCEDURE — 65220000003 HC RM SEMIPRIVATE PSYCH

## 2020-02-03 RX ORDER — TRAZODONE HYDROCHLORIDE 50 MG/1
50 TABLET ORAL
Status: DISCONTINUED | OUTPATIENT
Start: 2020-02-03 | End: 2020-02-05

## 2020-02-03 RX ADMIN — TRAZODONE HYDROCHLORIDE 50 MG: 50 TABLET ORAL at 21:33

## 2020-02-03 RX ADMIN — TIOTROPIUM BROMIDE INHALATION SPRAY 2 PUFF: 3.12 SPRAY, METERED RESPIRATORY (INHALATION) at 08:50

## 2020-02-03 RX ADMIN — BUDESONIDE AND FORMOTEROL FUMARATE DIHYDRATE 2 PUFF: 160; 4.5 AEROSOL RESPIRATORY (INHALATION) at 08:50

## 2020-02-03 RX ADMIN — PRAZOSIN HYDROCHLORIDE 2 MG: 1 CAPSULE ORAL at 21:33

## 2020-02-03 RX ADMIN — FLUTICASONE PROPIONATE 1 SPRAY: 50 SPRAY, METERED NASAL at 08:50

## 2020-02-03 RX ADMIN — ALBUTEROL SULFATE 2 PUFF: 90 AEROSOL, METERED RESPIRATORY (INHALATION) at 02:48

## 2020-02-03 RX ADMIN — GABAPENTIN 300 MG: 300 CAPSULE ORAL at 11:39

## 2020-02-03 RX ADMIN — LEVOTHYROXINE SODIUM 25 MCG: 25 TABLET ORAL at 06:34

## 2020-02-03 RX ADMIN — BUDESONIDE AND FORMOTEROL FUMARATE DIHYDRATE 2 PUFF: 160; 4.5 AEROSOL RESPIRATORY (INHALATION) at 15:34

## 2020-02-03 RX ADMIN — MIRTAZAPINE 30 MG: 15 TABLET, FILM COATED ORAL at 21:33

## 2020-02-03 RX ADMIN — ISOSORBIDE MONONITRATE 30 MG: 30 TABLET, EXTENDED RELEASE ORAL at 08:49

## 2020-02-03 RX ADMIN — GABAPENTIN 300 MG: 300 CAPSULE ORAL at 15:35

## 2020-02-03 RX ADMIN — PANTOPRAZOLE SODIUM 40 MG: 40 TABLET, DELAYED RELEASE ORAL at 08:49

## 2020-02-03 RX ADMIN — ATORVASTATIN CALCIUM 20 MG: 10 TABLET, FILM COATED ORAL at 21:33

## 2020-02-03 RX ADMIN — GABAPENTIN 300 MG: 300 CAPSULE ORAL at 08:49

## 2020-02-03 NOTE — PROGRESS NOTES
Problem: Suicide  Goal: *STG: Remains safe in hospital  Outcome: Progressing Towards Goal     Problem: Suicide  Goal: *STG: Seeks staff when feelings of self harm or harm towards others arise  Outcome: Progressing Towards Goal     Problem: Suicide  Goal: *STG:  Verbalizes alternative ways of dealing with maladaptive feelings/behaviors  Outcome: Progressing Towards Goal     Problem: Suicide  Goal: *STG/LTG: No longer expresses self destructive or suicidal thoughts  Outcome: Progressing Towards Goal   1915- Report given by Yeimi AVILA ,I assume care of the patient. Patient is sitting in the day room watching TV, patient is calm and talking to his peers. Patient denies SI/HI/AH/VH. No complaints of pain or discomfort at this time. 2030- Patient is laughing and talking with his peers and eating a snack. Patient is also passing gas and laughing about. 2100- Patient complaining  of trouble with urine flowing, he stated that at times he his trouble with continuously voiding while urinating. His urine will stop and start while voiding. The patient is voiding but problems with the flow of the urine. 2130- Patient given prn Atarax and compliant with medications. 2200- Patient is watching TV. In the dayroom. 2300- Patient is resting in bed.    0100- Patient is resting in bed.    0200- Patient is resting in bed.    0300- Patient is awake walking the hallways. 0400- Patient is awake walking in the hallway. 0500- Patient is back asleep in bed.    0600- Patient slept approx. 5 hrs.

## 2020-02-03 NOTE — PROGRESS NOTES
Problem: Suicide  Goal: *STG: Remains safe in hospital  Outcome: Progressing Towards Goal  Goal: *STG: Seeks staff when feelings of self harm or harm towards others arise  Outcome: Progressing Towards Goal  Goal: *STG: Attends activities and groups  Outcome: Progressing Towards Goal  Goal: *STG:  Verbalizes alternative ways of dealing with maladaptive feelings/behaviors  Outcome: Progressing Towards Goal  Goal: *STG/LTG: No longer expresses self destructive or suicidal thoughts  Outcome: Progressing Towards Goal  Goal: *LTG:  Identifies available community resources  Outcome: Progressing Towards Goal  Goal: *LTG:  Develops proactive suicide prevention plan  Outcome: Progressing Towards Goal  Goal: Interventions  Outcome: Progressing Towards Goal     Problem: Patient Education: Go to Patient Education Activity  Goal: Patient/Family Education  Outcome: Progressing Towards Goal     Problem: Falls - Risk of  Goal: *Absence of Falls  Description  Document Tia Manus Fall Risk and appropriate interventions in the flowsheet.   Outcome: Progressing Towards Goal  Note: Fall Risk Interventions:                                Problem: Patient Education: Go to Patient Education Activity  Goal: Patient/Family Education  Outcome: Progressing Towards Goal

## 2020-02-03 NOTE — INTERDISCIPLINARY ROUNDS
Behavioral Health Interdisciplinary Rounds Patient Name: Delores Bojorquez  Age: 71 y.o. Room/Bed:  321/01 Primary Diagnosis: Recurrent depression ([facility] [address] Admission Status: Voluntary Readmission within 30 days: no 
Power of  in place: n/a Patient requires a blocked bed: no           
Reason for blocked bed: no 
Order for blocked bed obtained: no    
 
Sleep hours: 5 Participation in Care/Groups:  yes Medication Compliant?: Yes PRNS (last 24 hours): Antianxiety Restraints (last 24 hours):  no 
Substance Abuse:  no   
24 hour chart check complete: yes Patient goal(s) for today: Continue taking medications as prescribed; engage in unit activities Treatment team focus/goals: Continue medication regimen Progress note: Patient reports improved mood from last week. Denies SI/HI/AVH. Reports poor sleep. SW to link with mental health skill building services LOS:  4  Expected LOS: TBD Financial concerns/prescription coverage: [facility]; [employer] Family contact: None Family requesting physician contact today: No 
Discharge plan: Return home Access to weapons: No 
Outpatient provider(s): To be linked Patient's preferred phone number for follow up call: [phone] Participating treatment team members: ISA Ratliff; Dr. Aidee Amin MD; Emmy Benjamin PharmD

## 2020-02-03 NOTE — BH NOTES
0800 pt is calm and cooperative. Visible on the unit. Denies si/hi/a/v reyez. States he had poor sleep last nights. Medication and meal complaint. 1000 pt is visible on the unit. Attending groups. 1200 pt is visible on the unit. Ate lunch. 1400 pt is visible on the unit attending groups. 1600 pt is visible on the unit. Ate dinner.      1800 pt is visible on the unit

## 2020-02-03 NOTE — BH NOTES
GROUP THERAPY PROGRESS NOTE    Patient is participating in coping skills group. Group time: 50 minutes    Personal goal for participation: Learn coping skills to reduce negative emotions    Goal orientation: Personal    Group therapy participation: active    Therapeutic interventions reviewed and discussed: Group discussion on why being bored can be a problem and the consequences of boredom that patient have experienced. Patient discussed issues they have had with being bored and ways they have tried to combat boredom. This lead to discussion of coping skills for negative emotions and ways to feel better that each patient has tried or that they have heard of. Discussion of activities that help with boredom, challenges, potential solutions and plans. Impression of participation: Bijal Lai was very focused on not leaving any time soon stating he did not know what would happen if he did due to losing his friend in his residence. He reports typically he prefers music to help with boredom and negative emotions.     Gordon Jimenez LPC LSATP [facility]

## 2020-02-03 NOTE — GROUP NOTE
PATRIZIA  GROUP DOCUMENTATION INDIVIDUAL Group Therapy Note Date: 2/3/2020 Group Start Time: 1400 Group End Time: 1500 Group Topic: Recreational/Music Therapy [facility] [place] [address] ACUTE BEHAV [facility] [place], [address] GROUP DOCUMENTATION GROUP Group Therapy Note Attendees: 6 Attendance: Attended Patient's Goal:  To concentrate on selected task Interventions/techniques: Supported-crafts,games,music Follows Directions: Followed directions Interactions: Interacted appropriately Mental Status: Calm Behavior/appearance: Attentive, Cooperative and Needed prompting Goals Achieved: Able to engage in interactions and Able to listen to others-completed selected task Additional Notes:   
 
Charley Denis

## 2020-02-03 NOTE — GROUP NOTE
PATRIZIA  GROUP DOCUMENTATION INDIVIDUAL Group Therapy Note Date: 2/3/2020 Group Start Time: 1000 Group End Time: 1100 Group Topic: Topic Group [facility] [address] ACUTE BEHAV [facility] [place], [address] GROUP DOCUMENTATION GROUP Group Therapy Note Attendees: 8 Attendance: Attended Patient's Goal:  To develop a personal plan for success Interventions/techniques: Supported-positive coping strategies,goals Follows Directions: Followed directions Interactions: Interacted appropriately Mental Status: Calm and Flat Behavior/appearance: Attentive, Cooperative and Needed prompting Goals Achieved: Able to engage in interactions, Able to listen to others, Able to give feedback to another, Able to self-disclose and Discussed coping Additional Notes:   
 
Mariusz Dyson

## 2020-02-03 NOTE — BH NOTES
PSYCHIATRIC PROGRESS NOTE         Patient Name  Oscar Herman   Date of Birth 1955   [facility] [id number]   Medical Record Number  [id number]      Age  71 y.o. PCP Freya Mendez MD   Admit date:  2020    Room Number  321/01  @ [facility]   Date of Service  2/3/2020         E & M PROGRESS NOTE:         HISTORY       CC:  \"suicidal ideation\"  HISTORY OF PRESENT ILLNESS/INTERVAL HISTORY:  (reviewed/updated 2/3/2020). per initial evaluation: This is a 70-year-old  male with history of depression, prior treatments, who reports in the past month he had 2 major losses in his life, and he has no more friends, no more family left to support him. He is helpless, hopeless. He had thoughts of suicide, but no specific plan to kill himself during this interview, per the reports. He is thinking about drinking antifreeze, but he had multiple plans, none specific that he had betted on at this time. He knows that his [place] of [place] [facility] where she was supposedly brain dead, so he got to watch her suffer before they could pull the plug on her, and then eight days after, they pulled the plug, then she passed away, and just buried her and he broke down. His friend was comforting him in his home, and he went to bed one night, the next morning he woke up and his friend was dead in his apartment couch area. He called the police and they came in and got him, but he was already dead. He is very tearful and became distraught during the conversation, and had to take a break before we could continue. He says maybe 4 years ago, his mother  and that was the last person in his family that was living. He no longer has any supports or anyone in his community. He lives alone now, and he feels helpless, hopeless, lost, and so he was brought in to get help.     - patient complained of anxiety and received Atarax. He slept 6 hours overnight.  Patient c/o chest pain, sharp and stabbing was given nitroglycerin with resolution. He denied SI/HI/AVH. This morning, the patient was visible, anxious, and spontaneously c/o chest pain, which he described as a sharp, substernal pain. The patient denied any pressure or radiation. EKG unremarkable. Troponin I negative. 2/02 - no acute overnight events. Patient with no new episodes of chest pain this AM. He is visible, pleasant and cooperative, c/o nightmares and depression. Patient requests restating his Seroquel which he took at 3-400 mg for several years. Discussed cardiac concerns on such an agent and the patient is amenable to trying prazosin before restarting Seroquel. He denies SI/HI/AVH.    2/03 - Chari Soto reports feeling well and moods are good. Had a good weekend, but notes having poor sleep last evening. Denies SI/HI/AH/VH. No aggression or violence. Appropriately interactive and aware. Tolerating medications well. Eating well with a good appetite. SIDE EFFECTS: (reviewed/updated 2/3/2020)  None reported or admitted to. ALLERGIES:(reviewed/updated 2/3/2020)  Allergies   Allergen Reactions    Allergen [Antipyrine-Benzocaine] Unknown (comments)    Avelox [Moxifloxacin] Unknown (comments)    Codeine Sulfate Nausea and Vomiting    Pcn [Penicillins] Other (comments)     Coma      MEDICATIONS PRIOR TO ADMISSION:(reviewed/updated 2/3/2020)  Medications Prior to Admission   Medication Sig    mirtazapine (REMERON) 15 mg tablet Take 15 mg by mouth nightly. Indications: major depressive disorder    ibuprofen (MOTRIN) 400 mg tablet Take 400 mg by mouth every six (6) hours as needed for Pain.  gabapentin (NEURONTIN) 300 mg capsule Take 300 mg by mouth three (3) times daily. Indications: neuropathic pain, neck pain    pantoprazole (PROTONIX) 40 mg tablet Take 40 mg by mouth daily.  Indications: gastroesophageal reflux disease    fluticasone propionate (FLONASE) 50 mcg/actuation nasal spray 2 Sprays by Both Nostrils route daily. Indications: inflammation of the nose due to an allergy    ipratropium-albuteroL (COMBIVENT RESPIMAT)  mcg/actuation inhaler Take 1 Puff by inhalation every six (6) hours as needed.  fluticasone-umeclidinium-vilanterol (TRELEGY ELLIPTA) 100-62.5-25 mcg inhaler Take 1 Puff by inhalation two (2) times a day. Indications: prevention of bronchospasms with emphysema    nitroglycerin (NITROSTAT) 0.4 mg SL tablet 1 Tab by SubLINGual route every five (5) minutes as needed for Chest Pain.  isosorbide mononitrate ER (IMDUR) 30 mg tablet Take 1 Tab by mouth daily.  atorvastatin (LIPITOR) 20 mg tablet Take 1 Tab by mouth daily.  levothyroxine (SYNTHROID) 25 mcg tablet Take 1 Tab by mouth Daily (before breakfast). PAST MEDICAL HISTORY: Past medical history from the initial psychiatric evaluation has been reviewed (reviewed/updated 2/3/2020) with no additional updates (I asked patient and no additional past medical history provided).    Past Medical History:   Diagnosis Date    Abdominal pain     Adverse effect of anesthesia     heart rate slows down    Asthma     Back pain     Cancer (HCC) 2015    Throat cancer treated with chemo & radiation    Cancer [facility])     testicular cancer    Constipation     COPD     managed by PCP    Cyst of pharynx     Depression     Emphysema of lung ([facility] [address]     managed by PCP    Extrinsic asthma, unspecified     Fatigue     GERD (gastroesophageal reflux disease)     Insomnia, unspecified     On home O2     at bedtime    Other chest pain     since 2-    Pain in joint, forearm     Psychiatric disorder     schizophrenia    Smoker     1 ppw     Past Surgical History:   Procedure Laterality Date    HX HEENT      throat CA  - Dr. Gauri Gallegos- r/t tumors    HX ORCHIECTOMY      tumors    HX ORTHOPAEDIC Left     wrist surgery    HX ORTHOPAEDIC Left 07/14/2017    manipulation and injection shoulder    PLACE PERCUT GASTROSTOMY TUBE  2015         TX COLONOSCOPY FLX DX W/COLLJ SPEC WHEN PFRMD  2011           SOCIAL HISTORY: Social history from the initial psychiatric evaluation has been reviewed (reviewed/updated 2/3/2020) with no additional updates (I asked patient and no additional social history provided). Social History     Socioeconomic History    Marital status: SINGLE     Spouse name: Not on file    Number of children: Not on file    Years of education: Not on file    Highest education level: Not on file   Occupational History    Not on file   Social Needs    Financial resource strain: Not on file    Food insecurity:     Worry: Not on file     Inability: Not on file    Transportation needs:     Medical: No     Non-medical: No   Tobacco Use    Smoking status: Current Every Day Smoker     Packs/day: 1.00     Years: 52.00     Pack years: 52.00     Last attempt to quit: 2010     Years since quittin.2    Smokeless tobacco: Never Used    Tobacco comment: Discussed importance of smoking cessation for healing.    Substance and Sexual Activity    Alcohol use: No    Drug use: Yes     Types: Prescription, OTC    Sexual activity: Not Currently   Lifestyle    Physical activity:     Days per week: Not on file     Minutes per session: Not on file    Stress: Not on file   Relationships    Social connections:     Talks on phone: Not on file     Gets together: Not on file     Attends Latter-day service: Not on file     Active member of club or organization: Not on file     Attends meetings of clubs or organizations: Not on file     Relationship status: Not on file    Intimate partner violence:     Fear of current or ex partner: Not on file     Emotionally abused: Not on file     Physically abused: Not on file     Forced sexual activity: Not on file   Other Topics Concern     Service Not Asked    Blood Transfusions Not Asked    Caffeine Concern Not Asked    Occupational Exposure Not Asked   [place] Hazards Not Asked    Sleep Concern Not Asked    Stress Concern Not Asked    Weight Concern Not Asked    Special Diet Not Asked    Back Care Not Asked    Exercise Not Asked    Bike Helmet Not Asked   [address] Not Asked    Self-Exams Not Asked   Social History Narrative    Not on file      FAMILY HISTORY: Family history from the initial psychiatric evaluation has been reviewed (reviewed/updated 2/3/2020) with no additional updates (I asked patient and no additional family history provided). Family History   Problem Relation Age of Onset    Cancer Mother     Cancer Father        REVIEW OF SYSTEMS: (reviewed/updated 2/3/2020)  Appetite:no change from normal   Sleep: poor with DIMS (difficulty initiating & maintaining sleep)   All other Review of Systems: Negative except per HPI         [employer] (MSE):    MSE FINDINGS ARE WITHIN NORMAL LIMITS (WNL) UNLESS OTHERWISE STATED BELOW. ( ALL OF THE BELOW CATEGORIES OF THE MSE HAVE BEEN REVIEWED (reviewed 2/3/2020) AND UPDATED AS DEEMED APPROPRIATE )  General Presentation age appropriate, cooperative   Orientation oriented to time, place and person   Vital Signs  See below (reviewed 2/3/2020); Vital Signs (BP, Pulse, & Temp) are within normal limits if not listed below.    Gait and Station Stable/steady, no ataxia   Musculoskeletal System No extrapyramidal symptoms (EPS); no abnormal muscular movements or Tardive Dyskinesia (TD); muscle strength and tone are within normal limits   Language No aphasia or dysarthria   Speech:  normal volume and non-pressured   Thought Processes logical; normal rate of thoughts; good abstract reasoning/computation   Thought Associations goal directed   Thought Content internally preoccupied   Suicidal Ideations contracts for safety   Homicidal Ideations none   Mood:  depressed and anhedonia   Affect:  constricted and mood-congruent   Memory recent intact   Memory remote:  intact   Concentration/Attention:  intact   Fund of Knowledge average   Insight:  limited   Reliability fair   Judgment:  limited          VITALS:     Patient Vitals for the past 24 hrs:   Temp Pulse Resp BP SpO2   02/03/20 0810 98 °F (36.7 °C) 70 16 115/78 99 %   02/02/20 1946 98.4 °F (36.9 °C) 73 18 106/69 100 %     Wt Readings from Last 3 Encounters:   02/01/20 64.6 kg (142 lb 6.7 oz)   01/30/20 64.6 kg (142 lb 6.7 oz)   10/23/19 64.5 kg (142 lb 3 oz)     Temp Readings from Last 3 Encounters:   02/03/20 98 °F (36.7 °C)   01/30/20 98.1 °F (36.7 °C)   10/23/19 97.6 °F (36.4 °C)     BP Readings from Last 3 Encounters:   02/03/20 115/78   01/30/20 150/72   10/23/19 129/77     Pulse Readings from Last 3 Encounters:   02/03/20 70   01/30/20 (!) 110   10/23/19 74            DATA     LABORATORY DATA:(reviewed/updated 2/3/2020)  No results found for this or any previous visit (from the past 24 hour(s)). No results found for: VALF2, VALAC, VALP, VALPR, DS6, CRBAM, CRBAMP, CARB2, XCRBAM  No results found for: LITHM   RADIOLOGY REPORTS:(reviewed/updated 2/3/2020)  No results found.        MEDICATIONS     ALL MEDICATIONS:   Current Facility-Administered Medications   Medication Dose Route Frequency    prazosin (MINIPRESS) capsule 2 mg  2 mg Oral QHS    albuterol (PROVENTIL HFA, VENTOLIN HFA, PROAIR HFA) inhaler 2 Puff  2 Puff Inhalation Q4H PRN    levothyroxine (SYNTHROID) tablet 25 mcg  25 mcg Oral ACB    mirtazapine (REMERON) tablet 30 mg  30 mg Oral QHS    OLANZapine (ZyPREXA) tablet 2.5 mg  2.5 mg Oral Q6H PRN    haloperidol lactate (HALDOL) injection 2.5 mg  2.5 mg IntraMUSCular Q6H PRN    benztropine (COGENTIN) tablet 0.5 mg  0.5 mg Oral BID PRN    diphenhydrAMINE (BENADRYL) injection 25 mg  25 mg IntraMUSCular BID PRN    acetaminophen (TYLENOL) tablet 650 mg  650 mg Oral Q4H PRN    magnesium hydroxide (MILK OF MAGNESIA) 400 mg/5 mL oral suspension 30 mL  30 mL Oral DAILY PRN    nicotine (NICODERM CQ) 21 mg/24 hr patch 1 Patch  1 Patch TransDERmal DAILY    isosorbide mononitrate ER (IMDUR) tablet 30 mg  30 mg Oral DAILY    atorvastatin (LIPITOR) tablet 20 mg  20 mg Oral QHS    pantoprazole (PROTONIX) tablet 40 mg  40 mg Oral DAILY AFTER BREAKFAST    nitroglycerin (NITROSTAT) tablet 0.4 mg  0.4 mg SubLINGual PRN    fluticasone propionate (FLONASE) 50 mcg/actuation nasal spray 1 Spray  1 Spray Both Nostrils DAILY    budesonide-formoteroL (SYMBICORT) 160-4.5 mcg/actuation HFA inhaler 2 Puff  2 Puff Inhalation BID    tiotropium bromide (SPIRIVA RESPIMAT) 2.5 mcg /actuation  2 Puff Inhalation DAILY    gabapentin (NEURONTIN) capsule 300 mg  300 mg Oral TID    hydroxyzine HCL (ATARAX) tablet 50 mg  50 mg Oral TID PRN    traZODone (DESYREL) tablet 50 mg  50 mg Oral QHS PRN      SCHEDULED MEDICATIONS:   Current Facility-Administered Medications   Medication Dose Route Frequency    prazosin (MINIPRESS) capsule 2 mg  2 mg Oral QHS    levothyroxine (SYNTHROID) tablet 25 mcg  25 mcg Oral ACB    mirtazapine (REMERON) tablet 30 mg  30 mg Oral QHS    nicotine (NICODERM CQ) 21 mg/24 hr patch 1 Patch  1 Patch TransDERmal DAILY    isosorbide mononitrate ER (IMDUR) tablet 30 mg  30 mg Oral DAILY    atorvastatin (LIPITOR) tablet 20 mg  20 mg Oral QHS    pantoprazole (PROTONIX) tablet 40 mg  40 mg Oral DAILY AFTER BREAKFAST    fluticasone propionate (FLONASE) 50 mcg/actuation nasal spray 1 Spray  1 Spray Both Nostrils DAILY    budesonide-formoteroL (SYMBICORT) 160-4.5 mcg/actuation HFA inhaler 2 Puff  2 Puff Inhalation BID    tiotropium bromide (SPIRIVA RESPIMAT) 2.5 mcg /actuation  2 Puff Inhalation DAILY    gabapentin (NEURONTIN) capsule 300 mg  300 mg Oral TID          ASSESSMENT & PLAN     DIAGNOSES REQUIRING ACTIVE TREATMENT AND MONITORING: (reviewed/updated 2/3/2020)  Patient Active Hospital Problem List:  Major depressive disorder    Assessment: patient with ongoing somatic symptoms, is taking nitroglycerin for what appears to be pleuritic pain vs stable angina. Will continue current course of treatment, limit RRTs as patient's symptoms do not appear to be consistent with an MI or dissection; managed with PRN medication without any issue thus far. Suspect somatic anxiety. Plan:  - CONTINUE REMERON 30 mg QHS for MDD  - START Prazosin 2 mg QHS for nightmares  - Schedule trazodone for tonight and if still poor results with sleep then. ..  - Consider Seroquel for MDD adjunct  - IGM therapy as tolerated  - Dispo planning    In summary, Lonny Cintron, is a 71 y.o.  male who presents with a severe exacerbation of the principal diagnosis of Recurrent depression ([facility] [address]    Patient's condition is not improving. Patient requires continued inpatient hospitalization for further stabilization, safety monitoring and medication management. I will continue to coordinate the provision of individual, milieu, occupational, group, and substance abuse therapies to address target symptoms/diagnoses as deemed appropriate for the individual patient. A coordinated, multidisplinary treatment team round was conducted with the patient (this team consists of the nurse, psychiatric unit pharmcist,  and writer). Complete current electronic health record for patient has been reviewed today including consultant notes, ancillary staff notes, nurses and psychiatric tech notes. Suicide risk assessment completed and patient deemed to be of low risk for suicide at this time. The following regarding medications was addressed during rounds with patient:   the risks and benefits of the proposed medication. The patient was given the opportunity to ask questions. Informed consent given to the use of the above medications.  Will continue to adjust psychiatric and non-psychiatric medications (see above \"medication\" section and orders section for details) as deemed appropriate & based upon diagnoses and response to treatment. I will continue to order blood tests/labs and diagnostic tests as deemed appropriate and review results as they become available (see orders for details and above listed lab/test results). I will order psychiatric records from previous [facility] hospitals to further elucidate the nature of patient's psychopathology and review once available. I will gather additional collateral information from friends, family and o/p treatment team to further elucidate the nature of patient's psychopathology and baselline level of psychiatric functioning. I certify that this patient's inpatient psychiatric hospital services furnished since the previous certification were, and continue to be, required for treatment that could reasonably be expected to improve the patient's condition, or for diagnostic study, and that the patient continues to need, on a daily basis, active treatment furnished directly by or requiring the supervision of inpatient psychiatric facility personnel. In addition, the hospital records show that services furnished were intensive treatment services, admission or related services, or equivalent services.     EXPECTED DISCHARGE DATE/DAY: TBD     DISPOSITION: Home       Signed By:   Aliya Sierra MD  2/3/2020

## 2020-02-04 PROCEDURE — 74011250637 HC RX REV CODE- 250/637: Performed by: PSYCHIATRY & NEUROLOGY

## 2020-02-04 PROCEDURE — 74011250637 HC RX REV CODE- 250/637: Performed by: NURSE PRACTITIONER

## 2020-02-04 PROCEDURE — 65220000003 HC RM SEMIPRIVATE PSYCH

## 2020-02-04 RX ADMIN — GABAPENTIN 300 MG: 300 CAPSULE ORAL at 11:29

## 2020-02-04 RX ADMIN — MIRTAZAPINE 30 MG: 15 TABLET, FILM COATED ORAL at 21:05

## 2020-02-04 RX ADMIN — LEVOTHYROXINE SODIUM 25 MCG: 25 TABLET ORAL at 06:37

## 2020-02-04 RX ADMIN — ALBUTEROL SULFATE 2 PUFF: 90 AEROSOL, METERED RESPIRATORY (INHALATION) at 06:39

## 2020-02-04 RX ADMIN — BUDESONIDE AND FORMOTEROL FUMARATE DIHYDRATE 2 PUFF: 160; 4.5 AEROSOL RESPIRATORY (INHALATION) at 16:29

## 2020-02-04 RX ADMIN — GABAPENTIN 300 MG: 300 CAPSULE ORAL at 07:57

## 2020-02-04 RX ADMIN — ISOSORBIDE MONONITRATE 30 MG: 30 TABLET, EXTENDED RELEASE ORAL at 07:57

## 2020-02-04 RX ADMIN — TRAZODONE HYDROCHLORIDE 50 MG: 50 TABLET ORAL at 21:06

## 2020-02-04 RX ADMIN — TIOTROPIUM BROMIDE INHALATION SPRAY 2 PUFF: 3.12 SPRAY, METERED RESPIRATORY (INHALATION) at 08:03

## 2020-02-04 RX ADMIN — BUDESONIDE AND FORMOTEROL FUMARATE DIHYDRATE 2 PUFF: 160; 4.5 AEROSOL RESPIRATORY (INHALATION) at 08:01

## 2020-02-04 RX ADMIN — ATORVASTATIN CALCIUM 20 MG: 10 TABLET, FILM COATED ORAL at 21:06

## 2020-02-04 RX ADMIN — GABAPENTIN 300 MG: 300 CAPSULE ORAL at 16:28

## 2020-02-04 RX ADMIN — ALBUTEROL SULFATE 2 PUFF: 90 AEROSOL, METERED RESPIRATORY (INHALATION) at 21:17

## 2020-02-04 RX ADMIN — FLUTICASONE PROPIONATE 1 SPRAY: 50 SPRAY, METERED NASAL at 08:00

## 2020-02-04 RX ADMIN — PANTOPRAZOLE SODIUM 40 MG: 40 TABLET, DELAYED RELEASE ORAL at 08:02

## 2020-02-04 RX ADMIN — PRAZOSIN HYDROCHLORIDE 2 MG: 1 CAPSULE ORAL at 21:05

## 2020-02-04 RX ADMIN — ALBUTEROL SULFATE 2 PUFF: 90 AEROSOL, METERED RESPIRATORY (INHALATION) at 10:26

## 2020-02-04 NOTE — BH NOTES
1930  Received report from JOSE CRUZ, RN. Assumed care of the patient. 2000  Pt in day reyez working on a puzzle and socializing with peers. Pt pleasant and cooperative upon interview. Pt endorses stomach pain 9/10. Pt reports it's gas cramps-requesting Maalox. Pt denies anxiety/depression/AVH/SI/HI. Pt rates mood as pretty good. Pt stated his last bm was yesterday after he received an enema. 2030  Pt in day reyez, ate evening snack    2100  Pt in day reyez socializing with peers    2130  Pt compliant with evening medications    2200  Pt in day reyez socializing with peers    2230  Pt in bed asleep    2300 Pt in bed asleep    0000  Pt in bed asleep    0100  Pt in bed asleep    0200  Pt in bed asleep    0300 Pt in bed asleep    0400  Pt in bed asleep    0415  Pt up in the hallway at the nurse's station requesting ice water    0500  Pt up standing in between his room and hallway    0600  Pt awake in room    0630  Pt compliant with am medication. Pt slept approximately 6 hours this shift. 9744  Pt received prn albuterol as requested.

## 2020-02-04 NOTE — PROGRESS NOTES
Problem: Suicide  Goal: *STG: Remains safe in hospital  Outcome: Progressing Towards Goal  Goal: *STG: Seeks staff when feelings of self harm or harm towards others arise  Outcome: Progressing Towards Goal  Goal: *STG: Attends activities and groups  Outcome: Progressing Towards Goal  Goal: *STG:  Verbalizes alternative ways of dealing with maladaptive feelings/behaviors  Outcome: Progressing Towards Goal  Goal: *STG/LTG: No longer expresses self destructive or suicidal thoughts  Outcome: Progressing Towards Goal  Goal: *LTG:  Identifies available community resources  Outcome: Progressing Towards Goal  Goal: *LTG:  Develops proactive suicide prevention plan  Outcome: Progressing Towards Goal  Goal: Interventions  Outcome: Progressing Towards Goal     0728 This writer received report from the outgoing nurse.     6236 Patient ambulating hallway. Patient presents with a cooperative attitude, smiling affect, and euthymic mood. Patient denied SI, HI, and AVH. Patient is alert and oriented x 3.    0920 Patient ate most of his breakfast. Patient interacting well with staff and peers. 1010 Patient in room resting. 1026 Patient complained of shortness of breath/wheezing. This writer provided the patient with prn albuterol. 1235 Patient ate most of his lunch. Patient interacting well with others. 1525 Patient in group. Patient actively interacting with group members.

## 2020-02-04 NOTE — BH NOTES
Behavioral Health Treatment Team Note     Patient goal(s) for today: Continue taking medications and attending groups  Treatment team focus/goals: Continue adjusting medications as needed    Progress note: Tiffanie Palafox reports feeling better and hopes to be discharged by Friday    LOS:  5  Expected LOS: TBD    Insurance info/prescription coverage:  [facility]; [employer]  Date of last family contact:  None  Family requesting physician contact today:  no  Discharge plan:  Return home  Guns in the home:  no   Outpatient provider(s):   To be linked    Participating treatment team members: Dr Tomasz Muñoz MD, Sean Toscano PharmD

## 2020-02-04 NOTE — GROUP NOTE
PATRIZIA  GROUP DOCUMENTATION INDIVIDUAL Group Therapy Note Date: 2/4/2020 Group Start Time: 1000 Group End Time: 1100 Group Topic: Topic Group 137 [facility] 3 ACUTE BEHAV [facility], [address] GROUP DOCUMENTATION GROUP Group Therapy Note Attendees: 7 Attendance: Attended Patient's Goal:  To participate in coping skills memory game Interventions/techniques: Supported-positive coping strategies a-z Follows Directions: Followed directions Interactions: Interacted appropriately Mental Status: Calm Behavior/appearance: Cooperative and Needed prompting Goals Achieved: Able to engage in interactions and Able to listen to others Additional Notes:  Pt arrived late-elected to listen Crystal Hudson

## 2020-02-04 NOTE — GROUP NOTE
PATRIZIA  GROUP DOCUMENTATION INDIVIDUAL Group Therapy Note Date: 2/4/2020 Group Start Time: 1400 Group End Time: 1500 Group Topic: Recreational/Music Therapy [facility] 3 ACUTE BEHAV [facility] [place], [address] GROUP DOCUMENTATION GROUP Group Therapy Note Attendees: 7 Attendance: Attended Patient's Goal:  To concentrate on selected task Interventions/techniques: Supported-crafts,games,music Follows Directions: Followed directions Interactions: Interacted appropriately Mental Status: Calm Behavior/appearance: Attentive and Cooperative Goals Achieved: Able to engage in interactions and Able to listen to others Additional Notes:   
 
Trisha Marcano

## 2020-02-04 NOTE — BH NOTES
GROUP THERAPY PROGRESS NOTE    Patient is participating in Coping Skills group. Group time: 45 minutes    Personal goal for participation: To come up with a plan of things to help maintain wellness of the whole person    Goal orientation: Personal    Group therapy participation: active    Therapeutic interventions reviewed and discussed: Group discussion of the wellness wheel and how they plan to balance themselves by looking at spiritual, physical, occupations, intellectual, social/family, and emotional aspects of their lives. Impression of participation: Phillip Sales shared that he is concerned about going home though he feels better than he was. He reports hoping that he can cope without his friend and that he struggles with maintaining support due to lack of transportation. He is hopeful that he will have a counselor that can come see him and that will provide support. He reports fishing and coloring are skills he will use to maintain stability despite stressors.     Fang Donaldson LPC LSATP [facility]

## 2020-02-04 NOTE — BH NOTES
PSYCHIATRIC PROGRESS NOTE         Patient Name  Jose Juan Treviño   Date of Birth 1955   [facility] [id number]   Medical Record Number  [id number]      Age  71 y.o. PCP Rod Cabral MD   Admit date:  2020    Room Number  321/01  @ [facility]   Date of Service  2020         E & M PROGRESS NOTE:         HISTORY       CC:  \"suicidal ideation\"  HISTORY OF PRESENT ILLNESS/INTERVAL HISTORY:  (reviewed/updated 2020). per initial evaluation: This is a 59-year-old  male with history of depression, prior treatments, who reports in the past month he had 2 major losses in his life, and he has no more friends, no more family left to support him. He is helpless, hopeless. He had thoughts of suicide, but no specific plan to kill himself during this interview, per the reports. He is thinking about drinking antifreeze, but he had multiple plans, none specific that he had betted on at this time. He knows that his girlfriend passed away of diabetes at [facility] earlier this month where she was supposedly brain dead, so he got to watch her suffer before they could pull the plug on her, and then eight days after, they pulled the plug, then she passed away, and just buried her and he broke down. His friend was comforting him in his home, and he went to bed one night, the next morning he woke up and his friend was dead in his apartment couch area. He called the police and they came in and got him, but he was already dead. He is very tearful and became distraught during the conversation, and had to take a break before we could continue. He says maybe 4 years ago, his mother  and that was the last person in his family that was living. He no longer has any supports or anyone in his community. He lives alone now, and he feels helpless, hopeless, lost, and so he was brought in to get help.     - patient complained of anxiety and received Atarax. He slept 6 hours overnight.  Patient c/o chest pain, sharp and stabbing was given nitroglycerin with resolution. He denied SI/HI/AVH. This morning, the patient was visible, anxious, and spontaneously c/o chest pain, which he described as a sharp, substernal pain. The patient denied any pressure or radiation. EKG unremarkable. Troponin I negative. 2/02 - no acute overnight events. Patient with no new episodes of chest pain this AM. He is visible, pleasant and cooperative, c/o nightmares and depression. Patient requests restating his Seroquel which he took at 3-400 mg for several years. Discussed cardiac concerns on such an agent and the patient is amenable to trying prazosin before restarting Seroquel. He denies SI/HI/AVH.    2/03 - Lokesh Swain reports feeling well and moods are good. Had a good weekend, but notes having poor sleep last evening. Denies SI/HI/AH/VH. No aggression or violence. Appropriately interactive and aware. Tolerating medications well. Eating well with a good appetite. 2/04 - Lokesh Swain reports feeling good today and moods are better than on admission. Denies SI/HI/AH/VH. No aggression or violence. Appropriately interactive and aware. Tolerating medications well. Eating and sleeping fairly. Working on discharge planning concerns. Made statements of wanting to kill himself when he leaves the hospital during groups yesterday. SIDE EFFECTS: (reviewed/updated 2/4/2020)  None reported or admitted to. ALLERGIES:(reviewed/updated 2/4/2020)  Allergies   Allergen Reactions    Allergen [Antipyrine-Benzocaine] Unknown (comments)    Avelox [Moxifloxacin] Unknown (comments)    Codeine Sulfate Nausea and Vomiting    Pcn [Penicillins] Other (comments)     Coma      MEDICATIONS PRIOR TO ADMISSION:(reviewed/updated 2/4/2020)  Medications Prior to Admission   Medication Sig    mirtazapine (REMERON) 15 mg tablet Take 15 mg by mouth nightly.  Indications: major depressive disorder    ibuprofen (MOTRIN) 400 mg tablet Take 400 mg by mouth every six (6) hours as needed for Pain.  gabapentin (NEURONTIN) 300 mg capsule Take 300 mg by mouth three (3) times daily. Indications: neuropathic pain, neck pain    pantoprazole (PROTONIX) 40 mg tablet Take 40 mg by mouth daily. Indications: gastroesophageal reflux disease    fluticasone propionate (FLONASE) 50 mcg/actuation nasal spray 2 Sprays by Both Nostrils route daily. Indications: inflammation of the nose due to an allergy    ipratropium-albuteroL (COMBIVENT RESPIMAT)  mcg/actuation inhaler Take 1 Puff by inhalation every six (6) hours as needed.  fluticasone-umeclidinium-vilanterol (TRELEGY ELLIPTA) 100-62.5-25 mcg inhaler Take 1 Puff by inhalation two (2) times a day. Indications: prevention of bronchospasms with emphysema    nitroglycerin (NITROSTAT) 0.4 mg SL tablet 1 Tab by SubLINGual route every five (5) minutes as needed for Chest Pain.  isosorbide mononitrate ER (IMDUR) 30 mg tablet Take 1 Tab by mouth daily.  atorvastatin (LIPITOR) 20 mg tablet Take 1 Tab by mouth daily.  levothyroxine (SYNTHROID) 25 mcg tablet Take 1 Tab by mouth Daily (before breakfast). PAST MEDICAL HISTORY: Past medical history from the initial psychiatric evaluation has been reviewed (reviewed/updated 2/4/2020) with no additional updates (I asked patient and no additional past medical history provided).    Past Medical History:   Diagnosis Date    Abdominal pain     Adverse effect of anesthesia     heart rate slows down    Asthma     Back pain     Cancer (HCC) 2015    Throat cancer treated with chemo & radiation    Cancer [facility])     testicular cancer    Constipation     COPD     managed by PCP    Cyst of pharynx     Depression     Emphysema of lung ([facility] [address]     managed by PCP    Extrinsic asthma, unspecified     Fatigue     GERD (gastroesophageal reflux disease)     Insomnia, unspecified     On home O2     at bedtime    Other chest pain     since 2-  Pain in joint, forearm     Psychiatric disorder     schizophrenia    Smoker     1 ppw     Past Surgical History:   Procedure Laterality Date    HX HEENT      throat CA  - Dr. Conway Arch- r/t tumors    HX ORCHIECTOMY      tumors    HX ORTHOPAEDIC Left     wrist surgery    HX ORTHOPAEDIC Left 2017    manipulation and injection shoulder    PLACE PERCUT GASTROSTOMY TUBE  2015         LA COLONOSCOPY FLX DX W/COLLJ SPEC WHEN PFRMD  2011           SOCIAL HISTORY: Social history from the initial psychiatric evaluation has been reviewed (reviewed/updated 2020) with no additional updates (I asked patient and no additional social history provided). Social History     Socioeconomic History    Marital status: SINGLE     Spouse name: Not on file    Number of children: Not on file    Years of education: Not on file    Highest education level: Not on file   Occupational History    Not on file   Social Needs    Financial resource strain: Not on file    Food insecurity:     Worry: Not on file     Inability: Not on file    Transportation needs:     Medical: No     Non-medical: No   Tobacco Use    Smoking status: Current Every Day Smoker     Packs/day: 1.00     Years: 52.00     Pack years: 52.00     Last attempt to quit: 2010     Years since quittin.2    Smokeless tobacco: Never Used    Tobacco comment: Discussed importance of smoking cessation for healing.    Substance and Sexual Activity    Alcohol use: No    Drug use: Yes     Types: Prescription, OTC    Sexual activity: Not Currently   Lifestyle    Physical activity:     Days per week: Not on file     Minutes per session: Not on file    Stress: Not on file   Relationships    Social connections:     Talks on phone: Not on file     Gets together: Not on file     Attends Protestant service: Not on file     Active member of club or organization: Not on file     Attends meetings of clubs or organizations: Not on file     Relationship status: Not on file    Intimate partner violence:     Fear of current or ex partner: Not on file     Emotionally abused: Not on file     Physically abused: Not on file     Forced sexual activity: Not on file   Other Topics Concern     Service Not Asked    Blood Transfusions Not Asked    Caffeine Concern Not Asked    Occupational Exposure Not Asked    Hobby Hazards Not Asked    Sleep Concern Not Asked    Stress Concern Not Asked    Weight Concern Not Asked    Special Diet Not Asked    Back Care Not Asked    Exercise Not Asked    Bike Helmet Not Asked   [address] Not Asked    Self-Exams Not Asked   Social History Narrative    Not on file      FAMILY HISTORY: Family history from the initial psychiatric evaluation has been reviewed (reviewed/updated 2/4/2020) with no additional updates (I asked patient and no additional family history provided). Family History   Problem Relation Age of Onset    Cancer Mother     Cancer Father        REVIEW OF SYSTEMS: (reviewed/updated 2/4/2020)  Appetite:no change from normal   Sleep: poor with DIMS (difficulty initiating & maintaining sleep)   All other Review of Systems: Negative except per HPI         [facility] (MSE):    MSE FINDINGS ARE WITHIN NORMAL LIMITS (WNL) UNLESS OTHERWISE STATED BELOW. ( ALL OF THE BELOW CATEGORIES OF THE MSE HAVE BEEN REVIEWED (reviewed 2/4/2020) AND UPDATED AS DEEMED APPROPRIATE )  General Presentation age appropriate, cooperative   Orientation oriented to time, place and person   Vital Signs  See below (reviewed 2/4/2020); Vital Signs (BP, Pulse, & Temp) are within normal limits if not listed below.    Gait and Station Stable/steady, no ataxia   Musculoskeletal System No extrapyramidal symptoms (EPS); no abnormal muscular movements or Tardive Dyskinesia (TD); muscle strength and tone are within normal limits   Language No aphasia or dysarthria Speech:  normal volume and non-pressured   Thought Processes logical; normal rate of thoughts; good abstract reasoning/computation   Thought Associations goal directed   Thought Content free of delusions and free of hallucinations   Suicidal Ideations contracts for safety   Homicidal Ideations none   Mood:  depressed   Affect:  constricted and mood-congruent   Memory recent  intact   Memory remote:  intact   Concentration/Attention:  intact   Fund of Knowledge average   Insight:  limited   Reliability fair   Judgment:  limited          VITALS:     Patient Vitals for the past 24 hrs:   Temp Pulse Resp BP SpO2   02/04/20 0735 97.5 °F (36.4 °C) 95 18 111/77 98 %   02/03/20 1959 98.2 °F (36.8 °C) 94 17 113/76 100 %     Wt Readings from Last 3 Encounters:   02/01/20 64.6 kg (142 lb 6.7 oz)   01/30/20 64.6 kg (142 lb 6.7 oz)   10/23/19 64.5 kg (142 lb 3 oz)     Temp Readings from Last 3 Encounters:   02/04/20 97.5 °F (36.4 °C)   01/30/20 98.1 °F (36.7 °C)   10/23/19 97.6 °F (36.4 °C)     BP Readings from Last 3 Encounters:   02/04/20 111/77   01/30/20 150/72   10/23/19 129/77     Pulse Readings from Last 3 Encounters:   02/04/20 95   01/30/20 (!) 110   10/23/19 74            DATA     LABORATORY DATA:(reviewed/updated 2/4/2020)  No results found for this or any previous visit (from the past 24 hour(s)). No results found for: VALF2, VALAC, VALP, VALPR, DS6, CRBAM, CRBAMP, CARB2, XCRBAM  No results found for: LITHM   RADIOLOGY REPORTS:(reviewed/updated 2/4/2020)  No results found.        MEDICATIONS     ALL MEDICATIONS:   Current Facility-Administered Medications   Medication Dose Route Frequency    traZODone (DESYREL) tablet 50 mg  50 mg Oral QHS    prazosin (MINIPRESS) capsule 2 mg  2 mg Oral QHS    albuterol (PROVENTIL HFA, VENTOLIN HFA, PROAIR HFA) inhaler 2 Puff  2 Puff Inhalation Q4H PRN    levothyroxine (SYNTHROID) tablet 25 mcg  25 mcg Oral ACB    mirtazapine (REMERON) tablet 30 mg  30 mg Oral QHS    OLANZapine (ZyPREXA) tablet 2.5 mg  2.5 mg Oral Q6H PRN    haloperidol lactate (HALDOL) injection 2.5 mg  2.5 mg IntraMUSCular Q6H PRN    benztropine (COGENTIN) tablet 0.5 mg  0.5 mg Oral BID PRN    diphenhydrAMINE (BENADRYL) injection 25 mg  25 mg IntraMUSCular BID PRN    acetaminophen (TYLENOL) tablet 650 mg  650 mg Oral Q4H PRN    magnesium hydroxide (MILK OF MAGNESIA) 400 mg/5 mL oral suspension 30 mL  30 mL Oral DAILY PRN    nicotine (NICODERM CQ) 21 mg/24 hr patch 1 Patch  1 Patch TransDERmal DAILY    isosorbide mononitrate ER (IMDUR) tablet 30 mg  30 mg Oral DAILY    atorvastatin (LIPITOR) tablet 20 mg  20 mg Oral QHS    pantoprazole (PROTONIX) tablet 40 mg  40 mg Oral DAILY AFTER BREAKFAST    nitroglycerin (NITROSTAT) tablet 0.4 mg  0.4 mg SubLINGual PRN    fluticasone propionate (FLONASE) 50 mcg/actuation nasal spray 1 Spray  1 Spray Both Nostrils DAILY    budesonide-formoteroL (SYMBICORT) 160-4.5 mcg/actuation HFA inhaler 2 Puff  2 Puff Inhalation BID    tiotropium bromide (SPIRIVA RESPIMAT) 2.5 mcg /actuation  2 Puff Inhalation DAILY    gabapentin (NEURONTIN) capsule 300 mg  300 mg Oral TID    hydroxyzine HCL (ATARAX) tablet 50 mg  50 mg Oral TID PRN      SCHEDULED MEDICATIONS:   Current Facility-Administered Medications   Medication Dose Route Frequency    traZODone (DESYREL) tablet 50 mg  50 mg Oral QHS    prazosin (MINIPRESS) capsule 2 mg  2 mg Oral QHS    levothyroxine (SYNTHROID) tablet 25 mcg  25 mcg Oral ACB    mirtazapine (REMERON) tablet 30 mg  30 mg Oral QHS    nicotine (NICODERM CQ) 21 mg/24 hr patch 1 Patch  1 Patch TransDERmal DAILY    isosorbide mononitrate ER (IMDUR) tablet 30 mg  30 mg Oral DAILY    atorvastatin (LIPITOR) tablet 20 mg  20 mg Oral QHS    pantoprazole (PROTONIX) tablet 40 mg  40 mg Oral DAILY AFTER BREAKFAST    fluticasone propionate (FLONASE) 50 mcg/actuation nasal spray 1 Spray  1 Spray Both Nostrils DAILY    budesonide-formoteroL (SYMBICORT) 160-4.5 mcg/actuation HFA inhaler 2 Puff  2 Puff Inhalation BID    tiotropium bromide (SPIRIVA RESPIMAT) 2.5 mcg /actuation  2 Puff Inhalation DAILY    gabapentin (NEURONTIN) capsule 300 mg  300 mg Oral TID          ASSESSMENT & PLAN     DIAGNOSES REQUIRING ACTIVE TREATMENT AND MONITORING: (reviewed/updated 2/4/2020)  Patient Active Hospital Problem List:  Major depressive disorder    Assessment: patient with ongoing somatic symptoms, is taking nitroglycerin for what appears to be pleuritic pain vs stable angina. Will continue current course of treatment, limit RRTs as patient's symptoms do not appear to be consistent with an MI or dissection; managed with PRN medication without any issue thus far. Suspect somatic anxiety. Plan:  - CONTINUE REMERON 30 mg QHS for MDD  - Continue Prazosin 2 mg QHS for nightmares  - IGM therapy as tolerated  - Dispo planning with social work for the next few days    In summary, Chari Soto, is a 71 y.o.  male who presents with a severe exacerbation of the principal diagnosis of Recurrent depression ([facility] [address]    Patient's condition is not improving. Patient requires continued inpatient hospitalization for further stabilization, safety monitoring and medication management. I will continue to coordinate the provision of individual, milieu, occupational, group, and substance abuse therapies to address target symptoms/diagnoses as deemed appropriate for the individual patient. A coordinated, multidisplinary treatment team round was conducted with the patient (this team consists of the nurse, psychiatric unit pharmcist,  and writer). Complete current electronic health record for patient has been reviewed today including consultant notes, ancillary staff notes, nurses and psychiatric tech notes. Suicide risk assessment completed and patient deemed to be of low risk for suicide at this time.      The following regarding medications was addressed during rounds with patient:   the risks and benefits of the proposed medication. The patient was given the opportunity to ask questions. Informed consent given to the use of the above medications. Will continue to adjust psychiatric and non-psychiatric medications (see above \"medication\" section and orders section for details) as deemed appropriate & based upon diagnoses and response to treatment. I will continue to order blood tests/labs and diagnostic tests as deemed appropriate and review results as they become available (see orders for details and above listed lab/test results). I will order psychiatric records from previous [facility] hospitals to further elucidate the nature of patient's psychopathology and review once available. I will gather additional collateral information from friends, family and o/p treatment team to further elucidate the nature of patient's psychopathology and baselline level of psychiatric functioning. I certify that this patient's inpatient psychiatric hospital services furnished since the previous certification were, and continue to be, required for treatment that could reasonably be expected to improve the patient's condition, or for diagnostic study, and that the patient continues to need, on a daily basis, active treatment furnished directly by or requiring the supervision of inpatient psychiatric facility personnel. In addition, the hospital records show that services furnished were intensive treatment services, admission or related services, or equivalent services.     EXPECTED DISCHARGE DATE/DAY: TBD     DISPOSITION: Home       Signed By:   Jeyson Palmer MD  2/4/2020

## 2020-02-04 NOTE — PROGRESS NOTES
Problem: Suicide  Goal: *STG: Remains safe in hospital  Outcome: Progressing Towards Goal  Goal: *STG: Seeks staff when feelings of self harm or harm towards others arise  Outcome: Progressing Towards Goal  Goal: *STG: Attends activities and groups  Outcome: Progressing Towards Goal  Goal: *STG:  Verbalizes alternative ways of dealing with maladaptive feelings/behaviors  Outcome: Progressing Towards Goal  Goal: *STG/LTG: No longer expresses self destructive or suicidal thoughts  Outcome: Progressing Towards Goal

## 2020-02-05 PROCEDURE — 74011250637 HC RX REV CODE- 250/637: Performed by: NURSE PRACTITIONER

## 2020-02-05 PROCEDURE — 74011250637 HC RX REV CODE- 250/637: Performed by: PSYCHIATRY & NEUROLOGY

## 2020-02-05 PROCEDURE — 65220000003 HC RM SEMIPRIVATE PSYCH

## 2020-02-05 RX ORDER — QUETIAPINE FUMARATE 25 MG/1
50 TABLET, FILM COATED ORAL
Status: DISCONTINUED | OUTPATIENT
Start: 2020-02-05 | End: 2020-02-10 | Stop reason: HOSPADM

## 2020-02-05 RX ADMIN — PRAZOSIN HYDROCHLORIDE 2 MG: 1 CAPSULE ORAL at 21:13

## 2020-02-05 RX ADMIN — MIRTAZAPINE 30 MG: 15 TABLET, FILM COATED ORAL at 21:13

## 2020-02-05 RX ADMIN — ALBUTEROL SULFATE 2 PUFF: 90 AEROSOL, METERED RESPIRATORY (INHALATION) at 12:00

## 2020-02-05 RX ADMIN — GABAPENTIN 300 MG: 300 CAPSULE ORAL at 08:38

## 2020-02-05 RX ADMIN — GABAPENTIN 300 MG: 300 CAPSULE ORAL at 11:55

## 2020-02-05 RX ADMIN — PANTOPRAZOLE SODIUM 40 MG: 40 TABLET, DELAYED RELEASE ORAL at 08:38

## 2020-02-05 RX ADMIN — ATORVASTATIN CALCIUM 20 MG: 10 TABLET, FILM COATED ORAL at 21:13

## 2020-02-05 RX ADMIN — BUDESONIDE AND FORMOTEROL FUMARATE DIHYDRATE 2 PUFF: 160; 4.5 AEROSOL RESPIRATORY (INHALATION) at 08:40

## 2020-02-05 RX ADMIN — GABAPENTIN 300 MG: 300 CAPSULE ORAL at 16:43

## 2020-02-05 RX ADMIN — ALBUTEROL SULFATE 2 PUFF: 90 AEROSOL, METERED RESPIRATORY (INHALATION) at 03:14

## 2020-02-05 RX ADMIN — TIOTROPIUM BROMIDE INHALATION SPRAY 2 PUFF: 3.12 SPRAY, METERED RESPIRATORY (INHALATION) at 08:41

## 2020-02-05 RX ADMIN — FLUTICASONE PROPIONATE 1 SPRAY: 50 SPRAY, METERED NASAL at 08:39

## 2020-02-05 RX ADMIN — QUETIAPINE FUMARATE 50 MG: 25 TABLET ORAL at 21:13

## 2020-02-05 RX ADMIN — ISOSORBIDE MONONITRATE 30 MG: 30 TABLET, EXTENDED RELEASE ORAL at 08:38

## 2020-02-05 RX ADMIN — MAGNESIUM HYDROXIDE 30 ML: 400 SUSPENSION ORAL at 14:53

## 2020-02-05 RX ADMIN — BUDESONIDE AND FORMOTEROL FUMARATE DIHYDRATE 2 PUFF: 160; 4.5 AEROSOL RESPIRATORY (INHALATION) at 16:43

## 2020-02-05 RX ADMIN — LEVOTHYROXINE SODIUM 25 MCG: 25 TABLET ORAL at 06:15

## 2020-02-05 NOTE — BH NOTES
PSYCHIATRIC PROGRESS NOTE         Patient Name  Fili Razo   Date of Birth 1955   [facility] [id number]   Medical Record Number  [id number]      Age  71 y.o. PCP Sp Mckinney MD   Admit date:  2020    Room Number  321/01  @ [facility]   Date of Service  2020         E & M PROGRESS NOTE:         HISTORY       CC:  \"suicidal ideation\"  HISTORY OF PRESENT ILLNESS/INTERVAL HISTORY:  (reviewed/updated 2020). per initial evaluation: This is a 58-year-old  male with history of depression, prior treatments, who reports in the past month he had 2 major losses in his life, and he has no more friends, no more family left to support him. He is helpless, hopeless. He had thoughts of suicide, but no specific plan to kill himself during this interview, per the reports. He is thinking about drinking antifreeze, but he had multiple plans, none specific that he had betted on at this time. He knows that his girlfriend passed away of diabetes at [facility] earlier this month where she was supposedly brain dead, so he got to watch her suffer before they could pull the plug on her, and then eight days after, they pulled the plug, then she passed away, and just buried her and he broke down. His friend was comforting him in his home, and he went to bed one night, the next morning he woke up and his friend was dead in his apartment couch area. He called the police and they came in and got him, but he was already dead. He is very tearful and became distraught during the conversation, and had to take a break before we could continue. He says maybe 4 years ago, his mother  and that was the last person in his family that was living. He no longer has any supports or anyone in his community. He lives alone now, and he feels helpless, hopeless, lost, and so he was brought in to get help.     - patient complained of anxiety and received Atarax. He slept 6 hours overnight.  Patient c/o chest pain, sharp and stabbing was given nitroglycerin with resolution. He denied SI/HI/AVH. This morning, the patient was visible, anxious, and spontaneously c/o chest pain, which he described as a sharp, substernal pain. The patient denied any pressure or radiation. EKG unremarkable. Troponin I negative. 2/02 - no acute overnight events. Patient with no new episodes of chest pain this AM. He is visible, pleasant and cooperative, c/o nightmares and depression. Patient requests restating his Seroquel which he took at 3-400 mg for several years. Discussed cardiac concerns on such an agent and the patient is amenable to trying prazosin before restarting Seroquel. He denies SI/HI/AVH.    2/03 - Astrid Rene reports feeling well and moods are good. Had a good weekend, but notes having poor sleep last evening. Denies SI/HI/AH/VH. No aggression or violence. Appropriately interactive and aware. Tolerating medications well. Eating well with a good appetite. 2/04 - Astrid Rene reports feeling good today and moods are better than on admission. Denies SI/HI/AH/VH. No aggression or violence. Appropriately interactive and aware. Tolerating medications well. Eating and sleeping fairly. Working on discharge planning concerns. Made statements of wanting to kill himself when he leaves the hospital during groups yesterday. 2/05 - Astrid Rene reports doing great and moods are good. Denies SI/HI/AH/VH. No aggression or violence. Appropriately interactive and aware. Tolerating medications well. Eating well, however notes not sleeping well. Has been having difficulty with falling and staying asleep recently. Interested in out-patient counseling. Has been on Seroquel effectively in the past.        SIDE EFFECTS: (reviewed/updated 2/5/2020)  None reported or admitted to.    ALLERGIES:(reviewed/updated 2/5/2020)  Allergies   Allergen Reactions    Allergen [Antipyrine-Benzocaine] Unknown (comments)  Avelox [Moxifloxacin] Unknown (comments)    Codeine Sulfate Nausea and Vomiting    Pcn [Penicillins] Other (comments)     Coma      MEDICATIONS PRIOR TO ADMISSION:(reviewed/updated 2/5/2020)  Medications Prior to Admission   Medication Sig    mirtazapine (REMERON) 15 mg tablet Take 15 mg by mouth nightly. Indications: major depressive disorder    ibuprofen (MOTRIN) 400 mg tablet Take 400 mg by mouth every six (6) hours as needed for Pain.  gabapentin (NEURONTIN) 300 mg capsule Take 300 mg by mouth three (3) times daily. Indications: neuropathic pain, neck pain    pantoprazole (PROTONIX) 40 mg tablet Take 40 mg by mouth daily. Indications: gastroesophageal reflux disease    fluticasone propionate (FLONASE) 50 mcg/actuation nasal spray 2 Sprays by Both Nostrils route daily. Indications: inflammation of the nose due to an allergy    ipratropium-albuteroL (COMBIVENT RESPIMAT)  mcg/actuation inhaler Take 1 Puff by inhalation every six (6) hours as needed.  fluticasone-umeclidinium-vilanterol (TRELEGY ELLIPTA) 100-62.5-25 mcg inhaler Take 1 Puff by inhalation two (2) times a day. Indications: prevention of bronchospasms with emphysema    nitroglycerin (NITROSTAT) 0.4 mg SL tablet 1 Tab by SubLINGual route every five (5) minutes as needed for Chest Pain.  isosorbide mononitrate ER (IMDUR) 30 mg tablet Take 1 Tab by mouth daily.  atorvastatin (LIPITOR) 20 mg tablet Take 1 Tab by mouth daily.  levothyroxine (SYNTHROID) 25 mcg tablet Take 1 Tab by mouth Daily (before breakfast). PAST MEDICAL HISTORY: Past medical history from the initial psychiatric evaluation has been reviewed (reviewed/updated 2/5/2020) with no additional updates (I asked patient and no additional past medical history provided).    Past Medical History:   Diagnosis Date    Abdominal pain     Adverse effect of anesthesia     heart rate slows down    Asthma     Back pain     Cancer ([facility] [address] 2015    Throat cancer treated with chemo & radiation    Cancer [facility])     testicular cancer    Constipation     COPD     managed by PCP    Cyst of pharynx     Depression     Emphysema of lung ([facility] [address]     managed by PCP    Extrinsic asthma, unspecified     Fatigue     GERD (gastroesophageal reflux disease)     Insomnia, unspecified     On home O2     at bedtime    Other chest pain     since 2013    Pain in joint, forearm     Psychiatric disorder     schizophrenia    Smoker     1 ppw     Past Surgical History:   Procedure Laterality Date    HX HEENT      throat CA  - Dr. Gauri Gallegos- r/t tumors    HX ORCHIECTOMY      tumors    HX ORTHOPAEDIC Left     wrist surgery    HX ORTHOPAEDIC Left 2017    manipulation and injection shoulder    PLACE PERCUT GASTROSTOMY TUBE  2015         MA COLONOSCOPY FLX DX W/COLLJ SPEC WHEN PFRMD  2011           SOCIAL HISTORY: Social history from the initial psychiatric evaluation has been reviewed (reviewed/updated 2020) with no additional updates (I asked patient and no additional social history provided). Social History     Socioeconomic History    Marital status: SINGLE     Spouse name: Not on file    Number of children: Not on file    Years of education: Not on file    Highest education level: Not on file   Occupational History    Not on file   Social Needs    Financial resource strain: Not on file    Food insecurity:     Worry: Not on file     Inability: Not on file    Transportation needs:     Medical: No     Non-medical: No   Tobacco Use    Smoking status: Current Every Day Smoker     Packs/day: 1.00     Years: 52.00     Pack years: 52.00     Last attempt to quit: 2010     Years since quittin.2    Smokeless tobacco: Never Used    Tobacco comment: Discussed importance of smoking cessation for healing.    Substance and Sexual Activity    Alcohol use: No    Drug use: Yes     Types: Prescription, OTC    Sexual activity: Not Currently   Lifestyle    Physical activity:     Days per week: Not on file     Minutes per session: Not on file    Stress: Not on file   Relationships    Social connections:     Talks on phone: Not on file     Gets together: Not on file     Attends Jainism service: Not on file     Active member of club or organization: Not on file     Attends meetings of clubs or organizations: Not on file     Relationship status: Not on file    Intimate partner violence:     Fear of current or ex partner: Not on file     Emotionally abused: Not on file     Physically abused: Not on file     Forced sexual activity: Not on file   Other Topics Concern     Service Not Asked    Blood Transfusions Not Asked    Caffeine Concern Not Asked    Occupational Exposure Not Asked   [employer] Hazards Not Asked    Sleep Concern Not Asked    Stress Concern Not Asked    Weight Concern Not Asked    Special Diet Not Asked    Back Care Not Asked    Exercise Not Asked    Bike Helmet Not Asked   [address] Not Asked    Self-Exams Not Asked   Social History Narrative    Not on file      FAMILY HISTORY: Family history from the initial psychiatric evaluation has been reviewed (reviewed/updated 2/5/2020) with no additional updates (I asked patient and no additional family history provided).    Family History   Problem Relation Age of Onset    Cancer Mother     Cancer Father        REVIEW OF SYSTEMS: (reviewed/updated 2/5/2020)  Appetite:no change from normal   Sleep: poor with DIMS (difficulty initiating & maintaining sleep)   All other Review of Systems: Negative except per HPI         [facility] (MSE):    MSE FINDINGS ARE WITHIN NORMAL LIMITS (WNL) UNLESS OTHERWISE STATED BELOW. ( ALL OF THE BELOW CATEGORIES OF THE MSE HAVE BEEN REVIEWED (reviewed 2/5/2020) AND UPDATED AS DEEMED APPROPRIATE )  General Presentation age appropriate, cooperative   Orientation oriented to time, place and person   Vital Signs  See below (reviewed 2/5/2020); Vital Signs (BP, Pulse, & Temp) are within normal limits if not listed below. Gait and Station Stable/steady, no ataxia   Musculoskeletal System No extrapyramidal symptoms (EPS); no abnormal muscular movements or Tardive Dyskinesia (TD); muscle strength and tone are within normal limits   Language No aphasia or dysarthria   Speech:  normal volume and non-pressured   Thought Processes logical; normal rate of thoughts; good abstract reasoning/computation   Thought Associations goal directed   Thought Content free of delusions and free of hallucinations   Suicidal Ideations contracts for safety   Homicidal Ideations none   Mood:  depressed   Affect:  constricted and mood-congruent   Memory recent  intact   Memory remote:  intact   Concentration/Attention:  intact   Fund of Knowledge average   Insight:  limited   Reliability fair   Judgment:  limited          VITALS:     Patient Vitals for the past 24 hrs:   Temp Pulse Resp BP SpO2   02/05/20 0834  86  120/79    02/05/20 0737 97.9 °F (36.6 °C) 92 20 98/62 98 %   02/05/20 0314  98  115/90    02/04/20 1934 98.1 °F (36.7 °C) 98 18 98/86 98 %     Wt Readings from Last 3 Encounters:   02/01/20 64.6 kg (142 lb 6.7 oz)   01/30/20 64.6 kg (142 lb 6.7 oz)   10/23/19 64.5 kg (142 lb 3 oz)     Temp Readings from Last 3 Encounters:   02/05/20 97.9 °F (36.6 °C)   01/30/20 98.1 °F (36.7 °C)   10/23/19 97.6 °F (36.4 °C)     BP Readings from Last 3 Encounters:   02/05/20 120/79   01/30/20 150/72   10/23/19 129/77     Pulse Readings from Last 3 Encounters:   02/05/20 86   01/30/20 (!) 110   10/23/19 74            DATA     LABORATORY DATA:(reviewed/updated 2/5/2020)  No results found for this or any previous visit (from the past 24 hour(s)). No results found for: VALF2, VALAC, VALP, VALPR, DS6, CRBAM, CRBAMP, CARB2, XCRBAM  No results found for: LITHM   RADIOLOGY REPORTS:(reviewed/updated 2/5/2020)  No results found. MEDICATIONS     ALL MEDICATIONS:   Current Facility-Administered Medications   Medication Dose Route Frequency    traZODone (DESYREL) tablet 50 mg  50 mg Oral QHS    prazosin (MINIPRESS) capsule 2 mg  2 mg Oral QHS    albuterol (PROVENTIL HFA, VENTOLIN HFA, PROAIR HFA) inhaler 2 Puff  2 Puff Inhalation Q4H PRN    levothyroxine (SYNTHROID) tablet 25 mcg  25 mcg Oral ACB    mirtazapine (REMERON) tablet 30 mg  30 mg Oral QHS    OLANZapine (ZyPREXA) tablet 2.5 mg  2.5 mg Oral Q6H PRN    haloperidol lactate (HALDOL) injection 2.5 mg  2.5 mg IntraMUSCular Q6H PRN    benztropine (COGENTIN) tablet 0.5 mg  0.5 mg Oral BID PRN    diphenhydrAMINE (BENADRYL) injection 25 mg  25 mg IntraMUSCular BID PRN    acetaminophen (TYLENOL) tablet 650 mg  650 mg Oral Q4H PRN    magnesium hydroxide (MILK OF MAGNESIA) 400 mg/5 mL oral suspension 30 mL  30 mL Oral DAILY PRN    nicotine (NICODERM CQ) 21 mg/24 hr patch 1 Patch  1 Patch TransDERmal DAILY    isosorbide mononitrate ER (IMDUR) tablet 30 mg  30 mg Oral DAILY    atorvastatin (LIPITOR) tablet 20 mg  20 mg Oral QHS    pantoprazole (PROTONIX) tablet 40 mg  40 mg Oral DAILY AFTER BREAKFAST    nitroglycerin (NITROSTAT) tablet 0.4 mg  0.4 mg SubLINGual PRN    fluticasone propionate (FLONASE) 50 mcg/actuation nasal spray 1 Spray  1 Spray Both Nostrils DAILY    budesonide-formoteroL (SYMBICORT) 160-4.5 mcg/actuation HFA inhaler 2 Puff  2 Puff Inhalation BID    tiotropium bromide (SPIRIVA RESPIMAT) 2.5 mcg /actuation  2 Puff Inhalation DAILY    gabapentin (NEURONTIN) capsule 300 mg  300 mg Oral TID    hydroxyzine HCL (ATARAX) tablet 50 mg  50 mg Oral TID PRN      SCHEDULED MEDICATIONS:   Current Facility-Administered Medications   Medication Dose Route Frequency    traZODone (DESYREL) tablet 50 mg  50 mg Oral QHS    prazosin (MINIPRESS) capsule 2 mg  2 mg Oral QHS    levothyroxine (SYNTHROID) tablet 25 mcg  25 mcg Oral ACB    mirtazapine (REMERON) tablet 30 mg 30 mg Oral QHS    nicotine (NICODERM CQ) 21 mg/24 hr patch 1 Patch  1 Patch TransDERmal DAILY    isosorbide mononitrate ER (IMDUR) tablet 30 mg  30 mg Oral DAILY    atorvastatin (LIPITOR) tablet 20 mg  20 mg Oral QHS    pantoprazole (PROTONIX) tablet 40 mg  40 mg Oral DAILY AFTER BREAKFAST    fluticasone propionate (FLONASE) 50 mcg/actuation nasal spray 1 Spray  1 Spray Both Nostrils DAILY    budesonide-formoteroL (SYMBICORT) 160-4.5 mcg/actuation HFA inhaler 2 Puff  2 Puff Inhalation BID    tiotropium bromide (SPIRIVA RESPIMAT) 2.5 mcg /actuation  2 Puff Inhalation DAILY    gabapentin (NEURONTIN) capsule 300 mg  300 mg Oral TID          ASSESSMENT & PLAN     DIAGNOSES REQUIRING ACTIVE TREATMENT AND MONITORING: (reviewed/updated 2/5/2020)  Patient Active Hospital Problem List:  Major depressive disorder    Assessment: patient with ongoing somatic symptoms, is taking nitroglycerin for what appears to be pleuritic pain vs stable angina. Will continue current course of treatment, limit RRTs as patient's symptoms do not appear to be consistent with an MI or dissection; managed with PRN medication without any issue thus far. Suspect somatic anxiety. Plan:  - CONTINUE REMERON 30 mg QHS for MDD  - Continue Prazosin 2 mg QHS for nightmares  - Seroquel 50 mg PO Q hs for insomnia and depression adjunct  - IGM therapy as tolerated  - Neurology consult for neuropathic pain evaluation  - Dispo planning with social work for the next few days    In summary, Corwin Evans, is a 71 y.o.  male who presents with a severe exacerbation of the principal diagnosis of Recurrent depression ([facility] [address]    Patient's condition is not improving. Patient requires continued inpatient hospitalization for further stabilization, safety monitoring and medication management.   I will continue to coordinate the provision of individual, milieu, occupational, group, and substance abuse therapies to address target symptoms/diagnoses as deemed appropriate for the individual patient. A coordinated, multidisplinary treatment team round was conducted with the patient (this team consists of the nurse, psychiatric unit pharmcist,  and writer). Complete current electronic health record for patient has been reviewed today including consultant notes, ancillary staff notes, nurses and psychiatric tech notes. Suicide risk assessment completed and patient deemed to be of low risk for suicide at this time. The following regarding medications was addressed during rounds with patient:   the risks and benefits of the proposed medication. The patient was given the opportunity to ask questions. Informed consent given to the use of the above medications. Will continue to adjust psychiatric and non-psychiatric medications (see above \"medication\" section and orders section for details) as deemed appropriate & based upon diagnoses and response to treatment. I will continue to order blood tests/labs and diagnostic tests as deemed appropriate and review results as they become available (see orders for details and above listed lab/test results). I will order psychiatric records from previous [facility] hospitals to further elucidate the nature of patient's psychopathology and review once available. I will gather additional collateral information from friends, family and o/p treatment team to further elucidate the nature of patient's psychopathology and baselline level of psychiatric functioning. I certify that this patient's inpatient psychiatric hospital services furnished since the previous certification were, and continue to be, required for treatment that could reasonably be expected to improve the patient's condition, or for diagnostic study, and that the patient continues to need, on a daily basis, active treatment furnished directly by or requiring the supervision of inpatient psychiatric facility personnel.  In addition, the hospital records show that services furnished were intensive treatment services, admission or related services, or equivalent services.     EXPECTED DISCHARGE DATE/DAY: TBD     DISPOSITION: Home       Signed By:   Judy Baig MD  2/5/2020

## 2020-02-05 NOTE — BH NOTES
1400-Assumed patient from off going nurse    1600-Patient has attended groups    1800-Patient ate dinner and was med compliant and interacting with peers in the dayroom

## 2020-02-05 NOTE — PROGRESS NOTES
Rody Kong  actively participated in [address] [place] [address] about [address] [facility] on the [facility] unit. Rev.  Nato Portillo MDiv   For  Assistance Page [phone])

## 2020-02-05 NOTE — GROUP NOTE
PATRIZIA  GROUP DOCUMENTATION INDIVIDUAL Group Therapy Note Date: 2/5/2020 Group Start Time: 1500 Group End Time: 4008 Group Topic: Recreational/Music Therapy [address] [facility] 3 ACUTE BEHAV [facility], [facility] GROUP DOCUMENTATION GROUP Group Therapy Note Attendees: 6 Attendance: Attended Patient's Goal:  To concentrate on selected task Interventions/techniques: Supported-crafts,games,music Follows Directions: Followed directions Interactions: Interacted appropriately Mental Status: Calm Behavior/appearance: Attentive and Cooperative Goals Achieved: Able to engage in interactions and Able to listen to others Additional Notes:   
 
Lilibeth Medeiros

## 2020-02-05 NOTE — BH NOTES
Assumed care for the patient. Patient was out in the milieu, interacting well with the staffs and the peers. Patient denied S.I/H. I/A/V/H., depression and anxiety. Patient appeared pleasant. Patient was medicine and meal compliant. No aggressive behavior noted. Will continue to monitor. Patient received PRN Albuterol puffs at 2117 and 0314. Patient slept for about 7 hours.

## 2020-02-05 NOTE — INTERDISCIPLINARY ROUNDS
Patient goal(s) for today: Continue taking medications as prescribed; engage in unit activities Treatment team focus/goals: Start Seroquel for sleep; refer for mental health skill building services Progress note: Patient reports feeling better, denies SI/HI/AVH. Still complaining of poor sleep. LOS:  6  Expected LOS: 8 Financial concerns/prescription coverage: [place] Sleissa; [place] Medicaid Family contact: None Family requesting physician contact today: No 
Discharge plan: Return home Access to weapons: No 
Outpatient provider(s): Refer to [facility] Patient's preferred phone number for follow up call: [phone] Participating treatment team members: ISA Ruiz; Dr. Ngoc Valle MD; Evelin Fry, KristenD

## 2020-02-05 NOTE — PROGRESS NOTES
Problem: Suicide  Goal: *STG: Remains safe in hospital  Outcome: Progressing Towards Goal  Goal: *STG: Seeks staff when feelings of self harm or harm towards others arise  Outcome: Progressing Towards Goal  Goal: *STG: Attends activities and groups  Outcome: Progressing Towards Goal  Goal: *STG/LTG: No longer expresses self destructive or suicidal thoughts  Outcome: Progressing Towards Goal  Goal: Interventions  Outcome: Progressing Towards Goal     0715 This writer received report from the outgoing nurse. 7112 Patient in dayroom. Patient presents with a cooperative attitude, smiling affect, and euthymic mood. Patient is calm and cooperative. Patient denied SI, HI, and AVH. Patient is alert and oriented x 4.     0840 Patient ate all of his breakfast. Patient stated that he is ready to be discharged home. Patient interacting well with staff and peers. Patient is med and meal compliant. 1030 Patient in room resting quietly. 1045 Patient exited from his room stating that he took a shower. 1050 Patient actively participating in spirituality group. Patient with orders for a neurology consult for neuropathic pain. 1107 Patient in dayroom actively participating in substance abuse group. 1200 Patient complained of shortness of breath. This writer provided the patient with prn albuterol     1325 Patient breathing better. Prn albuterol effective. [phone] This writer contacted neurologist regarding the patient's neuro consult.

## 2020-02-05 NOTE — BH NOTES
GROUP THERAPY PROGRESS NOTE    Patient is participating in Substance abuse group. Group time: 45 minutes    Personal goal for participation: To understand addiction and relapse and identify triggers that increase urges and relapse. Goal orientation: Personal    Group therapy participation: active    Therapeutic interventions reviewed and discussed: Group discussion of substance use, abuse, and dependence and the urge cycle. Patients were able to explore their own urges to use various substances including cigarettes, alcohol, heroin, cocaine, and others. Group discussed how they feel when they are unable to use and ways substance use has hindered their lives. Triggers for use and coping skills to avoid use or manage symptoms until craving subsides were discussed. Those without addiction issues used the urges handout to write down and draw pictures of things that make them anxious, angry, or sad. Impression of participation: Amol shared that he feels more negative when he is alone and when he does not feel well. He discussed things he is doing to help himself stay positive and find a support system.     Radha Lou LPC LSATP [facility]

## 2020-02-05 NOTE — PROGRESS NOTES
Problem: Suicide  Goal: *STG: Remains safe in hospital  Outcome: Progressing Towards Goal     Problem: Suicide  Goal: *STG: Attends activities and groups  Outcome: Progressing Towards Goal     Problem: Suicide  Goal: *STG/LTG: No longer expresses self destructive or suicidal thoughts  Outcome: Progressing Towards Goal

## 2020-02-05 NOTE — BH NOTES
GROUP THERAPY PROGRESS NOTE    Patient is participating in Process Group. Group time: 45 minutes    Personal goal for participation: Process changes they are hoping to make for the year by focusing on goals for today. Goal orientation: Personal    Group therapy participation: active    Therapeutic interventions reviewed and discussed: Group discussion was focused [address]. Patients then gave a goal that they home to focus on or accomplish today. Impression of participation: Severo Spaniel came to group very upset that he was unable to leave until Friday. This was processed and he eventually agreed that leaving Friday would be better but he is concerned about rotting potatoes. He did admit to being concerned about limited support and wanting to get more sleep.  He reports enjoying what he calls quiet music that is just soft sounds and nothing that is popular or mainstream.    Savannah Gardiner [facility] LSATP [facility]

## 2020-02-06 PROCEDURE — 74011250637 HC RX REV CODE- 250/637: Performed by: PSYCHIATRY & NEUROLOGY

## 2020-02-06 PROCEDURE — 65220000003 HC RM SEMIPRIVATE PSYCH

## 2020-02-06 PROCEDURE — 74011250637 HC RX REV CODE- 250/637: Performed by: NURSE PRACTITIONER

## 2020-02-06 RX ADMIN — TIOTROPIUM BROMIDE INHALATION SPRAY 2 PUFF: 3.12 SPRAY, METERED RESPIRATORY (INHALATION) at 08:00

## 2020-02-06 RX ADMIN — ALBUTEROL SULFATE 2 PUFF: 90 AEROSOL, METERED RESPIRATORY (INHALATION) at 03:47

## 2020-02-06 RX ADMIN — ISOSORBIDE MONONITRATE 30 MG: 30 TABLET, EXTENDED RELEASE ORAL at 08:01

## 2020-02-06 RX ADMIN — QUETIAPINE FUMARATE 50 MG: 25 TABLET ORAL at 21:06

## 2020-02-06 RX ADMIN — HYDROXYZINE HYDROCHLORIDE 50 MG: 25 TABLET, FILM COATED ORAL at 16:39

## 2020-02-06 RX ADMIN — MIRTAZAPINE 30 MG: 15 TABLET, FILM COATED ORAL at 21:07

## 2020-02-06 RX ADMIN — GABAPENTIN 300 MG: 300 CAPSULE ORAL at 12:39

## 2020-02-06 RX ADMIN — BUDESONIDE AND FORMOTEROL FUMARATE DIHYDRATE 2 PUFF: 160; 4.5 AEROSOL RESPIRATORY (INHALATION) at 07:58

## 2020-02-06 RX ADMIN — BUDESONIDE AND FORMOTEROL FUMARATE DIHYDRATE 2 PUFF: 160; 4.5 AEROSOL RESPIRATORY (INHALATION) at 16:39

## 2020-02-06 RX ADMIN — PRAZOSIN HYDROCHLORIDE 2 MG: 1 CAPSULE ORAL at 21:06

## 2020-02-06 RX ADMIN — ATORVASTATIN CALCIUM 20 MG: 10 TABLET, FILM COATED ORAL at 21:06

## 2020-02-06 RX ADMIN — GABAPENTIN 300 MG: 300 CAPSULE ORAL at 08:00

## 2020-02-06 RX ADMIN — FLUTICASONE PROPIONATE 1 SPRAY: 50 SPRAY, METERED NASAL at 07:54

## 2020-02-06 RX ADMIN — PANTOPRAZOLE SODIUM 40 MG: 40 TABLET, DELAYED RELEASE ORAL at 10:36

## 2020-02-06 RX ADMIN — LEVOTHYROXINE SODIUM 25 MCG: 25 TABLET ORAL at 06:31

## 2020-02-06 RX ADMIN — ALBUTEROL SULFATE 2 PUFF: 90 AEROSOL, METERED RESPIRATORY (INHALATION) at 07:57

## 2020-02-06 RX ADMIN — GABAPENTIN 300 MG: 300 CAPSULE ORAL at 16:39

## 2020-02-06 NOTE — BH NOTES
Behavioral Health Interdisciplinary Rounds Patient Name: Maria Teresa Bentley  Age: 71 y.o. Room/Bed:  321/01 Primary [facility]: [facility] Admission Status: Voluntary Readmission within 30 days: no 
Power of  in place: Unknown Patient requires a blocked bed: no           
Reason for blocked bed: Not required at this time Order for blocked bed obtained: no    
 
Sleep hours: *** Participation in Care/Groups:  yes Medication Compliant?: Yes PRNS (last 24 hours): None Restraints (last 24 hours):  no 
Substance Abuse:  yes 24 hour chart check complete: yes

## 2020-02-06 NOTE — BH NOTES
0800 Received report from off going nurse. Patient lying in bed, eyes closed, easily awaken to verbal stimuli. Patient denies any SI/SA/HI/HA denies any SH urges. Denies any AH/VH. Denies any pain or discomfort. ROS within normal limits. Continue with safety precautions. 12:00 Patient in day room, eating lunch and conversing with peers. NAD noted at this time. Continue with safety precautions. 1500 Patient in day room, drawing and conversing with peers. Patient has had an uneventful day. NAD noted at this time. Continue safety precautions. 1700 Patient is sitting in day room eating dinner and conversing with peers. NAD noted at this time. Continue with safety precautions. 1900 Report given to oncoming nurse. Hourly rounds completed. Shift unremarkable. Continue with safety precautions. TSH 3rd Generation, Hgb A1C with EAG and Lipid Panel early morning draw.

## 2020-02-06 NOTE — PROGRESS NOTES
GROUP THERAPY PROGRESS NOTE      Trey Cintron was present for medication group. GROUP TIME: 45 minutes, Thursdays 2pm    PERSONAL GOAL FOR PARTICIPATION: To be present for group, participate in discussion, and answer patient-directed questions. GOAL ORIENTATION: Personal    THERAPEUTIC INTERVENTIONS REVIEWED AND DISCUSSED: The following topics were presented: storage of medications, how to remember to refill medications and keep up with doctor appointments, relapse prevention, keeping a record of all medication including prescription and non-prescription drugs, and who to contact with medication questions. Patients were given time to ask questions regarding their current therapy. IMPRESSION OF PARTICIPATION: Mr. Mike Locke was present for group and participated in the beginning of group. He and another participant had multiple  side conversations throughout group and eventually stopped participating in group.        Marcy Crespo

## 2020-02-06 NOTE — PROGRESS NOTES
Laboratory monitoring for mood stabilizer and antipsychotics:    Recommended baseline monitoring has been completed based on this patient's current medication regimen. The patient is currently taking the following medication(s):   Current Facility-Administered Medications   Medication Dose Route Frequency    dexAMETHasone (DECADRON) tablet 4 mg  4 mg Oral Q12H    gabapentin (NEURONTIN) capsule 400 mg  400 mg Oral TID    QUEtiapine (SEROquel) tablet 50 mg  50 mg Oral QHS    prazosin (MINIPRESS) capsule 2 mg  2 mg Oral QHS    levothyroxine (SYNTHROID) tablet 25 mcg  25 mcg Oral ACB    mirtazapine (REMERON) tablet 30 mg  30 mg Oral QHS    nicotine (NICODERM CQ) 21 mg/24 hr patch 1 Patch  1 Patch TransDERmal DAILY    isosorbide mononitrate ER (IMDUR) tablet 30 mg  30 mg Oral DAILY    atorvastatin (LIPITOR) tablet 20 mg  20 mg Oral QHS    pantoprazole (PROTONIX) tablet 40 mg  40 mg Oral DAILY AFTER BREAKFAST    fluticasone propionate (FLONASE) 50 mcg/actuation nasal spray 1 Spray  1 Spray Both Nostrils DAILY    budesonide-formoteroL (SYMBICORT) 160-4.5 mcg/actuation HFA inhaler 2 Puff  2 Puff Inhalation BID    tiotropium bromide (SPIRIVA RESPIMAT) 2.5 mcg /actuation  2 Puff Inhalation DAILY       Height, Weight, BMI Estimation  Estimated body mass index is 21.03 kg/m² as calculated from the following:    Height as of this encounter: 175.3 cm (69\"). Weight as of this encounter: 64.6 kg (142 lb 6.7 oz). Renal Function, Hepatic Function and Chemistry  Estimated Creatinine Clearance: 70.8 mL/min (by C-G formula based on SCr of 0.95 mg/dL).     Lab Results   Component Value Date/Time    Sodium 142 02/01/2020 12:30 PM    Potassium 4.8 02/01/2020 12:30 PM    Chloride 105 02/01/2020 12:30 PM    CO2 34 (H) 02/01/2020 12:30 PM    Anion gap 3 (L) 02/01/2020 12:30 PM    Glucose 91 02/01/2020 12:30 PM    BUN 18 02/01/2020 12:30 PM    Creatinine 0.95 02/01/2020 12:30 PM    BUN/Creatinine ratio 19 02/01/2020 12:30 PM    GFR est AA >60 02/01/2020 12:30 PM    GFR est non-AA >60 02/01/2020 12:30 PM    Calcium 9.3 02/01/2020 12:30 PM    ALT (SGPT) 23 01/30/2020 10:30 AM    AST (SGOT) 15 01/30/2020 10:30 AM    Alk.  phosphatase 61 01/30/2020 10:30 AM    Protein, total 7.5 01/30/2020 10:30 AM    Albumin 3.5 01/30/2020 10:30 AM    Globulin 4.0 01/30/2020 10:30 AM    A-G Ratio 0.9 (L) 01/30/2020 10:30 AM    Bilirubin, total 0.6 01/30/2020 10:30 AM       Lab Results   Component Value Date/Time    Glucose 91 02/01/2020 12:30 PM       Lab Results   Component Value Date/Time    Hemoglobin A1c 5.7 (H) 02/07/2020 05:06 AM       Hematology  Lab Results   Component Value Date/Time    WBC 5.2 02/01/2020 12:30 PM    HGB 15.7 02/01/2020 12:30 PM    HCT 48.8 02/01/2020 12:30 PM    PLATELET 263 87/99/3934 12:30 PM    MCV 95.3 02/01/2020 12:30 PM       Lipids  Lab Results   Component Value Date/Time    Cholesterol, total 130 02/07/2020 05:06 AM    HDL Cholesterol 52 02/07/2020 05:06 AM    LDL, calculated 60.2 02/07/2020 05:06 AM    Triglyceride 89 02/07/2020 05:06 AM    CHOL/HDL Ratio 2.5 02/07/2020 05:06 AM       Thyroid Function    Lab Results   Component Value Date/Time    TSH 7.88 (H) 02/07/2020 05:06 AM    T4, Free 1.25 03/08/2018 11:42 AM     Vitals  Visit Vitals  /60 (BP 1 Location: Right arm, BP Patient Position: Sitting)   Pulse 93   Temp 97.4 °F (36.3 °C)   Resp 18   Ht 175.3 cm (69\")   Wt 64.6 kg (142 lb 6.7 oz)   SpO2 99%   BMI 21.03 kg/m²       Patricia Rodriges, PharmD, BCPS  [phone] (pharmacy)

## 2020-02-06 NOTE — PROGRESS NOTES
Problem: Suicide  Goal: *STG: Remains safe in hospital  Outcome: Progressing Towards Goal  Goal: *STG: Seeks staff when feelings of self harm or harm towards others arise  Outcome: Progressing Towards Goal  Goal: *STG: Attends activities and groups  Outcome: Progressing Towards Goal  Goal: *STG:  Verbalizes alternative ways of dealing with maladaptive feelings/behaviors  Outcome: Progressing Towards Goal  Goal: *STG/LTG: No longer expresses self destructive or suicidal thoughts  Outcome: Progressing Towards Goal  Goal: *LTG:  Identifies available community resources  Outcome: Progressing Towards Goal  Goal: *LTG:  Develops proactive suicide prevention plan  Outcome: Progressing Towards Goal  Goal: Interventions  Outcome: Progressing Towards Goal     Problem: Patient Education: Go to Patient Education Activity  Goal: Patient/Family Education  Outcome: Progressing Towards Goal     Problem: Falls - Risk of  Goal: *Absence of Falls  Description  Document Shelia Porter Fall Risk and appropriate interventions in the flowsheet.   Outcome: Progressing Towards Goal  Note: Fall Risk Interventions:     Problem: Patient Education: Go to Patient Education Activity  Goal: Patient/Family Education  Outcome: Progressing Towards Goal

## 2020-02-06 NOTE — BH NOTES
1930  Assumed care of patient from [facility] , ANA    Patient found to be in the day room, social and pleasant. Visible on the unit. Denies any suicidal or homicidal ideation. Denies any auditory or visual hallucinations. Contracts for safety on the unit. Alert and oriented to person, place, time, date and situation. States his only complaint is that of poor sleep. Denies any shortness of breath. States he believes he is to be discharged on Friday and is \"looking forward to it\". Otherwise he has been pleasant, calm and cooperative. 2113  Patient given prescribed HS Medication. Tolerated well. 2230  Patient resting quietly at this time. Respirations  Are easy and non labored. Appears to be sleeping. Will continue to monitor per patient plan of care    2300  Patient resting quietly at this time    0100  Patient continues to rest quietly    0233  Patient resting quietly  Appears to be sleeping    0430  Patient resting quietly    0611  Patient rested approximately 8.5 hours this evening.

## 2020-02-06 NOTE — INTERDISCIPLINARY ROUNDS
Behavioral Health Interdisciplinary Rounds Patient Name: Josie Cherry  Age: 71 y.o. Room/Bed:  321/01 [facility]: [facility] Admission Status: Voluntary Readmission within 30 days: no 
Power of  in place: Unknown Patient requires a blocked bed: no           
Reason for blocked bed: Not required at this time Order for blocked bed obtained: no    
 
Sleep hours: 8.5 Participation in Care/Groups:  yes Medication Compliant?: Yes PRNS (last 24 hours): None Restraints (last 24 hours):  no 
Substance Abuse:  yes 24 hour chart check complete: yes Patient goal(s) for today: Continue taking medications as prescribed; engage in unit activities; prepare for discharge tomorrow Treatment team focus/goals: Continue taking medications as prescribed; schedule follow-up Progress note: Patient was tearful in the morning, reporting feelings of grief over the loss of his friends. Reported he is looking forward to discharge tomorrow and beginning outpatient treatment. LOS:  7  Expected LOS: 8 Financial concerns/prescription coverage: [employer] Family contact: None Family requesting physician contact today: No 
Discharge plan: Return home Access to weapons: No 
Outpatient provider(s): [facility] Patient's preferred phone number for follow up call: [phone] Participating treatment team members: ISA Palacio; Dr. Magda Mari MD; Manuela Garza, PharmD

## 2020-02-06 NOTE — BH NOTES
PSYCHIATRIC PROGRESS NOTE         Patient Name  Janene Pelayo   Date of Birth 1955   [facility] [id number]   Medical Record Number  [id number]      Age  71 y.o. PCP Krista Connelly MD   Admit date:  2020    Room Number  321/01  @ [facility]   Date of Service  2020         E & M PROGRESS NOTE:         HISTORY       CC:  \"suicidal ideation\"  HISTORY OF PRESENT ILLNESS/INTERVAL HISTORY:  (reviewed/updated 2020). per initial evaluation: This is a 57-year-old  male with history of depression, prior treatments, who reports in the past month he had 2 major losses in his life, and he has no more friends, no more family left to support him. He is helpless, hopeless. He had thoughts of suicide, but no specific plan to kill himself during this interview, per the reports. He is thinking about drinking antifreeze, but he had multiple plans, none specific that he had betted on at this time. He knows that his girlfriend passed away of diabetes at [facility] earlier this month where she was supposedly brain dead, so he got to watch her suffer before they could pull the plug on her, and then eight days after, they pulled the plug, then she passed away, and just buried her and he broke down. His friend was comforting him in his home, and he went to bed one night, the next morning he woke up and his friend was dead in his apartment couch area. He called the police and they came in and got him, but he was already dead. He is very tearful and became distraught during the conversation, and had to take a break before we could continue. He says maybe 4 years ago, his mother  and that was the last person in his family that was living. He no longer has any supports or anyone in his community. He lives alone now, and he feels helpless, hopeless, lost, and so he was brought in to get help.     - patient complained of anxiety and received Atarax. He slept 6 hours overnight.  Patient c/o chest pain, sharp and stabbing was given nitroglycerin with resolution. He denied SI/HI/AVH. This morning, the patient was visible, anxious, and spontaneously c/o chest pain, which he described as a sharp, substernal pain. The patient denied any pressure or radiation. EKG unremarkable. Troponin I negative. 2/02 - no acute overnight events. Patient with no new episodes of chest pain this AM. He is visible, pleasant and cooperative, c/o nightmares and depression. Patient requests restating his Seroquel which he took at 3-400 mg for several years. Discussed cardiac concerns on such an agent and the patient is amenable to trying prazosin before restarting Seroquel. He denies SI/HI/AVH.    2/03 - Oscar Herman reports feeling well and moods are good. Had a good weekend, but notes having poor sleep last evening. Denies SI/HI/AH/VH. No aggression or violence. Appropriately interactive and aware. Tolerating medications well. Eating well with a good appetite. 2/04 - Oscar Herman reports feeling good today and moods are better than on admission. Denies SI/HI/AH/VH. No aggression or violence. Appropriately interactive and aware. Tolerating medications well. Eating and sleeping fairly. Working on discharge planning concerns. Made statements of wanting to kill himself when he leaves the hospital during groups yesterday. 2/05 - Oscar Herman reports doing great and moods are good. Denies SI/HI/AH/VH. No aggression or violence. Appropriately interactive and aware. Tolerating medications well. Eating well, however notes not sleeping well. Has been having difficulty with falling and staying asleep recently. Interested in out-patient counseling. Has been on Seroquel effectively in the past.    2/06 - Oscar Herman reports feeling down, grieving over the loss of his girlfriend and good friend and moods are sad  Denies SI/HI/AH/VH. No aggression or violence. Appropriately interactive and aware. Tolerating medications well. Eating and sleeping well. Attending to ADL's well. SIDE EFFECTS: (reviewed/updated 2/6/2020)  None reported or admitted to. ALLERGIES:(reviewed/updated 2/6/2020)  Allergies   Allergen Reactions    Allergen [Antipyrine-Benzocaine] Unknown (comments)    Avelox [Moxifloxacin] Unknown (comments)    Codeine Sulfate Nausea and Vomiting    Pcn [Penicillins] Other (comments)     Coma      MEDICATIONS PRIOR TO ADMISSION:(reviewed/updated 2/6/2020)  Medications Prior to Admission   Medication Sig    mirtazapine (REMERON) 15 mg tablet Take 15 mg by mouth nightly. Indications: major depressive disorder    ibuprofen (MOTRIN) 400 mg tablet Take 400 mg by mouth every six (6) hours as needed for Pain.  gabapentin (NEURONTIN) 300 mg capsule Take 300 mg by mouth three (3) times daily. Indications: neuropathic pain, neck pain    pantoprazole (PROTONIX) 40 mg tablet Take 40 mg by mouth daily. Indications: gastroesophageal reflux disease    fluticasone propionate (FLONASE) 50 mcg/actuation nasal spray 2 Sprays by Both Nostrils route daily. Indications: inflammation of the nose due to an allergy    ipratropium-albuteroL (COMBIVENT RESPIMAT)  mcg/actuation inhaler Take 1 Puff by inhalation every six (6) hours as needed.  fluticasone-umeclidinium-vilanterol (TRELEGY ELLIPTA) 100-62.5-25 mcg inhaler Take 1 Puff by inhalation two (2) times a day. Indications: prevention of bronchospasms with emphysema    nitroglycerin (NITROSTAT) 0.4 mg SL tablet 1 Tab by SubLINGual route every five (5) minutes as needed for Chest Pain.  isosorbide mononitrate ER (IMDUR) 30 mg tablet Take 1 Tab by mouth daily.  atorvastatin (LIPITOR) 20 mg tablet Take 1 Tab by mouth daily.  levothyroxine (SYNTHROID) 25 mcg tablet Take 1 Tab by mouth Daily (before breakfast).       PAST MEDICAL HISTORY: Past medical history from the initial psychiatric evaluation has been reviewed (reviewed/updated 2/6/2020) with no additional updates (I asked patient and no additional past medical history provided). Past Medical History:   Diagnosis Date    Abdominal pain     Adverse effect of anesthesia     heart rate slows down    Asthma     Back pain     Cancer (HCC) 2015    Throat cancer treated with chemo & radiation    Cancer [facility])     testicular cancer    Constipation     COPD     managed by PCP    Cyst of pharynx     Depression     Emphysema of lung ([address]     managed by PCP    Extrinsic asthma, unspecified     Fatigue     GERD (gastroesophageal reflux disease)     Insomnia, unspecified     On home O2     at bedtime    Other chest pain     since 2-    Pain in joint, forearm     Psychiatric disorder     schizophrenia    Smoker     1 ppw     Past Surgical History:   Procedure Laterality Date    HX HEENT      throat CA  - Dr. Rosanna Michael oncology    HX ORCHIECTOMY      Bilat- r/t tumors    HX ORCHIECTOMY      tumors    HX ORTHOPAEDIC Left     wrist surgery    HX ORTHOPAEDIC Left 07/14/2017    manipulation and injection shoulder    PLACE PERCUT GASTROSTOMY TUBE  9/21/2015         TX COLONOSCOPY FLX DX W/COLLJ SPEC WHEN PFRMD  8/29/2011           SOCIAL HISTORY: Social history from the initial psychiatric evaluation has been reviewed (reviewed/updated 2/6/2020) with no additional updates (I asked patient and no additional social history provided).    Social History     Socioeconomic History    Marital status: SINGLE     Spouse name: Not on file    Number of children: Not on file    Years of education: Not on file    Highest education level: Not on file   Occupational History    Not on file   Social Needs    Financial resource strain: Not on file    Food insecurity:     Worry: Not on file     Inability: Not on file    Transportation needs:     Medical: No     Non-medical: No   Tobacco Use    Smoking status: Current Every Day Smoker     Packs/day: 1.00     Years: 52.00     Pack years: 52.00     Last attempt to quit: 2010     Years since quittin.2    Smokeless tobacco: Never Used    Tobacco comment: Discussed importance of smoking cessation for healing. Substance and Sexual Activity    Alcohol use: No    Drug use: Yes     Types: Prescription, OTC    Sexual activity: Not Currently   Lifestyle    Physical activity:     Days per week: Not on file     Minutes per session: Not on file    Stress: Not on file   Relationships    Social connections:     Talks on phone: Not on file     Gets together: Not on file     Attends Sabianism service: Not on file     Active member of club or organization: Not on file     Attends meetings of clubs or organizations: Not on file     Relationship status: Not on file    Intimate partner violence:     Fear of current or ex partner: Not on file     Emotionally abused: Not on file     Physically abused: Not on file     Forced sexual activity: Not on file   Other Topics Concern     Service Not Asked    Blood Transfusions Not Asked    Caffeine Concern Not Asked    Occupational Exposure Not Asked   Hettie Model Hazards Not Asked    Sleep Concern Not Asked    Stress Concern Not Asked    Weight Concern Not Asked    Special Diet Not Asked    Back Care Not Asked    Exercise Not Asked    Bike Helmet Not Asked    [facility] Not Asked    Self-Exams Not Asked   Social History Narrative    Not on file      FAMILY HISTORY: Family history from the initial psychiatric evaluation has been reviewed (reviewed/updated 2020) with no additional updates (I asked patient and no additional family history provided).    Family History   Problem Relation Age of Onset    Cancer Mother     Cancer Father        REVIEW OF SYSTEMS: (reviewed/updated 2020)  Appetite:no change from normal   Sleep: poor with DIMS (difficulty initiating & maintaining sleep)   All other Review of Systems: Negative except per HPI         [address] EXAM (MSE):    MSE FINDINGS ARE WITHIN NORMAL LIMITS (WNL) UNLESS OTHERWISE STATED BELOW. ( ALL OF THE BELOW CATEGORIES OF THE MSE HAVE BEEN REVIEWED (reviewed 2/6/2020) AND UPDATED AS DEEMED APPROPRIATE )  General Presentation age appropriate, cooperative   Orientation oriented to time, place and person   Vital Signs  See below (reviewed 2/6/2020); Vital Signs (BP, Pulse, & Temp) are within normal limits if not listed below.    Gait and Station Stable/steady, no ataxia   Musculoskeletal System No extrapyramidal symptoms (EPS); no abnormal muscular movements or Tardive Dyskinesia (TD); muscle strength and tone are within normal limits   Language No aphasia or dysarthria   Speech:  normal volume and non-pressured   Thought Processes logical; normal rate of thoughts; good abstract reasoning/computation   Thought Associations goal directed   Thought Content free of delusions and free of hallucinations   Suicidal Ideations contracts for safety   Homicidal Ideations none   Mood:  depressed   Affect:  constricted and mood-congruent   Memory recent  intact   Memory remote:  intact   Concentration/Attention:  intact   Fund of Knowledge average   Insight:  limited   Reliability fair   Judgment:  limited          VITALS:     Patient Vitals for the past 24 hrs:   Temp Pulse Resp BP SpO2   02/06/20 0900 97.8 °F (36.6 °C) 84 14 99/62    02/06/20 0700 97.8 °F (36.6 °C) 84 14 99/62 99 %   02/05/20 1907 98.1 °F (36.7 °C) 96 18 122/70 99 %     Wt Readings from Last 3 Encounters:   02/01/20 64.6 kg (142 lb 6.7 oz)   01/30/20 64.6 kg (142 lb 6.7 oz)   10/23/19 64.5 kg (142 lb 3 oz)     Temp Readings from Last 3 Encounters:   02/06/20 97.8 °F (36.6 °C)   01/30/20 98.1 °F (36.7 °C)   10/23/19 97.6 °F (36.4 °C)     BP Readings from Last 3 Encounters:   02/06/20 99/62   01/30/20 150/72   10/23/19 129/77     Pulse Readings from Last 3 Encounters:   02/06/20 84   01/30/20 (!) 110   10/23/19 74            DATA     LABORATORY DATA:(reviewed/updated 2/6/2020)  No results found for this or any previous visit (from the past 24 hour(s)). No results found for: VALF2, VALAC, VALP, VALPR, DS6, CRBAM, CRBAMP, CARB2, XCRBAM  No results found for: LITHM   RADIOLOGY REPORTS:(reviewed/updated 2/6/2020)  No results found.        MEDICATIONS     ALL MEDICATIONS:   Current Facility-Administered Medications   Medication Dose Route Frequency    QUEtiapine (SEROquel) tablet 50 mg  50 mg Oral QHS    prazosin (MINIPRESS) capsule 2 mg  2 mg Oral QHS    albuterol (PROVENTIL HFA, VENTOLIN HFA, PROAIR HFA) inhaler 2 Puff  2 Puff Inhalation Q4H PRN    levothyroxine (SYNTHROID) tablet 25 mcg  25 mcg Oral ACB    mirtazapine (REMERON) tablet 30 mg  30 mg Oral QHS    OLANZapine (ZyPREXA) tablet 2.5 mg  2.5 mg Oral Q6H PRN    haloperidol lactate (HALDOL) injection 2.5 mg  2.5 mg IntraMUSCular Q6H PRN    benztropine (COGENTIN) tablet 0.5 mg  0.5 mg Oral BID PRN    diphenhydrAMINE (BENADRYL) injection 25 mg  25 mg IntraMUSCular BID PRN    acetaminophen (TYLENOL) tablet 650 mg  650 mg Oral Q4H PRN    magnesium hydroxide (MILK OF MAGNESIA) 400 mg/5 mL oral suspension 30 mL  30 mL Oral DAILY PRN    nicotine (NICODERM CQ) 21 mg/24 hr patch 1 Patch  1 Patch TransDERmal DAILY    isosorbide mononitrate ER (IMDUR) tablet 30 mg  30 mg Oral DAILY    atorvastatin (LIPITOR) tablet 20 mg  20 mg Oral QHS    pantoprazole (PROTONIX) tablet 40 mg  40 mg Oral DAILY AFTER BREAKFAST    nitroglycerin (NITROSTAT) tablet 0.4 mg  0.4 mg SubLINGual PRN    fluticasone propionate (FLONASE) 50 mcg/actuation nasal spray 1 Spray  1 Spray Both Nostrils DAILY    budesonide-formoteroL (SYMBICORT) 160-4.5 mcg/actuation HFA inhaler 2 Puff  2 Puff Inhalation BID    tiotropium bromide (SPIRIVA RESPIMAT) 2.5 mcg /actuation  2 Puff Inhalation DAILY    gabapentin (NEURONTIN) capsule 300 mg  300 mg Oral TID    hydroxyzine HCL (ATARAX) tablet 50 mg  50 mg Oral TID PRN      SCHEDULED MEDICATIONS:   Current Facility-Administered Medications   Medication Dose Route Frequency    QUEtiapine (SEROquel) tablet 50 mg  50 mg Oral QHS    prazosin (MINIPRESS) capsule 2 mg  2 mg Oral QHS    levothyroxine (SYNTHROID) tablet 25 mcg  25 mcg Oral ACB    mirtazapine (REMERON) tablet 30 mg  30 mg Oral QHS    nicotine (NICODERM CQ) 21 mg/24 hr patch 1 Patch  1 Patch TransDERmal DAILY    isosorbide mononitrate ER (IMDUR) tablet 30 mg  30 mg Oral DAILY    atorvastatin (LIPITOR) tablet 20 mg  20 mg Oral QHS    pantoprazole (PROTONIX) tablet 40 mg  40 mg Oral DAILY AFTER BREAKFAST    fluticasone propionate (FLONASE) 50 mcg/actuation nasal spray 1 Spray  1 Spray Both Nostrils DAILY    budesonide-formoteroL (SYMBICORT) 160-4.5 mcg/actuation HFA inhaler 2 Puff  2 Puff Inhalation BID    tiotropium bromide (SPIRIVA RESPIMAT) 2.5 mcg /actuation  2 Puff Inhalation DAILY    gabapentin (NEURONTIN) capsule 300 mg  300 mg Oral TID          ASSESSMENT & PLAN     DIAGNOSES REQUIRING ACTIVE TREATMENT AND MONITORING: (reviewed/updated 2/6/2020)  Patient Active Hospital Problem List:  Major depressive disorder    Assessment: patient with ongoing somatic symptoms, is taking nitroglycerin for what appears to be pleuritic pain vs stable angina. Will continue current course of treatment, limit RRTs as patient's symptoms do not appear to be consistent with an MI or dissection; managed with PRN medication without any issue thus far. Suspect somatic anxiety. Plan:  - CONTINUE REMERON 30 mg QHS for MDD  - Continue Prazosin 2 mg QHS for nightmares  - Seroquel 50 mg PO Q hs for insomnia and depression adjunct  - IGM therapy as tolerated  - Neurology consult Pending for neuropathic pain evaluation spoke with Dr. Siva Le today.   - Dispo planning with social work for the next few days    In summary, Inna Rdz, is a 71 y.o.  male who presents with a severe exacerbation of the principal diagnosis of Recurrent depression [facility])    Patient's condition is not improving. Patient requires continued inpatient hospitalization for further stabilization, safety monitoring and medication management. I will continue to coordinate the provision of individual, milieu, occupational, group, and substance abuse therapies to address target symptoms/diagnoses as deemed appropriate for the individual patient. A coordinated, multidisplinary treatment team round was conducted with the patient (this team consists of the nurse, psychiatric unit pharmcist,  and writer). Complete current electronic health record for patient has been reviewed today including consultant notes, ancillary staff notes, nurses and psychiatric tech notes. Suicide risk assessment completed and patient deemed to be of low risk for suicide at this time. The following regarding medications was addressed during rounds with patient:   the risks and benefits of the proposed medication. The patient was given the opportunity to ask questions. Informed consent given to the use of the above medications. Will continue to adjust psychiatric and non-psychiatric medications (see above \"medication\" section and orders section for details) as deemed appropriate & based upon diagnoses and response to treatment. I will continue to order blood tests/labs and diagnostic tests as deemed appropriate and review results as they become available (see orders for details and above listed lab/test results). I will order psychiatric records from previous [facility] hospitals to further elucidate the nature of patient's psychopathology and review once available. I will gather additional collateral information from friends, family and o/p treatment team to further elucidate the nature of patient's psychopathology and baselline level of psychiatric functioning.          I certify that this patient's inpatient psychiatric hospital services furnished since the previous certification were, and continue to be, required for treatment that could reasonably be expected to improve the patient's condition, or for diagnostic study, and that the patient continues to need, on a daily basis, active treatment furnished directly by or requiring the supervision of inpatient psychiatric facility personnel. In addition, the hospital records show that services furnished were intensive treatment services, admission or related services, or equivalent services.     EXPECTED DISCHARGE DATE/DAY: TBD     DISPOSITION: Home       Signed By:   Franci Santacruz MD  2/6/2020

## 2020-02-06 NOTE — GROUP NOTE
PATRIZIA  GROUP DOCUMENTATION INDIVIDUAL Group Therapy Note Date: 2/6/2020 Group Start Time: 1500 Group End Time: 4833 Group Topic: Recreational/Music Therapy [facility] [place] [address] ACUTE BEHAV [facility] [place], [address] GROUP DOCUMENTATION GROUP Group Therapy Note Attendees: 7 Attendance: Attended Patient's Goal:  To concentrate on selected task Interventions/techniques: Supported-crafts,games,music Follows Directions: Followed directions Interactions: Interacted appropriately Mental Status: Calm and Flat Behavior/appearance: Attentive and Cooperative Goals Achieved: Able to engage in interactions and Able to listen to others-completed selected task Additional Notes:   
 
Anjel Friend

## 2020-02-06 NOTE — GROUP NOTE
PATRIZIA  GROUP DOCUMENTATION INDIVIDUAL Group Therapy Note Date: 2/6/2020 Group Start Time: 65 Group End Time: 0500 Group Topic: Nursing [facility] [place] [address] [address] [place] Alexandre ACHARYA  GROUP DOCUMENTATION GROUP Group Therapy Note Attendees: 3 Attendance: Did not attend Carlos Cotto

## 2020-02-06 NOTE — GROUP NOTE
PATRIZIA  GROUP DOCUMENTATION INDIVIDUAL Group Therapy Note Date: 2/6/2020 Group Start Time: 1000 Group End Time: 1100 Group Topic: Topic Group [address] [facility], [address] GROUP DOCUMENTATION GROUP Group Therapy Note Attendees: 6 Attendance: Attended Patient's Goal:  To participate in self esteem bingo Interventions/techniques: Supported-things pertaining to self esteem Follows Directions: Followed directions Interactions: Interacted appropriately Mental Status: Calm and Flat Behavior/appearance: Needed prompting Goals Achieved: Able to listen to others Additional Notes:  Pt declined active participation-opted to listen Melvina Chow

## 2020-02-06 NOTE — BH NOTES
GROUP THERAPY PROGRESS NOTE    Patient is participating in Process group. Group time: 45 minutes    Personal goal for participation: To discussion stressful situations and ways to cope. Goal orientation: Personal    Group therapy participation: active    Therapeutic interventions reviewed and discussed: Group discussion was focused issues that arise during the day and in normal life that can cause anxiety but also things that can increase anxiety to the point that it is a problem and ways to reduce stress through daily activities. Impression of participation: Rubio Brody reports going home tomorrow is making him nervous. He reports the fear of how he will feel going home and what will happen if he gets overwhelmed. He reports he has a plan to come back to the hospital if he gets overwhelmed and has thoughts of hurting himself.     Alexei Merino LPC LSATP [facility]

## 2020-02-07 ENCOUNTER — APPOINTMENT (OUTPATIENT)
Dept: MRI IMAGING | Age: 65
DRG: 885 | End: 2020-02-07
Attending: PSYCHIATRY & NEUROLOGY
Payer: MEDICARE

## 2020-02-07 LAB
CHOLEST SERPL-MCNC: 130 MG/DL
CK SERPL-CCNC: 154 U/L (ref 39–308)
ERYTHROCYTE [SEDIMENTATION RATE] IN BLOOD: 25 MM/HR (ref 0–20)
EST. AVERAGE GLUCOSE BLD GHB EST-MCNC: 117 MG/DL
HBA1C MFR BLD: 5.7 % (ref 4–5.6)
HDLC SERPL-MCNC: 52 MG/DL
HDLC SERPL: 2.5 {RATIO} (ref 0–5)
LDLC SERPL CALC-MCNC: 60.2 MG/DL (ref 0–100)
LIPID PROFILE,FLP: NORMAL
TRIGL SERPL-MCNC: 89 MG/DL (ref ?–150)
TSH SERPL DL<=0.05 MIU/L-ACNC: 7.88 UIU/ML (ref 0.36–3.74)
VIT B12 SERPL-MCNC: 714 PG/ML (ref 193–986)
VLDLC SERPL CALC-MCNC: 17.8 MG/DL

## 2020-02-07 PROCEDURE — 74011250637 HC RX REV CODE- 250/637: Performed by: PSYCHIATRY & NEUROLOGY

## 2020-02-07 PROCEDURE — 85652 RBC SED RATE AUTOMATED: CPT

## 2020-02-07 PROCEDURE — 84443 ASSAY THYROID STIM HORMONE: CPT

## 2020-02-07 PROCEDURE — 83036 HEMOGLOBIN GLYCOSYLATED A1C: CPT

## 2020-02-07 PROCEDURE — 80061 LIPID PANEL: CPT

## 2020-02-07 PROCEDURE — 82607 VITAMIN B-12: CPT

## 2020-02-07 PROCEDURE — 82164 ANGIOTENSIN I ENZYME TEST: CPT

## 2020-02-07 PROCEDURE — 36415 COLL VENOUS BLD VENIPUNCTURE: CPT

## 2020-02-07 PROCEDURE — 82550 ASSAY OF CK (CPK): CPT

## 2020-02-07 PROCEDURE — 74011250637 HC RX REV CODE- 250/637: Performed by: NURSE PRACTITIONER

## 2020-02-07 PROCEDURE — 74011250636 HC RX REV CODE- 250/636: Performed by: PSYCHIATRY & NEUROLOGY

## 2020-02-07 PROCEDURE — A9575 INJ GADOTERATE MEGLUMI 0.1ML: HCPCS | Performed by: PSYCHIATRY & NEUROLOGY

## 2020-02-07 PROCEDURE — 72156 MRI NECK SPINE W/O & W/DYE: CPT

## 2020-02-07 PROCEDURE — 65220000003 HC RM SEMIPRIVATE PSYCH

## 2020-02-07 RX ORDER — GADOTERATE MEGLUMINE 376.9 MG/ML
14 INJECTION INTRAVENOUS
Status: COMPLETED | OUTPATIENT
Start: 2020-02-07 | End: 2020-02-07

## 2020-02-07 RX ORDER — DEXAMETHASONE 4 MG/1
4 TABLET ORAL EVERY 12 HOURS
Status: DISCONTINUED | OUTPATIENT
Start: 2020-02-07 | End: 2020-02-10 | Stop reason: HOSPADM

## 2020-02-07 RX ADMIN — GABAPENTIN 400 MG: 300 CAPSULE ORAL at 17:09

## 2020-02-07 RX ADMIN — ISOSORBIDE MONONITRATE 30 MG: 30 TABLET, EXTENDED RELEASE ORAL at 08:16

## 2020-02-07 RX ADMIN — QUETIAPINE FUMARATE 50 MG: 25 TABLET ORAL at 21:03

## 2020-02-07 RX ADMIN — PRAZOSIN HYDROCHLORIDE 2 MG: 1 CAPSULE ORAL at 21:03

## 2020-02-07 RX ADMIN — GADOTERATE MEGLUMINE 14 ML: 376.9 INJECTION INTRAVENOUS at 10:24

## 2020-02-07 RX ADMIN — ALBUTEROL SULFATE 2 PUFF: 90 AEROSOL, METERED RESPIRATORY (INHALATION) at 02:45

## 2020-02-07 RX ADMIN — BUDESONIDE AND FORMOTEROL FUMARATE DIHYDRATE 2 PUFF: 160; 4.5 AEROSOL RESPIRATORY (INHALATION) at 08:19

## 2020-02-07 RX ADMIN — FLUTICASONE PROPIONATE 1 SPRAY: 50 SPRAY, METERED NASAL at 08:32

## 2020-02-07 RX ADMIN — BUDESONIDE AND FORMOTEROL FUMARATE DIHYDRATE 2 PUFF: 160; 4.5 AEROSOL RESPIRATORY (INHALATION) at 17:12

## 2020-02-07 RX ADMIN — ALBUTEROL SULFATE 2 PUFF: 90 AEROSOL, METERED RESPIRATORY (INHALATION) at 17:11

## 2020-02-07 RX ADMIN — ATORVASTATIN CALCIUM 20 MG: 10 TABLET, FILM COATED ORAL at 21:03

## 2020-02-07 RX ADMIN — DEXAMETHASONE 4 MG: 4 TABLET ORAL at 08:16

## 2020-02-07 RX ADMIN — PANTOPRAZOLE SODIUM 40 MG: 40 TABLET, DELAYED RELEASE ORAL at 08:16

## 2020-02-07 RX ADMIN — GABAPENTIN 400 MG: 300 CAPSULE ORAL at 12:30

## 2020-02-07 RX ADMIN — DEXAMETHASONE 4 MG: 4 TABLET ORAL at 21:03

## 2020-02-07 RX ADMIN — MIRTAZAPINE 30 MG: 15 TABLET, FILM COATED ORAL at 21:03

## 2020-02-07 RX ADMIN — TIOTROPIUM BROMIDE INHALATION SPRAY 2 PUFF: 3.12 SPRAY, METERED RESPIRATORY (INHALATION) at 08:00

## 2020-02-07 RX ADMIN — LEVOTHYROXINE SODIUM 25 MCG: 25 TABLET ORAL at 06:34

## 2020-02-07 RX ADMIN — GABAPENTIN 400 MG: 300 CAPSULE ORAL at 08:16

## 2020-02-07 NOTE — BH NOTES
1900  Assumed care of patient from Cesar Garner RN    2000  Patient found to be in the day room. Social, bright. Stated he is having no suicidal or homicidal ideation. Denies any auditory or visual hallucinations. Contracts for safety on the unit. Patient states he is due to go home today and is anxious but ready to move forward    2100  Patient in hallway, very tearful, sad. Almost sobbing. Stating he is afraid of going back and facing all the death and grieving and loneliness in his apartment. Very tearful, states he knows he needs to move forward but he is very sad. Patient remained visibly upset for approximately thirty minutes. 2106  Patient compliant with evening medications. 2200  Patient resting quietly in bed at this time. Appears to be sleeping. 0000 patient resting quietly    0200  Patient continues to rest quietly    0245  Patient out on unit requesting inhaler.     0400  Patient resting quietly    0458  Patient continues to rest quietly    0500  Patient compliant with lab draw.    4848  Patient slept approximately 8 hours

## 2020-02-07 NOTE — BH NOTES
PSYCHIATRIC PROGRESS NOTE         Patient Name  Arlene Encinas   Date of Birth 1955   [facility] [id number]   Medical Record Number  [id number]      Age  71 y.o. PCP Dane Mar MD   Admit date:  2020    Room Number  321/01  @ [facility]   Date of Service  2020         E & M PROGRESS NOTE:         HISTORY       CC:  \"suicidal ideation\"  HISTORY OF PRESENT ILLNESS/INTERVAL HISTORY:  (reviewed/updated 2020). per initial evaluation: This is a 41-year-old  male with history of depression, prior treatments, who reports in the past month he had 2 major losses in his life, and he has no more friends, no more family left to support him. He is helpless, hopeless. He had thoughts of suicide, but no specific plan to kill himself during this interview, per the reports. He is thinking about drinking antifreeze, but he had multiple plans, none specific that he had betted on at this time. He knows that his girlfriend passed away of diabetes at [facility] earlier this month where she was supposedly brain dead, so he got to watch her suffer before they could pull the plug on her, and then eight days after, they pulled the plug, then she passed away, and just buried her and he broke down. His friend was comforting him in his home, and he went to bed one night, the next morning he woke up and his friend was dead in his apartment couch area. He called the police and they came in and got him, but he was already dead. He is very tearful and became distraught during the conversation, and had to take a break before we could continue. He says maybe 4 years ago, his mother  and that was the last person in his family that was living. He no longer has any supports or anyone in his community. He lives alone now, and he feels helpless, hopeless, lost, and so he was brought in to get help.     - patient complained of anxiety and received Atarax. He slept 6 hours overnight.  Patient c/o chest pain, sharp and stabbing was given nitroglycerin with resolution. He denied SI/HI/AVH. This morning, the patient was visible, anxious, and spontaneously c/o chest pain, which he described as a sharp, substernal pain. The patient denied any pressure or radiation. EKG unremarkable. Troponin I negative. 2/02 - no acute overnight events. Patient with no new episodes of chest pain this AM. He is visible, pleasant and cooperative, c/o nightmares and depression. Patient requests restating his Seroquel which he took at 3-400 mg for several years. Discussed cardiac concerns on such an agent and the patient is amenable to trying prazosin before restarting Seroquel. He denies SI/HI/AVH.    2/03 - Victoria Talamantes reports feeling well and moods are good. Had a good weekend, but notes having poor sleep last evening. Denies SI/HI/AH/VH. No aggression or violence. Appropriately interactive and aware. Tolerating medications well. Eating well with a good appetite. 2/04 - Victoria Talamantes reports feeling good today and moods are better than on admission. Denies SI/HI/AH/VH. No aggression or violence. Appropriately interactive and aware. Tolerating medications well. Eating and sleeping fairly. Working on discharge planning concerns. Made statements of wanting to kill himself when he leaves the hospital during groups yesterday. 2/05 - Victoria Talamantes reports doing great and moods are good. Denies SI/HI/AH/VH. No aggression or violence. Appropriately interactive and aware. Tolerating medications well. Eating well, however notes not sleeping well. Has been having difficulty with falling and staying asleep recently. Interested in out-patient counseling. Has been on Seroquel effectively in the past.    2/06 - Victoria Talamantes reports feeling down, grieving over the loss of his girlfriend and good friend and moods are sad  Denies SI/HI/AH/VH. No aggression or violence. Appropriately interactive and aware. Tolerating medications well. Eating and sleeping well. Attending to ADL's well. 2/07 - Vandana Go reports feeling a little better today though was tearful with staff. Moods are fair. Saw neurology and is being worked up for his numbness and tingling in his fingers with neck and shoulder spasms. MRI completed and pending read. Denies SI/HI/AH/VH. No aggression or violence. Appropriately interactive and aware. Tolerating medications well. Eating and sleeping fairly. Labs pending. SIDE EFFECTS: (reviewed/updated 2/7/2020)  None reported or admitted to. ALLERGIES:(reviewed/updated 2/7/2020)  Allergies   Allergen Reactions    Allergen [Antipyrine-Benzocaine] Unknown (comments)    Avelox [Moxifloxacin] Unknown (comments)    Codeine Sulfate Nausea and Vomiting    Pcn [Penicillins] Other (comments)     Coma      MEDICATIONS PRIOR TO ADMISSION:(reviewed/updated 2/7/2020)  Medications Prior to Admission   Medication Sig    mirtazapine (REMERON) 15 mg tablet Take 15 mg by mouth nightly. Indications: major depressive disorder    ibuprofen (MOTRIN) 400 mg tablet Take 400 mg by mouth every six (6) hours as needed for Pain.  gabapentin (NEURONTIN) 300 mg capsule Take 300 mg by mouth three (3) times daily. Indications: neuropathic pain, neck pain    pantoprazole (PROTONIX) 40 mg tablet Take 40 mg by mouth daily. Indications: gastroesophageal reflux disease    fluticasone propionate (FLONASE) 50 mcg/actuation nasal spray 2 Sprays by Both Nostrils route daily. Indications: inflammation of the nose due to an allergy    ipratropium-albuteroL (COMBIVENT RESPIMAT)  mcg/actuation inhaler Take 1 Puff by inhalation every six (6) hours as needed.  fluticasone-umeclidinium-vilanterol (TRELEGY ELLIPTA) 100-62.5-25 mcg inhaler Take 1 Puff by inhalation two (2) times a day.  Indications: prevention of bronchospasms with emphysema    nitroglycerin (NITROSTAT) 0.4 mg SL tablet 1 Tab by SubLINGual route every five (5) minutes as needed for Chest Pain.  isosorbide mononitrate ER (IMDUR) 30 mg tablet Take 1 Tab by mouth daily.  atorvastatin (LIPITOR) 20 mg tablet Take 1 Tab by mouth daily.  levothyroxine (SYNTHROID) 25 mcg tablet Take 1 Tab by mouth Daily (before breakfast). PAST MEDICAL HISTORY: Past medical history from the initial psychiatric evaluation has been reviewed (reviewed/updated 2/7/2020) with no additional updates (I asked patient and no additional past medical history provided). Past Medical History:   Diagnosis Date    Abdominal pain     Adverse effect of anesthesia     heart rate slows down    Asthma     Back pain     Cancer (HCC) 2015    Throat cancer treated with chemo & radiation    Cancer [facility])     testicular cancer    Constipation     COPD     managed by PCP    Cyst of pharynx     Depression     Emphysema of lung ([facility] [address]     managed by PCP    Extrinsic asthma, unspecified     Fatigue     GERD (gastroesophageal reflux disease)     Insomnia, unspecified     On home O2     at bedtime    Other chest pain     since 2-    Pain in joint, forearm     Psychiatric disorder     schizophrenia    Smoker     1 ppw     Past Surgical History:   Procedure Laterality Date    HX HEENT      throat CA  - Dr. Robert Tilley oncology    HX ORCHIECTOMY      Bilat- r/t tumors    HX ORCHIECTOMY      tumors    HX ORTHOPAEDIC Left     wrist surgery    HX ORTHOPAEDIC Left 07/14/2017    manipulation and injection shoulder    PLACE PERCUT GASTROSTOMY TUBE  9/21/2015         CT COLONOSCOPY FLX DX W/COLLJ SPEC WHEN PFRMD  8/29/2011           SOCIAL HISTORY: Social history from the initial psychiatric evaluation has been reviewed (reviewed/updated 2/7/2020) with no additional updates (I asked patient and no additional social history provided).    Social History     Socioeconomic History    Marital status: SINGLE     Spouse name: Not on file    Number of children: Not on file    Years of education: Not on file    Highest education level: Not on file   Occupational History    Not on file   Social Needs    Financial resource strain: Not on file    Food insecurity:     Worry: Not on file     Inability: Not on file    Transportation needs:     Medical: No     Non-medical: No   Tobacco Use    Smoking status: Current Every Day Smoker     Packs/day: 1.00     Years: 52.00     Pack years: 52.00     Last attempt to quit: 2010     Years since quittin.2    Smokeless tobacco: Never Used    Tobacco comment: Discussed importance of smoking cessation for healing.    Substance and Sexual Activity    Alcohol use: No    Drug use: Yes     Types: Prescription, OTC    Sexual activity: Not Currently   Lifestyle    Physical activity:     Days per week: Not on file     Minutes per session: Not on file    Stress: Not on file   Relationships    Social connections:     Talks on phone: Not on file     Gets together: Not on file     Attends Zoroastrian service: Not on file     Active member of club or organization: Not on file     Attends meetings of clubs or organizations: Not on file     Relationship status: Not on file    Intimate partner violence:     Fear of current or ex partner: Not on file     Emotionally abused: Not on file     Physically abused: Not on file     Forced sexual activity: Not on file   Other Topics Concern     Service Not Asked    Blood Transfusions Not Asked    Caffeine Concern Not Asked    Occupational Exposure Not Asked   [place] [employer] Hazards Not Asked    Sleep Concern Not Asked    Stress Concern Not Asked    Weight Concern Not Asked    Special Diet Not Asked    Back Care Not Asked    Exercise Not Asked    Bike Helmet Not Asked    [address] Not Asked    Self-Exams Not Asked   Social History Narrative    Not on file      FAMILY HISTORY: Family history from the initial psychiatric evaluation has been reviewed (reviewed/updated 2020) with no additional updates (I asked patient and no additional family history provided). Family History   Problem Relation Age of Onset    Cancer Mother     Cancer Father        REVIEW OF SYSTEMS: (reviewed/updated 2/7/2020)  Appetite:no change from normal   Sleep: poor with DIMS (difficulty initiating & maintaining sleep)   All other Review of Systems: Negative except per HPI         [facility] (MSE):    MSE FINDINGS ARE WITHIN NORMAL LIMITS (WNL) UNLESS OTHERWISE STATED BELOW. ( ALL OF THE BELOW CATEGORIES OF THE MSE HAVE BEEN REVIEWED (reviewed 2/7/2020) AND UPDATED AS DEEMED APPROPRIATE )  General Presentation age appropriate, cooperative   Orientation oriented to time, place and person   Vital Signs  See below (reviewed 2/7/2020); Vital Signs (BP, Pulse, & Temp) are within normal limits if not listed below.    Gait and Station Stable/steady, no ataxia   Musculoskeletal System No extrapyramidal symptoms (EPS); no abnormal muscular movements or Tardive Dyskinesia (TD); muscle strength and tone are within normal limits   Language No aphasia or dysarthria   Speech:  normal volume and non-pressured   Thought Processes logical; normal rate of thoughts; good abstract reasoning/computation   Thought Associations goal directed   Thought Content free of delusions and free of hallucinations   Suicidal Ideations contracts for safety   Homicidal Ideations none   Mood:  depressed   Affect:  constricted and mood-congruent   Memory recent  intact   Memory remote:  intact   Concentration/Attention:  intact   Fund of Knowledge average   Insight:  limited   Reliability fair   Judgment:  limited          VITALS:     Patient Vitals for the past 24 hrs:   Temp Pulse Resp BP SpO2   02/07/20 0720 97.4 °F (36.3 °C) 93 18 100/60 99 %   02/07/20 0700     99 %   02/06/20 2100 98.3 °F (36.8 °C) 99 16 (!) 82/51 98 %     Wt Readings from Last 3 Encounters:   02/01/20 64.6 kg (142 lb 6.7 oz)   02/07/20 64.6 kg (142 lb 6.7 oz)   01/30/20 64.6 kg (142 lb 6.7 oz)     Temp Readings from Last 3 Encounters:   02/07/20 97.4 °F (36.3 °C)   01/30/20 98.1 °F (36.7 °C)   10/23/19 97.6 °F (36.4 °C)     BP Readings from Last 3 Encounters:   02/07/20 100/60   01/30/20 150/72   10/23/19 129/77     Pulse Readings from Last 3 Encounters:   02/07/20 93   01/30/20 (!) 110   10/23/19 74            DATA     LABORATORY DATA:(reviewed/updated 2/7/2020)  Recent Results (from the past 24 hour(s))   TSH 3RD GENERATION    Collection Time: 02/07/20  5:06 AM   Result Value Ref Range    TSH 7.88 (H) 0.36 - 3.74 uIU/mL   HEMOGLOBIN A1C WITH EAG    Collection Time: 02/07/20  5:06 AM   Result Value Ref Range    Hemoglobin A1c 5.7 (H) 4.0 - 5.6 %    Est. average glucose 117 mg/dL   LIPID PANEL    Collection Time: 02/07/20  5:06 AM   Result Value Ref Range    LIPID PROFILE          Cholesterol, total 130 <200 MG/DL    Triglyceride 89 <150 MG/DL    HDL Cholesterol 52 MG/DL    LDL, calculated 60.2 0 - 100 MG/DL    VLDL, calculated 17.8 MG/DL    CHOL/HDL Ratio 2.5 0.0 - 5.0     CK    Collection Time: 02/07/20  5:06 AM   Result Value Ref Range     39 - 308 U/L   SED RATE (ESR)    Collection Time: 02/07/20  5:06 AM   Result Value Ref Range    Sed rate, automated 25 (H) 0 - 20 mm/hr   VITAMIN B12    Collection Time: 02/07/20  6:30 AM   Result Value Ref Range    Vitamin B12 714 193 - 986 pg/mL     No results found for: VALF2, VALAC, VALP, VALPR, DS6, CRBAM, CRBAMP, CARB2, XCRBAM  No results found for: LITHM   RADIOLOGY REPORTS:(reviewed/updated 2/7/2020)  No results found.        MEDICATIONS     ALL MEDICATIONS:   Current Facility-Administered Medications   Medication Dose Route Frequency    dexAMETHasone (DECADRON) tablet 4 mg  4 mg Oral Q12H    gabapentin (NEURONTIN) capsule 400 mg  400 mg Oral TID    QUEtiapine (SEROquel) tablet 50 mg  50 mg Oral QHS    prazosin (MINIPRESS) capsule 2 mg  2 mg Oral QHS    albuterol (PROVENTIL HFA, VENTOLIN HFA, PROAIR HFA) inhaler 2 Puff  2 Puff Inhalation Q4H PRN    levothyroxine (SYNTHROID) tablet 25 mcg  25 mcg Oral ACB    mirtazapine (REMERON) tablet 30 mg  30 mg Oral QHS    OLANZapine (ZyPREXA) tablet 2.5 mg  2.5 mg Oral Q6H PRN    haloperidol lactate (HALDOL) injection 2.5 mg  2.5 mg IntraMUSCular Q6H PRN    benztropine (COGENTIN) tablet 0.5 mg  0.5 mg Oral BID PRN    diphenhydrAMINE (BENADRYL) injection 25 mg  25 mg IntraMUSCular BID PRN    acetaminophen (TYLENOL) tablet 650 mg  650 mg Oral Q4H PRN    magnesium hydroxide (MILK OF MAGNESIA) 400 mg/5 mL oral suspension 30 mL  30 mL Oral DAILY PRN    nicotine (NICODERM CQ) 21 mg/24 hr patch 1 Patch  1 Patch TransDERmal DAILY    isosorbide mononitrate ER (IMDUR) tablet 30 mg  30 mg Oral DAILY    atorvastatin (LIPITOR) tablet 20 mg  20 mg Oral QHS    pantoprazole (PROTONIX) tablet 40 mg  40 mg Oral DAILY AFTER BREAKFAST    nitroglycerin (NITROSTAT) tablet 0.4 mg  0.4 mg SubLINGual PRN    fluticasone propionate (FLONASE) 50 mcg/actuation nasal spray 1 Spray  1 Spray Both Nostrils DAILY    budesonide-formoteroL (SYMBICORT) 160-4.5 mcg/actuation HFA inhaler 2 Puff  2 Puff Inhalation BID    tiotropium bromide (SPIRIVA RESPIMAT) 2.5 mcg /actuation  2 Puff Inhalation DAILY    hydroxyzine HCL (ATARAX) tablet 50 mg  50 mg Oral TID PRN      SCHEDULED MEDICATIONS:   Current Facility-Administered Medications   Medication Dose Route Frequency    dexAMETHasone (DECADRON) tablet 4 mg  4 mg Oral Q12H    gabapentin (NEURONTIN) capsule 400 mg  400 mg Oral TID    QUEtiapine (SEROquel) tablet 50 mg  50 mg Oral QHS    prazosin (MINIPRESS) capsule 2 mg  2 mg Oral QHS    levothyroxine (SYNTHROID) tablet 25 mcg  25 mcg Oral ACB    mirtazapine (REMERON) tablet 30 mg  30 mg Oral QHS    nicotine (NICODERM CQ) 21 mg/24 hr patch 1 Patch  1 Patch TransDERmal DAILY    isosorbide mononitrate ER (IMDUR) tablet 30 mg  30 mg Oral DAILY    atorvastatin (LIPITOR) tablet 20 mg  20 mg Oral QHS  pantoprazole (PROTONIX) tablet 40 mg  40 mg Oral DAILY AFTER BREAKFAST    fluticasone propionate (FLONASE) 50 mcg/actuation nasal spray 1 Spray  1 Spray Both Nostrils DAILY    budesonide-formoteroL (SYMBICORT) 160-4.5 mcg/actuation HFA inhaler 2 Puff  2 Puff Inhalation BID    tiotropium bromide (SPIRIVA RESPIMAT) 2.5 mcg /actuation  2 Puff Inhalation DAILY          ASSESSMENT & PLAN     DIAGNOSES REQUIRING ACTIVE TREATMENT AND MONITORING: (reviewed/updated 2/7/2020)  Patient Active Hospital Problem List:  Major depressive disorder    Assessment: patient with ongoing somatic symptoms, is taking nitroglycerin for what appears to be pleuritic pain vs stable angina. Will continue current course of treatment, limit RRTs as patient's symptoms do not appear to be consistent with an MI or dissection; managed with PRN medication without any issue thus far. Suspect somatic anxiety. Plan:  - CONTINUE REMERON 30 mg QHS for MDD  - Continue Prazosin 2 mg QHS for nightmares  - Seroquel 50 mg PO Q hs for insomnia and depression adjunct  - IGM therapy as tolerated  - Neurology work up for condition being done by Dr. Goldie Houser with social work    In summary, Astrid Rene, is a 71 y.o.  male who presents with a severe exacerbation of the principal diagnosis of Recurrent depression ([address]    Patient's condition is not improving. Patient requires continued inpatient hospitalization for further stabilization, safety monitoring and medication management. I will continue to coordinate the provision of individual, milieu, occupational, group, and substance abuse therapies to address target symptoms/diagnoses as deemed appropriate for the individual patient. A coordinated, multidisplinary treatment team round was conducted with the patient (this team consists of the nurse, psychiatric unit pharmcist,  and writer).      Complete current electronic health record for patient has been reviewed today including consultant notes, ancillary staff notes, nurses and psychiatric tech notes. Suicide risk assessment completed and patient deemed to be of low risk for suicide at this time. The following regarding medications was addressed during rounds with patient:   the risks and benefits of the proposed medication. The patient was given the opportunity to ask questions. Informed consent given to the use of the above medications. Will continue to adjust psychiatric and non-psychiatric medications (see above \"medication\" section and orders section for details) as deemed appropriate & based upon diagnoses and response to treatment. I will continue to order blood tests/labs and diagnostic tests as deemed appropriate and review results as they become available (see orders for details and above listed lab/test results). I will order psychiatric records from previous [facility] hospitals to further elucidate the nature of patient's psychopathology and review once available. I will gather additional collateral information from friends, family and o/p treatment team to further elucidate the nature of patient's psychopathology and baselline level of psychiatric functioning. I certify that this patient's inpatient psychiatric hospital services furnished since the previous certification were, and continue to be, required for treatment that could reasonably be expected to improve the patient's condition, or for diagnostic study, and that the patient continues to need, on a daily basis, active treatment furnished directly by or requiring the supervision of inpatient psychiatric facility personnel. In addition, the hospital records show that services furnished were intensive treatment services, admission or related services, or equivalent services.     EXPECTED DISCHARGE DATE/DAY: TBD     DISPOSITION: Home       Signed By:   Stephen Rogers MD  2/7/2020

## 2020-02-07 NOTE — PROGRESS NOTES

## 2020-02-07 NOTE — PROGRESS NOTES
Diet as tolerated. Cont supplements per pt preference. May benefit from CC diet as A1c c/w prediabetes (5.7). Other hx notable for hypothyroidism (TSH 7.88), COPD, throat cancer s/pt chemo rx and radiation, constipation, hyperlipidemia.   Ht:5'9\"  Wt: 142 lb 6.7 oz  BMI: 21.03 kg/(m^2) c/w normal weight  Est energy needs: 1825 kcal, 70 g protein 1 mL/kcal fluids  Pt will consume > 75% of meals at follow up 7-10 days  LOS

## 2020-02-07 NOTE — PROGRESS NOTES
Problem: Suicide  Goal: *STG: Remains safe in hospital  Outcome: Progressing Towards Goal     Problem: Suicide  Goal: *STG: Seeks staff when feelings of self harm or harm towards others arise  Outcome: Progressing Towards Goal     Problem: Suicide  Goal: *STG: Attends activities and groups  Outcome: Progressing Towards Goal     Problem: Suicide  Goal: *STG:  Verbalizes alternative ways of dealing with maladaptive feelings/behaviors  Outcome: Progressing Towards Goal     Problem: Suicide  Goal: *STG/LTG: No longer expresses self destructive or suicidal thoughts  Outcome: Progressing Towards Goal

## 2020-02-07 NOTE — GROUP NOTE
PATRIZIA  GROUP DOCUMENTATION INDIVIDUAL Group Therapy Note Date: 2/7/2020 Group Start Time: 1400 Group End Time: 1500 Group Topic: Recreational/Music Therapy [facility] [place] [address] ACUTE BEHAV [facility] [place], [address] GROUP DOCUMENTATION GROUP Group Therapy Note Attendees: 7 Attendance: Attended Patient's Goal:  To concentrate on selected task Interventions/techniques: Supported-crafts,games,music Follows Directions: Followed directions Interactions: Interacted appropriately Mental Status: Calm Behavior/appearance: Attentive and Cooperative Goals Achieved: Able to engage in interactions and Able to listen to others-completed selected task Additional Notes:   
 
Jerome Cunha

## 2020-02-07 NOTE — GROUP NOTE
PATRIZIA  GROUP DOCUMENTATION INDIVIDUAL Group Therapy Note Date: 2/7/2020 Group Start Time: 1000 Group End Time: 1100 Group Topic: Topic Group [facility] [place] [place] - [place] [address] ACUTE BEHAV [facility] [place], [address] [facility] GROUP DOCUMENTATION GROUP Group Therapy Note Attendees: 5 Attendance: Did not attend Patient's Goal: Interventions/techniques:Dee Younger

## 2020-02-07 NOTE — PROGRESS NOTES
Problem: Suicide  Goal: *STG: Remains safe in hospital  Outcome: Progressing Towards Goal  Goal: *STG: Seeks staff when feelings of self harm or harm towards others arise  Outcome: Progressing Towards Goal  Goal: *STG: Attends activities and groups  Outcome: Progressing Towards Goal  Goal: *STG/LTG: No longer expresses self destructive or suicidal thoughts  Outcome: Progressing Towards Goal  Goal: *LTG:  Identifies available community resources  Outcome: Progressing Towards Goal  Goal: *LTG:  Develops proactive suicide prevention plan  Outcome: Progressing Towards Goal  Goal: Interventions  Outcome: Progressing Towards Goal     0728 This writer received report from the outgoing nurse. 2991 Patient in dayroom. Patient presents with a cooperative attitude, smiling affect, anxious mood. Patient denied SI, HI, AVH. Patient is alert and oriented x 4. Patient ate most of his breakfast. Patient interacting well with staff and peers. Patient endorsed anxiety 9/10 related to being discharged soon. 5156 This writer completed the MRI screening form with the patient. 2714 Patient completing MRI.     1006 Patient back on unit from undergoing MRI. 1228 Patient interacting well with others. Patient ate most of his lunch. 1711 Patient complained of wheezing. Patient received prn albuterol for wheezing.

## 2020-02-07 NOTE — BH NOTES
Met with Bijal Lai face to face for a 30 minute individual session to discussion how he was coping with the loss of his friend and his fiance and planning for going home. He reports that he is afraid to see his friend laying on the floor when he opens the door. He is concerned that he will get overwhelmed. He reports the glass of antifreeze is sitting on the counter and the gallon on the floor and he is nervous how he will react. He was very tearful and honest. He reports hoping to have support and needing to find a way to get food. He asked if he could discharge after dinner tomorrow since he will have limited transport and does not have food in the house. He has money but has not shopped. He plans to try to stay busy with cleaning the apartment and going to get markers and coloring pages that he found helpful while he has been on the unit. Discussed some of the plans that social work have in place for him including an appointment with a potential skill building company.     Gordon Jimenez LPC LSATP [facility], [place]

## 2020-02-07 NOTE — INTERDISCIPLINARY ROUNDS
Behavioral Health Interdisciplinary Rounds Patient Name: Inna Rdz  Age: 71 y.o. Room/Bed:  321/01 Primary Diagnosis: Recurrent depression ([facility] [address] Admission Status: Voluntary Readmission within 30 days: no 
Power of  in place: Unknown Patient requires a blocked bed: no           
Reason for blocked bed: Not required at this time Order for blocked bed obtained: no    
 
Sleep hours: 8 Participation in Care/Groups:  yes Medication Compliant?: Yes PRNS (last 24 hours): None Restraints (last 24 hours):  no 
Substance Abuse:  yes 24 hour chart check complete: yes Patient goal(s) for today: Continue taking medications as prescribed; engage in unit activities; MRI Treatment team focus/goals: Continue medication regimen; schedule follow-up Progress note: Patient received consult from neurology who recommended several tests. Pt reported anxiety about discharging and about getting test results. Presented as tearful. Discharge delayed until Monday so neurology recommended tests can be run and Pt will have social supports over the weekend. LOS:  8  Expected LOS: 11 Financial concerns/prescription coverage: [facility]; [employer] Family contact: None Family requesting physician contact today: No 
Discharge plan: Return home Access to weapons: No 
[facility] Patient's preferred phone number for follow up call: [phone] Participating treatment team members: ISA Luque; Dr. Bryanna Jacob MD; Jackson Lal, KristenD

## 2020-02-08 LAB — ACE SERPL-CCNC: 46 U/L (ref 14–82)

## 2020-02-08 PROCEDURE — 36415 COLL VENOUS BLD VENIPUNCTURE: CPT

## 2020-02-08 PROCEDURE — 84155 ASSAY OF PROTEIN SERUM: CPT

## 2020-02-08 PROCEDURE — 74011250637 HC RX REV CODE- 250/637: Performed by: PSYCHIATRY & NEUROLOGY

## 2020-02-08 PROCEDURE — 74011250636 HC RX REV CODE- 250/636: Performed by: PSYCHIATRY & NEUROLOGY

## 2020-02-08 PROCEDURE — 65220000003 HC RM SEMIPRIVATE PSYCH

## 2020-02-08 PROCEDURE — 74011250637 HC RX REV CODE- 250/637: Performed by: NURSE PRACTITIONER

## 2020-02-08 RX ADMIN — MIRTAZAPINE 30 MG: 15 TABLET, FILM COATED ORAL at 21:09

## 2020-02-08 RX ADMIN — DEXAMETHASONE 4 MG: 4 TABLET ORAL at 21:09

## 2020-02-08 RX ADMIN — GABAPENTIN 400 MG: 300 CAPSULE ORAL at 08:56

## 2020-02-08 RX ADMIN — FLUTICASONE PROPIONATE 1 SPRAY: 50 SPRAY, METERED NASAL at 08:58

## 2020-02-08 RX ADMIN — DEXAMETHASONE 4 MG: 4 TABLET ORAL at 08:56

## 2020-02-08 RX ADMIN — ALBUTEROL SULFATE 2 PUFF: 90 AEROSOL, METERED RESPIRATORY (INHALATION) at 02:08

## 2020-02-08 RX ADMIN — GABAPENTIN 400 MG: 300 CAPSULE ORAL at 11:40

## 2020-02-08 RX ADMIN — GABAPENTIN 400 MG: 300 CAPSULE ORAL at 16:56

## 2020-02-08 RX ADMIN — LEVOTHYROXINE SODIUM 25 MCG: 25 TABLET ORAL at 06:28

## 2020-02-08 RX ADMIN — PANTOPRAZOLE SODIUM 40 MG: 40 TABLET, DELAYED RELEASE ORAL at 08:56

## 2020-02-08 RX ADMIN — ISOSORBIDE MONONITRATE 30 MG: 30 TABLET, EXTENDED RELEASE ORAL at 08:56

## 2020-02-08 RX ADMIN — QUETIAPINE FUMARATE 50 MG: 25 TABLET ORAL at 21:09

## 2020-02-08 RX ADMIN — TIOTROPIUM BROMIDE INHALATION SPRAY 2 PUFF: 3.12 SPRAY, METERED RESPIRATORY (INHALATION) at 08:59

## 2020-02-08 RX ADMIN — ATORVASTATIN CALCIUM 20 MG: 10 TABLET, FILM COATED ORAL at 21:09

## 2020-02-08 RX ADMIN — PRAZOSIN HYDROCHLORIDE 2 MG: 1 CAPSULE ORAL at 21:08

## 2020-02-08 RX ADMIN — BUDESONIDE AND FORMOTEROL FUMARATE DIHYDRATE 2 PUFF: 160; 4.5 AEROSOL RESPIRATORY (INHALATION) at 08:58

## 2020-02-08 RX ADMIN — BUDESONIDE AND FORMOTEROL FUMARATE DIHYDRATE 2 PUFF: 160; 4.5 AEROSOL RESPIRATORY (INHALATION) at 16:56

## 2020-02-08 NOTE — INTERDISCIPLINARY ROUNDS
Behavioral Health Interdisciplinary Rounds Patient Name: Vaughn Damico  Age: 71 y.o. Room/Bed:  321/01 Primary [facility]: [facility] Admission Status: Voluntary Readmission within 30 days: no 
Power of  in place:Unknown Patient requires a blocked bed: no           
Reason for blocked bed: Not required at this time Order for blocked bed obtained: no    
 
Sleep hours: 8 Participation in Care/Groups:  yes Medication Compliant?: Yes PRNS (last 24 hours): None Restraints (last 24 hours):  no 
Substance Abuse:  yes 24 hour chart check complete: yes

## 2020-02-08 NOTE — BH NOTES
Hourly rounding completed, resident denied all SI/HI, AVH. Medication and meal compliant. Denies any pain or discomfort. Has set goals to attend groups and activities for the day. Interacts well with peers and is visible on the milieu. No other issues were noted.

## 2020-02-08 NOTE — PROGRESS NOTES
Problem: Suicide  Goal: *STG: Remains safe in hospital  Outcome: Progressing Towards Goal  Goal: *STG: Attends activities and groups  Outcome: Progressing Towards Goal

## 2020-02-08 NOTE — GROUP NOTE
[facility] GROUP DOCUMENTATION INDIVIDUAL Group Therapy Note Date: 2/8/2020 Group Start Time: 1400 Group End Time: 1500 Group Topic: Social Work Group [address] [facility] Guero Munoz [facility] GROUP DOCUMENTATION GROUP Group Therapy Note Attendees: 5 Attendance: Attended Patient's Goal:  To cope with idea and the ramifications of substance abuse. To be able to identify emotions that trigger a relapse, and to learn ways to cope with those emotions. Interventions/techniques: Challenged, Informed, Validated and Promoted peer support Follows Directions: Followed directions Interactions: Interacted appropriately Mental Status: Anxious and Elevated Behavior/appearance: Agitated Goals Achieved: Able to engage in interactions, Able to listen to others, Able to give feedback to another and Able to reflect/comment on own behavior Additional Notes:  Pt stated that he was \"dreading but then excited about\" going home and seeing where his friend has passed away in his apartment. Pt spoke on this multiple times, but said he couldn't identify a specific emotion about the event. \"I just try not to think about it. \" Gonzalo Black

## 2020-02-08 NOTE — GROUP NOTE
[facility] GROUP DOCUMENTATION INDIVIDUAL Group Therapy Note Date: 2/8/2020 Group Start Time: 1000 Group End Time: 1250 Group Topic: Process Group - [facility] Tarah Chun [facility] GROUP DOCUMENTATION GROUP Group Therapy Note Attendees: 5 Attendance: Attended Patient's Goal:  To help identify the stages of grief as they apply to areas of loss, substance use, and change. Pt will be able to identify what stage he is in currently, and how to express those feelings in an effective manner. Interventions/techniques: Challenged, Informed, Validated and Promoted peer support Follows Directions: Followed directions Interactions: Interacted appropriately Mental Status: Anxious and Elevated Behavior/appearance: Agitated and Cooperative Goals Achieved: Able to listen to others and Able to give feedback to another Additional Notes:  Pt needed redirection a few times for talking over a peer, and complaining loudly about the temperature. Pt stated that he often dreads going home \"to my empty apartment and remembering there was a dead man there. \" Pt was able to process what grief and change may look like from here on out. Pt states that \"I know I'll never stop grieving, but it has to be different. I can't keep going on like this. \"  
 
Trang Bhatia

## 2020-02-08 NOTE — BH NOTES
[address] care of patient from Maty Eduardo, RN    9786  Patient found to be in the day room, coloring. Social with peers. States he stayed today and over the weekend to ViVu myself together\". Stated he feels better today but knows he will need to take the time to grieve. Denies the presence of suicidal or homicidal ideation. Denies the presence of auditory or visual hallucinations. He does contract for safety on the unit. He is alert and oriented to person, place, time date and situation. 2130  Patient ate snack. Was compliant with MRI today. Compliant with evening medications. Calm and cooperative. Denies any needs at this time. 2300  Patient resting quietly in bed. Appears to be sleeping. Respirations are even and non labored. Will continue to monitor per patient plan of care    0138  Patient resting quietly in bed at this time. Respirations remain even and non labored. Will continue to monitor per patient plan of care. Patient does appear to be sleeping    0208  Patient administered Inhaler for c/o Shortness of breath. Tolerated well. Will continue to monitor    [id number] patient resting quietly at this time. Respirations are even and non labored. Appears to be sleeping. Will continue to monitor    [phone] patient continues to rest quietly    0602  Patient currently in day room. Cooperative and compliant with blood draw.    Patient slept approximately 8 hours

## 2020-02-08 NOTE — BH NOTES
PSYCHIATRIC PROGRESS NOTE         Patient Name  Merline Shaver   Date of Birth 1955   [facility] [id number]   Medical Record Number  [id number]      Age  71 y.o. PCP Lidya Jones MD   Admit date:  2020    Room Number  321/01  @ [facility]   Date of Service  2020         E & M PROGRESS NOTE:         HISTORY       CC:  \"suicidal ideation\"  HISTORY OF PRESENT ILLNESS/INTERVAL HISTORY:  (reviewed/updated 2020). per initial evaluation: This is a 43-year-old  male with history of depression, prior treatments, who reports in the past month he had 2 major losses in his life, and he has no more friends, no more family left to support him. He is helpless, hopeless. He had thoughts of suicide, but no specific plan to kill himself during this interview, per the reports. He is thinking about drinking antifreeze, but he had multiple plans, none specific that he had betted on at this time. He knows that his girlfriend passed away of diabetes at [facility] earlier this month where she was supposedly brain dead, so he got to watch her suffer before they could pull the plug on her, and then eight days after, they pulled the plug, then she passed away, and just buried her and he broke down. His friend was comforting him in his home, and he went to bed one night, the next morning he woke up and his friend was dead in his apartment couch area. He called the police and they came in and got him, but he was already dead. He is very tearful and became distraught during the conversation, and had to take a break before we could continue. He says maybe 4 years ago, his mother  and that was the last person in his family that was living. He no longer has any supports or anyone in his community. He lives alone now, and he feels helpless, hopeless, lost, and so he was brought in to get help.     - patient complained of anxiety and received Atarax. He slept 6 hours overnight.  Patient c/o chest pain, sharp and stabbing was given nitroglycerin with resolution. He denied SI/HI/AVH. This morning, the patient was visible, anxious, and spontaneously c/o chest pain, which he described as a sharp, substernal pain. The patient denied any pressure or radiation. EKG unremarkable. Troponin I negative. 2/02 - no acute overnight events. Patient with no new episodes of chest pain this AM. He is visible, pleasant and cooperative, c/o nightmares and depression. Patient requests restating his Seroquel which he took at 3-400 mg for several years. Discussed cardiac concerns on such an agent and the patient is amenable to trying prazosin before restarting Seroquel. He denies SI/HI/AVH.    2/03 - KareenCelly reports feeling well and moods are good. Had a good weekend, but notes having poor sleep last evening. Denies SI/HI/AH/VH. No aggression or violence. Appropriately interactive and aware. Tolerating medications well. Eating well with a good appetite. 2/04 - KareenCelly reports feeling good today and moods are better than on admission. Denies SI/HI/AH/VH. No aggression or violence. Appropriately interactive and aware. Tolerating medications well. Eating and sleeping fairly. Working on discharge planning concerns. Made statements of wanting to kill himself when he leaves the hospital during groups yesterday. 2/05 - KareenCelly reports doing great and moods are good. Denies SI/HI/AH/VH. No aggression or violence. Appropriately interactive and aware. Tolerating medications well. Eating well, however notes not sleeping well. Has been having difficulty with falling and staying asleep recently. Interested in out-patient counseling. Has been on Seroquel effectively in the past.    2/06 - [place] reports feeling down, grieving over the loss of his girlfriend and good friend and moods are sad  Denies SI/HI/AH/VH. No aggression or violence. Appropriately interactive and aware. Tolerating medications well. Eating and sleeping well. Attending to ADL's well. 2/07 - Kathryn Muller reports feeling a little better today though was tearful with staff. Moods are fair. Saw neurology and is being worked up for his numbness and tingling in his fingers with neck and shoulder spasms. MRI completed and pending read. Denies SI/HI/AH/VH. No aggression or violence. Appropriately interactive and aware. Tolerating medications well. Eating and sleeping fairly. Labs pending. 2/8/20  Priti Mcdaniels is doing better. Says he is sleeping well with Seroquel. He slept about 7-8 hours but did wake up once for his inhaler. Denies any SI or plan. No AH or VH are noted. Pleasant and cheerful. Feels he is much improved. No adverse events are noted from his medications. SIDE EFFECTS: (reviewed/updated 2/8/2020)  None reported or admitted to. ALLERGIES:(reviewed/updated 2/8/2020)  Allergies   Allergen Reactions    Allergen [Antipyrine-Benzocaine] Unknown (comments)    Avelox [Moxifloxacin] Unknown (comments)    Codeine Sulfate Nausea and Vomiting    Pcn [Penicillins] Other (comments)     Coma      MEDICATIONS PRIOR TO ADMISSION:(reviewed/updated 2/8/2020)  Medications Prior to Admission   Medication Sig    mirtazapine (REMERON) 15 mg tablet Take 15 mg by mouth nightly. Indications: major depressive disorder    ibuprofen (MOTRIN) 400 mg tablet Take 400 mg by mouth every six (6) hours as needed for Pain.  gabapentin (NEURONTIN) 300 mg capsule Take 300 mg by mouth three (3) times daily. Indications: neuropathic pain, neck pain    pantoprazole (PROTONIX) 40 mg tablet Take 40 mg by mouth daily. Indications: gastroesophageal reflux disease    fluticasone propionate (FLONASE) 50 mcg/actuation nasal spray 2 Sprays by Both Nostrils route daily.  Indications: inflammation of the nose due to an allergy    ipratropium-albuteroL (COMBIVENT RESPIMAT)  mcg/actuation inhaler Take 1 Puff by inhalation every six (6) hours as needed.  fluticasone-umeclidinium-vilanterol (TRELEGY ELLIPTA) 100-62.5-25 mcg inhaler Take 1 Puff by inhalation two (2) times a day. Indications: prevention of bronchospasms with emphysema    nitroglycerin (NITROSTAT) 0.4 mg SL tablet 1 Tab by SubLINGual route every five (5) minutes as needed for Chest Pain.  isosorbide mononitrate ER (IMDUR) 30 mg tablet Take 1 Tab by mouth daily.  atorvastatin (LIPITOR) 20 mg tablet Take 1 Tab by mouth daily.  levothyroxine (SYNTHROID) 25 mcg tablet Take 1 Tab by mouth Daily (before breakfast). PAST MEDICAL HISTORY: Past medical history from the initial psychiatric evaluation has been reviewed (reviewed/updated 2/8/2020) with no additional updates (I asked patient and no additional past medical history provided).    Past Medical History:   Diagnosis Date    Abdominal pain     Adverse effect of anesthesia     heart rate slows down    Asthma     Back pain     Cancer (HCC) 2015    Throat cancer treated with chemo & radiation    Cancer [facility])     testicular cancer    Constipation     COPD     managed by PCP    Cyst of pharynx     Depression     Emphysema of lung ([facility] [address]     managed by PCP    Extrinsic asthma, unspecified     Fatigue     GERD (gastroesophageal reflux disease)     Insomnia, unspecified     On home O2     at bedtime    Other chest pain     since 2-    Pain in joint, forearm     Psychiatric disorder     schizophrenia    Smoker     1 ppw     Past Surgical History:   Procedure Laterality Date    HX HEENT      throat CA  - Dr. Dora Holm oncology    HX ORCHIECTOMY      Bilat- r/t tumors    HX ORCHIECTOMY      tumors    HX ORTHOPAEDIC Left     wrist surgery    HX ORTHOPAEDIC Left 07/14/2017    manipulation and injection shoulder    PLACE PERCUT GASTROSTOMY TUBE  9/21/2015         TX COLONOSCOPY FLX DX W/COLLJ SPEC WHEN PFRMD  8/29/2011           SOCIAL HISTORY: Social history from the initial psychiatric evaluation has been reviewed (reviewed/updated 2020) with no additional updates (I asked patient and no additional social history provided). Social History     Socioeconomic History    Marital status: SINGLE     Spouse name: Not on file    Number of children: Not on file    Years of education: Not on file    Highest education level: Not on file   Occupational History    Not on file   Social Needs    Financial resource strain: Not on file    Food insecurity:     Worry: Not on file     Inability: Not on file    Transportation needs:     Medical: No     Non-medical: No   Tobacco Use    Smoking status: Current Every Day Smoker     Packs/day: 1.00     Years: 52.00     Pack years: 52.00     Last attempt to quit: 2010     Years since quittin.2    Smokeless tobacco: Never Used    Tobacco comment: Discussed importance of smoking cessation for healing.    Substance and Sexual Activity    Alcohol use: No    Drug use: Yes     Types: Prescription, OTC    Sexual activity: Not Currently   Lifestyle    Physical activity:     Days per week: Not on file     Minutes per session: Not on file    Stress: Not on file   Relationships    Social connections:     Talks on phone: Not on file     Gets together: Not on file     Attends Judaism service: Not on file     Active member of club or organization: Not on file     Attends meetings of clubs or organizations: Not on file     Relationship status: Not on file    Intimate partner violence:     Fear of current or ex partner: Not on file     Emotionally abused: Not on file     Physically abused: Not on file     Forced sexual activity: Not on file   Other Topics Concern     Service Not Asked    Blood Transfusions Not Asked    Caffeine Concern Not Asked    Occupational Exposure Not Asked   Lonita Ely Hazards Not Asked    Sleep Concern Not Asked    Stress Concern Not Asked    Weight Concern Not Asked    Special Diet Not Asked    Back Care Not Asked  Exercise Not Asked    Bike Helmet Not Asked    Seat Belt Not Asked    Self-Exams Not Asked   Social History Narrative    Not on file      FAMILY HISTORY: Family history from the initial psychiatric evaluation has been reviewed (reviewed/updated 2/8/2020) with no additional updates (I asked patient and no additional family history provided). Family History   Problem Relation Age of Onset    Cancer Mother     Cancer Father        REVIEW OF SYSTEMS: (reviewed/updated 2/8/2020)  Appetite:no change from normal   Sleep: poor with DIMS (difficulty initiating & maintaining sleep)   All other Review of Systems: Negative except per HPI         [facility] (MSE):    MSE FINDINGS ARE WITHIN NORMAL LIMITS (WNL) UNLESS OTHERWISE STATED BELOW. ( ALL OF THE BELOW CATEGORIES OF THE MSE HAVE BEEN REVIEWED (reviewed 2/8/2020) AND UPDATED AS DEEMED APPROPRIATE )  General Presentation age appropriate, cooperative   Orientation oriented to time, place and person   Vital Signs  See below (reviewed 2/8/2020); Vital Signs (BP, Pulse, & Temp) are within normal limits if not listed below.    Gait and Station Stable/steady, no ataxia   Musculoskeletal System No extrapyramidal symptoms (EPS); no abnormal muscular movements or Tardive Dyskinesia (TD); muscle strength and tone are within normal limits   Language No aphasia or dysarthria   Speech:  normal volume and non-pressured   Thought Processes logical; normal rate of thoughts; good abstract reasoning/computation   Thought Associations goal directed   Thought Content free of delusions and free of hallucinations   Suicidal Ideations contracts for safety   Homicidal Ideations none   Mood:  depressed   Affect:  constricted and mood-congruent   Memory recent  intact   Memory remote:  intact   Concentration/Attention:  intact   Fund of Knowledge average   Insight:  limited   Reliability fair   Judgment:  limited          VITALS:     Patient Vitals for the past 24 hrs:   Temp Pulse Resp BP SpO2   02/08/20 0733 97.3 °F (36.3 °C) 91 18 113/72 100 %   02/07/20 2000 98.1 °F (36.7 °C) 98 16 113/82 98 %     Wt Readings from Last 3 Encounters:   02/01/20 64.6 kg (142 lb 6.7 oz)   02/07/20 64.6 kg (142 lb 6.7 oz)   01/30/20 64.6 kg (142 lb 6.7 oz)     Temp Readings from Last 3 Encounters:   02/08/20 97.3 °F (36.3 °C)   01/30/20 98.1 °F (36.7 °C)   10/23/19 97.6 °F (36.4 °C)     BP Readings from Last 3 Encounters:   02/08/20 113/72   01/30/20 150/72   10/23/19 129/77     Pulse Readings from Last 3 Encounters:   02/08/20 91   01/30/20 (!) 110   10/23/19 74            DATA     LABORATORY DATA:(reviewed/updated 2/8/2020)  No results found for this or any previous visit (from the past 24 hour(s)). No results found for: VALF2, VALAC, VALP, VALPR, DS6, CRBAM, CRBAMP, CARB2, XCRBAM  No results found for: LITHM   RADIOLOGY REPORTS:(reviewed/updated 2/8/2020)  No results found.        MEDICATIONS     ALL MEDICATIONS:   Current Facility-Administered Medications   Medication Dose Route Frequency    dexAMETHasone (DECADRON) tablet 4 mg  4 mg Oral Q12H    gabapentin (NEURONTIN) capsule 400 mg  400 mg Oral TID    QUEtiapine (SEROquel) tablet 50 mg  50 mg Oral QHS    prazosin (MINIPRESS) capsule 2 mg  2 mg Oral QHS    albuterol (PROVENTIL HFA, VENTOLIN HFA, PROAIR HFA) inhaler 2 Puff  2 Puff Inhalation Q4H PRN    levothyroxine (SYNTHROID) tablet 25 mcg  25 mcg Oral ACB    mirtazapine (REMERON) tablet 30 mg  30 mg Oral QHS    OLANZapine (ZyPREXA) tablet 2.5 mg  2.5 mg Oral Q6H PRN    haloperidol lactate (HALDOL) injection 2.5 mg  2.5 mg IntraMUSCular Q6H PRN    benztropine (COGENTIN) tablet 0.5 mg  0.5 mg Oral BID PRN    diphenhydrAMINE (BENADRYL) injection 25 mg  25 mg IntraMUSCular BID PRN    acetaminophen (TYLENOL) tablet 650 mg  650 mg Oral Q4H PRN    magnesium hydroxide (MILK OF MAGNESIA) 400 mg/5 mL oral suspension 30 mL  30 mL Oral DAILY PRN    nicotine (NICODERM CQ) 21 mg/24 hr patch 1 Patch  1 Patch TransDERmal DAILY    isosorbide mononitrate ER (IMDUR) tablet 30 mg  30 mg Oral DAILY    atorvastatin (LIPITOR) tablet 20 mg  20 mg Oral QHS    pantoprazole (PROTONIX) tablet 40 mg  40 mg Oral DAILY AFTER BREAKFAST    nitroglycerin (NITROSTAT) tablet 0.4 mg  0.4 mg SubLINGual PRN    fluticasone propionate (FLONASE) 50 mcg/actuation nasal spray 1 Spray  1 Spray Both Nostrils DAILY    budesonide-formoteroL (SYMBICORT) 160-4.5 mcg/actuation HFA inhaler 2 Puff  2 Puff Inhalation BID    tiotropium bromide (SPIRIVA RESPIMAT) 2.5 mcg /actuation  2 Puff Inhalation DAILY    hydroxyzine HCL (ATARAX) tablet 50 mg  50 mg Oral TID PRN      SCHEDULED MEDICATIONS:   Current Facility-Administered Medications   Medication Dose Route Frequency    dexAMETHasone (DECADRON) tablet 4 mg  4 mg Oral Q12H    gabapentin (NEURONTIN) capsule 400 mg  400 mg Oral TID    QUEtiapine (SEROquel) tablet 50 mg  50 mg Oral QHS    prazosin (MINIPRESS) capsule 2 mg  2 mg Oral QHS    levothyroxine (SYNTHROID) tablet 25 mcg  25 mcg Oral ACB    mirtazapine (REMERON) tablet 30 mg  30 mg Oral QHS    nicotine (NICODERM CQ) 21 mg/24 hr patch 1 Patch  1 Patch TransDERmal DAILY    isosorbide mononitrate ER (IMDUR) tablet 30 mg  30 mg Oral DAILY    atorvastatin (LIPITOR) tablet 20 mg  20 mg Oral QHS    pantoprazole (PROTONIX) tablet 40 mg  40 mg Oral DAILY AFTER BREAKFAST    fluticasone propionate (FLONASE) 50 mcg/actuation nasal spray 1 Spray  1 Spray Both Nostrils DAILY    budesonide-formoteroL (SYMBICORT) 160-4.5 mcg/actuation HFA inhaler 2 Puff  2 Puff Inhalation BID    tiotropium bromide (SPIRIVA RESPIMAT) 2.5 mcg /actuation  2 Puff Inhalation DAILY          ASSESSMENT & PLAN     DIAGNOSES REQUIRING ACTIVE TREATMENT AND MONITORING: (reviewed/updated 2/8/2020)  Patient Active Hospital Problem List:  Major depressive disorder    Assessment: patient with ongoing somatic symptoms, is taking nitroglycerin for what appears to be pleuritic pain vs stable angina. Will continue current course of treatment, limit RRTs as patient's symptoms do not appear to be consistent with an MI or dissection; managed with PRN medication without any issue thus far. Suspect somatic anxiety. Plan:  - CONTINUE REMERON 30 mg QHS for MDD  - Continue Prazosin 2 mg QHS for nightmares  - Seroquel 50 mg PO Q hs for insomnia and depression adjunct  - IGM therapy as tolerated  - Neurology work up for condition being done by Dr. Trina Minor with social work    In summary, Lai Murphy, is a 71 y.o.  male who presents with a severe exacerbation of the principal diagnosis of Recurrent depression ([facility] [address]    Patient's condition is not improving. Patient requires continued inpatient hospitalization for further stabilization, safety monitoring and medication management. I will continue to coordinate the provision of individual, milieu, occupational, group, and substance abuse therapies to address target symptoms/diagnoses as deemed appropriate for the individual patient. A coordinated, multidisplinary treatment team round was conducted with the patient (this team consists of the nurse, psychiatric unit pharmcist,  and writer). Complete current electronic health record for patient has been reviewed today including consultant notes, ancillary staff notes, nurses and psychiatric tech notes. Suicide risk assessment completed and patient deemed to be of low risk for suicide at this time. The following regarding medications was addressed during rounds with patient:   the risks and benefits of the proposed medication. The patient was given the opportunity to ask questions. Informed consent given to the use of the above medications.  Will continue to adjust psychiatric and non-psychiatric medications (see above \"medication\" section and orders section for details) as deemed appropriate & based upon diagnoses and response to treatment. I will continue to order blood tests/labs and diagnostic tests as deemed appropriate and review results as they become available (see orders for details and above listed lab/test results). I will order psychiatric records from previous [facility] hospitals to further elucidate the nature of patient's psychopathology and review once available. I will gather additional collateral information from friends, family and o/p treatment team to further elucidate the nature of patient's psychopathology and baselline level of psychiatric functioning. I certify that this patient's inpatient psychiatric hospital services furnished since the previous certification were, and continue to be, required for treatment that could reasonably be expected to improve the patient's condition, or for diagnostic study, and that the patient continues to need, on a daily basis, active treatment furnished directly by or requiring the supervision of inpatient psychiatric facility personnel. In addition, the hospital records show that services furnished were intensive treatment services, admission or related services, or equivalent services.     EXPECTED DISCHARGE DATE/DAY: TBD     DISPOSITION: Home       Signed By:   Belinda Esteban MD  2/8/2020

## 2020-02-09 PROCEDURE — 74011250637 HC RX REV CODE- 250/637: Performed by: PSYCHIATRY & NEUROLOGY

## 2020-02-09 PROCEDURE — 74011250636 HC RX REV CODE- 250/636: Performed by: PSYCHIATRY & NEUROLOGY

## 2020-02-09 PROCEDURE — 74011250637 HC RX REV CODE- 250/637: Performed by: NURSE PRACTITIONER

## 2020-02-09 PROCEDURE — 65220000003 HC RM SEMIPRIVATE PSYCH

## 2020-02-09 RX ADMIN — ALBUTEROL SULFATE 2 PUFF: 90 AEROSOL, METERED RESPIRATORY (INHALATION) at 11:25

## 2020-02-09 RX ADMIN — GABAPENTIN 400 MG: 300 CAPSULE ORAL at 11:39

## 2020-02-09 RX ADMIN — MIRTAZAPINE 30 MG: 15 TABLET, FILM COATED ORAL at 21:19

## 2020-02-09 RX ADMIN — HYDROXYZINE HYDROCHLORIDE 50 MG: 25 TABLET, FILM COATED ORAL at 11:41

## 2020-02-09 RX ADMIN — LEVOTHYROXINE SODIUM 25 MCG: 25 TABLET ORAL at 06:48

## 2020-02-09 RX ADMIN — GABAPENTIN 400 MG: 300 CAPSULE ORAL at 16:27

## 2020-02-09 RX ADMIN — BUDESONIDE AND FORMOTEROL FUMARATE DIHYDRATE 2 PUFF: 160; 4.5 AEROSOL RESPIRATORY (INHALATION) at 08:07

## 2020-02-09 RX ADMIN — DEXAMETHASONE 4 MG: 4 TABLET ORAL at 21:20

## 2020-02-09 RX ADMIN — PRAZOSIN HYDROCHLORIDE 2 MG: 1 CAPSULE ORAL at 21:20

## 2020-02-09 RX ADMIN — BUDESONIDE AND FORMOTEROL FUMARATE DIHYDRATE 2 PUFF: 160; 4.5 AEROSOL RESPIRATORY (INHALATION) at 16:28

## 2020-02-09 RX ADMIN — ATORVASTATIN CALCIUM 20 MG: 10 TABLET, FILM COATED ORAL at 21:19

## 2020-02-09 RX ADMIN — QUETIAPINE FUMARATE 50 MG: 25 TABLET ORAL at 21:19

## 2020-02-09 RX ADMIN — PANTOPRAZOLE SODIUM 40 MG: 40 TABLET, DELAYED RELEASE ORAL at 08:08

## 2020-02-09 RX ADMIN — ALBUTEROL SULFATE 2 PUFF: 90 AEROSOL, METERED RESPIRATORY (INHALATION) at 04:00

## 2020-02-09 RX ADMIN — DEXAMETHASONE 4 MG: 4 TABLET ORAL at 08:08

## 2020-02-09 RX ADMIN — ISOSORBIDE MONONITRATE 30 MG: 30 TABLET, EXTENDED RELEASE ORAL at 08:08

## 2020-02-09 RX ADMIN — TIOTROPIUM BROMIDE INHALATION SPRAY 2 PUFF: 3.12 SPRAY, METERED RESPIRATORY (INHALATION) at 08:07

## 2020-02-09 RX ADMIN — GABAPENTIN 400 MG: 300 CAPSULE ORAL at 08:07

## 2020-02-09 NOTE — GROUP NOTE
[facility] GROUP DOCUMENTATION INDIVIDUAL Group Therapy Note Date: 2/9/2020 Group Start Time: 4710 Group End Time: 9805 Group Topic: Discharge Planning [address] [facility] [place] [facility] GROUP DOCUMENTATION GROUP Group Therapy Note Attendees: 6 Attendance: Attended Patient's Goal:  Pt will work with peers to develop positive identity and traits they associate with themselves. Pt will process positive traits that they possess and that they would like to acquire in the future. Interventions/techniques: Challenged, Informed, Validated and Promoted peer support Follows Directions: Followed directions Interactions: Interacted appropriately Mental Status: Calm and Congruent Behavior/appearance: Attentive and Cooperative Goals Achieved: Able to listen to others, Able to give feedback to another, Able to reflect/comment on own behavior and Able to receive feedback Additional Notes:  Pt stated that he is resilient and wants to become more optimistic. Pt gave good feedback to a peer, and stated, \"You keep that woman. You here me. I lost mine, and I'll never get her back. You keep her. \" and was able to reflect on how he needs to gain a more positive outlook. Bucky Dutta

## 2020-02-09 NOTE — BH NOTES
PSYCHIATRIC PROGRESS NOTE         Patient Name  Lonny Cintron   Date of Birth 1955   [facility] [id number]   Medical Record Number  [id number]      Age  71 y.o. PCP Devonna Curling, MD   Admit date:  2020    Room Number  320/01  @ [facility]   Date of Service  2020         E & M PROGRESS NOTE:         HISTORY       CC:  \"suicidal ideation\"  HISTORY OF PRESENT ILLNESS/INTERVAL HISTORY:  (reviewed/updated 2020). per initial evaluation: This is a 58-year-old  male with history of depression, prior treatments, who reports in the past month he had 2 major losses in his life, and he has no more friends, no more family left to support him. He is helpless, hopeless. He had thoughts of suicide, but no specific plan to kill himself during this interview, per the reports. He is thinking about drinking antifreeze, but he had multiple plans, none specific that he had betted on at this time. He knows that his girlfriend passed away of diabetes at [facility] earlier this month where she was supposedly brain dead, so he got to watch her suffer before they could pull the plug on her, and then eight days after, they pulled the plug, then she passed away, and just buried her and he broke down. His friend was comforting him in his home, and he went to bed one night, the next morning he woke up and his friend was dead in his apartment couch area. He called the police and they came in and got him, but he was already dead. He is very tearful and became distraught during the conversation, and had to take a break before we could continue. He says maybe 4 years ago, his mother  and that was the last person in his family that was living. He no longer has any supports or anyone in his community. He lives alone now, and he feels helpless, hopeless, lost, and so he was brought in to get help.     - patient complained of anxiety and received Atarax. He slept 6 hours overnight.  Patient c/o chest pain, sharp and stabbing was given nitroglycerin with resolution. He denied SI/HI/AVH. This morning, the patient was visible, anxious, and spontaneously c/o chest pain, which he described as a sharp, substernal pain. The patient denied any pressure or radiation. EKG unremarkable. Troponin I negative. 2/02 - no acute overnight events. Patient with no new episodes of chest pain this AM. He is visible, pleasant and cooperative, c/o nightmares and depression. Patient requests restating his Seroquel which he took at 3-400 mg for several years. Discussed cardiac concerns on such an agent and the patient is amenable to trying prazosin before restarting Seroquel. He denies SI/HI/AVH.    2/03 [facility] reports feeling well and moods are good. Had a good weekend, but notes having poor sleep last evening. Denies SI/HI/AH/VH. No aggression or violence. Appropriately interactive and aware. Tolerating medications well. Eating well with a good appetite. 2/04 - [facility] reports feeling good today and moods are better than on admission. Denies SI/HI/AH/VH. No aggression or violence. Appropriately interactive and aware. Tolerating medications well. Eating and sleeping fairly. Working on discharge planning concerns. Made statements of wanting to kill himself when he leaves the hospital during groups yesterday. 2/05 [facility] reports doing great and moods are good. Denies SI/HI/AH/VH. No aggression or violence. Appropriately interactive and aware. Tolerating medications well. Eating well, however notes not sleeping well. Has been having difficulty with falling and staying asleep recently. Interested in out-patient counseling. Has been on Seroquel effectively in the past.    2/06 [facility] reports feeling down, grieving over the loss of his girlfriend and good friend and moods are sad  Denies SI/HI/AH/VH. No aggression or violence. Appropriately interactive and aware. Tolerating medications well. Eating and sleeping well. Attending to ADL's well. 2/07 - [facility] reports feeling a little better today though was tearful with staff. Moods are fair. Saw neurology and is being worked up for his numbness and tingling in his fingers with neck and shoulder spasms. MRI completed and pending read. Denies SI/HI/AH/VH. No aggression or violence. Appropriately interactive and aware. Tolerating medications well. Eating and sleeping fairly. Labs pending. 2/8/20  [facility] is doing better. Says he is sleeping well with Seroquel. He slept about 7-8 hours but did wake up once for his inhaler. Denies any SI or plan. No AH or VH are noted. Pleasant and cheerful. Feels he is much improved. No adverse events are noted from his medications. 2/9/20  [facility] continues to do well. Pleasant and cheerful. His room was moved as his roommate snored and was disturbing his sleep. Says he slept well last night. Denies any SI or plan. No Ah or VH. No adverse events are noted from his medications. SIDE EFFECTS: (reviewed/updated 2/9/2020)  None reported or admitted to. ALLERGIES:(reviewed/updated 2/9/2020)  Allergies   Allergen Reactions    Allergen [Antipyrine-Benzocaine] Unknown (comments)    Avelox [Moxifloxacin] Unknown (comments)    Codeine Sulfate Nausea and Vomiting    Pcn [Penicillins] Other (comments)     Coma      MEDICATIONS PRIOR TO ADMISSION:(reviewed/updated 2/9/2020)  Medications Prior to Admission   Medication Sig    mirtazapine (REMERON) 15 mg tablet Take 15 mg by mouth nightly. Indications: major depressive disorder    ibuprofen (MOTRIN) 400 mg tablet Take 400 mg by mouth every six (6) hours as needed for Pain.  gabapentin (NEURONTIN) 300 mg capsule Take 300 mg by mouth three (3) times daily. Indications: neuropathic pain, neck pain    pantoprazole (PROTONIX) 40 mg tablet Take 40 mg by mouth daily.  Indications: gastroesophageal reflux disease    fluticasone propionate (FLONASE) 50 mcg/actuation nasal spray 2 Sprays by Both Nostrils route daily. Indications: inflammation of the nose due to an allergy    ipratropium-albuteroL (COMBIVENT RESPIMAT)  mcg/actuation inhaler Take 1 Puff by inhalation every six (6) hours as needed.  fluticasone-umeclidinium-vilanterol (TRELEGY ELLIPTA) 100-62.5-25 mcg inhaler Take 1 Puff by inhalation two (2) times a day. Indications: prevention of bronchospasms with emphysema    nitroglycerin (NITROSTAT) 0.4 mg SL tablet 1 Tab by SubLINGual route every five (5) minutes as needed for Chest Pain.  isosorbide mononitrate ER (IMDUR) 30 mg tablet Take 1 Tab by mouth daily.  atorvastatin (LIPITOR) 20 mg tablet Take 1 Tab by mouth daily.  levothyroxine (SYNTHROID) 25 mcg tablet Take 1 Tab by mouth Daily (before breakfast). PAST MEDICAL HISTORY: Past medical history from the initial psychiatric evaluation has been reviewed (reviewed/updated 2/9/2020) with no additional updates (I asked patient and no additional past medical history provided).    Past Medical History:   Diagnosis Date    Abdominal pain     Adverse effect of anesthesia     heart rate slows down    Asthma     Back pain     Cancer (HCC) 2015    Throat cancer treated with chemo & radiation    Cancer [facility])     testicular cancer    Constipation     COPD     managed by PCP    Cyst of pharynx     Depression     Emphysema of lung ([facility] [address]     managed by PCP    Extrinsic asthma, unspecified     Fatigue     GERD (gastroesophageal reflux disease)     Insomnia, unspecified     On home O2     at bedtime    Other chest pain     since 2-    Pain in joint, forearm     Psychiatric disorder     schizophrenia    Smoker     1 ppw     Past Surgical History:   Procedure Laterality Date    HX HEENT      throat CA  - Dr. Usman Seha- r/t tumors    HX ORCHIECTOMY      tumors    HX ORTHOPAEDIC Left     wrist surgery  HX ORTHOPAEDIC Left 2017    manipulation and injection shoulder    PLACE PERCUT GASTROSTOMY TUBE  2015         UT COLONOSCOPY FLX DX W/COLLJ SPEC WHEN PFRMD  2011           SOCIAL HISTORY: Social history from the initial psychiatric evaluation has been reviewed (reviewed/updated 2020) with no additional updates (I asked patient and no additional social history provided). Social History     Socioeconomic History    Marital status: SINGLE     Spouse name: Not on file    Number of children: Not on file    Years of education: Not on file    Highest education level: Not on file   Occupational History    Not on file   Social Needs    Financial resource strain: Not on file    Food insecurity:     Worry: Not on file     Inability: Not on file    Transportation needs:     Medical: No     Non-medical: No   Tobacco Use    Smoking status: Current Every Day Smoker     Packs/day: 1.00     Years: 52.00     Pack years: 52.00     Last attempt to quit: 2010     Years since quittin.2    Smokeless tobacco: Never Used    Tobacco comment: Discussed importance of smoking cessation for healing.    Substance and Sexual Activity    Alcohol use: No    Drug use: Yes     Types: Prescription, OTC    Sexual activity: Not Currently   Lifestyle    Physical activity:     Days per week: Not on file     Minutes per session: Not on file    Stress: Not on file   Relationships    Social connections:     Talks on phone: Not on file     Gets together: Not on file     Attends Buddhist service: Not on file     Active member of club or organization: Not on file     Attends meetings of clubs or organizations: Not on file     Relationship status: Not on file    Intimate partner violence:     Fear of current or ex partner: Not on file     Emotionally abused: Not on file     Physically abused: Not on file     Forced sexual activity: Not on file   Other Topics Concern   [address] Service Not Asked    Blood Transfusions Not Asked    Caffeine Concern Not Asked    Occupational Exposure Not Asked    Hobby Hazards Not Asked    Sleep Concern Not Asked    Stress Concern Not Asked    Weight Concern Not Asked    Special Diet Not Asked    Back Care Not Asked    Exercise Not Asked    Bike Helmet Not Asked   [address] Not Asked    Self-Exams Not Asked   Social History Narrative    Not on file      FAMILY HISTORY: Family history from the initial psychiatric evaluation has been reviewed (reviewed/updated 2/9/2020) with no additional updates (I asked patient and no additional family history provided). Family History   Problem Relation Age of Onset    Cancer Mother     Cancer Father        REVIEW OF SYSTEMS: (reviewed/updated 2/9/2020)  Appetite:no change from normal   Sleep: poor with DIMS (difficulty initiating & maintaining sleep)   All other Review of Systems: Negative except per HPI         [facility] (MSE):    MSE FINDINGS ARE WITHIN NORMAL LIMITS (WNL) UNLESS OTHERWISE STATED BELOW. ( ALL OF THE BELOW CATEGORIES OF THE MSE HAVE BEEN REVIEWED (reviewed 2/9/2020) AND UPDATED AS DEEMED APPROPRIATE )  General Presentation age appropriate, cooperative   Orientation oriented to time, place and person   Vital Signs  See below (reviewed 2/9/2020); Vital Signs (BP, Pulse, & Temp) are within normal limits if not listed below.    Gait and Station Stable/steady, no ataxia   Musculoskeletal System No extrapyramidal symptoms (EPS); no abnormal muscular movements or Tardive Dyskinesia (TD); muscle strength and tone are within normal limits   Language No aphasia or dysarthria   Speech:  normal volume and non-pressured   Thought Processes logical; normal rate of thoughts; good abstract reasoning/computation   Thought Associations goal directed   Thought Content free of delusions and free of hallucinations   Suicidal Ideations contracts for safety   Homicidal Ideations none   Mood: depressed   Affect:  constricted and mood-congruent   Memory recent  intact   Memory remote:  intact   Concentration/Attention:  intact   Fund of Knowledge average   Insight:  limited   Reliability fair   Judgment:  limited          VITALS:     Patient Vitals for the past 24 hrs:   Temp Pulse Resp BP SpO2   02/09/20 0743 97.4 °F (36.3 °C) 85 18 119/69 100 %   02/08/20 1952 98.1 °F (36.7 °C) 92 18 120/72 98 %     Wt Readings from Last 3 Encounters:   02/01/20 64.6 kg (142 lb 6.7 oz)   02/07/20 64.6 kg (142 lb 6.7 oz)   01/30/20 64.6 kg (142 lb 6.7 oz)     Temp Readings from Last 3 Encounters:   02/09/20 97.4 °F (36.3 °C)   01/30/20 98.1 °F (36.7 °C)   10/23/19 97.6 °F (36.4 °C)     BP Readings from Last 3 Encounters:   02/09/20 119/69   01/30/20 150/72   10/23/19 129/77     Pulse Readings from Last 3 Encounters:   02/09/20 85   01/30/20 (!) 110   10/23/19 74            DATA     LABORATORY DATA:(reviewed/updated 2/9/2020)  No results found for this or any previous visit (from the past 24 hour(s)). No results found for: VALF2, VALAC, VALP, VALPR, DS6, CRBAM, CRBAMP, CARB2, XCRBAM  No results found for: LITHM   RADIOLOGY REPORTS:(reviewed/updated 2/9/2020)  No results found.        MEDICATIONS     ALL MEDICATIONS:   Current Facility-Administered Medications   Medication Dose Route Frequency    dexAMETHasone (DECADRON) tablet 4 mg  4 mg Oral Q12H    gabapentin (NEURONTIN) capsule 400 mg  400 mg Oral TID    QUEtiapine (SEROquel) tablet 50 mg  50 mg Oral QHS    prazosin (MINIPRESS) capsule 2 mg  2 mg Oral QHS    albuterol (PROVENTIL HFA, VENTOLIN HFA, PROAIR HFA) inhaler 2 Puff  2 Puff Inhalation Q4H PRN    levothyroxine (SYNTHROID) tablet 25 mcg  25 mcg Oral ACB    mirtazapine (REMERON) tablet 30 mg  30 mg Oral QHS    OLANZapine (ZyPREXA) tablet 2.5 mg  2.5 mg Oral Q6H PRN    haloperidol lactate (HALDOL) injection 2.5 mg  2.5 mg IntraMUSCular Q6H PRN    benztropine (COGENTIN) tablet 0.5 mg  0.5 mg Oral BID PRN  diphenhydrAMINE (BENADRYL) injection 25 mg  25 mg IntraMUSCular BID PRN    acetaminophen (TYLENOL) tablet 650 mg  650 mg Oral Q4H PRN    magnesium hydroxide (MILK OF MAGNESIA) 400 mg/5 mL oral suspension 30 mL  30 mL Oral DAILY PRN    nicotine (NICODERM CQ) 21 mg/24 hr patch 1 Patch  1 Patch TransDERmal DAILY    isosorbide mononitrate ER (IMDUR) tablet 30 mg  30 mg Oral DAILY    atorvastatin (LIPITOR) tablet 20 mg  20 mg Oral QHS    pantoprazole (PROTONIX) tablet 40 mg  40 mg Oral DAILY AFTER BREAKFAST    nitroglycerin (NITROSTAT) tablet 0.4 mg  0.4 mg SubLINGual PRN    fluticasone propionate (FLONASE) 50 mcg/actuation nasal spray 1 Spray  1 Spray Both Nostrils DAILY    budesonide-formoteroL (SYMBICORT) 160-4.5 mcg/actuation HFA inhaler 2 Puff  2 Puff Inhalation BID    tiotropium bromide (SPIRIVA RESPIMAT) 2.5 mcg /actuation  2 Puff Inhalation DAILY    hydroxyzine HCL (ATARAX) tablet 50 mg  50 mg Oral TID PRN      SCHEDULED MEDICATIONS:   Current Facility-Administered Medications   Medication Dose Route Frequency    dexAMETHasone (DECADRON) tablet 4 mg  4 mg Oral Q12H    gabapentin (NEURONTIN) capsule 400 mg  400 mg Oral TID    QUEtiapine (SEROquel) tablet 50 mg  50 mg Oral QHS    prazosin (MINIPRESS) capsule 2 mg  2 mg Oral QHS    levothyroxine (SYNTHROID) tablet 25 mcg  25 mcg Oral ACB    mirtazapine (REMERON) tablet 30 mg  30 mg Oral QHS    nicotine (NICODERM CQ) 21 mg/24 hr patch 1 Patch  1 Patch TransDERmal DAILY    isosorbide mononitrate ER (IMDUR) tablet 30 mg  30 mg Oral DAILY    atorvastatin (LIPITOR) tablet 20 mg  20 mg Oral QHS    pantoprazole (PROTONIX) tablet 40 mg  40 mg Oral DAILY AFTER BREAKFAST    fluticasone propionate (FLONASE) 50 mcg/actuation nasal spray 1 Spray  1 Spray Both Nostrils DAILY    budesonide-formoteroL (SYMBICORT) 160-4.5 mcg/actuation HFA inhaler 2 Puff  2 Puff Inhalation BID    tiotropium bromide (SPIRIVA RESPIMAT) 2.5 mcg /actuation  2 Puff Inhalation DAILY          ASSESSMENT & PLAN     DIAGNOSES REQUIRING ACTIVE TREATMENT AND MONITORING: (reviewed/updated 2/9/2020)  Patient Active Hospital Problem List:  Major depressive disorder    Assessment: patient with ongoing somatic symptoms, is taking nitroglycerin for what appears to be pleuritic pain vs stable angina. Will continue current course of treatment, limit RRTs as patient's symptoms do not appear to be consistent with an MI or dissection; managed with PRN medication without any issue thus far. Suspect somatic anxiety. Plan:  - CONTINUE REMERON 30 mg QHS for MDD  - Continue Prazosin 2 mg QHS for nightmares  - Seroquel 50 mg PO Q hs for insomnia and depression adjunct  - IGM therapy as tolerated  - Neurology work up for condition being done by Dr. Glenys Johansen with social work    In summary, Arlene Encinas, is a 71 y.o.  male who presents with a severe exacerbation of the principal diagnosis of Recurrent depression ([facility] [address]    Patient's condition is not improving. Patient requires continued inpatient hospitalization for further stabilization, safety monitoring and medication management. I will continue to coordinate the provision of individual, milieu, occupational, group, and substance abuse therapies to address target symptoms/diagnoses as deemed appropriate for the individual patient. A coordinated, multidisplinary treatment team round was conducted with the patient (this team consists of the nurse, psychiatric unit pharmcist,  and writer). Complete current electronic health record for patient has been reviewed today including consultant notes, ancillary staff notes, nurses and psychiatric tech notes. Suicide risk assessment completed and patient deemed to be of low risk for suicide at this time. The following regarding medications was addressed during rounds with patient:   the risks and benefits of the proposed medication.  The patient was given the opportunity to ask questions. Informed consent given to the use of the above medications. Will continue to adjust psychiatric and non-psychiatric medications (see above \"medication\" section and orders section for details) as deemed appropriate & based upon diagnoses and response to treatment. I will continue to order blood tests/labs and diagnostic tests as deemed appropriate and review results as they become available (see orders for details and above listed lab/test results). I will order psychiatric records from previous [facility] hospitals to further elucidate the nature of patient's psychopathology and review once available. I will gather additional collateral information from friends, family and o/p treatment team to further elucidate the nature of patient's psychopathology and baselline level of psychiatric functioning. I certify that this patient's inpatient psychiatric hospital services furnished since the previous certification were, and continue to be, required for treatment that could reasonably be expected to improve the patient's condition, or for diagnostic study, and that the patient continues to need, on a daily basis, active treatment furnished directly by or requiring the supervision of inpatient psychiatric facility personnel. In addition, the hospital records show that services furnished were intensive treatment services, admission or related services, or equivalent services.     EXPECTED DISCHARGE DATE/DAY: TBD     DISPOSITION: Home       Signed By:   Carlene Michel MD  2/9/2020

## 2020-02-09 NOTE — BH NOTES
1900  Assumed care of patient from Rafaela Coon, [address] [facility]  Patient found to be in the dayroom. Pleasant, calm, cooperative. Visible on the unit. Dressed in his own attire. Alert and oriented to person, place, time, date and situation. He denies the presence of suicidal or homicidal ideation. Denies the presence of auditory or visual hallucinations. He does contract for safety on the unit. States he feels his medications have helped him deal with his grief. States he is looking forward to discharge. Attending groups. 2130  Patient compliant with evening medications    2300  Patient resting quietly at this time. Appears to be sleeping. Will continue to monitor per patient plan of care. Patients respirations even and non labored    0100  Patient resting quietly in bed at this time    0213  Patient resting quietly    0310  Patient continues to rest quietly at this time.   Respirations remain even and non labored    0413  Patient resting quietly in bed at this time    0631  Patient slept a total of approx 7 hours

## 2020-02-09 NOTE — PROGRESS NOTES
Problem: Suicide  Goal: *STG: Remains safe in hospital  Outcome: Progressing Towards Goal  Goal: *STG: Seeks staff when feelings of self harm or harm towards others arise  Outcome: Progressing Towards Goal  Goal: *STG: Attends activities and groups  Outcome: Progressing Towards Goal

## 2020-02-09 NOTE — BH NOTES
Hourly rounds completed. Resident has denied AVH, SI/HI, depression and anxiety throughout the shift. Medication and meal compliant. Resident has been seen out on the milieu interacting with peers. There are no other issues to note at this time.

## 2020-02-09 NOTE — INTERDISCIPLINARY ROUNDS
Behavioral Health Interdisciplinary Rounds Patient Name: Irma Franks  Age: 71 y.o. Room/Bed:  321/01 Primary Diagnosis: [facility] Admission Status: Voluntary Readmission within 30 days: no 
Power of  in place: Unknown Patient requires a blocked bed: no           
Reason for blocked bed: Not required at this time Order for blocked bed obtained: no    
 
Sleep hours: 7 Participation in Care/Groups:  yes Medication Compliant?: Yes PRNS (last 24 hours): None Restraints (last 24 hours):  no 
Substance Abuse:  yes 24 hour chart check complete: yes

## 2020-02-10 VITALS
BODY MASS INDEX: 21.09 KG/M2 | OXYGEN SATURATION: 100 % | WEIGHT: 142.42 LBS | RESPIRATION RATE: 18 BRPM | DIASTOLIC BLOOD PRESSURE: 77 MMHG | TEMPERATURE: 98 F | HEIGHT: 69 IN | HEART RATE: 73 BPM | SYSTOLIC BLOOD PRESSURE: 112 MMHG

## 2020-02-10 LAB
ALBUMIN SERPL ELPH-MCNC: 3.3 G/DL (ref 2.9–4.4)
ALBUMIN/GLOB SERPL: 1.1 {RATIO} (ref 0.7–1.7)
ALPHA1 GLOB SERPL ELPH-MCNC: 0.2 G/DL (ref 0–0.4)
ALPHA2 GLOB SERPL ELPH-MCNC: 0.7 G/DL (ref 0.4–1)
B-GLOBULIN SERPL ELPH-MCNC: 0.9 G/DL (ref 0.7–1.3)
GAMMA GLOB SERPL ELPH-MCNC: 1.2 G/DL (ref 0.4–1.8)
GLOBULIN SER CALC-MCNC: 3.1 G/DL (ref 2.2–3.9)
M PROTEIN SERPL ELPH-MCNC: NORMAL G/DL
PROT SERPL-MCNC: 6.4 G/DL (ref 6–8.5)

## 2020-02-10 PROCEDURE — 74011250637 HC RX REV CODE- 250/637: Performed by: PSYCHIATRY & NEUROLOGY

## 2020-02-10 PROCEDURE — 74011250636 HC RX REV CODE- 250/636: Performed by: PSYCHIATRY & NEUROLOGY

## 2020-02-10 PROCEDURE — 74011250637 HC RX REV CODE- 250/637: Performed by: NURSE PRACTITIONER

## 2020-02-10 RX ORDER — FLUTICASONE PROPIONATE 50 MCG
SPRAY, SUSPENSION (ML) NASAL
Qty: 1 BOTTLE | Refills: 0 | Status: SHIPPED | OUTPATIENT
Start: 2020-02-11 | End: 2020-08-03

## 2020-02-10 RX ORDER — PANTOPRAZOLE SODIUM 40 MG/1
40 TABLET, DELAYED RELEASE ORAL
Qty: 30 TAB | Refills: 0 | Status: SHIPPED | OUTPATIENT
Start: 2020-02-11 | End: 2020-08-03

## 2020-02-10 RX ORDER — MIRTAZAPINE 30 MG/1
30 TABLET, FILM COATED ORAL
Qty: 30 TAB | Refills: 0 | Status: SHIPPED | OUTPATIENT
Start: 2020-02-10 | End: 2021-08-03

## 2020-02-10 RX ORDER — QUETIAPINE FUMARATE 50 MG/1
50 TABLET, FILM COATED ORAL
Qty: 30 TAB | Refills: 0 | Status: SHIPPED | OUTPATIENT
Start: 2020-02-10 | End: 2020-08-03

## 2020-02-10 RX ORDER — BUDESONIDE AND FORMOTEROL FUMARATE DIHYDRATE 160; 4.5 UG/1; UG/1
2 AEROSOL RESPIRATORY (INHALATION) 2 TIMES DAILY
Qty: 1 INHALER | Refills: 0 | Status: SHIPPED | OUTPATIENT
Start: 2020-02-10

## 2020-02-10 RX ORDER — GABAPENTIN 400 MG/1
400 CAPSULE ORAL 3 TIMES DAILY
Qty: 30 CAP | Refills: 0 | Status: SHIPPED | OUTPATIENT
Start: 2020-02-10

## 2020-02-10 RX ORDER — PRAZOSIN HYDROCHLORIDE 2 MG/1
2 CAPSULE ORAL
Qty: 30 CAP | Refills: 0 | Status: SHIPPED | OUTPATIENT
Start: 2020-02-10 | End: 2020-08-03

## 2020-02-10 RX ORDER — DEXAMETHASONE 4 MG/1
TABLET ORAL
Qty: 3 TAB | Refills: 0 | Status: SHIPPED | OUTPATIENT
Start: 2020-02-10 | End: 2020-08-03

## 2020-02-10 RX ADMIN — TIOTROPIUM BROMIDE INHALATION SPRAY 2 PUFF: 3.12 SPRAY, METERED RESPIRATORY (INHALATION) at 08:37

## 2020-02-10 RX ADMIN — PANTOPRAZOLE SODIUM 40 MG: 40 TABLET, DELAYED RELEASE ORAL at 08:34

## 2020-02-10 RX ADMIN — GABAPENTIN 400 MG: 300 CAPSULE ORAL at 11:18

## 2020-02-10 RX ADMIN — FLUTICASONE PROPIONATE 1 SPRAY: 50 SPRAY, METERED NASAL at 08:32

## 2020-02-10 RX ADMIN — ALBUTEROL SULFATE 2 PUFF: 90 AEROSOL, METERED RESPIRATORY (INHALATION) at 05:00

## 2020-02-10 RX ADMIN — GABAPENTIN 400 MG: 300 CAPSULE ORAL at 08:34

## 2020-02-10 RX ADMIN — ISOSORBIDE MONONITRATE 30 MG: 30 TABLET, EXTENDED RELEASE ORAL at 08:34

## 2020-02-10 RX ADMIN — LEVOTHYROXINE SODIUM 25 MCG: 25 TABLET ORAL at 06:21

## 2020-02-10 RX ADMIN — NITROGLYCERIN 0.4 MG: 0.4 TABLET, ORALLY DISINTEGRATING SUBLINGUAL at 11:15

## 2020-02-10 RX ADMIN — DEXAMETHASONE 4 MG: 4 TABLET ORAL at 08:34

## 2020-02-10 RX ADMIN — BUDESONIDE AND FORMOTEROL FUMARATE DIHYDRATE 2 PUFF: 160; 4.5 AEROSOL RESPIRATORY (INHALATION) at 08:32

## 2020-02-10 NOTE — DISCHARGE INSTRUCTIONS
DISCHARGE SUMMARY    NAME:Garrison Wong  : 1955  MRN: [id number]    The patient Jaja Fuentes exhibits the ability to control behavior in a less restrictive environment. Patient's level of functioning is improving. No assaultive/destructive behavior has been observed for the past 24 hours. No suicidal/homicidal threat or behavior has been observed for the past 24 hours. There is no evidence of serious medication side effects. Patient has not been in physical or protective restraints for at least the past 24 hours. If weapons involved, how are they secured? No weapons involved. Is patient aware of and in agreement with discharge plan? Yes    Arrangements for medication:  Prescriptions sent to [facility]. Copy of discharge instructions to provider?:  Attachment & [address]; [facility] ([phone])    Arrangements for transportation home:  [place] all follow up appointments as scheduled, continue to take prescribed medications per physician instructions. Mental health crisis number:  888 or your local mental health crisis line number at [phone]. Shabbir Acevedo If I feel I am at risk of hurting myself or others, I will call the crisis office and speak with a crisis worker who will assist me during my crisis. Shabbir Acevedo .David Hobbs   [address] [phone]  [address] [place] [address]  [facility]-  [phone]

## 2020-02-10 NOTE — BH NOTES
Behavioral Health Transition Record to Provider    Patient Name: Fili Razo  YOB: 1955  Medical Record Number: [id number]  Date of Admission: 1/30/2020  Date of Discharge: 2/10/2020    Attending Provider: Yasmeen Dolan MD  Discharging Provider: Yasmeen Dolan MD  To contact this individual call [phone] and ask the  to page. If unavailable, ask to be transferred to [address] [place] [address] Provider on call. [facility] Provider will be available on call 24/7 and during holidays. Primary Care Provider: Sp Mckinney MD    Allergies   Allergen Reactions    Allergen [Antipyrine-Benzocaine] Unknown (comments)    Avelox [Moxifloxacin] Unknown (comments)    Codeine Sulfate Nausea and Vomiting    Pcn [Penicillins] Other (comments)     Coma       Reason for Admission: This is a 40-year-old  male with history of depression, prior treatments, who reports in the past month he had 2 major losses in his life, and he has no more friends, no more family left to support him. He is helpless, hopeless. He had thoughts of suicide, but no specific plan to kill himself during this interview, per the reports. He is thinking about drinking antifreeze, but he had multiple plans, none specific that he had betted on at this time. He knows that his [place] of [place] [facility] [place] where she was supposedly brain dead, so he got to watch her suffer before they could pull the plug on her, and then eight days after, they pulled the plug, then she passed away, and just buried her and he broke down. His friend was comforting him in his home, and he went to bed one night, the next morning he woke up and his friend was dead in his apartment couch area. He called the police and they came in and got him, but he was already dead. He is very tearful and became distraught during the conversation, and had to take a break before we could continue.   He says maybe 4 years ago, his mother  and that was the last person in his family that was living. He no longer has any supports or anyone in his community. He lives alone now, and he feels helpless, hopeless, lost, and so he was brought in to get help. Admission Diagnosis: Suicidal ideations [R45.851]  Suicidal ideation [R45.851]    * No surgery found *    Results for orders placed or performed during the hospital encounter of 20   TROPONIN I   Result Value Ref Range    Troponin-I, Qt. <0.05 <0.05 ng/mL   CBC WITH AUTOMATED DIFF   Result Value Ref Range    WBC 5.2 4.1 - 11.1 K/uL    RBC 5.12 4.10 - 5.70 M/uL    HGB 15.7 12.1 - 17.0 g/dL    HCT 48.8 36.6 - 50.3 %    MCV 95.3 80.0 - 99.0 FL    MCH 30.7 26.0 - 34.0 PG    MCHC 32.2 30.0 - 36.5 g/dL    RDW 14.6 (H) 11.5 - 14.5 %    PLATELET 724 526 - 852 K/uL    MPV 9.2 8.9 - 12.9 FL    NRBC 0.0 0  WBC    ABSOLUTE NRBC 0.00 0.00 - 0.01 K/uL    NEUTROPHILS 61 32 - 75 %    LYMPHOCYTES 29 12 - 49 %    MONOCYTES 8 5 - 13 %    EOSINOPHILS 1 0 - 7 %    BASOPHILS 1 0 - 1 %    IMMATURE GRANULOCYTES 0 0.0 - 0.5 %    ABS. NEUTROPHILS 3.1 1.8 - 8.0 K/UL    ABS. LYMPHOCYTES 1.5 0.8 - 3.5 K/UL    ABS. MONOCYTES 0.4 0.0 - 1.0 K/UL    ABS. EOSINOPHILS 0.1 0.0 - 0.4 K/UL    ABS. BASOPHILS 0.0 0.0 - 0.1 K/UL    ABS. IMM.  GRANS. 0.0 0.00 - 0.04 K/UL    DF AUTOMATED     METABOLIC PANEL, BASIC   Result Value Ref Range    Sodium 142 136 - 145 mmol/L    Potassium 4.8 3.5 - 5.1 mmol/L    Chloride 105 97 - 108 mmol/L    CO2 34 (H) 21 - 32 mmol/L    Anion gap 3 (L) 5 - 15 mmol/L    Glucose 91 65 - 100 mg/dL    BUN 18 6 - 20 MG/DL    Creatinine 0.95 0.70 - 1.30 MG/DL    BUN/Creatinine ratio 19 12 - 20      GFR est AA >60 >60 ml/min/1.73m2    GFR est non-AA >60 >60 ml/min/1.73m2    Calcium 9.3 8.5 - 10.1 MG/DL   TSH 3RD GENERATION   Result Value Ref Range    TSH 7.88 (H) 0.36 - 3.74 uIU/mL   HEMOGLOBIN A1C WITH EAG   Result Value Ref Range    Hemoglobin A1c 5.7 (H) 4.0 - 5.6 % Est. average glucose 117 mg/dL   LIPID PANEL   Result Value Ref Range    LIPID PROFILE          Cholesterol, total 130 <200 MG/DL    Triglyceride 89 <150 MG/DL    HDL Cholesterol 52 MG/DL    LDL, calculated 60.2 0 - 100 MG/DL    VLDL, calculated 17.8 MG/DL    CHOL/HDL Ratio 2.5 0.0 - 5.0     ANGIOTENSIN CONVERTING ENZYME   Result Value Ref Range    Angiotensin Converting Enzyme (ACE) 46 14 - 82 U/L   VITAMIN B12   Result Value Ref Range    Vitamin B12 714 193 - 986 pg/mL   CK   Result Value Ref Range     39 - 308 U/L   SED RATE (ESR)   Result Value Ref Range    Sed rate, automated 25 (H) 0 - 20 mm/hr   EKG, 12 LEAD, INITIAL   Result Value Ref Range    Ventricular Rate 81 BPM    Atrial Rate 81 BPM    P-R Interval 166 ms    QRS Duration 98 ms    Q-T Interval 374 ms    QTC Calculation (Bezet) 434 ms    Calculated P Axis 67 degrees    Calculated R Axis 82 degrees    Calculated T Axis 75 degrees    Diagnosis       Normal sinus rhythm  Normal ECG  No previous ECGs available  Confirmed by Bessie Veronica M.D., Howard Ferrell ([id number]) on 2/3/2020 8:52:13 AM         Immunizations administered during this encounter:   Immunization History   Administered Date(s) Administered    Influenza Vaccine 09/27/2017    Pneumococcal Conjugate (PCV-13) 09/10/2015       Screening for Metabolic Disorders for Patients on Antipsychotic Medications  (Data obtained from the EMR)    Estimated Body Mass Index  Estimated body mass index is 21.03 kg/m² as calculated from the following:    Height as of this encounter: 5' 9\" (1.753 m). Weight as of this encounter: 64.6 kg (142 lb 6.7 oz).      Vital Signs/Blood Pressure  Visit Vitals  /77 (BP 1 Location: Right arm, BP Patient Position: Sitting)   Pulse 73   Temp 98 °F (36.7 °C)   Resp 18   Ht 5' 9\" (1.753 m)   Wt 64.6 kg (142 lb 6.7 oz)   SpO2 100%   BMI 21.03 kg/m²       Blood Glucose/Hemoglobin A1c  Lab Results   Component Value Date/Time    Glucose 91 02/01/2020 12:30 PM       Lab Results Component Value Date/Time    Hemoglobin A1c 5.7 (H) 2020 05:06 AM        Lipid Panel  Lab Results   Component Value Date/Time    Cholesterol, total 130 2020 05:06 AM    HDL Cholesterol 52 2020 05:06 AM    LDL, calculated 60.2 2020 05:06 AM    Triglyceride 89 2020 05:06 AM    CHOL/HDL Ratio 2.5 2020 05:06 AM        Discharge Diagnosis: Recurrent depression (ICD-10-CM: F33.9); Suicidal ideations (ICD-10-CM: R45.851); Bereavement (ICD-10-CM: Z63.4)    Discharge Plan: Patient discharged home via [facility]. DISCHARGE SUMMARY    NAME:Garrison Wong  : 1955  MRN: [id number]    The patient Trey Cintron exhibits the ability to control behavior in a less restrictive environment. Patient's level of functioning is improving. No assaultive/destructive behavior has been observed for the past 24 hours. No suicidal/homicidal threat or behavior has been observed for the past 24 hours. There is no evidence of serious medication side effects. Patient has not been in physical or protective restraints for at least the past 24 hours. If weapons involved, how are they secured? No weapons involved. Is patient aware of and in agreement with discharge plan? Yes    Arrangements for medication:  Prescriptions sent to [address] Copy of discharge instructions to provider?:  Attachment & [address]; [facility] ([phone])    Arrangements for transportation home:  [place] all follow up appointments as scheduled, continue to take prescribed medications per physician instructions. Mental health crisis number:  386 or your local mental health crisis line number at [phone].     Discharge Medication List and Instructions:   Discharge Medication List as of 2/10/2020 11:53 AM      CONTINUE these medications which have NOT CHANGED    Details   ipratropium-albuteroL (COMBIVENT RESPIMAT)  mcg/actuation inhaler Take 1 Puff by inhalation every six (6) hours as needed., Historical Med      nitroglycerin (NITROSTAT) 0.4 mg SL tablet 1 Tab by SubLINGual route every five (5) minutes as needed for Chest Pain., Normal, Disp-1 Bottle, R-12      isosorbide mononitrate ER (IMDUR) 30 mg tablet Take 1 Tab by mouth daily. , Normal, Disp-30 Tab, R-6      atorvastatin (LIPITOR) 20 mg tablet Take 1 Tab by mouth daily. , Normal, Disp-30 Tab, R-5      levothyroxine (SYNTHROID) 25 mcg tablet Take 1 Tab by mouth Daily (before breakfast). , Normal, Disp-30 Tab, R-5      mirtazapine (REMERON) 15 mg tablet Take 15 mg by mouth nightly. Indications: major depressive disorder, Historical Med      ibuprofen (MOTRIN) 400 mg tablet Take 400 mg by mouth every six (6) hours as needed for Pain., Historical Med      gabapentin (NEURONTIN) 300 mg capsule Take 300 mg by mouth three (3) times daily. Indications: neuropathic pain, neck pain, Historical Med      pantoprazole (PROTONIX) 40 mg tablet Take 40 mg by mouth daily. Indications: gastroesophageal reflux disease, Historical Med      fluticasone propionate (FLONASE) 50 mcg/actuation nasal spray 2 Sprays by Both Nostrils route daily. Indications: inflammation of the nose due to an allergy, Historical Med      fluticasone-umeclidinium-vilanterol (TRELEGY ELLIPTA) 100-62.5-25 mcg inhaler Take 1 Puff by inhalation two (2) times a day. Indications: prevention of bronchospasms with emphysema, Historical Med             Unresulted Labs (24h ago, onward)    None        To obtain results of studies pending at discharge, please contact [phone]    Follow-up Information     Follow up With Specialties Details Why [address] [place] [address]  On 2/10/2020 You will meet with Saeed Rizo to complete your intake. [facility], [address] [facility]  [phone]    Viv Larose  On 2/12/2020 You have an 11:30am appointment with the nurse practitioner for medication management.  [facility], [address] [place] [address]  [phone]    Veena Gardner  On 2/13/2020 You have a 3:00pm appointment with the . [address], [address] [facility]  [phone]    Rod Cabral MD [facility]   [address]  [phone]            Advanced Directive:   Does the patient have an appointed surrogate decision maker? No  Does the patient have a Medical Advance Directive? No  Does the patient have a Psychiatric Advance Directive? No  If the patient does not have a surrogate or Medical Advance Directive AND Psychiatric Advance Directive, the patient was offered information on these advance directives Yes and Patient declined to complete    Patient Instructions: Please continue all medications until otherwise directed by physician. Tobacco Cessation Discharge Plan:   Is the patient a smoker and needs referral for smoking cessation? Yes  Patient referred to the following for smoking cessation with an appointment? Refused     Patient was offered medication to assist with smoking cessation at discharge? Refused  Was education for smoking cessation added to the discharge instructions? Yes    Alcohol/Substance Abuse Discharge Plan:   Does the patient have a history of substance/alcohol abuse and requires a referral for treatment? Yes  Patient referred to the following for substance/alcohol abuse treatment with an appointment? Yes, Patient has an appointment with Deven King on 2/12/2020 at 11:00am.  Patient was offered medication to assist with alcohol cessation at discharge? Refused  Was education for substance/alcohol abuse added to discharge instructions? Yes    Patient discharged to Home; discussed with patient/caregiver and provided to the patient/caregiver either in hard copy or electronically.

## 2020-02-10 NOTE — BH NOTES
GROUP THERAPY PROGRESS NOTE    Patient is participating in Substance abuse group. Group time: 45 minutes    Personal goal for participation: To understand addiction and relapse and identify triggers that increase urges and relapse. Goal orientation: Personal    Group therapy participation: active    Therapeutic interventions reviewed and discussed: Group discussion of substance use, abuse, and dependence and the urge cycle. Patients were able to explore their own urges to use various substances including cigarettes, alcohol, heroin, cocaine, and others. Group discussed how they feel when they are unable to use and ways substance use has hindered their lives. Triggers for use and coping skills to avoid use or manage symptoms until craving subsides were discussed. Those without addiction issues used the urges handout to write down and draw pictures of things that make them anxious, angry, or sad. Impression of participation: Gareth Joel shared that he is concerned with going home but feels more comfortable with going home after meeting with this writer and his treatment team. He is looking forward to meeting with his skill builder.     Tiffany Vicente [facility]

## 2020-02-10 NOTE — BH NOTES
Pt is pleasant and cooperative with staff and peers. Med compliant and denies all psych symptoms. Attends meals and groups. Discharge focused.

## 2020-02-10 NOTE — PROGRESS NOTES
Problem: Suicide  Goal: *STG: Remains safe in hospital  Outcome: Progressing Towards Goal  Goal: *STG: Seeks staff when feelings of self harm or harm towards others arise  Outcome: Progressing Towards Goal  Goal: *STG: Attends activities and groups  Outcome: Progressing Towards Goal  Goal: *STG/LTG: No longer expresses self destructive or suicidal thoughts  Outcome: Progressing Towards Goal     Problem: Falls - Risk of  Goal: *Absence of Falls  Description  [facility] and appropriate interventions in the flowsheet.   Outcome: Progressing Towards Goal  Note: Fall Risk Interventions:       Mentation Interventions: Adequate sleep, hydration, pain control, Update white board

## 2020-02-10 NOTE — DISCHARGE SUMMARY
PSYCHIATRIC DISCHARGE SUMMARY         IDENTIFICATION:    Patient Name  Lokesh Swain   Date of Birth 1955   [facility] [id number]   Medical Record Number  [id number]      Age  71 y.o.    PCP Prashanth العراقي MD   Admit date:  1/30/2020    Discharge date: 2/10/2020   Room Number  320/01  @ [facility]   Date of Service  2/10/2020            TYPE OF DISCHARGE: REGULAR               CONDITION AT DISCHARGE: improved       PROVISIONAL & DISCHARGE DIAGNOSES:    Problem List  Date Reviewed: 3/7/2018          Codes Class    Suicidal ideation ICD-10-CM: R45.851  ICD-9-CM: V62.84         Bereavement ICD-10-CM: Z63.4  ICD-9-CM: V62.82         Suicidal ideations ICD-10-CM: R45.851  ICD-9-CM: V62.84         Osteoradionecrosis of jaw ICD-10-CM: M27.2, Y84.2  ICD-9-CM: 526.89, E926.9         Other specified hypothyroidism ICD-10-CM: E03.8  ICD-9-CM: 244.8     Overview Signed 6/9/2018  4:20 PM by Nova Benson MD     S/p xrt to neck             Other forms of angina pectoris ([facility] ICD-10-CM: I20.8  ICD-9-CM: 413.9         * (Principal) Recurrent depression ([facility] ICD-10-CM: F33.9  ICD-9-CM: 296.30         Pure hypercholesterolemia ICD-10-CM: E78.00  ICD-9-CM: 272.0         Elevated TSH ICD-10-CM: R79.89  ICD-9-CM: 794.5         Nontraumatic partial bilateral rotator cuff tear ICD-10-CM: M75.111, M75.112  ICD-9-CM: 726.13         Adhesive capsulitis of left shoulder ICD-10-CM: M75.02  ICD-9-CM: 726.0         Bursitis/tendonitis, shoulder ICD-10-CM: M71.9, M67.919  ICD-9-CM: 726.10               Active Hospital Problems    Suicidal ideation      Bereavement      Suicidal ideations      *Recurrent depression ([facility]        DISCHARGE DIAGNOSIS:   Axis I:  SEE ABOVE  Axis II: SEE ABOVE  Axis III: SEE ABOVE  Axis IV:  lack of structure  Axis V:  <50 on admission, 55+ on discharge     CC & HISTORY OF PRESENT ILLNESS:   65-year-old  male with history of depression, prior treatments, who reports in the past month he had 2 major losses in his life, and he has no more friends, no more family left to support him. He is helpless, hopeless. He had thoughts of suicide, but no specific plan to kill himself during this interview, per the reports. He is thinking about drinking antifreeze, but he had multiple plans, none specific that he had betted on at this time. He knows that his girlfriend passed away of diabetes at [facility] earlier this month where she was supposedly brain dead, so he got to watch her suffer before they could pull the plug on her, and then eight days after, they pulled the plug, then she passed away, and just buried her and he broke down. His friend was comforting him in his home, and he went to bed one night, the next morning he woke up and his friend was dead in his apartment couch area. He called the police and they came in and got him, but he was already dead. He is very tearful and became distraught during the conversation, and had to take a break before we could continue. He says maybe 4 years ago, his mother  and that was the last person in his family that was living. He no longer has any supports or anyone in his community. He lives alone now, and he feels helpless, hopeless, lost, and so he was brought in to get help.      SOCIAL HISTORY:    Social History     Socioeconomic History    Marital status: SINGLE     Spouse name: Not on file    Number of children: Not on file    Years of education: Not on file    Highest education level: Not on file   Occupational History    Not on file   Social Needs    Financial resource strain: Not on file    Food insecurity:     Worry: Not on file     Inability: Not on file    Transportation needs:     Medical: No     Non-medical: No   Tobacco Use    Smoking status: Current Every Day Smoker     Packs/day: 1.00     Years: 52.00     Pack years: 52.00     Last attempt to quit: 2010     Years since quittin.2    Smokeless tobacco: Never Used    Tobacco comment: Discussed importance of smoking cessation for healing. Substance and Sexual Activity    Alcohol use: No    Drug use: Yes     Types: Prescription, OTC    Sexual activity: Not Currently   Lifestyle    Physical activity:     Days per week: Not on file     Minutes per session: Not on file    Stress: Not on file   Relationships    Social connections:     Talks on phone: Not on file     Gets together: Not on file     Attends Gnosticist service: Not on file     Active member of club or organization: Not on file     Attends meetings of clubs or organizations: Not on file     Relationship status: Not on file    Intimate partner violence:     Fear of current or ex partner: Not on file     Emotionally abused: Not on file     Physically abused: Not on file     Forced sexual activity: Not on file   Other Topics Concern     Service Not Asked    Blood Transfusions Not Asked    Caffeine Concern Not Asked    Occupational Exposure Not Asked   Ellender Share Hazards Not Asked    Sleep Concern Not Asked    Stress Concern Not Asked    Weight Concern Not Asked    Special Diet Not Asked    Back Care Not Asked    Exercise Not Asked    Bike Helmet Not Asked   [address] Not Asked    Self-Exams Not Asked   Social History Narrative    Not on file      FAMILY HISTORY:   Family History   Problem Relation Age of Onset    Cancer Mother     Cancer Father              HOSPITALIZATION COURSE:    Oscar Herman was admitted to the inpatient psychiatric unit [facility] for acute psychiatric stabilization in regards to symptomatology as described in the HPI above. The differential diagnosis at time of admission included: schizophrenia vs substance induced psychotic disorder schizoaffective vs bipolar vs adjustment disorder. While on the unit Oscar Herman was involved in individual, group, occupational and milieu therapy. Psychiatric medications were adjusted during this hospitalization.   Glenda Smith ISA Wong demonstrated a progressive improvement in overall condition. Much of patient's initial presentation appeared to be related to situational stressors, effects of medication non-compliance drugs of abuse, and psychological factors. Please see individual progress notes for more specific details regarding patient's hospitalization course. Kareen Hidalgo' reports feeling well and moods are good. Denies SI/HI/AH/VH. No aggression or violence. Appropriately interactive and aware. Tolerating medications well. Eating and sleeping fairly. Patient with request for discharge today. There are no grounds to seek a TDO. At time of discharge, Kareen Hidalgo is without significant problems of depression, psychosis, or jade. Patient free of suicidal and homicidal ideations (appears to be at very low risk of suicide or homicide) and reports many positive predictive factors in terms of not attempting suicide or homicide. Overall presentation at time of discharge is most consistent with the diagnosis of Major Depressive Disorder recurrent, Bereavement. Patient has maximized benefit to be derived from acute inpatient psychiatric treatment. All members of the treatment team concur with each other in regards to plans for discharge today. Patient and family are aware and in agreement with discharge and discharge plan.          LABS AND IMAGAING:    Labs Reviewed   CBC WITH AUTOMATED DIFF - Abnormal; Notable for the following components:       Result Value    RDW 14.6 (*)     All other components within normal limits   METABOLIC PANEL, BASIC - Abnormal; Notable for the following components:    CO2 34 (*)     Anion gap 3 (*)     All other components within normal limits   TSH 3RD GENERATION - Abnormal; Notable for the following components:    TSH 7.88 (*)     All other components within normal limits   HEMOGLOBIN A1C WITH EAG - Abnormal; Notable for the following components:    Hemoglobin A1c 5.7 (*)     All other components within normal limits   SED RATE (ESR) - Abnormal; Notable for the following components:    Sed rate, automated 25 (*)     All other components within normal limits   TROPONIN I   LIPID PANEL   ANGIOTENSIN CONVERTING ENZYME   VITAMIN B12   CK   PROTEIN ELECTROPHORESIS     No results found for: DS35, PHEN, PHENO, PHENT, DILF, DS39, PHENY, PTN, VALF2, VALAC, VALP, VALPR, DS6, CRBAM, CRBAMP, CARB2, XCRBAM  Admission on 01/30/2020   Component Date Value Ref Range Status    Ventricular Rate 02/01/2020 81  BPM Final    Atrial Rate 02/01/2020 81  BPM Final    P-R Interval 02/01/2020 166  ms Final    QRS Duration 02/01/2020 98  ms Final    Q-T Interval 02/01/2020 374  ms Final    QTC Calculation (Bezet) 02/01/2020 434  ms Final    Calculated P Axis 02/01/2020 67  degrees Final    Calculated R Axis 02/01/2020 82  degrees Final    Calculated T Axis 02/01/2020 75  degrees Final    Diagnosis 02/01/2020    Final                    Value:Normal sinus rhythm  Normal ECG  No previous ECGs available  Confirmed by [facility] Amelia BARDALES M.D., Reny Joseph ([id number]) on 2/3/2020 8:52:13 AM      Troponin-I, Qt. 02/01/2020 <0.05  <0.05 ng/mL Final    WBC 02/01/2020 5.2  4.1 - 11.1 K/uL Final    RBC 02/01/2020 5.12  4.10 - 5.70 M/uL Final    HGB 02/01/2020 15.7  12.1 - 17.0 g/dL Final    HCT 02/01/2020 48.8  36.6 - 50.3 % Final    MCV 02/01/2020 95.3  80.0 - 99.0 FL Final    MCH 02/01/2020 30.7  26.0 - 34.0 PG Final    MCHC 02/01/2020 32.2  30.0 - 36.5 g/dL Final    RDW 02/01/2020 14.6* 11.5 - 14.5 % Final    PLATELET 92/02/9837 417  150 - 400 K/uL Final    MPV 02/01/2020 9.2  8.9 - 12.9 FL Final    NRBC 02/01/2020 0.0  0  WBC Final    ABSOLUTE NRBC 02/01/2020 0.00  0.00 - 0.01 K/uL Final    NEUTROPHILS 02/01/2020 61  32 - 75 % Final    LYMPHOCYTES 02/01/2020 29  12 - 49 % Final    MONOCYTES 02/01/2020 8  5 - 13 % Final    EOSINOPHILS 02/01/2020 1  0 - 7 % Final    BASOPHILS 02/01/2020 1  0 - 1 % Final    IMMATURE GRANULOCYTES 02/01/2020 0  0.0 - 0.5 % Final    ABS. NEUTROPHILS 02/01/2020 3.1  1.8 - 8.0 K/UL Final    ABS. LYMPHOCYTES 02/01/2020 1.5  0.8 - 3.5 K/UL Final    ABS. MONOCYTES 02/01/2020 0.4  0.0 - 1.0 K/UL Final    ABS. EOSINOPHILS 02/01/2020 0.1  0.0 - 0.4 K/UL Final    ABS. BASOPHILS 02/01/2020 0.0  0.0 - 0.1 K/UL Final    ABS. IMM.  GRANS. 02/01/2020 0.0  0.00 - 0.04 K/UL Final    DF 02/01/2020 AUTOMATED    Final    Sodium 02/01/2020 142  136 - 145 mmol/L Final    Potassium 02/01/2020 4.8  3.5 - 5.1 mmol/L Final    Chloride 02/01/2020 105  97 - 108 mmol/L Final    CO2 02/01/2020 34* 21 - 32 mmol/L Final    Anion gap 02/01/2020 3* 5 - 15 mmol/L Final    Glucose 02/01/2020 91  65 - 100 mg/dL Final    BUN 02/01/2020 18  6 - 20 MG/DL Final    Creatinine 02/01/2020 0.95  0.70 - 1.30 MG/DL Final    BUN/Creatinine ratio 02/01/2020 19  12 - 20   Final    GFR est AA 02/01/2020 >60  >60 ml/min/1.73m2 Final    GFR est non-AA 02/01/2020 >60  >60 ml/min/1.73m2 Final    Calcium 02/01/2020 9.3  8.5 - 10.1 MG/DL Final    TSH 02/07/2020 7.88* 0.36 - 3.74 uIU/mL Final    Hemoglobin A1c 02/07/2020 5.7* 4.0 - 5.6 % Final    Est. average glucose 02/07/2020 117  mg/dL Final    LIPID PROFILE 02/07/2020        Final    Cholesterol, total 02/07/2020 130  <200 MG/DL Final    Triglyceride 02/07/2020 89  <150 MG/DL Final    HDL Cholesterol 02/07/2020 52  MG/DL Final    LDL, calculated 02/07/2020 60.2  0 - 100 MG/DL Final    VLDL, calculated 02/07/2020 17.8  MG/DL Final    CHOL/HDL Ratio 02/07/2020 2.5  0.0 - 5.0   Final    Angiotensin Converting Enzyme (ACE) 02/07/2020 46  14 - 82 U/L Final    Vitamin B12 02/07/2020 714  193 - 986 pg/mL Final    CK 02/07/2020 154  39 - 308 U/L Final    Sed rate, automated 02/07/2020 25* 0 - 20 mm/hr Final   Admission on 01/30/2020, Discharged on 01/30/2020   Component Date Value Ref Range Status    WBC 01/30/2020 7.0  4.1 - 11.1 K/uL Final    RBC 01/30/2020 5.04 4.10 - 5.70 M/uL Final    HGB 01/30/2020 15.4  12.1 - 17.0 g/dL Final    HCT 01/30/2020 47.6  36.6 - 50.3 % Final    MCV 01/30/2020 94.4  80.0 - 99.0 FL Final    MCH 01/30/2020 30.6  26.0 - 34.0 PG Final    MCHC 01/30/2020 32.4  30.0 - 36.5 g/dL Final    RDW 01/30/2020 14.7* 11.5 - 14.5 % Final    PLATELET 28/85/4464 510  150 - 400 K/uL Final    MPV 01/30/2020 9.0  8.9 - 12.9 FL Final    NRBC 01/30/2020 0.0  0  WBC Final    ABSOLUTE NRBC 01/30/2020 0.00  0.00 - 0.01 K/uL Final    NEUTROPHILS 01/30/2020 72  32 - 75 % Final    LYMPHOCYTES 01/30/2020 19  12 - 49 % Final    MONOCYTES 01/30/2020 7  5 - 13 % Final    EOSINOPHILS 01/30/2020 1  0 - 7 % Final    BASOPHILS 01/30/2020 1  0 - 1 % Final    IMMATURE GRANULOCYTES 01/30/2020 0  0.0 - 0.5 % Final    ABS. NEUTROPHILS 01/30/2020 5.0  1.8 - 8.0 K/UL Final    ABS. LYMPHOCYTES 01/30/2020 1.3  0.8 - 3.5 K/UL Final    ABS. MONOCYTES 01/30/2020 0.5  0.0 - 1.0 K/UL Final    ABS. EOSINOPHILS 01/30/2020 0.1  0.0 - 0.4 K/UL Final    ABS. BASOPHILS 01/30/2020 0.1  0.0 - 0.1 K/UL Final    ABS. IMM. GRANS. 01/30/2020 0.0  0.00 - 0.04 K/UL Final    DF 01/30/2020 AUTOMATED    Final    Sodium 01/30/2020 140  136 - 145 mmol/L Final    Potassium 01/30/2020 4.0  3.5 - 5.1 mmol/L Final    Chloride 01/30/2020 106  97 - 108 mmol/L Final    CO2 01/30/2020 31  21 - 32 mmol/L Final    Anion gap 01/30/2020 3* 5 - 15 mmol/L Final    Glucose 01/30/2020 117* 65 - 100 mg/dL Final    BUN 01/30/2020 19  6 - 20 MG/DL Final    Creatinine 01/30/2020 0.92  0.70 - 1.30 MG/DL Final    BUN/Creatinine ratio 01/30/2020 21* 12 - 20   Final    GFR est AA 01/30/2020 >60  >60 ml/min/1.73m2 Final    GFR est non-AA 01/30/2020 >60  >60 ml/min/1.73m2 Final    Calcium 01/30/2020 9.3  8.5 - 10.1 MG/DL Final    Bilirubin, total 01/30/2020 0.6  0.2 - 1.0 MG/DL Final    ALT (SGPT) 01/30/2020 23  12 - 78 U/L Final    AST (SGOT) 01/30/2020 15  15 - 37 U/L Final    Alk. phosphatase 01/30/2020 61  45 - 117 U/L Final    Protein, total 01/30/2020 7.5  6.4 - 8.2 g/dL Final    Albumin 01/30/2020 3.5  3.5 - 5.0 g/dL Final    Globulin 01/30/2020 4.0  2.0 - 4.0 g/dL Final    A-G Ratio 01/30/2020 0.9* 1.1 - 2.2   Final    Salicylate level 83/81/6730 1.9* 2.8 - 20.0 MG/DL Final    Acetaminophen level 01/30/2020 <2* 10 - 30 ug/mL Final    ALCOHOL(ETHYL),SERUM 01/30/2020 <10  <10 MG/DL Final    AMPHETAMINES 01/30/2020 NEGATIVE   NEG   Final    BARBITURATES 01/30/2020 NEGATIVE   NEG   Final    BENZODIAZEPINES 01/30/2020 NEGATIVE   NEG   Final    COCAINE 01/30/2020 NEGATIVE   NEG   Final    METHADONE 01/30/2020 NEGATIVE   NEG   Final    OPIATES 01/30/2020 NEGATIVE   NEG   Final    PCP(PHENCYCLIDINE) 01/30/2020 NEGATIVE   NEG   Final    THC (TH-CANNABINOL) 01/30/2020 POSITIVE* NEG   Final    Drug screen comment 01/30/2020 (NOTE)   Final     Mri Cerv Spine W Wo Cont    Result Date: 2/7/2020  EXAM: MRI CERV SPINE W WO CONT INDICATION: cervical myelopathy. COMPARISON: None TECHNIQUE: MR imaging of the cervical spine was performed using the following sequences: sagittal T1, T2, STIR;  axial T2, T1 prior to and following contrast administration. CONTRAST: 14 mL of Dotarem. FINDINGS: There is moderate straightening of the cervical spine. Vertebral body heights are maintained. There is a mild marrow edema at C5-6 and C6-7 compatible with Modic type I changes. The craniocervical junction is intact. The course, caliber, and signal intensity of the spinal cord are normal. There is no pathologic intrathecal enhancement. The paraspinal soft tissues are within normal limits. C2-C3: No herniation or stenosis. C3-C4: No herniation or stenosis. C4-C5: No herniation or stenosis. There is a small central bulge. The central canal measures 12 mm anterior posterior (axial image 15). C5-C6: There is a disc osteophyte complex eccentric to the left.  The central canal measures 9.7 mm anterior to posterior. There is mild left-sided neural foraminal stenosis (series 601, image 11). C6-C7: There is a disc osteophyte complex, eccentric to the left. The central canal measures 9.1 mm anterolisthesis year. There is a moderate severe left-sided neural foraminal stenosis (series 601, image 8). C7-T1: There is a disc osteophyte complex eccentric to the left. The central canal measures 10.6 mm anterior to posterior. Moderate severe left-sided neural foraminal stenosis is present. IMPRESSION: Moderate severe left C6-7 and C7-T1 neural foraminal stenosis as detailed Degenerative disc disease, most marked at C5-6 and C6-7 Multifactorial central canal stenosis C5-6 and C6-7    Xr Chest Port    Result Date: 2/1/2020  EXAM:  XR CHEST PORT INDICATION:  chest pain COMPARISON:  1/26/2019 FINDINGS: A portable AP radiograph of the chest was obtained at 1133 hours. Apical pleural thickening is stable. .  Lungs are clear of an acute process. The cardiac and mediastinal contours and pulmonary vascularity are normal.  Bony structures are unchanged. IMPRESSION: No acute abnormality identified. DISPOSITION:    Home. Patient to f/u with  psychiatric, and psychotherapy appointments. Patient is to f/u with internist as directed. FOLLOW-UP CARE:    Activity as tolerated  Regular diet  Wound Care: none needed. Follow-up Information     Follow up With Specialties Details Why [address] [place] [address]  On 2/10/2020 You will meet with Karely Desai to complete your intake. [address] [facility], [address] [place] [address]  [phone]    Aidan Padgett  On 2/12/2020 You have an 11:30am appointment with the nurse practitioner for medication management. [address]  [phone]    [facility]  On 2/13/2020 You have a 3:00pm appointment with the .  [address] [facility]   [address], [address] [place] [address]  [phone]    Taylor Cueva MD [facility]   [address] [address]  [phone]                   PROGNOSIS:   Fair ---- based on nature of patient's pathology/ies and treatment compliance issues. Prognosis is greatly dependent upon patient's ability to remain sober and to follow up with scheduled appointments as well as to comply with psychiatric medications as prescribed. DISCHARGE MEDICATIONS:     Informed consent given for the use of following psychotropic medications:  Current Discharge Medication List      START taking these medications    Details   budesonide-formoteroL (SYMBICORT) 160-4.5 mcg/actuation HFAA Take 2 Puffs by inhalation two (2) times a day. Indications: bronchospasm prevention with COPD  Qty: 1 Inhaler, Refills: 0      prazosin (MINIPRESS) 2 mg capsule Take 1 Cap by mouth nightly. Indications: posttraumatic stress syndrome  Qty: 30 Cap, Refills: 0      QUEtiapine (SEROQUEL) 50 mg tablet Take 1 Tab by mouth nightly. Indications: additional medications to treat depression, insomnia  Qty: 30 Tab, Refills: 0      tiotropium bromide (SPIRIVA RESPIMAT) 2.5 mcg/actuation inhaler Take 2 Puffs by inhalation daily. Indications: bronchospasm prevention with COPD  Qty: 1 Inhaler, Refills: 0      dexAMETHasone (DECADRON) 4 mg tablet Take 4 mg PO Q 12 hrs until discontinued  Indications: worsening asthma  Qty: 3 Tab, Refills: 0         CONTINUE these medications which have CHANGED    Details   fluticasone propionate (FLONASE) 50 mcg/actuation nasal spray 1 spray each nostril daily for allergy  Indications: inflammation of the nose due to an allergy  Qty: 1 Bottle, Refills: 0      gabapentin (NEURONTIN) 400 mg capsule Take 1 Cap by mouth three (3) times daily. Max Daily Amount: 1,200 mg. Indications: Neck spasms  Qty: 30 Cap, Refills: 0    Associated Diagnoses: Neuropathy; Tremor; Bursitis/tendonitis, shoulder      mirtazapine (REMERON) 30 mg tablet Take 1 Tab by mouth nightly.  Indications: major depressive disorder  Qty: 30 Tab, Refills: 0      pantoprazole (PROTONIX) 40 mg tablet Take 1 Tab by mouth daily (after breakfast). Indications: gastroesophageal reflux disease  Qty: 30 Tab, Refills: 0         CONTINUE these medications which have NOT CHANGED    Details   ipratropium-albuteroL (COMBIVENT RESPIMAT)  mcg/actuation inhaler Take 1 Puff by inhalation every six (6) hours as needed. nitroglycerin (NITROSTAT) 0.4 mg SL tablet 1 Tab by SubLINGual route every five (5) minutes as needed for Chest Pain. Qty: 1 Bottle, Refills: 12      isosorbide mononitrate ER (IMDUR) 30 mg tablet Take 1 Tab by mouth daily. Qty: 30 Tab, Refills: 6      atorvastatin (LIPITOR) 20 mg tablet Take 1 Tab by mouth daily. Qty: 30 Tab, Refills: 5      levothyroxine (SYNTHROID) 25 mcg tablet Take 1 Tab by mouth Daily (before breakfast). Qty: 30 Tab, Refills: 5         STOP taking these medications       ibuprofen (MOTRIN) 400 mg tablet Comments:   Reason for Stopping:         fluticasone-umeclidinium-vilanterol (TRELEGY ELLIPTA) 100-62.5-25 mcg inhaler Comments:   Reason for Stopping:                      A coordinated, multidisplinary treatment team round was conducted with Sana Pineda is done daily here at [facility]. This team consists of the nurse, psychiatric unit pharmacist,  and Seven Montesinos. I have spent greater than 35 minutes on discharge work.     Signed:  Vanesa Neil MD  2/10/2020

## 2020-02-10 NOTE — BH NOTES
GROUP THERAPY PROGRESS NOTE    Patient is participating in Discharge Group. Group time: 40 minutes    Personal goal for participation: Process feelings related to discharge and communication issues with friends/family. Goal orientation: Personal    Group therapy participation: active    Therapeutic interventions reviewed and discussed: Group discussion was focused on discharge plans and anxiety related to this. Group members discussed what they planned to do once discharge and discharge plans. Discussion also related to support and communication issues that arise. Impression of participation: Rubio Brody was very positive today and talked about feel;ing hopeful about leaving although he is worried as well. He shard his plans and what goals he has set for himself when he gets home to remain focused and try to stay busy.     Alexei Merino LPC LSATP CSAC

## 2020-02-10 NOTE — INTERDISCIPLINARY ROUNDS
Behavioral Health Interdisciplinary Rounds Patient Name: Janene Pelayo  Age: 71 y.o. Room/Bed:  320/01 Primary Diagnosis: Recurrent depression ([facility] Admission Status: Voluntary Readmission within 30 days:  
Power of  in place:  
Patient requires a blocked bed: no          Reason for blocked bed:  
 
Sleep hours: 7 Participation in Care/Groups: yes Medication Compliant?: Yes PRNS (last 24 hours):    
Restraints (last 24 hours):  no 
Substance Abuse:  no   
24 hour chart check complete: yes Patient goal(s) for today:  
Treatment team focus/goals:  
Progress note: LOS:  11  Expected LOS:  
 
Financial concerns/prescription coverage:   
Family contact:      
Family requesting physician contact today:   
Discharge plan: Access to weapons: Outpatient provider(s):  
Patient's preferred phone number for follow up call:  
 
Participating treatment team members: Janene Pelayo (assigned SW),

## 2020-02-10 NOTE — GROUP NOTE
PATRIZIA  GROUP DOCUMENTATION INDIVIDUAL Group Therapy Note Date: 2/10/2020 Group Start Time: 1000 Group End Time: 1100 Group Topic: Topic Group [address] 3 ACUTE BEHAV [facility], [address] [facility] GROUP DOCUMENTATION GROUP Group Therapy Note Attendees: 5 Attendance: Attended Patient's Goal:  To identify personal affirmations Interventions/techniques: Supported-worksheet Follows Directions: Followed directions Interactions: Interacted appropriately Mental Status: Calm Behavior/appearance: Cooperative and Needed prompting Goals Achieved: Able to engage in interactions and Able to listen to others Additional Notes:   
 
Tyree Patrick

## 2020-02-10 NOTE — BH NOTES
Patient without complaint this shift. Medication compliant. Observed in bed resting quietly with eyes closed. No distress noted. Slept approx 7 hours.

## 2020-02-17 ENCOUNTER — TELEPHONE (OUTPATIENT)
Dept: INTERNAL MEDICINE CLINIC | Age: 65
End: 2020-02-17

## 2020-02-17 NOTE — TELEPHONE ENCOUNTER
Attempted to return Ermelinda's phone call. Unable to leave message. Letter faxed to [facility] that pt is not under the care of Dr. Sweta Mar.

## 2020-02-17 NOTE — TELEPHONE ENCOUNTER
Jamil Marking over at [address] requests an updated med list for pt and states that she needs a call. .[phone] Fax

## 2020-08-03 ENCOUNTER — OFFICE VISIT (OUTPATIENT)
Dept: CARDIOLOGY CLINIC | Age: 65
End: 2020-08-03
Payer: MEDICARE

## 2020-08-03 VITALS
DIASTOLIC BLOOD PRESSURE: 62 MMHG | SYSTOLIC BLOOD PRESSURE: 110 MMHG | OXYGEN SATURATION: 97 % | BODY MASS INDEX: 25.33 KG/M2 | WEIGHT: 171 LBS | HEIGHT: 69 IN | RESPIRATION RATE: 18 BRPM | HEART RATE: 65 BPM

## 2020-08-03 DIAGNOSIS — I20.8 OTHER FORMS OF ANGINA PECTORIS (HCC): Primary | ICD-10-CM

## 2020-08-03 DIAGNOSIS — I20.8 OTHER FORMS OF ANGINA PECTORIS (HCC): ICD-10-CM

## 2020-08-03 DIAGNOSIS — E78.2 MIXED HYPERLIPIDEMIA: ICD-10-CM

## 2020-08-03 PROBLEM — I25.10 CORONARY ARTERY DISEASE INVOLVING NATIVE CORONARY ARTERY OF NATIVE HEART WITHOUT ANGINA PECTORIS: Status: ACTIVE | Noted: 2020-08-03

## 2020-08-03 PROCEDURE — 93000 ELECTROCARDIOGRAM COMPLETE: CPT | Performed by: INTERNAL MEDICINE

## 2020-08-03 PROCEDURE — 3017F COLORECTAL CA SCREEN DOC REV: CPT | Performed by: INTERNAL MEDICINE

## 2020-08-03 PROCEDURE — G8536 NO DOC ELDER MAL SCRN: HCPCS | Performed by: INTERNAL MEDICINE

## 2020-08-03 PROCEDURE — G8427 DOCREV CUR MEDS BY ELIG CLIN: HCPCS | Performed by: INTERNAL MEDICINE

## 2020-08-03 PROCEDURE — 99214 OFFICE O/P EST MOD 30 MIN: CPT | Performed by: INTERNAL MEDICINE

## 2020-08-03 PROCEDURE — 3288F FALL RISK ASSESSMENT DOCD: CPT | Performed by: INTERNAL MEDICINE

## 2020-08-03 PROCEDURE — G9717 DOC PT DX DEP/BP F/U NT REQ: HCPCS | Performed by: INTERNAL MEDICINE

## 2020-08-03 PROCEDURE — 1100F PTFALLS ASSESS-DOCD GE2>/YR: CPT | Performed by: INTERNAL MEDICINE

## 2020-08-03 PROCEDURE — G8419 CALC BMI OUT NRM PARAM NOF/U: HCPCS | Performed by: INTERNAL MEDICINE

## 2020-08-03 RX ORDER — MELOXICAM 7.5 MG/1
TABLET ORAL
Status: ON HOLD | COMMUNITY
Start: 2020-07-20 | End: 2021-08-24

## 2020-08-03 RX ORDER — SERTRALINE HYDROCHLORIDE 50 MG/1
50 TABLET, FILM COATED ORAL DAILY
COMMUNITY
Start: 2020-07-20 | End: 2021-10-22

## 2020-08-03 RX ORDER — ISOSORBIDE MONONITRATE 30 MG/1
15 TABLET, EXTENDED RELEASE ORAL DAILY
Qty: 45 TAB | Refills: 3 | Status: SHIPPED | OUTPATIENT
Start: 2020-08-03 | End: 2021-08-03

## 2020-08-03 NOTE — PROGRESS NOTES
Charan Anders, [facility]    Subjective/HPI:     Spence Cushing is a 71 y.o. male is here for routine f/u. He has a PMHx of HLD, depression/schizophrenia, GERD, COPD, and tobacco abuse. He reports complaints of chest pain that occurs with rest.  The pain is relieved with 1 dose of NTG. Pain occurs a few times a month. Symptoms are unchanged from his previous symptoms reported last year. He is still smoking 10 cigarettes a day. He denies symptoms with exertion. PCP Provider  Gisela Barron MD    Past Medical History:   Diagnosis Date    Abdominal pain     Adverse effect of anesthesia     heart rate slows down    Asthma     Back pain     Cancer ([facility] [address] 2015    Throat cancer treated with chemo & radiation    Cancer [facility])     testicular cancer    Constipation     COPD     managed by PCP    Cyst of pharynx     Depression     Emphysema of lung ([facility] [address]     managed by PCP    Extrinsic asthma, unspecified     Fatigue     GERD (gastroesophageal reflux disease)     Insomnia, unspecified     On home O2     at bedtime    Other chest pain     since 2-    Pain in joint, forearm     Psychiatric disorder     schizophrenia    Smoker     1 ppw        Allergies   Allergen Reactions    Allergen [Antipyrine-Benzocaine] Unknown (comments)    Avelox [Moxifloxacin] Unknown (comments)    Codeine Sulfate Nausea and Vomiting    Pcn [Penicillins] Other (comments)     Coma        Outpatient Encounter Medications as of 8/3/2020   Medication Sig Dispense Refill    meloxicam (MOBIC) 7.5 mg tablet       sertraline (ZOLOFT) 50 mg tablet       Nitrostat 0.4 mg SL tablet 1 TAB DISSOLVED UNDER THE TONGUE AT FIRST SIGN OF ATTACK,MAY REPEAT EVERY 5 MIN . MAX 3 DOSES- IF NO RELIEF/CALL 911 25 Tab 5    gabapentin (NEURONTIN) 400 mg capsule Take 1 Cap by mouth three (3) times daily. Max Daily Amount: 1,200 mg.  Indications: Neck spasms 30 Cap 0    mirtazapine (REMERON) 30 mg tablet Take 1 Tab by mouth nightly. Indications: major depressive disorder 30 Tab 0    budesonide-formoteroL (SYMBICORT) 160-4.5 mcg/actuation HFAA Take 2 Puffs by inhalation two (2) times a day. Indications: bronchospasm prevention with COPD 1 Inhaler 0    tiotropium bromide (SPIRIVA RESPIMAT) 2.5 mcg/actuation inhaler Take 2 Puffs by inhalation daily. Indications: bronchospasm prevention with COPD 1 Inhaler 0    ipratropium-albuteroL (COMBIVENT RESPIMAT)  mcg/actuation inhaler Take 1 Puff by inhalation every six (6) hours as needed.  atorvastatin (LIPITOR) 20 mg tablet Take 1 Tab by mouth daily. 30 Tab 5    levothyroxine (SYNTHROID) 25 mcg tablet Take 1 Tab by mouth Daily (before breakfast). 30 Tab 5    [DISCONTINUED] fluticasone propionate (FLONASE) 50 mcg/actuation nasal spray 1 spray each nostril daily for allergy  Indications: inflammation of the nose due to an allergy 1 Bottle 0    [DISCONTINUED] pantoprazole (PROTONIX) 40 mg tablet Take 1 Tab by mouth daily (after breakfast). Indications: gastroesophageal reflux disease 30 Tab 0    [DISCONTINUED] prazosin (MINIPRESS) 2 mg capsule Take 1 Cap by mouth nightly. Indications: posttraumatic stress syndrome 30 Cap 0    [DISCONTINUED] QUEtiapine (SEROQUEL) 50 mg tablet Take 1 Tab by mouth nightly. Indications: additional medications to treat depression, insomnia 30 Tab 0    [DISCONTINUED] dexAMETHasone (DECADRON) 4 mg tablet Take 4 mg PO Q 12 hrs until discontinued  Indications: worsening asthma 3 Tab 0    isosorbide mononitrate ER (IMDUR) 30 mg tablet Take 1 Tab by mouth daily. 30 Tab 6     No facility-administered encounter medications on file as of 8/3/2020. Review of Symptoms:    Review of Systems   Constitutional: Negative for chills, fever and weight loss. HENT: Negative for nosebleeds. Eyes: Negative for blurred vision and double vision. Respiratory: Negative for cough, shortness of breath and wheezing.     Cardiovascular: Negative for chest pain, palpitations, orthopnea, leg swelling and PND. Gastrointestinal: Negative for abdominal pain, blood in stool, diarrhea, nausea and vomiting. Musculoskeletal: Negative for joint pain. Skin: Negative for rash. Neurological: Negative for dizziness, tingling and loss of consciousness. Endo/Heme/Allergies: Does not bruise/bleed easily. Physical Exam:      General: Well developed, in no acute distress, cooperative and alert  HEENT: No carotid bruits, no JVD, trach is midline. Neck Supple, PEERL, EOM intact. Heart:  reg rate and rhythm; normal S1/S2; no murmurs, no gallops or rubs. Respiratory: Clear bilaterally x 4, no wheezing or rales  Abdomen:   Soft, non-tender, no distention, no masses. + BS. Extremities:  Normal cap refill, no cyanosis, atraumatic. No edema. Neuro: A&Ox3, speech clear, gait stable. Skin: Skin color is normal. No rashes or lesions.  Non diaphoretic  Vascular: 2+ pulses symmetric in all extremities    Vitals:    08/03/20 0940 08/03/20 0941   BP: 106/60 110/62   Pulse: 65    Resp: 18    SpO2: 97%    Weight: 171 lb (77.6 kg)    Height: 5' 9\" (1.753 m)        ECG: sinus rhythm    Cardiology Labs:    Lab Results   Component Value Date/Time    Cholesterol, total 130 02/07/2020 05:06 AM    HDL Cholesterol 52 02/07/2020 05:06 AM    LDL, calculated 60.2 02/07/2020 05:06 AM    LDL, calculated 185 (H) 06/08/2018 12:40 PM    LDL, calculated 198 (H) 03/08/2018 11:42 AM    VLDL, calculated 17.8 02/07/2020 05:06 AM    CHOL/HDL Ratio 2.5 02/07/2020 05:06 AM       Lab Results   Component Value Date/Time    Hemoglobin A1c 5.7 (H) 02/07/2020 05:06 AM       Lab Results   Component Value Date/Time    Sodium 142 02/01/2020 12:30 PM    Potassium 4.8 02/01/2020 12:30 PM    Chloride 105 02/01/2020 12:30 PM    CO2 34 (H) 02/01/2020 12:30 PM    Glucose 91 02/01/2020 12:30 PM    BUN 18 02/01/2020 12:30 PM    Creatinine 0.95 02/01/2020 12:30 PM    BUN/Creatinine ratio 19 02/01/2020 12:30 PM    GFR est AA >60 02/01/2020 12:30 PM    GFR est non-AA >60 02/01/2020 12:30 PM    Calcium 9.3 02/01/2020 12:30 PM    Anion gap 3 (L) 02/01/2020 12:30 PM    Bilirubin, total 0.6 01/30/2020 10:30 AM    ALT (SGPT) 23 01/30/2020 10:30 AM    Alk. phosphatase 61 01/30/2020 10:30 AM    Protein, total 6.4 02/08/2020 05:45 AM    Albumin 3.5 01/30/2020 10:30 AM    Globulin 4.0 01/30/2020 10:30 AM    A-G Ratio 0.9 (L) 01/30/2020 10:30 AM          Assessment:       ICD-10-CM ICD-9-CM    1. Other forms of angina pectoris (HCC)  I20.8 413.9    2. Mixed hyperlipidemia  E78.2 272.2 AMB POC EKG ROUTINE W/ 12 LEADS, INTER & REP        Plan:     1. Other forms of angina pectoris ([address]  Atypical symptoms with rest, improved with NTG  Will evaluate with lexiscan stress test -- note previous workup has always been normal, symptoms remain unchanged  Add Imdur 15 mg daily  Continue risk factor modification -- discussed benefits of smoking cessation    2. Pure hypercholesterolemia  LDL 60 in 2/2020  Continue statin therapy and low fat, low cholesterol diet  Lipids managed by PCP    F/u with Dr. Eze Key after testing complete    Edwin Padgett NP       [facility]    8/3/2020         Patient seen, examined by me personally. Plan discussed as detailed. Agree with note as outlined by  NP. I confirm findings in history and physical exam. No additional findings noted. Agree with plan as outlined above. Consider further evaluation if continues to be symptomatic.     Lydia Kim MD

## 2020-08-03 NOTE — LETTER
8/3/20 Patient: Maria Luisa Stage YOB: 1955 Date of Visit: 8/3/2020 [address] [phone] VIA Facsimile: [phone] Dear Shaggy Mcguire MD, Thank you for referring Mr. Negin Dillard to [address] [place] [address] [facility] for evaluation. My notes for this consultation are attached. If you have questions, please do not hesitate to call me. I look forward to following your patient along with you. Sincerely, Humberto Trammell MD

## 2020-08-03 NOTE — PROGRESS NOTES
Chief Complaint   Patient presents with    Irregular Heart Beat     annual follow up     Cholesterol Problem    Chest Pain     has taken 6-7 NTG this month and last month - last episode of CP was yesterday AM lasts for 15 minutes - took a NTG     Shortness of Breath     with and without exertion     Ankle swelling     yumiko'  at times      1. Have you been to the ER, urgent care clinic since your last visit? Hospitalized since your last visit? No     2. Have you seen or consulted any other health care providers outside of the [address] [place] [address] since your last visit? Include any pap smears or colon screening.   Yes Pulmonologist

## 2020-08-12 ENCOUNTER — ANCILLARY PROCEDURE (OUTPATIENT)
Dept: CARDIOLOGY CLINIC | Age: 65
End: 2020-08-12
Payer: MEDICARE

## 2020-08-12 VITALS
DIASTOLIC BLOOD PRESSURE: 80 MMHG | BODY MASS INDEX: 25.33 KG/M2 | SYSTOLIC BLOOD PRESSURE: 120 MMHG | WEIGHT: 171 LBS | HEIGHT: 69 IN

## 2020-08-12 LAB
STRESS BASELINE DIAS BP: 80 MMHG
STRESS BASELINE HR: 77 BPM
STRESS BASELINE SYS BP: 120 MMHG
STRESS PEAK DIAS BP: 80 MMHG
STRESS PEAK SYS BP: 130 MMHG
STRESS PERCENT HR ACHIEVED: 69 %
STRESS POST PEAK HR: 107 BPM
STRESS RATE PRESSURE PRODUCT: NORMAL BPM*MMHG
STRESS ST DEPRESSION: 0 MM
STRESS ST ELEVATION: 0 MM
STRESS TARGET HR: 155 BPM

## 2020-08-12 PROCEDURE — 78452 HT MUSCLE IMAGE SPECT MULT: CPT | Performed by: INTERNAL MEDICINE

## 2020-08-12 PROCEDURE — A9502 TC99M TETROFOSMIN: HCPCS | Performed by: INTERNAL MEDICINE

## 2020-08-12 PROCEDURE — 93015 CV STRESS TEST SUPVJ I&R: CPT | Performed by: INTERNAL MEDICINE

## 2020-08-12 NOTE — PROGRESS NOTES
Aliyah    Please call the patient and inform that stress test is normal.  Low concern for significant blockages in the heart arteries at this time. F/u if he continues to have symptoms.   Otherwise, can be seen in 6 months    Thanks,  Carla Mcwilliams

## 2020-08-21 ENCOUNTER — TELEPHONE (OUTPATIENT)
Dept: CARDIOLOGY CLINIC | Age: 65
End: 2020-08-21

## 2020-08-21 NOTE — TELEPHONE ENCOUNTER
Spoke to patient using 2 identifiers. Patient wanted to know if he needed to keep his appt on 8/24/20. Per result note, patient was informed if he continues to have symptoms to follow up with Dr. Darius Solares. Otherwise, can be seen in 6 mos. Patient stated he is not having any cardiac symptoms. Will notify PSR to cancel appt.

## 2020-08-21 NOTE — TELEPHONE ENCOUNTER
Pt states that he is returning a call; pt states he would like to know why he still needcs his appt for Monday?        Thanks

## 2020-08-21 NOTE — TELEPHONE ENCOUNTER
Spoke to patient using 2 identifiers. Per Ac Hernandez NP, patient was made aware that stress test is normal.  Low concern for significant blockages in the heart arteries at this time. F/u if he continues to have symptoms.  Otherwise, can be seen in 6 months. Patient verbalized understanding.

## 2020-11-19 ENCOUNTER — HOSPITAL ENCOUNTER (OUTPATIENT)
Dept: GENERAL RADIOLOGY | Age: 65
Discharge: HOME | End: 2020-11-19
Payer: MEDICARE

## 2020-11-19 ENCOUNTER — TRANSCRIBE ORDER (OUTPATIENT)
Dept: REGISTRATION | Age: 65
End: 2020-11-19

## 2020-11-19 DIAGNOSIS — J44.9 OBSTRUCTIVE CHRONIC BRONCHITIS WITHOUT EXACERBATION (HCC): ICD-10-CM

## 2020-11-19 DIAGNOSIS — J44.9 OBSTRUCTIVE CHRONIC BRONCHITIS WITHOUT EXACERBATION (HCC): Primary | ICD-10-CM

## 2020-11-19 PROCEDURE — 71046 X-RAY EXAM CHEST 2 VIEWS: CPT

## 2020-12-10 ENCOUNTER — TRANSCRIBE ORDER (OUTPATIENT)
Dept: SCHEDULING | Age: 65
End: 2020-12-10

## 2020-12-10 DIAGNOSIS — N20.0 URIC ACID NEPHROLITHIASIS: Primary | ICD-10-CM

## 2020-12-15 ENCOUNTER — HOSPITAL ENCOUNTER (OUTPATIENT)
Dept: CT IMAGING | Age: 65
Discharge: HOME | End: 2020-12-15
Attending: UROLOGY
Payer: MEDICARE

## 2020-12-15 DIAGNOSIS — N20.0 URIC ACID NEPHROLITHIASIS: ICD-10-CM

## 2020-12-15 PROCEDURE — 74176 CT ABD & PELVIS W/O CONTRAST: CPT

## 2021-05-25 NOTE — TELEPHONE ENCOUNTER
----- Message from Rosie Black sent at 3/5/2018  9:47 AM EST -----  Regarding: Dr. Bill Hightower, Girlfriend, is requesting a call to be sure that medical records were received from the pt's previous PCP (Dr. Jen Fuchs), ([phone], (F)[phone]. Best contact number [phone].       Message copied/pasted from [facility]

## 2021-06-15 NOTE — PROGRESS NOTES
Subjective/HPI:     Kenzie Yañez is a 76 y.o. male is here for routine f/u and cardiac clearance to have a left cubital tunnel release using regional block with Dr Corey Zurita 6/21/21. Last seen in our office 8/3/20. He has a PMHx of HLD, depression/schizophrenia, GERD, COPD, and tobacco abuse. At his last appt 8/2020 he reported continued episodes of brief chest pain that occurred with rest. He took 1 NTG and pain resolved. He was started on Imdur 15mg daily. We obtained a nuclear stress test 8/12/20 which was neg for ischemia. EF 53%. Today he reports he stopped taking Imdur, doesn't know if it was effective or not for the CP. He continues to have episodes of left sided CP relieved by NTG. Occurs typically at least once a week. Not exertionally related, not related to meals, denies associated symptoms. He denies hx of reflux/hiatal hernia. He is having MENDEZ, PND. He wakes up in the middle of the night and cant breathe. He will use a nebulizer and this will help some. He had an episode where getting off a dock, was looking at something when boat hit dock and he lost his balance, fell onto dock.  Denies feeling positionally dizzy or near syncopal.          PCP Provider  Dione Flaherty MD    Past Medical History:   Diagnosis Date    Abdominal pain     Adverse effect of anesthesia     heart rate slows down    Asthma     Back pain     CAD (coronary artery disease)     Cancer ([facility] [address] 2015    Throat cancer treated with chemo & radiation    Cancer [facility])     testicular cancer    Constipation     COPD     managed by PCP    Cyst of pharynx     Depression     Emphysema of lung ([facility] [address]     managed by PCP    Extrinsic asthma, unspecified     Fatigue     GERD (gastroesophageal reflux disease)     Hyperlipidemia     Insomnia, unspecified     On home O2     at bedtime    Other chest pain     since 2-    Pain in joint, forearm     Psychiatric disorder     schizophrenia    Smoker     1 ppw    Thyroid disease         Allergies   Allergen Reactions    Allergen [Antipyrine-Benzocaine] Unknown (comments)    Avelox [Moxifloxacin] Unknown (comments)    Codeine Sulfate Nausea and Vomiting    Pcn [Penicillins] Other (comments)     Coma        Outpatient Encounter Medications as of 6/16/2021   Medication Sig Dispense Refill    gabapentin (NEURONTIN) 400 mg capsule Take 1 Cap by mouth three (3) times daily. Max Daily Amount: 1,200 mg. Indications: Neck spasms 30 Cap 0    budesonide-formoteroL (SYMBICORT) 160-4.5 mcg/actuation HFAA Take 2 Puffs by inhalation two (2) times a day. Indications: bronchospasm prevention with COPD 1 Inhaler 0    ipratropium-albuteroL (COMBIVENT RESPIMAT)  mcg/actuation inhaler Take 1 Puff by inhalation every six (6) hours as needed.  atorvastatin (LIPITOR) 20 mg tablet Take 1 Tab by mouth daily. 30 Tab 5    meloxicam (MOBIC) 7.5 mg tablet       sertraline (ZOLOFT) 50 mg tablet       isosorbide mononitrate ER (IMDUR) 30 mg tablet Take 0.5 Tabs by mouth daily. (Patient not taking: Reported on 6/16/2021) 45 Tab 3    Nitrostat 0.4 mg SL tablet 1 TAB DISSOLVED UNDER THE TONGUE AT FIRST SIGN OF ATTACK,MAY REPEAT EVERY 5 MIN . MAX 3 DOSES- IF NO RELIEF/CALL 911 25 Tab 5    mirtazapine (REMERON) 30 mg tablet Take 1 Tab by mouth nightly. Indications: major depressive disorder 30 Tab 0    tiotropium bromide (SPIRIVA RESPIMAT) 2.5 mcg/actuation inhaler Take 2 Puffs by inhalation daily. Indications: bronchospasm prevention with COPD 1 Inhaler 0    levothyroxine (SYNTHROID) 25 mcg tablet Take 1 Tab by mouth Daily (before breakfast). 30 Tab 5     No facility-administered encounter medications on file as of 6/16/2021. Review of Symptoms:    Review of Systems   Constitutional: Negative for chills, fever and weight loss. HENT: Negative for nosebleeds. Eyes: Negative for blurred vision and double vision.    Respiratory: Positive for shortness of breath. Negative for cough and wheezing. Cardiovascular: Positive for chest pain and PND. Negative for palpitations, orthopnea and leg swelling. Gastrointestinal: Negative for abdominal pain, blood in stool, diarrhea, nausea and vomiting. Musculoskeletal: Negative for joint pain. Skin: Negative for rash. Neurological: Negative for dizziness, tingling and loss of consciousness. Endo/Heme/Allergies: Does not bruise/bleed easily. Physical Exam:      General: Well developed, in no acute distress, cooperative and alert  HEENT: No carotid bruits, no JVD, trach is midline. Neck Supple, PEERL, EOM intact. Heart:  reg rate and rhythm; normal S1/S2; no murmurs, no gallops or rubs. Respiratory: restricted lung sounds throughout  Abdomen:   Soft, non-tender, no distention, no masses. + BS. Extremities:  Normal cap refill, no cyanosis, atraumatic. No edema. Neuro: A&Ox3, speech clear, gait stable. Skin: Skin color is normal. No rashes or lesions.  Non diaphoretic  Vascular: 2+ pulses symmetric in all extremities    Vitals:    06/16/21 1323 06/16/21 1344 06/16/21 1348   BP: 110/68 100/70 110/80   Pulse: 60 62 70   Resp: 18     SpO2: 96%     Weight: 171 lb 11.2 oz (77.9 kg)     Height: 5' 9\" (1.753 m)         ECG: Sinus  Rhythm   -Poor R-wave progression       Cardiology Labs:    Lab Results   Component Value Date/Time    Cholesterol, total 130 02/07/2020 05:06 AM    HDL Cholesterol 52 02/07/2020 05:06 AM    LDL, calculated 60.2 02/07/2020 05:06 AM    LDL, calculated 185 (H) 06/08/2018 12:40 PM    LDL, calculated 198 (H) 03/08/2018 11:42 AM    VLDL, calculated 17.8 02/07/2020 05:06 AM    CHOL/HDL Ratio 2.5 02/07/2020 05:06 AM       Lab Results   Component Value Date/Time    Hemoglobin A1c 5.7 (H) 02/07/2020 05:06 AM       Lab Results   Component Value Date/Time    Sodium 142 02/01/2020 12:30 PM    Potassium 4.8 02/01/2020 12:30 PM    Chloride 105 02/01/2020 12:30 PM    CO2 34 (H) 02/01/2020 12:30 PM Glucose 91 02/01/2020 12:30 PM    BUN 18 02/01/2020 12:30 PM    Creatinine 0.95 02/01/2020 12:30 PM    BUN/Creatinine ratio 19 02/01/2020 12:30 PM    GFR est AA >60 02/01/2020 12:30 PM    GFR est non-AA >60 02/01/2020 12:30 PM    Calcium 9.3 02/01/2020 12:30 PM    Anion gap 3 (L) 02/01/2020 12:30 PM    Bilirubin, total 0.6 01/30/2020 10:30 AM    ALT (SGPT) 23 01/30/2020 10:30 AM    Alk. phosphatase 61 01/30/2020 10:30 AM    Protein, total 6.4 02/08/2020 05:45 AM    Albumin 3.5 01/30/2020 10:30 AM    Globulin 4.0 01/30/2020 10:30 AM    A-G Ratio 0.9 (L) 01/30/2020 10:30 AM          Assessment:       ICD-10-CM ICD-9-CM    1. Other forms of angina pectoris (HCC)  I20.8 413.9 AMB POC EKG ROUTINE W/ 12 LEADS, INTER & REP   2. Mixed hyperlipidemia  E78.2 272.2         Plan:     1. Other forms of angina pectoris [facility])  He reported that he had episodes of CP that were not exertionally related at his last appt 8/3/20. We added Imdur 15 mg daily and obtained a nuclear stress test which was neg for ischemia, EF 53%. He reports today he didn't take Imdur for long, not sure if it helped his symptoms or not. He has persisted to have episodes of left sided CP relieved by NTG. Not exertionally related. He has MENDEZ and PND. EKG SR without significant changes c/w EKG 8/3/20. He is requesting cardiac clearance for surgery. Obtain lexiscan stress test and echo. Discussed if testing neg can consider CT angio and/or GI w/u.       2. Pure hypercholesterolemia  LDL 60 in 2/2020. He is taking Lipitor 20mg daily. Continue statin therapy and low fat, low cholesterol diet  Lipids managed by PCP    3. Cardiac clearance: left cubital tunnel release using regional block with Dr Rianna Roger 6/21/21. Given CP concerning for angina and his MENDEZ/PND requiring testing would recommend postponement of his elective surgery until his testing is completed. F/u with Dr. Young Latham dependent on results.      Ela Chow, NP

## 2021-06-16 ENCOUNTER — TELEPHONE (OUTPATIENT)
Dept: CARDIOLOGY CLINIC | Age: 66
End: 2021-06-16

## 2021-06-16 ENCOUNTER — OFFICE VISIT (OUTPATIENT)
Dept: CARDIOLOGY CLINIC | Age: 66
End: 2021-06-16
Payer: MEDICARE

## 2021-06-16 VITALS
SYSTOLIC BLOOD PRESSURE: 110 MMHG | WEIGHT: 171.7 LBS | HEART RATE: 70 BPM | HEIGHT: 69 IN | BODY MASS INDEX: 25.43 KG/M2 | DIASTOLIC BLOOD PRESSURE: 80 MMHG | RESPIRATION RATE: 18 BRPM | OXYGEN SATURATION: 96 %

## 2021-06-16 DIAGNOSIS — I20.8 OTHER FORMS OF ANGINA PECTORIS (HCC): Primary | ICD-10-CM

## 2021-06-16 DIAGNOSIS — E78.2 MIXED HYPERLIPIDEMIA: ICD-10-CM

## 2021-06-16 PROCEDURE — 3288F FALL RISK ASSESSMENT DOCD: CPT | Performed by: NURSE PRACTITIONER

## 2021-06-16 PROCEDURE — G8427 DOCREV CUR MEDS BY ELIG CLIN: HCPCS | Performed by: NURSE PRACTITIONER

## 2021-06-16 PROCEDURE — 3017F COLORECTAL CA SCREEN DOC REV: CPT | Performed by: NURSE PRACTITIONER

## 2021-06-16 PROCEDURE — G8536 NO DOC ELDER MAL SCRN: HCPCS | Performed by: NURSE PRACTITIONER

## 2021-06-16 PROCEDURE — 1100F PTFALLS ASSESS-DOCD GE2>/YR: CPT | Performed by: NURSE PRACTITIONER

## 2021-06-16 PROCEDURE — G8419 CALC BMI OUT NRM PARAM NOF/U: HCPCS | Performed by: NURSE PRACTITIONER

## 2021-06-16 PROCEDURE — G9717 DOC PT DX DEP/BP F/U NT REQ: HCPCS | Performed by: NURSE PRACTITIONER

## 2021-06-16 PROCEDURE — 99214 OFFICE O/P EST MOD 30 MIN: CPT | Performed by: NURSE PRACTITIONER

## 2021-06-16 PROCEDURE — 93000 ELECTROCARDIOGRAM COMPLETE: CPT | Performed by: NURSE PRACTITIONER

## 2021-06-16 NOTE — TELEPHONE ENCOUNTER
Spoke with patient. Verified patient with two patient identifiers. Told we advise he have his preop testing at [facility] since we do not have openings for a month. Advised orders are in, [facility] will call pt to schedule. Patient verbalized understanding.

## 2021-06-16 NOTE — LETTER
6/16/2021 4:55 PM 
 
Patient:  Yeyo Fuller YOB: 1955 Date of Visit: 6/16/2021 Dear Asaf Sherman MD 
[address] [address] [phone] Via In Basket: Thank you for referring Mr. Xiomara Ayers to me for evaluation/treatment. Below are the relevant portions of my assessment and plan of care. If you have questions, please do not hesitate to call me. I look forward to following Mr. Wong along with you. Sincerely, Janiya Baron NP

## 2021-06-16 NOTE — Clinical Note
Please contact pt and let him know it is a regional block, recommend getting his testing first before doing surgery. Due to lack of availability in our office for testing for over a month will change to be done at hospital. Trying to expedite for his surgery.

## 2021-06-16 NOTE — LETTER
6/16/2021 Patient: Michael Herman YOB: 1955 Date of Visit: 6/16/2021 [address] [phone] Via Fax: [phone] Dear Karel Bee MD, Thank you for referring Mr. Karley Harris to [facility] for evaluation. My notes for this consultation are attached. If you have questions, please do not hesitate to call me. I look forward to following your patient along with you. Sincerely, Leonardo Beaulieu NP

## 2021-06-16 NOTE — PROGRESS NOTES
1. Have you been to the ER, urgent care clinic since your last visit? Hospitalized since your last visit? No    2. Have you seen or consulted any other health care providers outside of the [address] [place] [address] since your last visit? Include any pap smears or colon screening. No    Chief Complaint   Patient presents with    Coronary Artery Disease     Pre-op cardiac clearance, Dr Karla Acevedo cubital tunnel syndrome left arm. dizziness, chest pain both. Pt unsure of meds will call to verify.

## 2021-07-27 ENCOUNTER — TELEPHONE (OUTPATIENT)
Dept: CARDIOLOGY CLINIC | Age: 66
End: 2021-07-27

## 2021-07-27 ENCOUNTER — ANCILLARY PROCEDURE (OUTPATIENT)
Dept: CARDIOLOGY CLINIC | Age: 66
End: 2021-07-27
Payer: MEDICARE

## 2021-07-27 VITALS
BODY MASS INDEX: 23.7 KG/M2 | SYSTOLIC BLOOD PRESSURE: 118 MMHG | HEIGHT: 72 IN | WEIGHT: 175 LBS | DIASTOLIC BLOOD PRESSURE: 80 MMHG

## 2021-07-27 DIAGNOSIS — I20.8 OTHER FORMS OF ANGINA PECTORIS (HCC): ICD-10-CM

## 2021-07-27 DIAGNOSIS — E78.2 MIXED HYPERLIPIDEMIA: ICD-10-CM

## 2021-07-27 DIAGNOSIS — I20.8 OTHER FORMS OF ANGINA PECTORIS (HCC): Primary | ICD-10-CM

## 2021-07-27 LAB
STRESS BASELINE DIAS BP: 80 MMHG
STRESS BASELINE HR: 55 BPM
STRESS BASELINE SYS BP: 118 MMHG
STRESS PEAK DIAS BP: 82 MMHG
STRESS PEAK SYS BP: 134 MMHG
STRESS PERCENT HR ACHIEVED: 62 %
STRESS POST PEAK HR: 96 BPM
STRESS RATE PRESSURE PRODUCT: NORMAL BPM*MMHG
STRESS ST DEPRESSION: 0 MM
STRESS ST ELEVATION: 0 MM
STRESS TARGET HR: 154 BPM

## 2021-07-27 PROCEDURE — 93015 CV STRESS TEST SUPVJ I&R: CPT | Performed by: INTERNAL MEDICINE

## 2021-07-27 PROCEDURE — 78452 HT MUSCLE IMAGE SPECT MULT: CPT | Performed by: INTERNAL MEDICINE

## 2021-07-27 PROCEDURE — A9500 TC99M SESTAMIBI: HCPCS | Performed by: INTERNAL MEDICINE

## 2021-07-27 PROCEDURE — A9502 TC99M TETROFOSMIN: HCPCS

## 2021-07-27 PROCEDURE — 93017 CV STRESS TEST TRACING ONLY: CPT | Performed by: INTERNAL MEDICINE

## 2021-07-27 RX ADMIN — TETROFOSMIN 33.6 MILLICURIE: 1.38 INJECTION, POWDER, LYOPHILIZED, FOR SOLUTION INTRAVENOUS at 09:53

## 2021-07-27 RX ADMIN — TETROFOSMIN 9.8 MILLICURIE: 1.38 INJECTION, POWDER, LYOPHILIZED, FOR SOLUTION INTRAVENOUS at 09:12

## 2021-07-27 RX ADMIN — REGADENOSON 0.4 MG: 0.08 INJECTION, SOLUTION INTRAVENOUS at 09:53

## 2021-07-27 NOTE — PROGRESS NOTES
Stress test shows changes from previous stress test. Cont with echo and schedule an appt with Dr Janelle Barajas to discuss

## 2021-07-27 NOTE — TELEPHONE ENCOUNTER
If patient is willing to have done at hospital lets get it done there then.  I definitely want him seen by the doctor sooner, within next couple weeks at most.

## 2021-07-27 NOTE — TELEPHONE ENCOUNTER
Balbir Tony     Per Geraldine Rose NP can pt get in for Echo and appt with Dr. Eun Hernandez within the next 2 weeks. If not let me know please. Thank You!

## 2021-07-27 NOTE — TELEPHONE ENCOUNTER
Spoke with patient. Verified with two patient identifiers. Advised pt per Cristian Paulino NP, Stress test shows changes from previous stress test. Cont with echo and schedule an appt with Dr Gee Leon to discuss. Advised he was scheduled for echo today but was late so they only did the stress test today. Advised I would have  to call and reschedule Echo and a f/u with Fermin after. Patient verbalized understanding.

## 2021-07-27 NOTE — TELEPHONE ENCOUNTER
----- Message from Yan Chi NP sent at 7/27/2021  1:26 PM EDT -----  Stress test shows changes from previous stress test. Cont with echo and schedule an appt with Dr Audrey Rosen to discuss

## 2021-07-27 NOTE — TELEPHONE ENCOUNTER
Juan Martinez  Can you call pt and reschedule pt for Echo as soon as pt can and the schedule a f/u with Gee Leon a day or two after the Echo to discuss results.  Thank You

## 2021-07-28 NOTE — TELEPHONE ENCOUNTER
Kylee Caceres NP    Spoke with  they can not fit him in here to have Echo done within the next two weeks. Did you still want him to go to the hospital to get done or is his scheduled Echo here for 8/27/21 ok? If you would like it changed to the hospital can you put in a new order to the hospital and I will call pt to let him know of the change.  Thank You

## 2021-07-28 NOTE — TELEPHONE ENCOUNTER
I placed the order for the echo at the hospital, but we do not need echo results for him to see Dr Marcy Ballard.  Lets get him in to see the doctor first available to discuss

## 2021-07-29 NOTE — TELEPHONE ENCOUNTER
Gianni Santos    [facility] pt needs to come in sooner than 8/31/21 for a f/u with Fermin to discuss stress test. Echo is to be scheduled at hospital as per NP Mario Marie wants it done before 8/27/21.  Tried calling pt no answer

## 2021-07-30 NOTE — TELEPHONE ENCOUNTER
Can You reschedule pts 8/31/21 appt with Fermin to an earlier date Per Kya Perez NP, she would like to get pt in within the next 2 weeks to discuss stress test results. Thank You!

## 2021-07-30 NOTE — TELEPHONE ENCOUNTER
Spoke with patient. Verified with two patient identifiers. Advised pt per Kelin Larry NP, she would like him to come in before 8/31/21 to see Verónica Andino to discuss stress test. Advised pt that hospital will call him to schedule an earlier appt for his Echo wants done before 8/27/21. Advised I would have  to call and schedule a sooner appt with Fermin. Patient verbalized understanding.

## 2021-08-03 ENCOUNTER — OFFICE VISIT (OUTPATIENT)
Dept: CARDIOLOGY CLINIC | Age: 66
End: 2021-08-03
Payer: MEDICARE

## 2021-08-03 VITALS
HEIGHT: 72 IN | DIASTOLIC BLOOD PRESSURE: 82 MMHG | SYSTOLIC BLOOD PRESSURE: 114 MMHG | WEIGHT: 176.9 LBS | BODY MASS INDEX: 23.96 KG/M2 | RESPIRATION RATE: 18 BRPM | HEART RATE: 79 BPM | OXYGEN SATURATION: 97 %

## 2021-08-03 DIAGNOSIS — Z01.818 PRE-OPERATIVE CLEARANCE: ICD-10-CM

## 2021-08-03 DIAGNOSIS — E78.2 MIXED HYPERLIPIDEMIA: ICD-10-CM

## 2021-08-03 DIAGNOSIS — R94.39 ABNORMAL STRESS TEST: Primary | ICD-10-CM

## 2021-08-03 PROCEDURE — 3017F COLORECTAL CA SCREEN DOC REV: CPT | Performed by: INTERNAL MEDICINE

## 2021-08-03 PROCEDURE — G8536 NO DOC ELDER MAL SCRN: HCPCS | Performed by: INTERNAL MEDICINE

## 2021-08-03 PROCEDURE — 99214 OFFICE O/P EST MOD 30 MIN: CPT | Performed by: INTERNAL MEDICINE

## 2021-08-03 PROCEDURE — G8420 CALC BMI NORM PARAMETERS: HCPCS | Performed by: INTERNAL MEDICINE

## 2021-08-03 PROCEDURE — G9717 DOC PT DX DEP/BP F/U NT REQ: HCPCS | Performed by: INTERNAL MEDICINE

## 2021-08-03 PROCEDURE — 93010 ELECTROCARDIOGRAM REPORT: CPT | Performed by: INTERNAL MEDICINE

## 2021-08-03 PROCEDURE — G8427 DOCREV CUR MEDS BY ELIG CLIN: HCPCS | Performed by: INTERNAL MEDICINE

## 2021-08-03 PROCEDURE — 3288F FALL RISK ASSESSMENT DOCD: CPT | Performed by: INTERNAL MEDICINE

## 2021-08-03 PROCEDURE — 93005 ELECTROCARDIOGRAM TRACING: CPT | Performed by: INTERNAL MEDICINE

## 2021-08-03 PROCEDURE — 1100F PTFALLS ASSESS-DOCD GE2>/YR: CPT | Performed by: INTERNAL MEDICINE

## 2021-08-03 PROCEDURE — G0463 HOSPITAL OUTPT CLINIC VISIT: HCPCS | Performed by: INTERNAL MEDICINE

## 2021-08-03 RX ORDER — TIZANIDINE 4 MG/1
4 TABLET ORAL 3 TIMES DAILY
COMMUNITY
Start: 2021-08-02

## 2021-08-03 RX ORDER — ISOSORBIDE MONONITRATE 30 MG/1
15 TABLET, EXTENDED RELEASE ORAL DAILY
Qty: 15 TABLET | Refills: 1 | Status: SHIPPED | OUTPATIENT
Start: 2021-08-03 | End: 2021-10-22

## 2021-08-03 RX ORDER — GUAIFENESIN 100 MG/5ML
81 LIQUID (ML) ORAL DAILY
Qty: 30 TABLET | Refills: 1 | Status: SHIPPED | OUTPATIENT
Start: 2021-08-03 | End: 2021-10-22

## 2021-08-03 RX ORDER — MIRTAZAPINE 15 MG/1
15 TABLET, FILM COATED ORAL
COMMUNITY
Start: 2021-07-12 | End: 2021-10-22

## 2021-08-03 RX ORDER — METOPROLOL SUCCINATE 25 MG/1
12.5 TABLET, EXTENDED RELEASE ORAL
Qty: 15 TABLET | Refills: 1 | Status: SHIPPED | OUTPATIENT
Start: 2021-08-03 | End: 2021-10-22

## 2021-08-03 NOTE — PROGRESS NOTES
1. Have you been to the ER, urgent care clinic since your last visit? Hospitalized since your last visit? No    2. Have you seen or consulted any other health care providers outside of the [address] [place] [address] since your last visit? Include any pap smears or colon screening.  No           Chief Complaint   Patient presents with    Results     Abnormal stress test,  C/O  CP, SOB

## 2021-08-03 NOTE — LETTER
8/3/2021    Patient: Sierra Mena   YOB: 1955   Date of Visit: 8/3/2021     [facility] [id number]  Via Fax: [phone]    Dear Kuldeep Levy MD,      Thank you for referring Mr. Rohit Betts to [facility] for evaluation. My notes for this consultation are attached. If you have questions, please do not hesitate to call me. I look forward to following your patient along with you.       Sincerely,    Alison Kim MD

## 2021-08-03 NOTE — H&P (VIEW-ONLY)
Julianna Otero, P-BC    Subjective/HPI:     Dustin Somers is a 76 y.o. male is here for routine f/u. He has a PMHx of HLD, depression/schizophrenia, GERD, COPD, and tobacco abuse. Had lexiscan stress test done for chest pain symptoms, relieved with NTG, also as part of pre-op clearance. Scheduled for echo, which is pending. Still having chest pain symptoms, some shortness of breath, however continues to smoke. Symptoms improved with SL NTG. Is pending left cubital tunnel release with regional block. Current Outpatient Medications on File Prior to Visit   Medication Sig Dispense Refill    mirtazapine (REMERON) 15 mg tablet Take 15 mg by mouth nightly.  tiZANidine (ZANAFLEX) 4 mg tablet Take 4 mg by mouth three (3) times daily.  meloxicam (MOBIC) 7.5 mg tablet       sertraline (ZOLOFT) 50 mg tablet       Nitrostat 0.4 mg SL tablet 1 TAB DISSOLVED UNDER THE TONGUE AT FIRST SIGN OF ATTACK,MAY REPEAT EVERY 5 MIN . MAX 3 DOSES- IF NO RELIEF/CALL 911 25 Tab 5    gabapentin (NEURONTIN) 400 mg capsule Take 1 Cap by mouth three (3) times daily. Max Daily Amount: 1,200 mg. Indications: Neck spasms 30 Cap 0    budesonide-formoteroL (SYMBICORT) 160-4.5 mcg/actuation HFAA Take 2 Puffs by inhalation two (2) times a day. Indications: bronchospasm prevention with COPD 1 Inhaler 0    tiotropium bromide (SPIRIVA RESPIMAT) 2.5 mcg/actuation inhaler Take 2 Puffs by inhalation daily. Indications: bronchospasm prevention with COPD 1 Inhaler 0    ipratropium-albuteroL (COMBIVENT RESPIMAT)  mcg/actuation inhaler Take 1 Puff by inhalation every six (6) hours as needed.  atorvastatin (LIPITOR) 20 mg tablet Take 1 Tab by mouth daily. 30 Tab 5    levothyroxine (SYNTHROID) 25 mcg tablet Take 1 Tab by mouth Daily (before breakfast). 30 Tab 5    [DISCONTINUED] isosorbide mononitrate ER (IMDUR) 30 mg tablet Take 0.5 Tabs by mouth daily.  (Patient not taking: Reported on 6/16/2021) 45 Tab 3    [DISCONTINUED] mirtazapine (REMERON) 30 mg tablet Take 1 Tab by mouth nightly. Indications: major depressive disorder (Patient not taking: Reported on 8/3/2021) 30 Tab 0     No current facility-administered medications on file prior to visit. Review of Symptoms:    Review of Systems   Constitutional: Negative for chills, fever and weight loss. HENT: Negative for nosebleeds. Eyes: Negative for blurred vision and double vision. Respiratory: Positive for shortness of breath. Negative for cough and wheezing. Cardiovascular: Positive for chest pain. Negative for palpitations, orthopnea, leg swelling and PND. Skin: Negative for rash. Neurological: Negative for dizziness and loss of consciousness. Physical Exam:      General: Well developed, in no acute distress, cooperative and alert  Heart:  reg rate and rhythm; normal S1/S2; no murmurs, no gallops or rubs. Respiratory: Clear bilaterally x 4, no wheezing or rales  Extremities:  Normal cap refill, no cyanosis, atraumatic. No edema. Vascular: 2+ pulses symmetric in all extremities    Vitals:    08/03/21 0955   BP: 114/82   BP 1 Location: Left upper arm   BP Patient Position: Sitting   BP Cuff Size: Large adult   Pulse: 79   Resp: 18   Height: 6' (1.829 m)   Weight: 176 lb 14.4 oz (80.2 kg)   SpO2: 97%       ECG done today shows sinus rhythm     Assessment:       ICD-10-CM ICD-9-CM    1. Abnormal stress test  R94.39 794.39 AMB POC EKG ROUTINE W/ 12 LEADS, INTER & REP   2. Mixed hyperlipidemia  E78.2 272.2    3. Pre-operative clearance  Z01.818 V72.84         Plan:     1. Abnormal stress test  Lexiscan stress test done 7/2021 with large sized defect of the inferior wall, severe reduction. EF 58%. This is a change from last stress test done 8/2020, which did not show any fixed defects. Having anginal and anginal equivalent symptoms, needs cardiac clearance  Discussed cardiac cath, risks, benefits, alternatives.   He does desire to proceed  Start Imdur 15 mg, Toprol 12.5 mg at bedtime. Start ASA, continue statin. CPT 94981    2. Mixed hyperlipidemia  LDL 60 in 2/2020  Continue statin therapy and low fat, low cholesterol diet  Lipids managed by PCP -- due for lipids this year    3. Pre-operative clearance  Final cardiac clearance pending results of cath  Discussed with patient that his surgery may need to be postponed if he requires stent placement. 4.  Tobacco abuse  Encouraged smoking cessation. F/u with Dr. Марина Fox after procedure    Derrick Esteban NP       [facility]             Patient seen, examined by me personally. Plan discussed as detailed. Agree with note as outlined by  NP with modifications as noted. My independent physical exam reveals : Physical Exam  Vitals and nursing note reviewed. Cardiovascular:      Rate and Rhythm: Normal rate and regular rhythm. Pulses: Normal pulses. Heart sounds: Normal heart sounds. Pulmonary:      Breath sounds: Normal breath sounds. Musculoskeletal:      Right lower leg: No edema. Neurological:      Mental Status: He is alert and oriented to person, place, and time. Psychiatric:         Mood and Affect: Mood normal.          No additional findings noted. Agree with plan as outlined above with modifications as noted. Discussed cath/PCI.     Tremaine Estes MD

## 2021-08-03 NOTE — PROGRESS NOTES
Davy Malagon, FNP-BC    Subjective/HPI:     Raisa Bae is a 76 y.o. male is here for routine f/u. He has a PMHx of HLD, depression/schizophrenia, GERD, COPD, and tobacco abuse. Had lexiscan stress test done for chest pain symptoms, relieved with NTG, also as part of pre-op clearance. Scheduled for echo, which is pending. Still having chest pain symptoms, some shortness of breath, however continues to smoke. Symptoms improved with SL NTG. Is pending left cubital tunnel release with regional block. Current Outpatient Medications on File Prior to Visit   Medication Sig Dispense Refill    mirtazapine (REMERON) 15 mg tablet Take 15 mg by mouth nightly.  tiZANidine (ZANAFLEX) 4 mg tablet Take 4 mg by mouth three (3) times daily.  meloxicam (MOBIC) 7.5 mg tablet       sertraline (ZOLOFT) 50 mg tablet       Nitrostat 0.4 mg SL tablet 1 TAB DISSOLVED UNDER THE TONGUE AT FIRST SIGN OF ATTACK,MAY REPEAT EVERY 5 MIN . MAX 3 DOSES- IF NO RELIEF/CALL 911 25 Tab 5    gabapentin (NEURONTIN) 400 mg capsule Take 1 Cap by mouth three (3) times daily. Max Daily Amount: 1,200 mg. Indications: Neck spasms 30 Cap 0    budesonide-formoteroL (SYMBICORT) 160-4.5 mcg/actuation HFAA Take 2 Puffs by inhalation two (2) times a day. Indications: bronchospasm prevention with COPD 1 Inhaler 0    tiotropium bromide (SPIRIVA RESPIMAT) 2.5 mcg/actuation inhaler Take 2 Puffs by inhalation daily. Indications: bronchospasm prevention with COPD 1 Inhaler 0    ipratropium-albuteroL (COMBIVENT RESPIMAT)  mcg/actuation inhaler Take 1 Puff by inhalation every six (6) hours as needed.  atorvastatin (LIPITOR) 20 mg tablet Take 1 Tab by mouth daily. 30 Tab 5    levothyroxine (SYNTHROID) 25 mcg tablet Take 1 Tab by mouth Daily (before breakfast). 30 Tab 5    [DISCONTINUED] isosorbide mononitrate ER (IMDUR) 30 mg tablet Take 0.5 Tabs by mouth daily.  (Patient not taking: Reported on 6/16/2021) 45 Tab 3    [DISCONTINUED] mirtazapine (REMERON) 30 mg tablet Take 1 Tab by mouth nightly. Indications: major depressive disorder (Patient not taking: Reported on 8/3/2021) 30 Tab 0     No current facility-administered medications on file prior to visit. Review of Symptoms:    Review of Systems   Constitutional: Negative for chills, fever and weight loss. HENT: Negative for nosebleeds. Eyes: Negative for blurred vision and double vision. Respiratory: Positive for shortness of breath. Negative for cough and wheezing. Cardiovascular: Positive for chest pain. Negative for palpitations, orthopnea, leg swelling and PND. Skin: Negative for rash. Neurological: Negative for dizziness and loss of consciousness. Physical Exam:      General: Well developed, in no acute distress, cooperative and alert  Heart:  reg rate and rhythm; normal S1/S2; no murmurs, no gallops or rubs. Respiratory: Clear bilaterally x 4, no wheezing or rales  Extremities:  Normal cap refill, no cyanosis, atraumatic. No edema. Vascular: 2+ pulses symmetric in all extremities    Vitals:    08/03/21 0955   BP: 114/82   BP 1 Location: Left upper arm   BP Patient Position: Sitting   BP Cuff Size: Large adult   Pulse: 79   Resp: 18   Height: 6' (1.829 m)   Weight: 176 lb 14.4 oz (80.2 kg)   SpO2: 97%       ECG done today shows sinus rhythm     Assessment:       ICD-10-CM ICD-9-CM    1. Abnormal stress test  R94.39 794.39 AMB POC EKG ROUTINE W/ 12 LEADS, INTER & REP   2. Mixed hyperlipidemia  E78.2 272.2    3. Pre-operative clearance  Z01.818 V72.84         Plan:     1. Abnormal stress test  Lexiscan stress test done 7/2021 with large sized defect of the inferior wall, severe reduction. EF 58%. This is a change from last stress test done 8/2020, which did not show any fixed defects. Having anginal and anginal equivalent symptoms, needs cardiac clearance  Discussed cardiac cath, risks, benefits, alternatives.   He does desire to proceed  Start Imdur 15 mg, Toprol 12.5 mg at bedtime. Start ASA, continue statin. CPT 22592    2. Mixed hyperlipidemia  LDL 60 in 2/2020  Continue statin therapy and low fat, low cholesterol diet  Lipids managed by PCP -- due for lipids this year    3. Pre-operative clearance  Final cardiac clearance pending results of cath  Discussed with patient that his surgery may need to be postponed if he requires stent placement. 4.  Tobacco abuse  Encouraged smoking cessation. F/u with Dr. Mariama Jimenez after procedure    Jett Espinosa NP       [facility]             Patient seen, examined by me personally. Plan discussed as detailed. Agree with note as outlined by  NP with modifications as noted. My independent physical exam reveals : Physical Exam  Vitals and nursing note reviewed. Cardiovascular:      Rate and Rhythm: Normal rate and regular rhythm. Pulses: Normal pulses. Heart sounds: Normal heart sounds. Pulmonary:      Breath sounds: Normal breath sounds. Musculoskeletal:      Right lower leg: No edema. Neurological:      Mental Status: He is alert and oriented to person, place, and time. Psychiatric:         Mood and Affect: Mood normal.          No additional findings noted. Agree with plan as outlined above with modifications as noted. Discussed cath/PCI.     Elda Oconnor MD

## 2021-08-04 LAB
ALBUMIN SERPL-MCNC: 4.6 G/DL (ref 3.8–4.8)
ALBUMIN/GLOB SERPL: 1.4 {RATIO} (ref 1.2–2.2)
ALP SERPL-CCNC: 70 IU/L (ref 48–121)
ALT SERPL-CCNC: 16 IU/L (ref 0–44)
AST SERPL-CCNC: 17 IU/L (ref 0–40)
BILIRUB SERPL-MCNC: 0.4 MG/DL (ref 0–1.2)
BUN SERPL-MCNC: 13 MG/DL (ref 8–27)
BUN/CREAT SERPL: 12 (ref 10–24)
CALCIUM SERPL-MCNC: 10 MG/DL (ref 8.6–10.2)
CHLORIDE SERPL-SCNC: 98 MMOL/L (ref 96–106)
CO2 SERPL-SCNC: 28 MMOL/L (ref 20–29)
CREAT SERPL-MCNC: 1.09 MG/DL (ref 0.76–1.27)
ERYTHROCYTE [DISTWIDTH] IN BLOOD BY AUTOMATED COUNT: 15.8 % (ref 11.6–15.4)
GLOBULIN SER CALC-MCNC: 3.4 G/DL (ref 1.5–4.5)
GLUCOSE SERPL-MCNC: 69 MG/DL (ref 65–99)
HCT VFR BLD AUTO: 52.6 % (ref 37.5–51)
HGB BLD-MCNC: 17.7 G/DL (ref 13–17.7)
INR PPP: 1 (ref 0.9–1.2)
MCH RBC QN AUTO: 29.2 PG (ref 26.6–33)
MCHC RBC AUTO-ENTMCNC: 33.7 G/DL (ref 31.5–35.7)
MCV RBC AUTO: 87 FL (ref 79–97)
PLATELET # BLD AUTO: 205 X10E3/UL (ref 150–450)
POTASSIUM SERPL-SCNC: 4.9 MMOL/L (ref 3.5–5.2)
PROT SERPL-MCNC: 8 G/DL (ref 6–8.5)
PROTHROMBIN TIME: 10.9 SEC (ref 9.1–12)
RBC # BLD AUTO: 6.06 X10E6/UL (ref 4.14–5.8)
SODIUM SERPL-SCNC: 140 MMOL/L (ref 134–144)
WBC # BLD AUTO: 3.6 X10E3/UL (ref 3.4–10.8)

## 2021-08-06 NOTE — PERIOP NOTES
Patient denied any COVID-19 symptoms or possible exposure in the last 30 days. Patient has been fully vaccinated and immunization is documented in [facility].

## 2021-08-18 NOTE — PERIOP NOTES
Patient denied any COVID-19 symptoms or possible exposure in the last 30 days. Patient has been fully vaccinated and immunization is documented in [employer].

## 2021-08-24 ENCOUNTER — HOSPITAL ENCOUNTER (OUTPATIENT)
Age: 66
Discharge: HOME | End: 2021-08-24
Attending: INTERNAL MEDICINE | Admitting: INTERNAL MEDICINE
Payer: MEDICARE

## 2021-08-24 VITALS
TEMPERATURE: 97.6 F | HEIGHT: 69 IN | WEIGHT: 175 LBS | BODY MASS INDEX: 25.92 KG/M2 | SYSTOLIC BLOOD PRESSURE: 153 MMHG | RESPIRATION RATE: 20 BRPM | HEART RATE: 66 BPM | DIASTOLIC BLOOD PRESSURE: 75 MMHG | OXYGEN SATURATION: 99 %

## 2021-08-24 DIAGNOSIS — R07.9 CHEST PAIN, UNSPECIFIED TYPE: ICD-10-CM

## 2021-08-24 PROBLEM — Z98.890 S/P CARDIAC CATH: Status: ACTIVE | Noted: 2021-08-24

## 2021-08-24 PROCEDURE — 93458 L HRT ARTERY/VENTRICLE ANGIO: CPT | Performed by: INTERNAL MEDICINE

## 2021-08-24 PROCEDURE — 77030004549 HC CATH ANGI DX PRF MRTM -A: Performed by: INTERNAL MEDICINE

## 2021-08-24 PROCEDURE — 77030008543 HC TBNG MON PRSS MRTM -A: Performed by: INTERNAL MEDICINE

## 2021-08-24 PROCEDURE — C1769 GUIDE WIRE: HCPCS | Performed by: INTERNAL MEDICINE

## 2021-08-24 PROCEDURE — 77030015766: Performed by: INTERNAL MEDICINE

## 2021-08-24 PROCEDURE — 74011000250 HC RX REV CODE- 250: Performed by: INTERNAL MEDICINE

## 2021-08-24 PROCEDURE — 74011250637 HC RX REV CODE- 250/637: Performed by: INTERNAL MEDICINE

## 2021-08-24 PROCEDURE — C1894 INTRO/SHEATH, NON-LASER: HCPCS | Performed by: INTERNAL MEDICINE

## 2021-08-24 PROCEDURE — 99152 MOD SED SAME PHYS/QHP 5/>YRS: CPT | Performed by: INTERNAL MEDICINE

## 2021-08-24 PROCEDURE — 74011250636 HC RX REV CODE- 250/636: Performed by: INTERNAL MEDICINE

## 2021-08-24 PROCEDURE — 77030019569 HC BND COMPR RAD TERU -B: Performed by: INTERNAL MEDICINE

## 2021-08-24 PROCEDURE — 77030010221 HC SPLNT WR POS TELE -B: Performed by: INTERNAL MEDICINE

## 2021-08-24 PROCEDURE — 74011000636 HC RX REV CODE- 636: Performed by: INTERNAL MEDICINE

## 2021-08-24 PROCEDURE — 77030019698 HC SYR ANGI MDLON MRTM -A: Performed by: INTERNAL MEDICINE

## 2021-08-24 PROCEDURE — APPNB30 APP NON BILLABLE TIME 0-30 MINS: Performed by: NURSE PRACTITIONER

## 2021-08-24 RX ORDER — HEPARIN SODIUM 200 [USP'U]/100ML
INJECTION, SOLUTION INTRAVENOUS
Status: COMPLETED | OUTPATIENT
Start: 2021-08-24 | End: 2021-08-24

## 2021-08-24 RX ORDER — LIDOCAINE HYDROCHLORIDE 10 MG/ML
INJECTION, SOLUTION EPIDURAL; INFILTRATION; INTRACAUDAL; PERINEURAL AS NEEDED
Status: DISCONTINUED | OUTPATIENT
Start: 2021-08-24 | End: 2021-08-24 | Stop reason: HOSPADM

## 2021-08-24 RX ORDER — ACETAMINOPHEN 325 MG/1
650 TABLET ORAL
Status: DISCONTINUED | OUTPATIENT
Start: 2021-08-24 | End: 2021-08-24 | Stop reason: HOSPADM

## 2021-08-24 RX ORDER — VERAPAMIL HYDROCHLORIDE 2.5 MG/ML
INJECTION, SOLUTION INTRAVENOUS AS NEEDED
Status: DISCONTINUED | OUTPATIENT
Start: 2021-08-24 | End: 2021-08-24 | Stop reason: HOSPADM

## 2021-08-24 RX ORDER — GUAIFENESIN 100 MG/5ML
81 LIQUID (ML) ORAL
Status: COMPLETED | OUTPATIENT
Start: 2021-08-24 | End: 2021-08-24

## 2021-08-24 RX ORDER — MIDAZOLAM HYDROCHLORIDE 1 MG/ML
INJECTION, SOLUTION INTRAMUSCULAR; INTRAVENOUS AS NEEDED
Status: DISCONTINUED | OUTPATIENT
Start: 2021-08-24 | End: 2021-08-24 | Stop reason: HOSPADM

## 2021-08-24 RX ORDER — HEPARIN SODIUM 1000 [USP'U]/ML
INJECTION, SOLUTION INTRAVENOUS; SUBCUTANEOUS AS NEEDED
Status: DISCONTINUED | OUTPATIENT
Start: 2021-08-24 | End: 2021-08-24 | Stop reason: HOSPADM

## 2021-08-24 RX ORDER — SODIUM CHLORIDE 0.9 % (FLUSH) 0.9 %
5-40 SYRINGE (ML) INJECTION EVERY 8 HOURS
Status: DISCONTINUED | OUTPATIENT
Start: 2021-08-24 | End: 2021-08-24 | Stop reason: HOSPADM

## 2021-08-24 RX ORDER — FENTANYL CITRATE 50 UG/ML
INJECTION, SOLUTION INTRAMUSCULAR; INTRAVENOUS AS NEEDED
Status: DISCONTINUED | OUTPATIENT
Start: 2021-08-24 | End: 2021-08-24 | Stop reason: HOSPADM

## 2021-08-24 RX ORDER — SODIUM CHLORIDE 0.9 % (FLUSH) 0.9 %
5-40 SYRINGE (ML) INJECTION AS NEEDED
Status: DISCONTINUED | OUTPATIENT
Start: 2021-08-24 | End: 2021-08-24 | Stop reason: HOSPADM

## 2021-08-24 RX ADMIN — ASPIRIN 81 MG: 81 TABLET, CHEWABLE ORAL at 12:42

## 2021-08-24 NOTE — PROGRESS NOTES
TRANSFER - IN REPORT:    Verbal report received from [facility] on Bonney Severe  being received from [facility] for routine post - op. Report consisted of patients Situation, Background, Assessment and Recommendations(SBAR). Information from the following report(s) SBAR, Kardex, Procedure Summary and MAR was reviewed with the receiving clinician. Opportunity for questions and clarification was provided. Assessment completed upon patients arrival to [address] [place] [address] and care assumed. Cardiac Cath Lab Recovery Arrival Note:    Bonney Severe arrived to [facility] recovery area. Patient procedure= Left Heart Cath. Patient on cardiac monitor, non-invasive blood pressure, SPO2 monitor. On RA. Patient is A&Ox 4. Patient reports no distress at this time. PROCEDURE SITE CHECK:    Procedure site:without any bleeding and nohematoma, no pain/discomfort reported at procedure site. Pt TR band patent at 12ml of air at 1420. No change in patient status. Continue to monitor patient and status.

## 2021-08-24 NOTE — PROGRESS NOTES
Cardiac Cath Lab Recovery Arrival Note:      Bonney Severe arrived to Cardiac Cath Lab, Recovery Area. Staff introduced to patient. Patient identifiers verified with NAME and DATE OF BIRTH. Procedure verified with patient. Consent forms reviewed and signed by patient or authorized representative and verified. Allergies verified. Patient and family oriented to department. Patient and family informed of procedure and plan of care. Questions answered with review. Patient prepped for procedure, per orders from physician, prior to arrival.    Patient on cardiac monitor, non-invasive blood pressure, SPO2 monitor. On RA. Patient is A&Ox 4. Patient reports no complaints. Patient in stretcher, in low position, with side rails up, call bell within reach, patient instructed to call if assistance as needed. Patient prep in: [address], [place] [address]. Patient family has pager # none  Family in: [address] [facility] waiting area.    Prep by: Saravanan Grady RN and Magdalena He

## 2021-08-24 NOTE — PROGRESS NOTES
Jessika Reynaga is recovering post-procedure. R radial site dressing is CDI without swelling or bleeding with TR band in place. VSS. Rhythm sinus. Jessika Reynaga denies complaints at this time. If recovery continues to progress without complication, discharge is planned for later today.           Ganga Parker NP DNP, RN, AGACNP-BC

## 2021-08-24 NOTE — PROGRESS NOTES
Patient ambulated in hallway with steady gait. No complaints of discomfort, dizziness, sob. Right radial site clean dry and intact. VSS. Discharge instructions reviewed with patient and patients girlfriend; answered questions as needed. Transported patient in wheelchair to car.

## 2021-08-24 NOTE — INTERVAL H&P NOTE
Update History & Physical    The Patient's History and Physical of August 3,   2021 was reviewed with the patient and I examined the patient. There was no change. The surgical site was confirmed by the patient and me. Plan:  The risk, benefits, expected outcome, and alternative to the recommended procedure have been discussed with the patient. Patient understands and wants to proceed with the procedure.     Electronically signed by Bo Kemp MD on 8/24/2021 at 12:16 PM

## 2021-08-25 NOTE — CARDIO/PULMONARY
Cardiac Rehab Note: chart review/referral     Cardiac cath 8/24/21:  · \"Normal coronary arteries. Normal LVEF. \"    Smoking history assessed. Patient is a current smoker. Smoking Cessation Program link has been added to the AVS.     Patient not seen at this time due to current condition as indicated in chart.

## 2021-08-25 NOTE — DISCHARGE INSTRUCTIONS
Smoking Cessation Program:   [employer] is now offering a proven, interactive, text-based smoking cessation program for FREE! To register, please text [employer] [phone] or visit [email or web address]  For more information, please call: [address], [address] [facility], [address] [place] [address]  [phone]        Patient ID:  Darrell Reyes  [id number]  41 y.o.  1955    Admit Date: 8/24/2021    Discharge Date: 8/24/2021     Admitting Physician: Joe Serra MD     Discharge Physician: Joe Serra MD    Admission Diagnoses:   Chest pain, unspecified type [R07.9]    Discharge Diagnoses: Active Problems:    S/P cardiac cath (8/24/2021)      Overview: 8/24/21: no significant CAD        Discharge Condition: Good    Cardiology Procedures this Admission:  Diagnostic left heart catheterization    Disposition: home    Reference discharge instructions provided by nursing for diet and activity. Signed:  Nestor Ching NP  8/24/2021  3:30 PM        Radial Cardiac Catheterization/Angiography Discharge Instructions    It is normal to feel tired the first couple days. Take it easy and follow the physicians instructions. CHECK THE CATHETER INSERTION SITE DAILY:    Remove the wrist dressing 24 hours after the procedure. You may shower 24 hours after the procedure. Wash with soap and water and pat dry. Gentle cleaning of the site with soap and water is sufficient, cover with a dry clean dressing or bandage. Do not apply creams or powders to the area. No soaking the wrist for 3 days. Leave the puncture site open to air after 24 hours post-procedure. CALL THE PHYSICIANS:     If the site becomes red, swollen or feels warm to the touch  If there is bleeding or drainage or if there is unusual pain at the radial site. If there is any minor oozing, you may apply a band-aid and remove after 12 hours.    If the bleeding continues, hold pressure with the middle finger against the puncture site and the thumb against the back of the wrist,call 911 to be transported to the hospital.  DO NOT DRIVE YOURSELF, [address]. ACTIVITY:   For the first 24 hours do not manipulate the wrist.  No lifting, pushing or pulling over 3-5 pounds with the affected wrist for 7 daysand no straining the insertion site. Do not life grocery bags or the garbage can, do not run the vacuum  or  for 7 days. Start with short walks as in the hospital and gradually increase as tolerated each day. It is recommended to walk 30 minutes 5-7 days per week. Follow your physicians instructions on activity. Avoid walking outside in extremes of heat or cold. Walk inside when it is cold and windy or hot and humid. Things to keep in mind:  No driving for at least 24 hours, or as designated by your physician. Limit the number of times you go up and down the stairs  Take rests and pace yourself with activity. Be careful and do not strain with bowel movements. MEDICATIONS:    Take all medications as prescribed  Call your physician if you have any questions  Keep an updated list of your medications with you at all times and give a list to your physician and pharmacist    SIGNS AND SYMPTOMS:   Be cautious of symptoms of angina or recurrent symptoms such as chest discomfort, unusual shortness of breath or fatigue. These could be symptoms of restenosis, a new blockage or a heart attack. If your symptoms are relieved with rest it is still recommended that you notify your physician of recurrent chest pain or discomfort. For CHEST PAIN or symptoms of angina not relieved with rest:  If the discomfort is not relieved with rest, and you have been prescribed Nitroglycerin, take as directed (taken under the tongue, one at a time 5 minutes apart for a total of 3 doses). If the discomfort is not relieved after the 3rd nitroglycerin, call 911.   If you have not been prescribed Nitroglycerin  and your chest discomfort is not relieved with rest, call 911. AFTER CARE:   Follow up with your physician as instructed. Follow a heart healthy diet with proper portion control, daily stress management, daily exercise, blood pressure and cholesterol control , and smoking cessation.

## 2021-09-07 ENCOUNTER — TELEPHONE (OUTPATIENT)
Dept: CARDIOLOGY CLINIC | Age: 66
End: 2021-09-07

## 2021-09-07 NOTE — TELEPHONE ENCOUNTER
Spoke with patient. Verified with two patient identifiers. Advised pt that he is cleared and faxed clearance to Dr. Sobia Cohen. Patient verbalized understanding.

## 2021-09-07 NOTE — TELEPHONE ENCOUNTER
Patients fiance looking for the cardiac clearance letter in which Jose Salmon has stating that the cardiac clearance depends on Cath test - which patient says that was done a few weeks ago- can you shed some light on the clearance letter- the fiance Carlee Patiño can be reached at [phone].     Thanks  [employer]

## 2021-09-07 NOTE — TELEPHONE ENCOUNTER
Faxed Cardiac clearance to Dr. Latasha Johnson orthopedic at fax # [phone] for pts surgery on left elbow.

## 2021-09-07 NOTE — TELEPHONE ENCOUNTER
Denisse Please Advise     Patients alana called and was wondering about cardiac clearance letter in which Conrad Richards had stated that the cardiac clearance depends on Cath test - which patient says that was done a few weeks ago- can you shed some light on the clearance letter- Thank You

## 2021-09-23 ENCOUNTER — HOSPITAL ENCOUNTER (OUTPATIENT)
Dept: PREADMISSION TESTING | Age: 66
Discharge: HOME | End: 2021-09-23
Payer: MEDICAID

## 2021-09-23 LAB
SARS-COV-2, XPLCVT: NOT DETECTED
SOURCE, COVRS: NORMAL

## 2021-09-23 PROCEDURE — U0003 INFECTIOUS AGENT DETECTION BY NUCLEIC ACID (DNA OR RNA); SEVERE ACUTE RESPIRATORY SYNDROME CORONAVIRUS 2 (SARS-COV-2) (CORONAVIRUS DISEASE [COVID-19]), AMPLIFIED PROBE TECHNIQUE, MAKING USE OF HIGH THROUGHPUT TECHNOLOGIES AS DESCRIBED BY CMS-2020-01-R: HCPCS

## 2021-09-29 NOTE — PERIOP NOTES
[facility]  Ambulatory Surgery Unit  Pre-operative Instructions    Surgery/Procedure Date  Monday October 11th            Tentative Arrival Time TBD      1. On the day of your surgery/procedure, please report to the Ambulatory Surgery Unit Registration Desk and sign in at your designated time. The Ambulatory Surgery Unit is located in [facility] on the [facility] side of the [facility] across from the [address] [place] [address]. Please have all of your health insurance cards, photo ID, and COVID vaccination card. 2. You must have someone with you to drive you home, as you should not drive a car for 24 hours following anesthesia. Please make arrangements for a responsible adult friend or family member to stay with you for at least the first 24 hours after your surgery. 3. Do not have anything to eat or drink (including water, gum, mints, coffee, juice) after 11:59 PM  Sunday October 10th. This may not apply to medications prescribed by your physician. (Please note below the special instructions with medications to take the morning of surgery, if applicable.)    4. We recommend you do not drink any alcoholic beverages for 24 hours before and after your surgery. 5. Contact your surgeons office for instructions on the following medications: non-steroidal anti-inflammatory drugs (i.e. Advil, Aleve), vitamins, and supplements. (Some surgeons will want you to stop these medications prior to surgery and others may allow you to take them)   **If you are currently taking Plavix, Coumadin, Aspirin and/or other blood-thinning agents, contact your surgeon for instructions. ** Your surgeon will partner with the physician prescribing these medications to determine if it is safe to stop or if you need to continue taking. Please do not stop taking these medications without instructions from your surgeon.     6. In an effort to help prevent surgical site infection, we ask that you shower with an anti-bacterial soap (i.e. Dial/Safeguard, or the soap provided to you at your preadmission testing appointment) for 3 days prior to and on the morning of surgery, using a fresh towel after each shower. (Please begin this process with fresh bed linens.) Do not apply any lotions, powders, or deodorants after the shower on the day of your procedure. If applicable, please do not shave the operative site for 48 hours prior to surgery. 7. Wear comfortable clothes. Wear glasses instead of contacts. Do not bring any jewelry or money (other than copays or fees as instructed). Do not wear make-up, particularly mascara, the morning of your surgery. Do not wear nail polish, particularly if you are having foot /hand surgery. Wear your hair loose or down, no ponytails, buns, mathew pins or clips. All body piercings must be removed. 8. You should understand that if you do not follow these instructions your surgery may be cancelled. If your physical condition changes (i.e. fever, cold or flu) please contact your surgeon as soon as possible. 9. It is important that you be on time. If a situation occurs where you may be late, or if you have any questions or problems, please call [phone].    10. Your surgery time may be subject to change. You will receive a phone call the day prior to surgery to confirm your arrival time. 11. Pediatric patients: please bring a change of clothes, diapers, bottle/sippy cup, pacifier, etc.      Special Instructions: Take all medications and inhalers, as prescribed, on the morning of surgery with a sip of water EXCEPT: N/A      Insulin Dependent Diabetic patients: Take your diabetic medications as prescribed the day before surgery. Hold all diabetic medications the day of surgery. If you are scheduled to arrive for surgery after 8:00 AM, and your AM blood sugar is >200, please call Ambulatory Surgery.     I understand a pre-operative phone call will be made to verify my surgery time.  In the event that I am not available, I give permission for a message to be left on my answering service and/or with another person?       yes         ___________________      ___________________      ________________  (Signature of Patient)          (Witness)                   (Date and Time)

## 2021-09-30 ENCOUNTER — ANESTHESIA EVENT (OUTPATIENT)
Dept: SURGERY | Age: 66
End: 2021-09-30
Payer: MEDICARE

## 2021-10-01 NOTE — PERIOP NOTES
preop chart to Dr. Cliff Samuels for review, including cardio and pulm notes. Case will need to be moved to MAIN OR due to medical hx. LM at Dr. Yessy Barnett office.   LM on  for pt to rc to asu re location change

## 2021-10-07 ENCOUNTER — HOSPITAL ENCOUNTER (OUTPATIENT)
Dept: PREADMISSION TESTING | Age: 66
Discharge: HOME | End: 2021-10-07
Payer: COMMERCIAL

## 2021-10-07 PROCEDURE — U0003 INFECTIOUS AGENT DETECTION BY NUCLEIC ACID (DNA OR RNA); SEVERE ACUTE RESPIRATORY SYNDROME CORONAVIRUS 2 (SARS-COV-2) (CORONAVIRUS DISEASE [COVID-19]), AMPLIFIED PROBE TECHNIQUE, MAKING USE OF HIGH THROUGHPUT TECHNOLOGIES AS DESCRIBED BY CMS-2020-01-R: HCPCS

## 2021-10-08 LAB
SARS-COV-2, XPLCVT: NOT DETECTED
SOURCE, COVRS: NORMAL

## 2021-10-11 ENCOUNTER — ANESTHESIA (OUTPATIENT)
Dept: SURGERY | Age: 66
End: 2021-10-11
Payer: MEDICARE

## 2021-10-11 ENCOUNTER — HOSPITAL ENCOUNTER (OUTPATIENT)
Age: 66
Setting detail: OUTPATIENT SURGERY
Discharge: HOME | End: 2021-10-11
Attending: ORTHOPAEDIC SURGERY | Admitting: ORTHOPAEDIC SURGERY
Payer: MEDICARE

## 2021-10-11 VITALS
BODY MASS INDEX: 23.92 KG/M2 | SYSTOLIC BLOOD PRESSURE: 106 MMHG | RESPIRATION RATE: 12 BRPM | DIASTOLIC BLOOD PRESSURE: 71 MMHG | WEIGHT: 176.59 LBS | OXYGEN SATURATION: 96 % | HEART RATE: 66 BPM | HEIGHT: 72 IN | TEMPERATURE: 98 F

## 2021-10-11 DIAGNOSIS — G89.18 POST-OP PAIN: Primary | ICD-10-CM

## 2021-10-11 PROCEDURE — 74011250636 HC RX REV CODE- 250/636: Performed by: ORTHOPAEDIC SURGERY

## 2021-10-11 PROCEDURE — 77030040356 HC CORD BPLR FRCP COVD -A: Performed by: ORTHOPAEDIC SURGERY

## 2021-10-11 PROCEDURE — 74011250636 HC RX REV CODE- 250/636: Performed by: STUDENT IN AN ORGANIZED HEALTH CARE EDUCATION/TRAINING PROGRAM

## 2021-10-11 PROCEDURE — 77030002933 HC SUT MCRYL J&J -A: Performed by: ORTHOPAEDIC SURGERY

## 2021-10-11 PROCEDURE — 74011250636 HC RX REV CODE- 250/636: Performed by: NURSE ANESTHETIST, CERTIFIED REGISTERED

## 2021-10-11 PROCEDURE — 74011000250 HC RX REV CODE- 250: Performed by: NURSE ANESTHETIST, CERTIFIED REGISTERED

## 2021-10-11 PROCEDURE — 74011250637 HC RX REV CODE- 250/637: Performed by: ORTHOPAEDIC SURGERY

## 2021-10-11 PROCEDURE — 76060000032 HC ANESTHESIA 0.5 TO 1 HR: Performed by: ORTHOPAEDIC SURGERY

## 2021-10-11 PROCEDURE — 76210000017 HC OR PH I REC 1.5 TO 2 HR: Performed by: ORTHOPAEDIC SURGERY

## 2021-10-11 PROCEDURE — 76010000138 HC OR TIME 0.5 TO 1 HR: Performed by: ORTHOPAEDIC SURGERY

## 2021-10-11 PROCEDURE — 77030000032 HC CUF TRNQT ZIMM -B: Performed by: ORTHOPAEDIC SURGERY

## 2021-10-11 PROCEDURE — 2709999900 HC NON-CHARGEABLE SUPPLY: Performed by: ORTHOPAEDIC SURGERY

## 2021-10-11 RX ORDER — LIDOCAINE HYDROCHLORIDE 10 MG/ML
0.1 INJECTION, SOLUTION EPIDURAL; INFILTRATION; INTRACAUDAL; PERINEURAL AS NEEDED
Status: DISCONTINUED | OUTPATIENT
Start: 2021-10-11 | End: 2021-10-11 | Stop reason: HOSPADM

## 2021-10-11 RX ORDER — ROPIVACAINE HYDROCHLORIDE 5 MG/ML
30 INJECTION, SOLUTION EPIDURAL; INFILTRATION; PERINEURAL AS NEEDED
Status: DISCONTINUED | OUTPATIENT
Start: 2021-10-11 | End: 2021-10-11 | Stop reason: HOSPADM

## 2021-10-11 RX ORDER — MORPHINE SULFATE 2 MG/ML
2 INJECTION, SOLUTION INTRAMUSCULAR; INTRAVENOUS
Status: CANCELLED | OUTPATIENT
Start: 2021-10-11

## 2021-10-11 RX ORDER — PROPOFOL 10 MG/ML
INJECTION, EMULSION INTRAVENOUS
Status: DISCONTINUED | OUTPATIENT
Start: 2021-10-11 | End: 2021-10-11 | Stop reason: HOSPADM

## 2021-10-11 RX ORDER — MIDAZOLAM HYDROCHLORIDE 1 MG/ML
1 INJECTION, SOLUTION INTRAMUSCULAR; INTRAVENOUS AS NEEDED
Status: DISCONTINUED | OUTPATIENT
Start: 2021-10-11 | End: 2021-10-11 | Stop reason: HOSPADM

## 2021-10-11 RX ORDER — MIDAZOLAM HYDROCHLORIDE 1 MG/ML
0.5 INJECTION, SOLUTION INTRAMUSCULAR; INTRAVENOUS
Status: CANCELLED | OUTPATIENT
Start: 2021-10-11

## 2021-10-11 RX ORDER — SODIUM CHLORIDE, SODIUM LACTATE, POTASSIUM CHLORIDE, CALCIUM CHLORIDE 600; 310; 30; 20 MG/100ML; MG/100ML; MG/100ML; MG/100ML
25 INJECTION, SOLUTION INTRAVENOUS CONTINUOUS
Status: DISCONTINUED | OUTPATIENT
Start: 2021-10-11 | End: 2021-10-11 | Stop reason: HOSPADM

## 2021-10-11 RX ORDER — DEXMEDETOMIDINE HYDROCHLORIDE 100 UG/ML
INJECTION, SOLUTION INTRAVENOUS AS NEEDED
Status: DISCONTINUED | OUTPATIENT
Start: 2021-10-11 | End: 2021-10-11 | Stop reason: HOSPADM

## 2021-10-11 RX ORDER — SODIUM CHLORIDE 0.9 % (FLUSH) 0.9 %
5-40 SYRINGE (ML) INJECTION EVERY 8 HOURS
Status: CANCELLED | OUTPATIENT
Start: 2021-10-11

## 2021-10-11 RX ORDER — MIDAZOLAM HYDROCHLORIDE 1 MG/ML
INJECTION, SOLUTION INTRAMUSCULAR; INTRAVENOUS
Status: COMPLETED | OUTPATIENT
Start: 2021-10-11 | End: 2021-10-11

## 2021-10-11 RX ORDER — SODIUM CHLORIDE 0.9 % (FLUSH) 0.9 %
5-40 SYRINGE (ML) INJECTION EVERY 8 HOURS
Status: DISCONTINUED | OUTPATIENT
Start: 2021-10-11 | End: 2021-10-11 | Stop reason: HOSPADM

## 2021-10-11 RX ORDER — SODIUM CHLORIDE, SODIUM LACTATE, POTASSIUM CHLORIDE, CALCIUM CHLORIDE 600; 310; 30; 20 MG/100ML; MG/100ML; MG/100ML; MG/100ML
100 INJECTION, SOLUTION INTRAVENOUS CONTINUOUS
Status: CANCELLED | OUTPATIENT
Start: 2021-10-11

## 2021-10-11 RX ORDER — FENTANYL CITRATE 50 UG/ML
50 INJECTION, SOLUTION INTRAMUSCULAR; INTRAVENOUS AS NEEDED
Status: DISCONTINUED | OUTPATIENT
Start: 2021-10-11 | End: 2021-10-11 | Stop reason: HOSPADM

## 2021-10-11 RX ORDER — ONDANSETRON 2 MG/ML
INJECTION INTRAMUSCULAR; INTRAVENOUS AS NEEDED
Status: DISCONTINUED | OUTPATIENT
Start: 2021-10-11 | End: 2021-10-11 | Stop reason: HOSPADM

## 2021-10-11 RX ORDER — SODIUM CHLORIDE 9 MG/ML
25 INJECTION, SOLUTION INTRAVENOUS CONTINUOUS
Status: DISCONTINUED | OUTPATIENT
Start: 2021-10-11 | End: 2021-10-11 | Stop reason: HOSPADM

## 2021-10-11 RX ORDER — SODIUM CHLORIDE 0.9 % (FLUSH) 0.9 %
5-40 SYRINGE (ML) INJECTION AS NEEDED
Status: DISCONTINUED | OUTPATIENT
Start: 2021-10-11 | End: 2021-10-11 | Stop reason: HOSPADM

## 2021-10-11 RX ORDER — ACETAMINOPHEN 325 MG/1
650 TABLET ORAL ONCE
Status: DISCONTINUED | OUTPATIENT
Start: 2021-10-11 | End: 2021-10-11 | Stop reason: HOSPADM

## 2021-10-11 RX ORDER — FENTANYL CITRATE 50 UG/ML
25 INJECTION, SOLUTION INTRAMUSCULAR; INTRAVENOUS
Status: CANCELLED | OUTPATIENT
Start: 2021-10-11

## 2021-10-11 RX ORDER — GLYCOPYRROLATE 0.2 MG/ML
INJECTION INTRAMUSCULAR; INTRAVENOUS AS NEEDED
Status: DISCONTINUED | OUTPATIENT
Start: 2021-10-11 | End: 2021-10-11 | Stop reason: HOSPADM

## 2021-10-11 RX ORDER — DIPHENHYDRAMINE HYDROCHLORIDE 50 MG/ML
12.5 INJECTION, SOLUTION INTRAMUSCULAR; INTRAVENOUS AS NEEDED
Status: CANCELLED | OUTPATIENT
Start: 2021-10-11 | End: 2021-10-11

## 2021-10-11 RX ORDER — SODIUM CHLORIDE, SODIUM LACTATE, POTASSIUM CHLORIDE, CALCIUM CHLORIDE 600; 310; 30; 20 MG/100ML; MG/100ML; MG/100ML; MG/100ML
100 INJECTION, SOLUTION INTRAVENOUS CONTINUOUS
Status: DISCONTINUED | OUTPATIENT
Start: 2021-10-11 | End: 2021-10-11 | Stop reason: HOSPADM

## 2021-10-11 RX ORDER — DEXAMETHASONE SODIUM PHOSPHATE 4 MG/ML
INJECTION, SOLUTION INTRA-ARTICULAR; INTRALESIONAL; INTRAMUSCULAR; INTRAVENOUS; SOFT TISSUE AS NEEDED
Status: DISCONTINUED | OUTPATIENT
Start: 2021-10-11 | End: 2021-10-11 | Stop reason: HOSPADM

## 2021-10-11 RX ORDER — PHENYLEPHRINE HCL IN 0.9% NACL 0.4MG/10ML
SYRINGE (ML) INTRAVENOUS AS NEEDED
Status: DISCONTINUED | OUTPATIENT
Start: 2021-10-11 | End: 2021-10-11 | Stop reason: HOSPADM

## 2021-10-11 RX ORDER — HYDROCODONE BITARTRATE AND ACETAMINOPHEN 5; 325 MG/1; MG/1
1 TABLET ORAL
Qty: 20 TABLET | Refills: 0 | Status: SHIPPED | OUTPATIENT
Start: 2021-10-11 | End: 2021-10-18

## 2021-10-11 RX ORDER — FENTANYL CITRATE 50 UG/ML
INJECTION, SOLUTION INTRAMUSCULAR; INTRAVENOUS AS NEEDED
Status: DISCONTINUED | OUTPATIENT
Start: 2021-10-11 | End: 2021-10-11 | Stop reason: HOSPADM

## 2021-10-11 RX ORDER — ONDANSETRON 2 MG/ML
4 INJECTION INTRAMUSCULAR; INTRAVENOUS AS NEEDED
Status: CANCELLED | OUTPATIENT
Start: 2021-10-11

## 2021-10-11 RX ORDER — ROPIVACAINE HYDROCHLORIDE 5 MG/ML
INJECTION, SOLUTION EPIDURAL; INFILTRATION; PERINEURAL
Status: COMPLETED | OUTPATIENT
Start: 2021-10-11 | End: 2021-10-11

## 2021-10-11 RX ORDER — SODIUM CHLORIDE 0.9 % (FLUSH) 0.9 %
5-40 SYRINGE (ML) INJECTION AS NEEDED
Status: CANCELLED | OUTPATIENT
Start: 2021-10-11

## 2021-10-11 RX ORDER — HYDROMORPHONE HYDROCHLORIDE 1 MG/ML
0.5 INJECTION, SOLUTION INTRAMUSCULAR; INTRAVENOUS; SUBCUTANEOUS
Status: CANCELLED | OUTPATIENT
Start: 2021-10-11

## 2021-10-11 RX ADMIN — DEXAMETHASONE SODIUM PHOSPHATE 4 MG: 4 INJECTION, SOLUTION INTRAMUSCULAR; INTRAVENOUS at 16:26

## 2021-10-11 RX ADMIN — VANCOMYCIN HYDROCHLORIDE 1000 MG: 1 INJECTION, POWDER, LYOPHILIZED, FOR SOLUTION INTRAVENOUS at 13:55

## 2021-10-11 RX ADMIN — SODIUM CHLORIDE, POTASSIUM CHLORIDE, SODIUM LACTATE AND CALCIUM CHLORIDE 25 ML/HR: 600; 310; 30; 20 INJECTION, SOLUTION INTRAVENOUS at 13:55

## 2021-10-11 RX ADMIN — GLYCOPYRROLATE 0.2 MG: 0.2 INJECTION, SOLUTION INTRAMUSCULAR; INTRAVENOUS at 16:12

## 2021-10-11 RX ADMIN — MIDAZOLAM HYDROCHLORIDE 1 MG: 1 INJECTION, SOLUTION INTRAMUSCULAR; INTRAVENOUS at 15:20

## 2021-10-11 RX ADMIN — ROPIVACAINE HYDROCHLORIDE 20 ML: 5 INJECTION, SOLUTION EPIDURAL; INFILTRATION; PERINEURAL at 15:20

## 2021-10-11 RX ADMIN — FENTANYL CITRATE 50 MCG: 50 INJECTION, SOLUTION INTRAMUSCULAR; INTRAVENOUS at 16:00

## 2021-10-11 RX ADMIN — Medication 3 AMPULE: at 13:55

## 2021-10-11 RX ADMIN — ONDANSETRON HYDROCHLORIDE 4 MG: 2 INJECTION, SOLUTION INTRAMUSCULAR; INTRAVENOUS at 16:26

## 2021-10-11 RX ADMIN — Medication 80 MCG: at 16:19

## 2021-10-11 RX ADMIN — MIDAZOLAM HYDROCHLORIDE 1 MG: 1 INJECTION, SOLUTION INTRAMUSCULAR; INTRAVENOUS at 15:44

## 2021-10-11 RX ADMIN — PROPOFOL 75 MCG/KG/MIN: 10 INJECTION, EMULSION INTRAVENOUS at 15:51

## 2021-10-11 RX ADMIN — DEXMEDETOMIDINE HYDROCHLORIDE 6 MCG: 100 INJECTION, SOLUTION, CONCENTRATE INTRAVENOUS at 16:10

## 2021-10-11 NOTE — OP NOTES
PATIENT NAME:  Alexei Roy     SURGEON:    Roxy Toledo MD     DATE OF SURGERY: 10/11/21      LOCATION: [facility]      PREOPERATIVE DIAGNOSIS: Left cubital tunnel syndrome     POSTOPERATIVE DIAGNOSIS:  Same     PROCEDURE:  Left cubital tunnel release     ANESTHESIA: Brachial Plexus block/IV sedation      BLOOD LOSS:  Minimal     COMPLICATIONS: none      TOURNIQUET TIME: 17 min      OPERATIVE INDICATIONS:  They had developed persistent ulnar neuropathy at the elbow. An EMG had been obtained which proved this. They failed non operative management. They were therefore indicated for surgery. After risks benefits alternatives were discussed with the patient, they consented to proceed.     DESCRIPTION  OF PROCEDURE:   On the date of operation the patient presented stable to the holding area. The correct upper extremity was identified and marked. Regional anesthesia was induced by the anesthesia team.  They were then brought to the operating room and placed supine with the operative upper extremity on a hand table. The upper extremity was then prepped and draped in the standard sterile fashion. After formal time-out was performed, the upper extremity was elevated and exsanguinated and a sterile upper arm tourniquet was inflated to 250 mm of mercury.     An incision was then made at the medial elbow just posterior to the medial epicondyle curving proximally along the medial intermuscular septum and distally between the 2 heads of the FCU. Skin and subcutaneous tissue were taken down sharply and deeper tissues were carefully dissected with care to avoid any damage to the medial antebrachial cutaneous nerve branches. The ulnar nerve was then located just proximal to Velasco's ligament. It was released from Velasco's ligament and then released in its entirety both proximally and distally to this. It was carried distally through the 2 heads of the FCU and proximally up to the arcade of [place].  Once it was fully released, the elbow was taken through range of motion and there was no subluxation of the nerve noted. Tourniquet was then taken down. Copious irrigation was performed and all bleeding sites were carefully coagulated with bipolar cautery. Skin was closed with running subcuticular Monocryl. Patient was then placed in sterile bulky dressing. They were then taken stable to the recovery room with all instrument needle counts correct at the end of the case.

## 2021-10-11 NOTE — ANESTHESIA POSTPROCEDURE EVALUATION
Procedure(s):  LEFT CUBITAL TUNNEL RELEASE (REGIONAL BLOCK WITH MAC). general, regional    Anesthesia Post Evaluation      Multimodal analgesia: multimodal analgesia used between 6 hours prior to anesthesia start to PACU discharge  Patient location during evaluation: PACU  Patient participation: complete - patient participated  Level of consciousness: sleepy but conscious  Pain score: 2  Pain management: adequate  Airway patency: patent  Anesthetic complications: no  Cardiovascular status: acceptable  Respiratory status: acceptable  Hydration status: acceptable  Comments: +Post-Anesthesia Evaluation and Assessment    Patient: Al Chanel MRN: [id number]  SSN: [id number]   YOB: 1955  Age: 78 y.o. Sex: male      Cardiovascular Function/Vital Signs    BP (P) 108/65 (BP 1 Location: Right upper arm, BP Patient Position: At rest)   Pulse 70   Temp 36.2 °C (97.1 °F)   Resp 16   Ht 6' (1.829 m)   Wt 80.1 kg (176 lb 9.4 oz)   SpO2 97%   BMI 23.95 kg/m²     Patient is status post Procedure(s):  LEFT CUBITAL TUNNEL RELEASE (REGIONAL BLOCK WITH MAC). Nausea/Vomiting: Controlled. Postoperative hydration reviewed and adequate. Pain:  Pain Scale 1: (P) Numeric (0 - 10) (10/11/21 1644)  Pain Intensity 1: (P) 0 (10/11/21 1644)   Managed. Neurological Status:   Neuro (WDL): (P) Exceptions to WDL (10/11/21 1644)   At baseline. Mental Status and Level of Consciousness: Arousable. Pulmonary Status:   O2 Device: (P) Nasal cannula;None (10/11/21 1644)   Adequate oxygenation and airway patent. Complications related to anesthesia: None    Post-anesthesia assessment completed. No concerns.     Signed By: Maria De Jesus Buchanan MD    10/11/2021  Post anesthesia nausea and vomiting:  controlled  Final Post Anesthesia Temperature Assessment:  Normothermia (36.0-37.5 degrees C)      INITIAL Post-op Vital signs:   Vitals Value Taken Time   BP 98/60 10/11/21 1650   Temp 36.2 °C (97.1 °F) 10/11/21 1644 Pulse 66 10/11/21 1655   Resp 14 10/11/21 1655   SpO2 96 % 10/11/21 1655   Vitals shown include unvalidated device data.

## 2021-10-11 NOTE — ANESTHESIA PREPROCEDURE EVALUATION
Relevant Problems   NEUROLOGY   (+) Recurrent depression (HCC)      CARDIOVASCULAR   (+) Other forms of angina pectoris (HCC)      ENDOCRINE   (+) Adhesive capsulitis of left shoulder   (+) Other specified hypothyroidism       Anesthetic History   No history of anesthetic complications            Review of Systems / Medical History  Patient summary reviewed, nursing notes reviewed and pertinent labs reviewed    Pulmonary  Within defined limits  COPD: mild        Asthma : well controlled       Neuro/Psych   Within defined limits      Psychiatric history     Cardiovascular  Within defined limits            CAD    Exercise tolerance: >4 METS     GI/Hepatic/Renal  Within defined limits   GERD: well controlled           Endo/Other  Within defined limits    Hypothyroidism  Arthritis     Other Findings   Comments: GERD (gastroesophageal reflux disease)  COPD           Physical Exam    Airway  Mallampati: II  TM Distance: 4 - 6 cm  Neck ROM: normal range of motion   Mouth opening: Normal     Cardiovascular  Regular rate and rhythm,  S1 and S2 normal,  no murmur, click, rub, or gallop  Rhythm: regular  Rate: normal         Dental  No notable dental hx       Pulmonary  Breath sounds clear to auscultation               Abdominal  GI exam deferred       Other Findings            Anesthetic Plan    ASA: 2  Anesthesia type: general and regional - supraclavicular block            Anesthetic plan and risks discussed with: Patient

## 2021-10-11 NOTE — ANESTHESIA PROCEDURE NOTES
Peripheral Block    Start time: 10/11/2021 3:15 PM  End time: 10/11/2021 3:25 PM  Performed by: Analilia Gonzalez MD  Authorized by: Analilia Gonzalez MD       Pre-procedure: Indications: at surgeon's request, post-op pain management, procedure for pain and primary anesthetic    Preanesthetic Checklist: patient identified, risks and benefits discussed, site marked, timeout performed, anesthesia consent given and patient being monitored    Timeout Time: 15:15 EDT          Block Type:   Block Type:  Supraclavicular  Laterality:  Left  Monitoring:  Continuous pulse ox, frequent vital sign checks, heart rate, responsive to questions and oxygen  Injection Technique:  Single shot  Procedures: ultrasound guided    Patient Position: supine  Prep: alcohol    Location:  Supraclavicular  Needle Type:  Stimuplex  Needle Gauge:  21 G  Needle Localization:  Ultrasound guidance  Medication Injected:  Midazolam (VERSED) injection, 1 mg  ropivacaine (PF) (NAROPIN)(0.5%) 5 mg/mL injection, 20 mL  Med Admin Time: 10/11/2021 3:20 PM    Assessment:  Number of attempts:  1  Injection Assessment:  Incremental injection every 5 mL, ultrasound image on chart, local visualized surrounding nerve on ultrasound, negative aspiration for blood and no intravascular symptoms  Patient tolerance:  Patient tolerated the procedure well with no immediate complications  Paresthesia with needle, needle withdrawn and paresthesia resolved.  Continued injection without complication

## 2021-10-11 NOTE — PERIOP NOTES
TRANSFER - IN REPORT:    Verbal report received from [facility] RN on Kobe Mock  being received from OR for routine progression of care      Report consisted of patients Situation, Background, Assessment and   Recommendations(SBAR). Information from the following report(s) OR Summary, Procedure Summary, Intake/Output and MAR was reviewed with the receiving nurse. Opportunity for questions and clarification was provided. Assessment completed upon patients arrival to unit and care assumed.

## 2021-10-11 NOTE — DISCHARGE INSTRUCTIONS
[facility]  Post-operative instructions  For: Marydarrick Lo    Your first postop appointment should be scheduled with Dr. Lavona Schaumann for 2 weeks post-op. [address], [address]  [place]  Phone: [phone]  Hand Therapy Phone: [phone]  Fax: [phone]    Please follow these instructions for a safe and speedy recovery:    1. Surgical Bandage: Leave the bandage in place until we remove it. Please keep it clean and dry. To shower or bathe, apply a plastic bag or GLAD Press'n Seal® plastic wrap around the bandage or simply sponge bathe. 2. Elevation: Hand swelling is best prevented by keeping your hand elevated above the level of your heart at all times, night and day. The opposite, dangling your hand below your waist, will cause additional pain, swelling, and later stiffness. You can elevate the hand in a sling or by propping it on a pillow at night. Occasionally, we will provide you with a custom-made foam block for elevating the arm. Ice compresses may help but do not rep[lace elevation. Frequently, extreme pain is caused by a tight bandage, which should be loosened. If pain is severe and progressive, call us at [phone] during the day (ask for immediate connection to Dr. Ethel Polanco Team) or during the night (ask for the on-call physician). 3. Medication: You will be provided with an appropriate pain medication (over-the-counter or prescription). Please fill this at a pharmacy promptly so you will have it available when all local anesthetic wears off. Take this to relieve pain as directed on the bottle. Please refrain from driving, drinking alcohol, and making important medical decisions while taking the medication. Please call us if you need something stronger. Medication changes or refills must be made before 5pm or through your pharmacy.     4. Weight bearing: Do NOT bear any weight on the operative extremity. I want to thank you for choosing us for your hand care needs. My staff and I are committed to providing all our customers with the highest quality hand surgery and subsequent hand therapy care as possible. We want your recovery to be comfortable. If you have clinical non-emergent questions about your surgery or other hand conditions please call [phone] and ask for my team. Your call will be returned within 24 hours. DISCHARGE SUMMARY from Nurse    PATIENT INSTRUCTIONS:    After general anesthesia or intravenous sedation, for 24 hours or while taking prescription Narcotics:  · Limit your activities  · Do not drive and operate hazardous machinery  · Do not make important personal or business decisions  · Do  not drink alcoholic beverages  · If you have not urinated within 8 hours after discharge, please contact your surgeon on call. Report the following to your surgeon:  · Excessive pain, swelling, redness or odor of or around the surgical area  · Temperature over 100.5  · Nausea and vomiting lasting longer than 4 hours or if unable to take medications  · Any signs of decreased circulation or nerve impairment to extremity: change in color, persistent  numbness, tingling, coldness or increase pain  · Any questions                  These are general instructions for a healthy lifestyle:    No smoking/ No tobacco products/ Avoid exposure to second hand smoke  Surgeon General's Warning:  Quitting smoking now greatly reduces serious risk to your health.     Obesity, smoking, and sedentary lifestyle greatly increases your risk for illness    A healthy diet, regular physical exercise & weight monitoring are important for maintaining a healthy lifestyle    You may be retaining fluid if you have a history of heart failure or if you experience any of the following symptoms:  Weight gain of 3 pounds or more overnight or 5 pounds in a week, increased swelling in our hands or feet or shortness of breath while lying flat in bed. Please call your doctor as soon as you notice any of these symptoms; do not wait until your next office visit. The discharge information has been reviewed with the caregiver. The caregiver verbalized understanding. Discharge medications reviewed with the caregiver and appropriate educational materials and side effects teaching were provided.   ___________________________________________________________________________________________________________________________________

## 2021-10-11 NOTE — H&P
Comes today for EMG follow-up. This was performed earlier today. This is reviewed by myself. This was notable for mild to moderate left cubital tunnel syndrome. Continues to report radiating pain down the arm to the fingertips with small finger numbness. PMh, PSH, ROS reviewed on prior intake form    Objective:   Constitutional: No acute distress. Well nourished. Well developed. Eyes: Sclera are nonicteric. Respiratory: No labored breathing. Cardiovascular: No marked edema. Skin: No marked skin ulcers. Neurological: see below  Psychiatric: Alert and oriented x3. Musculoskeletal   Examination of the left elbow and upper extremity stable from prior. There is numbness in the ulnar nerve distribution. Positive Tinel's at the elbow. Radiographs:       No imaging obtained     Assessment:     1. Cubital tunnel syndrome on left     Plan:   Discussed nature condition as well as treatment options. He has failed meaningful nonsurgical management for his left cubital tunnel syndrome. Does desire surgical management. Risks benefits and alternatives as well as postoperative course are discussed. He consents to proceed. Surgical date chosen. Date of Surgery Update:  Gucci Madden was seen and examined. History and physical has been reviewed. The patient has been examined.  There have been no significant clinical changes since the completion of the originally dated History and Physical.    Signed By: Wes Chaparro MD     October 11, 2021 1:56 PM

## 2021-10-11 NOTE — PERIOP NOTES
Discharge instructions discussed with pt and alana both verbalized understanding of instructions hard copy signed by alana and placed in chart

## 2021-10-22 ENCOUNTER — OFFICE VISIT (OUTPATIENT)
Dept: CARDIOLOGY CLINIC | Age: 66
End: 2021-10-22
Payer: MEDICARE

## 2021-10-22 VITALS
HEART RATE: 68 BPM | RESPIRATION RATE: 18 BRPM | DIASTOLIC BLOOD PRESSURE: 78 MMHG | OXYGEN SATURATION: 98 % | BODY MASS INDEX: 23.58 KG/M2 | HEIGHT: 73 IN | SYSTOLIC BLOOD PRESSURE: 132 MMHG | WEIGHT: 177.9 LBS

## 2021-10-22 DIAGNOSIS — Z72.0 TOBACCO ABUSE: ICD-10-CM

## 2021-10-22 DIAGNOSIS — E78.2 MIXED HYPERLIPIDEMIA: ICD-10-CM

## 2021-10-22 DIAGNOSIS — R94.39 ABNORMAL STRESS TEST: Primary | ICD-10-CM

## 2021-10-22 PROCEDURE — 93005 ELECTROCARDIOGRAM TRACING: CPT | Performed by: NURSE PRACTITIONER

## 2021-10-22 PROCEDURE — G8420 CALC BMI NORM PARAMETERS: HCPCS | Performed by: NURSE PRACTITIONER

## 2021-10-22 PROCEDURE — 1101F PT FALLS ASSESS-DOCD LE1/YR: CPT | Performed by: NURSE PRACTITIONER

## 2021-10-22 PROCEDURE — 93010 ELECTROCARDIOGRAM REPORT: CPT | Performed by: NURSE PRACTITIONER

## 2021-10-22 PROCEDURE — 3017F COLORECTAL CA SCREEN DOC REV: CPT | Performed by: NURSE PRACTITIONER

## 2021-10-22 PROCEDURE — G8536 NO DOC ELDER MAL SCRN: HCPCS | Performed by: NURSE PRACTITIONER

## 2021-10-22 PROCEDURE — 99214 OFFICE O/P EST MOD 30 MIN: CPT | Performed by: NURSE PRACTITIONER

## 2021-10-22 PROCEDURE — G8427 DOCREV CUR MEDS BY ELIG CLIN: HCPCS | Performed by: NURSE PRACTITIONER

## 2021-10-22 PROCEDURE — G9717 DOC PT DX DEP/BP F/U NT REQ: HCPCS | Performed by: NURSE PRACTITIONER

## 2021-10-22 RX ORDER — HYDROCODONE BITARTRATE AND ACETAMINOPHEN 5; 325 MG/1; MG/1
1 TABLET ORAL
COMMUNITY
Start: 2021-10-18 | End: 2021-10-25

## 2021-10-22 RX ORDER — FLUTICASONE PROPIONATE 50 MCG
2 SPRAY, SUSPENSION (ML) NASAL DAILY
COMMUNITY
Start: 2021-09-22

## 2021-10-22 NOTE — PROGRESS NOTES
Kelly Rodas, FNP-BC    Subjective/HPI:     Annemarie Ivey is a 78 y.o. male is here for routine f/u. He has a PMHx of HLD, depression/schizophrenia, GERD, COPD, and tobacco abuse. He had cardiac cath done 8/2021 for abnormal stress test and for cardiac clearance for left cubital tunnel release surgery. Cath with normal coronaries angiographically. Was cleared from CV standpoint for surgery, which he completed on 10/11/21. Notes shortness of breath which is ongoing, unchanged. Denies new chest pain symptoms. Relieved about his normal cath. Current Outpatient Medications on File Prior to Visit   Medication Sig Dispense Refill    fluticasone propionate (FLONASE) 50 mcg/actuation nasal spray       HYDROcodone-acetaminophen (NORCO) 5-325 mg per tablet Take 1 Tablet by mouth every four (4) hours as needed.  tiZANidine (ZANAFLEX) 4 mg tablet Take 4 mg by mouth three (3) times daily.  Nitrostat 0.4 mg SL tablet 1 TAB DISSOLVED UNDER THE TONGUE AT FIRST SIGN OF ATTACK,MAY REPEAT EVERY 5 MIN . MAX 3 DOSES- IF NO RELIEF/CALL 911 25 Tab 5    gabapentin (NEURONTIN) 400 mg capsule Take 1 Cap by mouth three (3) times daily. Max Daily Amount: 1,200 mg. Indications: Neck spasms 30 Cap 0    budesonide-formoteroL (SYMBICORT) 160-4.5 mcg/actuation HFAA Take 2 Puffs by inhalation two (2) times a day. Indications: bronchospasm prevention with COPD 1 Inhaler 0    tiotropium bromide (SPIRIVA RESPIMAT) 2.5 mcg/actuation inhaler Take 2 Puffs by inhalation daily. Indications: bronchospasm prevention with COPD 1 Inhaler 0    ipratropium-albuteroL (COMBIVENT RESPIMAT)  mcg/actuation inhaler Take 1 Puff by inhalation every six (6) hours as needed.  [DISCONTINUED] mirtazapine (REMERON) 15 mg tablet Take 15 mg by mouth nightly. (Patient not taking: Reported on 10/22/2021)      [DISCONTINUED] isosorbide mononitrate ER (IMDUR) 30 mg tablet Take 0.5 Tablets by mouth daily.  (Patient not taking: Reported on 10/22/2021) 15 Tablet 1    [DISCONTINUED] metoprolol succinate (TOPROL-XL) 25 mg XL tablet Take 0.5 Tablets by mouth nightly. (Patient not taking: Reported on 10/22/2021) 15 Tablet 1    [DISCONTINUED] aspirin 81 mg chewable tablet Take 1 Tablet by mouth daily. (Patient not taking: Reported on 10/22/2021) 30 Tablet 1    [DISCONTINUED] sertraline (ZOLOFT) 50 mg tablet Take 50 mg by mouth daily. (Patient not taking: Reported on 10/22/2021)      [DISCONTINUED] atorvastatin (LIPITOR) 20 mg tablet Take 1 Tab by mouth daily. (Patient not taking: Reported on 10/22/2021) 30 Tab 5    [DISCONTINUED] levothyroxine (SYNTHROID) 25 mcg tablet Take 1 Tab by mouth Daily (before breakfast). (Patient not taking: Reported on 10/22/2021) 30 Tab 5     No current facility-administered medications on file prior to visit. Review of Symptoms:    Review of Systems   Constitutional: Negative for chills, fever and weight loss. HENT: Negative for nosebleeds. Eyes: Negative for blurred vision and double vision. Respiratory: Negative for cough, shortness of breath and wheezing. Cardiovascular: Negative for chest pain, palpitations, orthopnea, leg swelling and PND. Skin: Negative for rash. Neurological: Negative for dizziness and loss of consciousness. Physical Exam:      General: Well developed, in no acute distress, cooperative and alert  Heart:  reg rate and rhythm; normal S1/S2; no murmurs, no gallops or rubs. Respiratory: Clear bilaterally x 4, no wheezing or rales  Extremities:  Normal cap refill, no cyanosis, atraumatic. No edema. Vascular: 2+ pulses symmetric in all extremities    Vitals:    10/22/21 1326   BP: 132/78   BP 1 Location: Left arm   BP Patient Position: Sitting   BP Cuff Size: Adult   Pulse: 68   Resp: 18   Height: 6' 1\" (1.854 m)   Weight: 177 lb 14.4 oz (80.7 kg)   SpO2: 98%       ECG done today shows sinus rhythm     Assessment:       ICD-10-CM ICD-9-CM    1. Abnormal stress test  R94.39 794.39    2. Mixed hyperlipidemia  E78.2 272.2 AMB POC EKG ROUTINE W/ 12 LEADS, INTER & REP   3. Tobacco abuse  Z72.0 305.1         Plan:     1. Abnormal stress test  Cardiac cath done 8/2021 with normal coronaries angiographically  Echo pending -- recommended to get this done to complete cardiac evaluation  Symptoms improved; has SOB, pending echo, could be related to smoking hx    2. Mixed hyperlipidemia  LDL 60 in 2/2020  Off statin -- recommended he resume medication  Lipids managed by PCP -- due for lipids this year    3. Tobacco abuse  Encouraged smoking cessation. F/u with Dr. Jazmine Delaney in 1 year if echo is normal    9 Abby Geiger NP     On this date 10/22/2021  I have spent 38 minutes reviewing previous notes, test results and face to face with the patient discussing the diagnosis and importance of compliance with the treatment plan as well as documenting on the day of the visit.

## 2021-10-22 NOTE — PROGRESS NOTES
1. Have you been to the ER, urgent care clinic since your last visit? Hospitalized since your last visit? 10-11-21 [facility], Cubital tunnel syndrome    2. Have you seen or consulted any other health care providers outside of the [address] [place] [address] since your last visit? Include any pap smears or colon screening.  No       Chief Complaint   Patient presents with    Follow-up     hos, f/up

## 2022-03-18 PROBLEM — R45.851 SUICIDAL IDEATION: Status: ACTIVE | Noted: 2020-02-02

## 2022-03-18 PROBLEM — R79.89 ELEVATED TSH: Status: ACTIVE | Noted: 2018-03-11

## 2022-03-18 PROBLEM — Z98.890 S/P CARDIAC CATH: Status: ACTIVE | Noted: 2021-08-24
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
## 2022-03-19 PROBLEM — E03.8 OTHER SPECIFIED HYPOTHYROIDISM: Status: ACTIVE | Noted: 2018-06-09

## 2022-03-19 PROBLEM — M27.2 OSTEORADIONECROSIS OF JAW: Status: ACTIVE | Noted: 2018-12-05

## 2022-03-19 PROBLEM — E78.00 PURE HYPERCHOLESTEROLEMIA: Status: ACTIVE | Noted: 2018-03-11

## 2022-03-19 PROBLEM — M75.02 ADHESIVE CAPSULITIS OF LEFT SHOULDER: Status: ACTIVE | Noted: 2017-06-14

## 2022-03-19 PROBLEM — I20.89 OTHER FORMS OF ANGINA PECTORIS (HCC): Status: ACTIVE | Noted: 2018-06-07

## 2022-03-19 PROBLEM — Z63.4 BEREAVEMENT: Status: ACTIVE | Noted: 2020-01-31

## 2022-03-19 PROBLEM — R45.851 SUICIDAL IDEATIONS: Status: ACTIVE | Noted: 2020-01-30

## 2022-03-19 PROBLEM — I20.8 OTHER FORMS OF ANGINA PECTORIS (HCC): Status: ACTIVE | Noted: 2018-06-07

## 2022-03-20 PROBLEM — M75.111 NONTRAUMATIC PARTIAL BILATERAL ROTATOR CUFF TEAR: Status: ACTIVE | Noted: 2017-09-22

## 2022-03-20 PROBLEM — M75.112 NONTRAUMATIC PARTIAL BILATERAL ROTATOR CUFF TEAR: Status: ACTIVE | Noted: 2017-09-22

## 2022-03-20 PROBLEM — F33.9 RECURRENT DEPRESSION (HCC): Status: ACTIVE | Noted: 2018-04-03

## 2022-05-23 ENCOUNTER — TRANSCRIBE ORDER (OUTPATIENT)
Dept: SCHEDULING | Age: 67
End: 2022-05-23

## 2022-05-23 DIAGNOSIS — Z98.890 S/P CUBITAL TUNNEL RELEASE: ICD-10-CM

## 2022-05-23 DIAGNOSIS — M77.02 MEDIAL EPICONDYLITIS OF LEFT ELBOW: Primary | ICD-10-CM

## 2022-06-10 ENCOUNTER — HOSPITAL ENCOUNTER (OUTPATIENT)
Dept: MRI IMAGING | Age: 67
Discharge: HOME | End: 2022-06-10
Attending: ORTHOPAEDIC SURGERY
Payer: MEDICARE

## 2022-06-10 DIAGNOSIS — M77.02 MEDIAL EPICONDYLITIS OF LEFT ELBOW: ICD-10-CM

## 2022-06-10 DIAGNOSIS — Z98.890 S/P CUBITAL TUNNEL RELEASE: ICD-10-CM

## 2022-06-10 PROCEDURE — 73221 MRI JOINT UPR EXTREM W/O DYE: CPT

## 2022-06-22 ENCOUNTER — TRANSCRIBE ORDER (OUTPATIENT)
Dept: SCHEDULING | Age: 67
End: 2022-06-22

## 2022-06-22 DIAGNOSIS — M77.8 LEFT ELBOW TENDONITIS: ICD-10-CM

## 2022-06-22 DIAGNOSIS — M19.022 ARTHRITIS OF ELBOW, LEFT: ICD-10-CM

## 2022-06-22 DIAGNOSIS — M77.02 MEDIAL EPICONDYLITIS OF LEFT ELBOW: Primary | ICD-10-CM

## 2022-07-05 ENCOUNTER — HOSPITAL ENCOUNTER (OUTPATIENT)
Dept: ULTRASOUND IMAGING | Age: 67
Discharge: HOME | End: 2022-07-05
Attending: ORTHOPAEDIC SURGERY

## 2022-07-05 DIAGNOSIS — M77.8 LEFT ELBOW TENDONITIS: ICD-10-CM

## 2022-07-05 DIAGNOSIS — M19.022 ARTHRITIS OF ELBOW, LEFT: ICD-10-CM

## 2022-07-05 DIAGNOSIS — M77.02 MEDIAL EPICONDYLITIS OF LEFT ELBOW: ICD-10-CM

## 2022-07-15 ENCOUNTER — HOSPITAL ENCOUNTER (OUTPATIENT)
Dept: ULTRASOUND IMAGING | Age: 67
Discharge: HOME | End: 2022-07-15
Attending: ORTHOPAEDIC SURGERY
Payer: MEDICARE

## 2022-07-15 PROCEDURE — 76882 US LMTD JT/FCL EVL NVASC XTR: CPT

## 2022-08-02 RX ORDER — TAMSULOSIN HYDROCHLORIDE 0.4 MG/1
CAPSULE ORAL
COMMUNITY
Start: 2022-05-10

## 2022-08-02 NOTE — PERIOP NOTES
[facility]  Ambulatory Surgery Unit  Pre-operative Instructions    Surgery/Procedure Date  Monday, August 8, 2022            Tentative Arrival Time TBD      1. On the day of your surgery/procedure, please report to the Ambulatory Surgery Unit Registration Desk and sign in at your designated time. The Ambulatory Surgery Unit is located in [facility] on the [facility] side of the [facility] across from the [address] [place] [address]. Please have all of your health insurance cards, co-payment, and a photo ID.    **TWO adults may accompany you the day of the procedure. We have limited seating available. If our waiting room is at capacity, your ride may be asked to remain in their vehicle. No one under 15 is allowed in the waiting room. Masks, fully covering the mouth and nose, are required in the waiting room. 2. You must have someone with you to drive you home, as you should not drive a car for 24 hours following anesthesia. Please make arrangements for a responsible adult friend or family member to stay with you for at least the first 24 hours after your surgery. 3. Do not have anything to eat or drink (including water, gum, mints, coffee, juice) after 11:59 PM, Sunday. This may not apply to medications prescribed by your physician. (Please note below the special instructions with medications to take the morning of surgery, if applicable.)    4. We recommend you do not drink any alcoholic beverages for 24 hours before and after your surgery. 5. Contact your surgeons office for instructions on the following medications: non-steroidal anti-inflammatory drugs (i.e. Advil, Aleve), vitamins, and supplements. (Some surgeons will want you to stop these medications prior to surgery and others may allow you to take them)   **If you are currently taking Plavix, Coumadin, Aspirin and/or other blood-thinning agents, contact your surgeon for instructions. ** Your surgeon will partner with the physician prescribing these medications to determine if it is safe to stop or if you need to continue taking. Please do not stop taking these medications without instructions from your surgeon. 6. In an effort to help prevent surgical site infection, we ask that you shower with an anti-bacterial soap (i.e. Dial/Safeguard, or the soap provided to you at your preadmission testing appointment) for 3 days prior to and on the morning of surgery, using a fresh towel after each shower. (Please begin this process with fresh bed linens.) Do not apply any lotions, powders, or deodorants after the shower on the day of your procedure. If applicable, please do not shave the operative site for 48 hours prior to surgery. 7. Wear comfortable clothes. Wear glasses instead of contacts. Do not bring any jewelry or money (other than copays or fees as instructed). Do not wear make-up, particularly mascara, the morning of your surgery. Do not wear nail polish, particularly if you are having foot /hand surgery. Wear your hair loose or down, no ponytails, buns, mathew pins or clips. All body piercings must be removed. 8. You should understand that if you do not follow these instructions your surgery may be cancelled. If your physical condition changes (i.e. fever, cold or flu) please contact your surgeon as soon as possible. 9. It is important that you be on time. If a situation occurs where you may be late, or if you have any questions or problems, please call [phone].    10. Your surgery time may be subject to change. You will receive a phone call the day prior to surgery to confirm your arrival time. 11. Pediatric patients: please bring a change of clothes, diapers, bottle/sippy cup, pacifier, etc.      Special Instructions: Take all medications and inhalers, as prescribed, on the morning of surgery with a sip of water. I understand a pre-operative phone call will be made to verify my surgery time.   In the event that I am not available, I give permission for a message to be left on my answering service and/or with another person?       yes    Preop instructions reviewed  Pt verbalized understanding.       ___________________      ___________________      ________________  (Signature of Patient)          (Witness)                   (Date and Time)

## 2022-08-05 ENCOUNTER — ANESTHESIA EVENT (OUTPATIENT)
Dept: SURGERY | Age: 67
End: 2022-08-05
Payer: MEDICARE

## 2022-08-08 ENCOUNTER — ANESTHESIA (OUTPATIENT)
Dept: SURGERY | Age: 67
End: 2022-08-08
Payer: MEDICARE

## 2022-08-08 ENCOUNTER — HOSPITAL ENCOUNTER (OUTPATIENT)
Age: 67
Setting detail: OUTPATIENT SURGERY
Discharge: HOME | End: 2022-08-08
Attending: ORTHOPAEDIC SURGERY | Admitting: ORTHOPAEDIC SURGERY
Payer: MEDICARE

## 2022-08-08 VITALS
HEIGHT: 72 IN | BODY MASS INDEX: 21.67 KG/M2 | OXYGEN SATURATION: 99 % | RESPIRATION RATE: 16 BRPM | HEART RATE: 61 BPM | TEMPERATURE: 97.8 F | SYSTOLIC BLOOD PRESSURE: 129 MMHG | DIASTOLIC BLOOD PRESSURE: 81 MMHG | WEIGHT: 160 LBS

## 2022-08-08 DIAGNOSIS — G89.18 POST-OP PAIN: Primary | ICD-10-CM

## 2022-08-08 PROCEDURE — 2709999900 HC NON-CHARGEABLE SUPPLY: Performed by: ORTHOPAEDIC SURGERY

## 2022-08-08 PROCEDURE — 74011250636 HC RX REV CODE- 250/636: Performed by: NURSE ANESTHETIST, CERTIFIED REGISTERED

## 2022-08-08 PROCEDURE — 77030000032 HC CUF TRNQT ZIMM -B: Performed by: ORTHOPAEDIC SURGERY

## 2022-08-08 PROCEDURE — 76210000050 HC AMBSU PH II REC 0.5 TO 1 HR: Performed by: ORTHOPAEDIC SURGERY

## 2022-08-08 PROCEDURE — 76030000001 HC AMB SURG OR TIME 1 TO 1.5: Performed by: ORTHOPAEDIC SURGERY

## 2022-08-08 PROCEDURE — 76060000062 HC AMB SURG ANES 1 TO 1.5 HR: Performed by: ORTHOPAEDIC SURGERY

## 2022-08-08 PROCEDURE — 77030006689 HC BLD OPHTH BVR BD -A: Performed by: ORTHOPAEDIC SURGERY

## 2022-08-08 PROCEDURE — 76210000040 HC AMBSU PH I REC FIRST 0.5 HR: Performed by: ORTHOPAEDIC SURGERY

## 2022-08-08 PROCEDURE — 74011250636 HC RX REV CODE- 250/636: Performed by: ANESTHESIOLOGY

## 2022-08-08 PROCEDURE — 74011000250 HC RX REV CODE- 250: Performed by: NURSE ANESTHETIST, CERTIFIED REGISTERED

## 2022-08-08 PROCEDURE — 74011000250 HC RX REV CODE- 250: Performed by: ANESTHESIOLOGY

## 2022-08-08 PROCEDURE — 77030002922 HC SUT FBRWRE ARTH -B: Performed by: ORTHOPAEDIC SURGERY

## 2022-08-08 PROCEDURE — 74011250636 HC RX REV CODE- 250/636: Performed by: ORTHOPAEDIC SURGERY

## 2022-08-08 PROCEDURE — 77030040356 HC CORD BPLR FRCP COVD -A: Performed by: ORTHOPAEDIC SURGERY

## 2022-08-08 PROCEDURE — 77030002933 HC SUT MCRYL J&J -A: Performed by: ORTHOPAEDIC SURGERY

## 2022-08-08 RX ORDER — LIDOCAINE HYDROCHLORIDE 10 MG/ML
0.1 INJECTION, SOLUTION EPIDURAL; INFILTRATION; INTRACAUDAL; PERINEURAL AS NEEDED
Status: DISCONTINUED | OUTPATIENT
Start: 2022-08-08 | End: 2022-08-08 | Stop reason: HOSPADM

## 2022-08-08 RX ORDER — SODIUM CHLORIDE 0.9 % (FLUSH) 0.9 %
5-40 SYRINGE (ML) INJECTION EVERY 8 HOURS
Status: DISCONTINUED | OUTPATIENT
Start: 2022-08-08 | End: 2022-08-08 | Stop reason: HOSPADM

## 2022-08-08 RX ORDER — SODIUM CHLORIDE 0.9 % (FLUSH) 0.9 %
5-40 SYRINGE (ML) INJECTION AS NEEDED
Status: DISCONTINUED | OUTPATIENT
Start: 2022-08-08 | End: 2022-08-08 | Stop reason: HOSPADM

## 2022-08-08 RX ORDER — SODIUM CHLORIDE 9 MG/ML
INJECTION, SOLUTION INTRAVENOUS
Status: DISCONTINUED
Start: 2022-08-08 | End: 2022-08-08 | Stop reason: HOSPADM

## 2022-08-08 RX ORDER — PROPOFOL 10 MG/ML
INJECTION, EMULSION INTRAVENOUS
Status: DISCONTINUED | OUTPATIENT
Start: 2022-08-08 | End: 2022-08-08 | Stop reason: HOSPADM

## 2022-08-08 RX ORDER — ONDANSETRON 2 MG/ML
4 INJECTION INTRAMUSCULAR; INTRAVENOUS AS NEEDED
Status: DISCONTINUED | OUTPATIENT
Start: 2022-08-08 | End: 2022-08-08 | Stop reason: HOSPADM

## 2022-08-08 RX ORDER — LIDOCAINE HYDROCHLORIDE 20 MG/ML
INJECTION, SOLUTION EPIDURAL; INFILTRATION; INTRACAUDAL; PERINEURAL AS NEEDED
Status: DISCONTINUED | OUTPATIENT
Start: 2022-08-08 | End: 2022-08-08 | Stop reason: HOSPADM

## 2022-08-08 RX ORDER — DEXAMETHASONE SODIUM PHOSPHATE 4 MG/ML
INJECTION, SOLUTION INTRA-ARTICULAR; INTRALESIONAL; INTRAMUSCULAR; INTRAVENOUS; SOFT TISSUE AS NEEDED
Status: DISCONTINUED | OUTPATIENT
Start: 2022-08-08 | End: 2022-08-08 | Stop reason: HOSPADM

## 2022-08-08 RX ORDER — VANCOMYCIN/0.9 % SOD CHLORIDE 1.5G/250ML
1500 PLASTIC BAG, INJECTION (ML) INTRAVENOUS ONCE
Status: DISCONTINUED | OUTPATIENT
Start: 2022-08-08 | End: 2022-08-08

## 2022-08-08 RX ORDER — OXYCODONE HYDROCHLORIDE 5 MG/1
5 TABLET ORAL
Qty: 30 TABLET | Refills: 0 | Status: SHIPPED | OUTPATIENT
Start: 2022-08-08 | End: 2022-08-15

## 2022-08-08 RX ORDER — MIDAZOLAM HYDROCHLORIDE 1 MG/ML
INJECTION, SOLUTION INTRAMUSCULAR; INTRAVENOUS
Status: COMPLETED | OUTPATIENT
Start: 2022-08-08 | End: 2022-08-08

## 2022-08-08 RX ORDER — ONDANSETRON 2 MG/ML
INJECTION INTRAMUSCULAR; INTRAVENOUS AS NEEDED
Status: DISCONTINUED | OUTPATIENT
Start: 2022-08-08 | End: 2022-08-08 | Stop reason: HOSPADM

## 2022-08-08 RX ORDER — VANCOMYCIN HYDROCHLORIDE 1 G/20ML
INJECTION, POWDER, LYOPHILIZED, FOR SOLUTION INTRAVENOUS
Status: DISCONTINUED
Start: 2022-08-08 | End: 2022-08-08 | Stop reason: HOSPADM

## 2022-08-08 RX ORDER — FENTANYL CITRATE 50 UG/ML
25 INJECTION, SOLUTION INTRAMUSCULAR; INTRAVENOUS
Status: DISCONTINUED | OUTPATIENT
Start: 2022-08-08 | End: 2022-08-08 | Stop reason: HOSPADM

## 2022-08-08 RX ORDER — MIDAZOLAM HYDROCHLORIDE 1 MG/ML
INJECTION, SOLUTION INTRAMUSCULAR; INTRAVENOUS
Status: COMPLETED
Start: 2022-08-08 | End: 2022-08-08

## 2022-08-08 RX ORDER — DIPHENHYDRAMINE HYDROCHLORIDE 50 MG/ML
12.5 INJECTION, SOLUTION INTRAMUSCULAR; INTRAVENOUS AS NEEDED
Status: DISCONTINUED | OUTPATIENT
Start: 2022-08-08 | End: 2022-08-08 | Stop reason: HOSPADM

## 2022-08-08 RX ORDER — ALBUTEROL SULFATE 0.83 MG/ML
SOLUTION RESPIRATORY (INHALATION)
Status: DISCONTINUED
Start: 2022-08-08 | End: 2022-08-08 | Stop reason: HOSPADM

## 2022-08-08 RX ORDER — LIDOCAINE HYDROCHLORIDE 10 MG/ML
INJECTION, SOLUTION EPIDURAL; INFILTRATION; INTRACAUDAL; PERINEURAL
Status: DISCONTINUED
Start: 2022-08-08 | End: 2022-08-08 | Stop reason: HOSPADM

## 2022-08-08 RX ORDER — SODIUM CHLORIDE, SODIUM LACTATE, POTASSIUM CHLORIDE, CALCIUM CHLORIDE 600; 310; 30; 20 MG/100ML; MG/100ML; MG/100ML; MG/100ML
25 INJECTION, SOLUTION INTRAVENOUS CONTINUOUS
Status: DISCONTINUED | OUTPATIENT
Start: 2022-08-08 | End: 2022-08-08 | Stop reason: HOSPADM

## 2022-08-08 RX ORDER — ALBUTEROL SULFATE 0.83 MG/ML
2.5 SOLUTION RESPIRATORY (INHALATION) ONCE
Status: COMPLETED | OUTPATIENT
Start: 2022-08-08 | End: 2022-08-08

## 2022-08-08 RX ORDER — ROPIVACAINE HYDROCHLORIDE 5 MG/ML
INJECTION, SOLUTION EPIDURAL; INFILTRATION; PERINEURAL
Status: DISCONTINUED
Start: 2022-08-08 | End: 2022-08-08 | Stop reason: WASHOUT

## 2022-08-08 RX ADMIN — ONDANSETRON HYDROCHLORIDE 4 MG: 2 INJECTION, SOLUTION INTRAMUSCULAR; INTRAVENOUS at 09:00

## 2022-08-08 RX ADMIN — LIDOCAINE HYDROCHLORIDE 100 MG: 20 INJECTION, SOLUTION INTRAVENOUS at 08:52

## 2022-08-08 RX ADMIN — MEPIVACAINE HYDROCHLORIDE 30 ML: 15 INJECTION, SOLUTION EPIDURAL; INFILTRATION at 08:28

## 2022-08-08 RX ADMIN — DEXAMETHASONE SODIUM PHOSPHATE 4 MG: 4 INJECTION, SOLUTION INTRAMUSCULAR; INTRAVENOUS at 08:58

## 2022-08-08 RX ADMIN — PROPOFOL 75 MCG/KG/MIN: 10 INJECTION, EMULSION INTRAVENOUS at 08:53

## 2022-08-08 RX ADMIN — MIDAZOLAM HYDROCHLORIDE 3 MG: 1 INJECTION, SOLUTION INTRAMUSCULAR; INTRAVENOUS at 08:24

## 2022-08-08 RX ADMIN — ALBUTEROL SULFATE 2.5 MG: 2.5 SOLUTION RESPIRATORY (INHALATION) at 08:15

## 2022-08-08 RX ADMIN — SODIUM CHLORIDE, POTASSIUM CHLORIDE, SODIUM LACTATE AND CALCIUM CHLORIDE 25 ML/HR: 600; 310; 30; 20 INJECTION, SOLUTION INTRAVENOUS at 07:33

## 2022-08-08 RX ADMIN — VANCOMYCIN HYDROCHLORIDE 1000 MG: 1 INJECTION, POWDER, LYOPHILIZED, FOR SOLUTION INTRAVENOUS at 07:35

## 2022-08-08 RX ADMIN — PROPOFOL 50 MG: 10 INJECTION, EMULSION INTRAVENOUS at 08:52

## 2022-08-08 NOTE — ANESTHESIA PREPROCEDURE EVALUATION
Relevant Problems   NEUROLOGY   (+) Recurrent depression (HCC)      CARDIOVASCULAR   (+) Other forms of angina pectoris (HCC)      ENDOCRINE   (+) Adhesive capsulitis of left shoulder   (+) Other specified hypothyroidism       Anesthetic History   No history of anesthetic complications            Review of Systems / Medical History  Patient summary reviewed, nursing notes reviewed and pertinent labs reviewed    Pulmonary    COPD: moderate      Smoker ( 1 ppd & marijuana occasionally)  Asthma        Neuro/Psych         Psychiatric history    Comments: Schizophrenia  depression Cardiovascular  Within defined limits            CAD    Exercise tolerance: >4 METS  Comments: Negative Cath, normal EF 08/21   GI/Hepatic/Renal     GERD (not active)           Endo/Other      Hypothyroidism  Arthritis and cancer     Other Findings   Comments: Synovitis left elbow    H/o Pharyngeal Cancer, s/p chemo & XRT  Testicular Ca          Physical Exam    Airway  Mallampati: II  TM Distance: 4 - 6 cm  Neck ROM: normal range of motion   Mouth opening: Normal     Cardiovascular  Regular rate and rhythm,  S1 and S2 normal,  no murmur, click, rub, or gallop  Rhythm: regular  Rate: normal         Dental    Dentition: Edentulous     Pulmonary    Rhonchi:bibasilar    Wheezes:bibasilar         Abdominal  GI exam deferred       Other Findings            Anesthetic Plan    ASA: 3  Anesthesia type: regional and MAC - supraclavicular block      Post-op pain plan if not by surgeon: peripheral nerve block single    Induction: Intravenous  Anesthetic plan and risks discussed with: Patient

## 2022-08-08 NOTE — ANESTHESIA PROCEDURE NOTES
Peripheral Block    Start time: 8/8/2022 8:21 AM  End time: 8/8/2022 8:31 AM  Performed by: Haylie Aguirre MD  Authorized by: Haylie Aguirre MD       Pre-procedure: Indications: at surgeon's request, post-op pain management and primary anesthetic    Preanesthetic Checklist: patient identified, risks and benefits discussed, site marked, timeout performed, anesthesia consent given, patient being monitored and fire risk safety assessment completed and verbalized    Timeout Time: 08:23 EDT      Block Type:   Block Type:  Supraclavicular  Laterality:  Left  Monitoring:  Standard ASA monitoring, responsive to questions, oxygen and frequent vital sign checks  Injection Technique:  Single shot  Procedures: ultrasound guided    Patient Position: supine  Prep: chlorhexidine    Location:  Supraclavicular  Needle Type:  Stimuplex  Needle Gauge:  22 G  Needle Localization:  Ultrasound guidance  Medication Injected:  Midazolam (VERSED) injection - IntraVENous   3 mg - 8/8/2022 8:24:00 AM  mepivacaine PF (CARBOCAINE) 1.5 % injection - Peripheral Nerve Block, Left Arm   30 mL - 8/8/2022 8:28:00 AM  Med Admin Time: 8/8/2022 8:28 AM    Assessment:  Number of attempts:  1  Injection Assessment:  Incremental injection every 5 mL, no paresthesia, ultrasound image on chart, local visualized surrounding nerve on ultrasound, negative aspiration for blood and no intravascular symptoms  Patient tolerance:  Patient tolerated the procedure well with no immediate complications  Lef Intercostobrachial block: left axilla. Chlohex prep/ 25g needle. 5 ml of the 30 ml total 1.5% Mepivacaine injected subcut. after negative aspiration. Tolerated well.  N0 intravascular s/sx's

## 2022-08-08 NOTE — PERIOP NOTES
Dr. Shelly Trinh performed nerve block in preop using ultrasound machine to LUE. Pt on CM x3, 02 by NC at 3L, patient responsive when spoken to. Able to answer questions appropriately. Pt did receive Versed 3 mg IV given by Dr. Shelly Trinh for sedation. Pt tolerated procedure well. VSS and will continue to monitor.

## 2022-08-08 NOTE — PERIOP NOTES
1025 pt declined po fluids. Denies pain or discomfort. Encouraged cough and deep breathing. Pt with strong non productive cough, pt reports this is his baseline. 1035 all discharge instructions reviewed with friend Eva Esteban, verbalized understanding. Notified that pt needs responsible adult with him 24 hours post procedure, verbalized understanding. Pt continues to decline po fluids. Pt denies pain or discomfort. IV d/c'd without incident. 1040 assisted pt dressing, sling applied. Assisted to sitting position on side of stretcher. 1050 discharged to car via wheelchair, d/c'd home with friend, Eva Esteban. Safety stressed to patient and friend. Pt needs responsible adult x 24 hours, please walk with pt. Post op instructions with friend. D/c'd home with friend.

## 2022-08-08 NOTE — BRIEF OP NOTE
Brief Postoperative Note    Patient: Cassy Beasley  YOB: 1955  MRN: [id number]    Date of Procedure: 8/8/2022     Pre-Op Diagnosis: Synovitis of elbow [M65.9]    Post-Op Diagnosis: Same as preoperative diagnosis.       Procedure(s):  LEFT ELBOW OPEN SYNOVECTOMY (REGIONAL BLOCK WITH MAC)    Surgeon(s):  Prince Shine MD    Surgical Assistant: Surg Asst-1: Gloria Rhodes    Anesthesia: Regional     Estimated Blood Loss (mL): Minimal (29 m TT)    Complications: None    Specimens: * No specimens in log *     Implants: * No implants in log *    Drains: * No LDAs found *    Findings: as expected    Electronically Signed by Yumiko Oliveira MD on 8/8/2022 at 9:38 AM

## 2022-08-08 NOTE — H&P
Comes today for follow-up of his left elbow. Reporting continued significant pain and dysfunction as well as mechanical symptoms. PMH, PSH, ROS reviewed on intake    Objective:   Constitutional: No acute distress. Well nourished. Well developed. Eyes: Sclera are nonicteric. Respiratory: No labored breathing. Cardiovascular: No marked edema. Skin: No marked skin ulcers. Neurological: see below  Psychiatric: Alert and oriented x3. Musculoskeletal   Examination of the left elbow demonstrates there is continued medial and lateral tenderness and mechanical symptoms. Radiographs:   Dynamic Ultrasound of the left elbow performed on 07/15/2022 is reviewed by myself. It is notable for likely redundant elbow capsule creating in the palpable clicking he notes. There is capsular distension present. There is no evidence of ulnar nerve subluxation. Assessment:     1. Synovitis of elbow     Plan:   Had a long discussion with him about nature condition as well as treatment options. Have offered him a steroid or Toradol injection into the elbow however he declines. He would like surgical management. I have discussed with him the surgical management in this circumstance is not particularly reliable. Have discussed possible excision of redundant capsule and synovectomy. Risks benefits and alternatives including the significant risk of limited relief are discussed. Postoperative course discussed. He would like to move forward with surgery. Surgical date chosen.

## 2022-08-08 NOTE — PERIOP NOTES
Bess [facility]  1955  [id number]    Situation:  Verbal report given from: walker rn and carmencita crna  Procedure: Procedure(s):  LEFT ELBOW OPEN SYNOVECTOMY (REGIONAL BLOCK WITH MAC)    Background:    Preoperative diagnosis: Synovitis of elbow [M65.9]    Postoperative diagnosis: Synovitis of elbow [M65.9]    :  Dr. Alesia Daley    Assistant(s): Circ-1: Nisa Gonzalez RN  Surg Asst-1: Robin Nye    Specimens: * No specimens in log *    Assessment:  Intra-procedure medications         Anesthesia gave intra-procedure sedation and medications, see anesthesia flow sheet     Intravenous fluids: LR@ KVO     Vital signs stable       Recommendation:    Permission to share finding with Eva Esteban ,friend : yes

## 2022-08-08 NOTE — OP NOTES
PATIENT NAME:  Soo Velez     SURGEON:    Shelbie Yates MD     DATE OF SURGERY: 8/8/22      LOCATION: [facility]      PREOPERATIVE DIAGNOSIS: Left elbow synovitis and mechanical pooping/locking     POSTOPERATIVE DIAGNOSIS:  Same     PROCEDURE:  Left elbow open synovectomy with caspular resection and hypertrophic annular ligament resection     ANESTHESIA: Brachial Plexus block/IV sedation      BLOOD LOSS:  Minimal     COMPLICATIONS: none      TOURNIQUET TIME: 29 min    Assistant: Ganga Alvarez PA-C       OPERATIVE INDICATIONS:  They had developed persistent pain with mechanical symptoms at the elbow. An They failed non operative management. They were therefore indicated for surgery. After risks benefits alternatives were discussed with the patient, they consented to proceed. DESCRIPTION  OF PROCEDURE:   On the date of operation the patient presented stable to the holding area. The correct upper extremity was identified and marked. Regional anesthesia was induced by the anesthesia team.  They were then brought to the operating room and placed supine with the operative upper extremity on a hand table. The upper extremity was then prepped and draped in the standard sterile fashion. After formal time-out was performed, the upper extremity was elevated and exsanguinated and a sterile upper arm tourniquet was inflated to 250 mm of mercury. An incision was then made at the lateral elbow just anterior to the lateral epicondyle. Skin and subcutaneous tissue were taken down sharply and deeper tissues were carefully dissected. A fascial incision was made between the ECRB and EDC for the olson approach. The fascia was carefully retracted and deeper dissection was performed avoiding any contact with the PIN. The LUCL was also carefully avoided. The capsule was then visualized. A small rent in the capsule was performed. There was significant synovitic fluid produced and this was released and irrigated. There was minimal amount of synovitis capsule and this was resected with a rongeur. The elbow was then taken through a range of motion and it was noted that there was still mechanical clicking associated with a hypertrophic annular ligament. The distal 50% of this was was redundant was resected carefully. It was noted following this that the mechanical symptoms had resolved. The elbow remained stable. The wound was irrigated. The fascial interval was closed with 2-0 fiberwire. Tourniquet was then taken down. Copious irrigation was performed and all bleeding sites were carefully coagulated with bipolar cautery. Skin was closed with running subcuticular Monocryl. Patient was then placed in sterile bulky dressing. They were then taken stable to the recovery room with all instrument needle counts correct at the end of the case. During the procedure, a physician assistant was vital to the outcome the case providing stability to the arm, retraction of crucial structures, assistance with wound closure, assistance with handling soft tissue and dressing application.

## 2022-08-08 NOTE — PERIOP NOTES
Permission received to review discharge instructions and discuss private health information with [facility], friend.

## 2022-08-08 NOTE — ANESTHESIA POSTPROCEDURE EVALUATION
Procedure(s):  LEFT ELBOW OPEN SYNOVECTOMY (REGIONAL BLOCK WITH MAC). regional, MAC    Anesthesia Post Evaluation      Multimodal analgesia: multimodal analgesia used between 6 hours prior to anesthesia start to PACU discharge  Patient location during evaluation: PACU  Patient participation: complete - patient participated  Level of consciousness: awake and alert  Pain score: 0  Airway patency: patent  Anesthetic complications: no  Cardiovascular status: acceptable  Respiratory status: acceptable  Hydration status: acceptable  Comments: Pt has Supraclavicular block; sling postop until block resolves. Post anesthesia nausea and vomiting:  none  Final Post Anesthesia Temperature Assessment:  Normothermia (36.0-37.5 degrees C)      INITIAL Post-op Vital signs:   Vitals Value Taken Time   /65 08/08/22 1016   Temp 36.7 °C (98 °F) 08/08/22 0955   Pulse 67 08/08/22 1022   Resp 20 08/08/22 1022   SpO2 99 % 08/08/22 1022   Vitals shown include unvalidated device data.

## 2022-08-08 NOTE — DISCHARGE INSTRUCTIONS
[facility]  Post-operative instructions  For: Abigail Gusman    Your first postop appointment should be scheduled with Dr. Goran Lundy for 2 weeks post-op. [address], [address]  [place]   Phone: [phone]  Hand Therapy Phone: [phone]  Fax: [phone]    Please follow these instructions for a safe and speedy recovery:    1. Surgical Bandage: Leave the bandage in place until we remove it. Please keep it clean and dry. To shower or bathe, apply a plastic bag or GLAD Press'n Seal® plastic wrap around the bandage or simply sponge bathe. 2. Elevation: Hand swelling is best prevented by keeping your hand elevated above the level of your heart at all times, night and day. The opposite, dangling your hand below your waist, will cause additional pain, swelling, and later stiffness. You can elevate the hand in a sling or by propping it on a pillow at night. Occasionally, we will provide you with a custom-made foam block for elevating the arm. Ice compresses may help but do not rep[lace elevation. Frequently, extreme pain is caused by a tight bandage, which should be loosened. If pain is severe and progressive, call us at [phone] during the day (ask for immediate connection to Dr. Marily Mota Team) or during the night (ask for the on-call physician). 3. Medication: You will be provided with an appropriate pain medication (over-the-counter or prescription). Please fill this at a pharmacy promptly so you will have it available when all local anesthetic wears off. Take this to relieve pain as directed on the bottle. Please refrain from driving, drinking alcohol, and making important medical decisions while taking the medication. Please call us if you need something stronger. Medication changes or refills must be made before 5pm or through your pharmacy.     4. Weight bearing: Do NOT bear any weight on the operative extremity. I want to thank you for choosing us for your hand care needs. My staff and I are committed to providing all our customers with the highest quality hand surgery and subsequent hand therapy care as possible. We want your recovery to be comfortable. If you have clinical non-emergent questions about your surgery or other hand conditions please call [phone] and ask for my team. Your call will be returned within 24 hours. DO NOT TAKE TYLENOL/ACETAMINOPHEN WITH PERCOCET, [employer], [address] TAKE NARCOTIC PAIN MEDICATIONS WITH FOOD! For the night of surgery, while block is still in effect, start with 1 pain pill at bedtime    Narcotics tend to be constipating and we recommend taking a stool softener such as Colace or Miralax (follow package instructions). If you were given prescriptions, please review the written information on the prescribed medications. DO NOT DRIVE WHILE TAKING NARCOTIC PAIN MEDICATIONS. CPAP PATIENTS BE SURE TO WEAR MACHINE WHENEVER NAPPING OR SLEEPING DAY/NIGHT OF SURGERY! DISCHARGE SUMMARY from Nurse    The following personal items collected during your admission are returned to you:   Dental Appliance: Dental Appliances: Lowers, Uppers, At home  Vision: Visual Aid: None  Hearing Aid:    Jewelry: Jewelry: Watch, With patient  Clothing: Clothing: With patient  Other Valuables: Other Valuables: Mara Harris, With patient  Valuables sent to safe:        PATIENT INSTRUCTIONS:    After General Anesthesia or Intravenous Sedation, for 24 hours or while taking prescription Narcotics:  Someone should be with you for the next 24 hours. For your own safety, a responsible adult must drive you home. Limit your activities  Recommended activity: Rest today, up with assistance today. Do not climb stairs or shower unattended for the next 24 hours. Start with a soft bland diet and advance as tolerated (no nausea) to regular diet.   If you have a sore throat some things that may help are: fluids, warm salt water gargle, or throat lozenges. If this does not improve after several days please follow up with your family physician. Do not drive and operate hazardous machinery  Do not make important personal or business decisions  Do  not drink alcoholic beverages  If you have not urinated within 8 hours after discharge, please contact your surgeon on call. Report the following to your surgeon:  Excessive pain, swelling, redness or odor of or around the surgical area  Temperature over 100.5  Nausea and vomiting lasting longer than 4 hours or if unable to take medications  Any signs of decreased circulation or nerve impairment to extremity: change in color, persistent  numbness, tingling, coldness or increase pain    If you received an upper extremity nerve block, please wear your sling until the block has worn off, then refer to your surgeons post-operative instructions. If you have had a shoulder block or a block near your collar bone, you may have              symptoms such as:          1. Mild shortness of breath        2. A hoarse voice        3. Blurry vision        4. Unequal pupils        5. Drooping of your face on the same side as the nerve block. These symptoms will disappear as the nerve block wears off. You will receive a Post Operative Call from one of the Recovery Room Nurses on the day after your surgery to check on you. It is very important for us to know how you are recovering after your surgery. If you have an issue please call your surgeon, do not wait for the post operative call. You may receive an e-mail or letter in the mail from [employer] regarding your experience with us in the Ambulatory Surgery Unit. Your feedback is valuable to us and we appreciate your participation in the survey.      If the above instructions are not adequate or you are having problems after your surgery, call your physician at their office number. We wish you a speedy recovery ? What to do at Home:    *  Please give a list of your current medications to your Primary Care Provider. *  Please update this list whenever your medications are discontinued, doses are      changed, or new medications (including over-the-counter products) are added. *  Please carry medication information at all times in case of emergency situations. If you have not had your influenza or pneumococcal vaccines, please follow up with your primary care physician. The discharge information has been reviewed with the patient and caregiver. The patient and caregiver verbalized understanding.

## 2023-04-04 ENCOUNTER — TRANSCRIBE ORDER (OUTPATIENT)
Dept: SCHEDULING | Age: 68
End: 2023-04-04

## 2023-04-28 ENCOUNTER — TRANSCRIBE ORDERS (OUTPATIENT)
Facility: HOSPITAL | Age: 68
End: 2023-04-28

## 2023-04-28 DIAGNOSIS — Z87.442 HISTORY OF KIDNEY STONES: Primary | ICD-10-CM

## 2023-04-28 DIAGNOSIS — N20.0 KIDNEY STONES: ICD-10-CM

## 2023-05-10 ENCOUNTER — HOSPITAL ENCOUNTER (OUTPATIENT)
Facility: HOSPITAL | Age: 68
Discharge: HOME | End: 2023-05-13
Payer: COMMERCIAL

## 2023-05-10 DIAGNOSIS — N20.0 KIDNEY STONES: ICD-10-CM

## 2023-05-10 DIAGNOSIS — Z87.442 HISTORY OF KIDNEY STONES: ICD-10-CM

## 2023-05-10 PROCEDURE — 74176 CT ABD & PELVIS W/O CONTRAST: CPT

## 2023-05-15 ENCOUNTER — APPOINTMENT (OUTPATIENT)
Facility: HOSPITAL | Age: 68
End: 2023-05-15
Payer: COMMERCIAL

## 2023-05-15 ENCOUNTER — HOSPITAL ENCOUNTER (OUTPATIENT)
Facility: HOSPITAL | Age: 68
Discharge: HOME | End: 2023-05-18
Payer: COMMERCIAL

## 2023-05-15 DIAGNOSIS — J44.9 CHRONIC OBSTRUCTIVE BRONCHITIS (HCC): ICD-10-CM

## 2023-05-15 PROCEDURE — 71046 X-RAY EXAM CHEST 2 VIEWS: CPT

## 2023-05-17 ENCOUNTER — HOSPITAL ENCOUNTER (EMERGENCY)
Facility: HOSPITAL | Age: 68
Discharge: LEFT BEFORE DISCHARGE | End: 2023-05-17

## 2023-05-17 VITALS
RESPIRATION RATE: 18 BRPM | BODY MASS INDEX: 25.18 KG/M2 | HEIGHT: 69 IN | TEMPERATURE: 98.6 F | WEIGHT: 170 LBS | OXYGEN SATURATION: 96 % | DIASTOLIC BLOOD PRESSURE: 87 MMHG | HEART RATE: 93 BPM | SYSTOLIC BLOOD PRESSURE: 136 MMHG

## 2023-05-17 ASSESSMENT — PAIN - FUNCTIONAL ASSESSMENT: PAIN_FUNCTIONAL_ASSESSMENT: NONE - DENIES PAIN

## 2023-05-28 ENCOUNTER — HOSPITAL ENCOUNTER (OUTPATIENT)
Facility: HOSPITAL | Age: 68
Discharge: HOME | End: 2023-05-31
Payer: MEDICARE

## 2023-05-28 DIAGNOSIS — R04.2 HEMOPTYSIS: ICD-10-CM

## 2023-05-28 LAB — CREAT BLD-MCNC: 1.1 MG/DL (ref 0.6–1.3)

## 2023-05-28 PROCEDURE — 82565 ASSAY OF CREATININE: CPT

## 2023-05-28 PROCEDURE — 6360000004 HC RX CONTRAST MEDICATION: Performed by: FAMILY MEDICINE

## 2023-05-28 PROCEDURE — 71260 CT THORAX DX C+: CPT

## 2023-05-28 RX ADMIN — IOPAMIDOL 100 ML: 755 INJECTION, SOLUTION INTRAVENOUS at 10:44

## 2023-06-07 NOTE — CONSULTS
NEUROLOGY CONSULTATION    DATE OF CONSULTATION: 2/6/2020    CONSULTED BY:Dr. Wili Paulson    REASON FOR CONSULT: Neuropathy      HISTORY OF PRESENT ILLNESS  Jordy Ordaz is a 71 y.o. right white male male with history of asthma, COPD, throat cancer status post radiation and chemotherapy, major depressive disorder, schizophrenia, GERD, admitted for psychiatric decompensation, was consulted to evaluate for numbness and tingling sensation of the arms and neck pain. Patient noted that his symptoms of numbness and tingling sensation started about 3 years ago after he finished treatment for his throat cancer. Since then he is having progressive , he says sometimes his hand will be so weak that he drops things. He also noted that it wakes him up at night mostly tingling sensation. Patient noted that he started having tremors of the hand mostly  the right hand, the tremor is worse when patient tries to do something, or tries to shake someone, this has also been progressing. There is no history of tremor in the family. He says neck pain is sharp in nature, burning sensation that goes down to the arms at times, movement of the neck tends to make it worse.   Review of Systems - General ROS: positive for  - fatigue, night sweats, sleep disturbance and weight loss  Psychological ROS: positive for - anxiety, concentration difficulties, depression, hallucinations, memory difficulties, mood swings and sleep disturbances  Ophthalmic ROS: positive for - decreased vision and double vision  ENT ROS: positive for - headaches, tinnitus, vertigo and visual changes  Allergy and Immunology ROS: negative  Hematological and Lymphatic ROS: negative  Endocrine ROS: negative  Respiratory ROS: positive for - cough, tachypnea and wheezing  Cardiovascular ROS: no chest pain or dyspnea on exertion  Gastrointestinal ROS: no abdominal pain, change in bowel habits, or black or bloody stools  Genito-Urinary ROS: no dysuria, trouble voiding, or hematuria  Musculoskeletal ROS: positive for - joint pain, muscle pain and muscular weakness  Neurological ROS: positive for - dizziness, headaches, impaired coordination/balance, numbness/tingling, tremors and weakness  Dermatological ROS: negative        PMH  Past Medical History:   Diagnosis Date    Abdominal pain     Adverse effect of anesthesia     heart rate slows down    Asthma     Back pain     Cancer ([facility]     Throat cancer treated with chemo & radiation    Cancer [facility])     testicular cancer    Constipation     COPD     managed by PCP    Cyst of pharynx     Depression     Emphysema of lung ([facility]     managed by PCP    Extrinsic asthma, unspecified     Fatigue     GERD (gastroesophageal reflux disease)     Insomnia, unspecified     On home O2     at bedtime    Other chest pain     since 2013    Pain in joint, forearm     Psychiatric disorder     schizophrenia    Smoker     1 ppw         Social History     Socioeconomic History    Marital status: SINGLE     Spouse name: Not on file    Number of children: Not on file    Years of education: Not on file    Highest education level: Not on file   Social Needs    Financial resource strain: Not on file    Food insecurity:     Worry: Not on file     Inability: Not on file    Transportation needs:     Medical: No     Non-medical: No   Tobacco Use    Smoking status: Current Every Day Smoker     Packs/day: 1.00     Years: 52.00     Pack years: 52.00     Last attempt to quit: 2010     Years since quittin.2    Smokeless tobacco: Never Used    Tobacco comment: Discussed importance of smoking cessation for healing.    Substance and Sexual Activity    Alcohol use: No    Drug use: Yes     Types: Prescription, OTC    Sexual activity: Not Currently   Other Topics Concern       FH  Family History   Problem Relation Age of Onset    Cancer Mother     Cancer Father        ALLERGIES  Allergies   Allergen Reactions    Allergen [Antipyrine-Benzocaine] Unknown (comments)    Avelox [Moxifloxacin] Unknown (comments)    Codeine Sulfate Nausea and Vomiting    Pcn [Penicillins] Other (comments)     Coma       CURRENT MEDS  Current Facility-Administered Medications   Medication Dose Route Frequency Provider Last Rate Last Dose    QUEtiapine (SEROquel) tablet 50 mg  50 mg Oral QHS Milvia Toro MD   50 mg at 02/05/20 2113    prazosin (MINIPRESS) capsule 2 mg  2 mg Oral QHS Jarocho King MD   2 mg at 02/05/20 2113    albuterol (PROVENTIL HFA, VENTOLIN HFA, PROAIR HFA) inhaler 2 Puff  2 Puff Inhalation Q4H PRN Milvia Toro MD   2 Puff at 02/06/20 0757    levothyroxine (SYNTHROID) tablet 25 mcg  25 mcg Oral ACB Milvia Toro MD   25 mcg at 02/06/20 0631    mirtazapine (REMERON) tablet 30 mg  30 mg Oral QHS Milvia Toro MD   30 mg at 02/05/20 2113    OLANZapine (ZyPREXA) tablet 2.5 mg  2.5 mg Oral Q6H PRN Jeffrey SCHAEFER NP        haloperidol lactate (HALDOL) injection 2.5 mg  2.5 mg IntraMUSCular Q6H PRN Gena Muro NP        benztropine (COGENTIN) tablet 0.5 mg  0.5 mg Oral BID PRN Gena Wilde NP        diphenhydrAMINE (BENADRYL) injection 25 mg  25 mg IntraMUSCular BID PRN Gena Muro NP        acetaminophen (TYLENOL) tablet 650 mg  650 mg Oral Q4H PRN Gena Muro NP   650 mg at 02/01/20 2151    magnesium hydroxide (MILK OF MAGNESIA) 400 mg/5 mL oral suspension 30 mL  30 mL Oral DAILY PRN Gena Muro NP   30 mL at 02/05/20 1453    nicotine (NICODERM CQ) 21 mg/24 hr patch 1 Patch  1 Patch TransDERmal DAILY Gena Muro NP   1 Patch at 02/06/20 1181    isosorbide mononitrate ER (IMDUR) tablet 30 mg  30 mg Oral DAILY Gena Muro NP   30 mg at 02/06/20 0801    atorvastatin (LIPITOR) tablet 20 mg  20 mg Oral QHS Gena Muro NP   20 mg at 02/05/20 2113    pantoprazole (PROTONIX) tablet 40 mg  40 mg Oral DAILY AFTER BREAKFAST Gena Muro NP   40 mg at 02/06/20 1036    nitroglycerin (NITROSTAT) tablet 0.4 mg  0.4 mg SubLINGual PRN Gena Muro, NP   0.4 mg at 02/01/20 1124    fluticasone propionate (FLONASE) 50 mcg/actuation nasal spray 1 Spray  1 Spray Both Nostrils DAILY Gena Muro NP   1 [place] at 02/06/20 0754    budesonide-formoteroL (SYMBICORT) 160-4.5 mcg/actuation HFA inhaler 2 Puff  2 Puff Inhalation BID Gena Muro NP   2 Puff at 02/06/20 1639    tiotropium bromide (SPIRIVA RESPIMAT) 2.5 mcg /actuation  2 Puff Inhalation DAILY Gena Muro NP   2 Puff at 02/06/20 0800    gabapentin (NEURONTIN) capsule 300 mg  300 mg Oral TID Buzzy Bar A, NP   300 mg at 02/06/20 1639    hydroxyzine HCL (ATARAX) tablet 50 mg  50 mg Oral TID PRN Buzzy Bar A, NP   50 mg at 02/06/20 1639       ROS  As per HPI  LABS  No results found for this or any previous visit (from the past 48 hour(s)). PHYSICAL EXAM  Visit Vitals  BP 99/62 (BP 1 Location: Right arm, BP Patient Position: Sitting)   Pulse 84   Temp 97.8 °F (36.6 °C)   Resp 14   Ht 5' 9\" (1.753 m)   Wt 142 lb 6.7 oz (64.6 kg)   SpO2 99%   BMI 21.03 kg/m²     General:  Alert, cooperative, no distress. Head:  Normocephalic, without obvious abnormality, atraumatic. Eyes:  Conjunctivae/corneas clear. Pupils equal, round, reactive to light. Extraocular movements intact, VFF, NO papilledema   Lungs:  Heart:   Non labored breathing, rhonchi  Regular rate and rhythm, no carotid bruits   Abdomen:   Soft, non-distended   Extremities: Extremities normal, atraumatic, no cyanosis or edema. Pulses: 2+ and symmetric all extremities. Skin: Skin color, texture, turgor normal. No rashes or lesions.    Neurologic:  Gen: Attention normal             Language: naming, repetition, fluency normal             Memory: intact recent and remote memory  Cranial Nerves:  I: smell Not tested   II: visual fields Full to confrontation   II: pupils Equal, round, reactive to light   II: optic disc No papilledema III,VII: ptosis none   III,IV,VI: extraocular muscles  Full ROM   V: mastication normal   V: facial light touch sensation  normal   VII: facial muscle function   symmetric   VIII: hearing symmetric   IX: soft palate elevation  normal   XI: trapezius strength  5/5   XI: sternocleidomastoid strength 5/5   XI: neck flexion strength  5/5   XII: tongue  midline     Motor: normal bulk and tone,  Tremor++ right              Strength: 5-/5 bilateral lower extremity, 4+5 bilateral upper extremity . Sensory: Sensation to LT, PP, vibration, and temperature bilateral upper extremity, Tinel and Phalen sign positive  Coordination: FTN and HTS abnormal, Rhomberg positive  Gait: normal gait including tandem   Reflexes: 2+ throughout  Plantar: Withdrawn       IMPRESSION:  Neuropathy  Bilateral carpal tunnel syndrome  Tremor? Cause  Cervical arthropathy with myelopathy  Rule out plexopathy  Rule out paraneoplastic syndrome    RECOMMENDATIONS:  Decadron 2 mg p.o. twice daily for 10 days  Requip 2 mg p.o. daily if there is no absolute contraindication  Increase Neurontin to 400 mg p.o. 3 times daily  MRI of the cervical spine with and without gadolinium  Blood for autoimmune work-up, protein serum electrophoresis, vitamin B12, ESR.   We will consider EMG/nerve conduction study as an outpatient                Thank you very much for this consultation    Carlos Cunningham MD Anesthesia Type: 1% lidocaine with epinephrine

## 2023-07-26 ENCOUNTER — TRANSCRIBE ORDERS (OUTPATIENT)
Facility: HOSPITAL | Age: 68
End: 2023-07-26

## 2023-07-26 DIAGNOSIS — F17.210 NICOTINE DEPENDENCE, CIGARETTES, UNCOMPLICATED: Primary | ICD-10-CM

## 2024-08-21 ENCOUNTER — TRANSCRIBE ORDERS (OUTPATIENT)
Facility: HOSPITAL | Age: 69
End: 2024-08-21

## 2024-08-21 DIAGNOSIS — M50.30 DDD (DEGENERATIVE DISC DISEASE), CERVICAL: ICD-10-CM

## 2024-08-21 DIAGNOSIS — M54.2 CERVICALGIA: Primary | ICD-10-CM

## 2024-08-21 DIAGNOSIS — M47.812 SPONDYLOSIS, CERVICAL: ICD-10-CM

## 2024-08-21 DIAGNOSIS — M43.12 SPONDYLOLISTHESIS OF CERVICAL REGION: ICD-10-CM

## 2024-09-06 ENCOUNTER — HOSPITAL ENCOUNTER (OUTPATIENT)
Facility: HOSPITAL | Age: 69
Discharge: HOME | End: 2024-09-09
Payer: MEDICARE

## 2024-09-06 DIAGNOSIS — M50.30 DDD (DEGENERATIVE DISC DISEASE), CERVICAL: ICD-10-CM

## 2024-09-06 DIAGNOSIS — M54.2 CERVICALGIA: ICD-10-CM

## 2024-09-06 DIAGNOSIS — M43.12 SPONDYLOLISTHESIS OF CERVICAL REGION: ICD-10-CM

## 2024-09-06 DIAGNOSIS — M47.812 SPONDYLOSIS, CERVICAL: ICD-10-CM

## 2024-09-06 PROCEDURE — 72141 MRI NECK SPINE W/O DYE: CPT

## 2024-10-14 ENCOUNTER — OFFICE VISIT (OUTPATIENT)
Age: 69
End: 2024-10-14
Payer: MEDICARE

## 2024-10-14 VITALS
RESPIRATION RATE: 16 BRPM | OXYGEN SATURATION: 98 % | SYSTOLIC BLOOD PRESSURE: 126 MMHG | WEIGHT: 176 LBS | BODY MASS INDEX: 26.07 KG/M2 | HEIGHT: 69 IN | HEART RATE: 72 BPM | TEMPERATURE: 98.2 F | DIASTOLIC BLOOD PRESSURE: 83 MMHG

## 2024-10-14 DIAGNOSIS — F17.200 SMOKING: ICD-10-CM

## 2024-10-14 DIAGNOSIS — Z12.5 SPECIAL SCREENING FOR MALIGNANT NEOPLASM OF PROSTATE: ICD-10-CM

## 2024-10-14 DIAGNOSIS — E03.9 HYPOTHYROIDISM, UNSPECIFIED TYPE: ICD-10-CM

## 2024-10-14 DIAGNOSIS — Z79.899 LONG-TERM USE OF HIGH-RISK MEDICATION: ICD-10-CM

## 2024-10-14 DIAGNOSIS — Z87.891 PERSONAL HISTORY OF TOBACCO USE: Primary | ICD-10-CM

## 2024-10-14 DIAGNOSIS — R73.09 ABNORMAL GLUCOSE: ICD-10-CM

## 2024-10-14 DIAGNOSIS — Z12.11 ENCOUNTER FOR SCREENING FOR MALIGNANT NEOPLASM OF COLON: ICD-10-CM

## 2024-10-14 DIAGNOSIS — Z23 FLU VACCINE NEED: ICD-10-CM

## 2024-10-14 DIAGNOSIS — E78.00 PURE HYPERCHOLESTEROLEMIA: ICD-10-CM

## 2024-10-14 PROCEDURE — 99204 OFFICE O/P NEW MOD 45 MIN: CPT | Performed by: STUDENT IN AN ORGANIZED HEALTH CARE EDUCATION/TRAINING PROGRAM

## 2024-10-14 PROCEDURE — PBSHW INFLUENZA, FLUAD TRIVALENT, (AGE 65 Y+), IM, PRESERVATIVE FREE, 0.5ML: Performed by: STUDENT IN AN ORGANIZED HEALTH CARE EDUCATION/TRAINING PROGRAM

## 2024-10-14 PROCEDURE — 90653 IIV ADJUVANT VACCINE IM: CPT | Performed by: STUDENT IN AN ORGANIZED HEALTH CARE EDUCATION/TRAINING PROGRAM

## 2024-10-14 PROCEDURE — G0296 VISIT TO DETERM LDCT ELIG: HCPCS | Performed by: STUDENT IN AN ORGANIZED HEALTH CARE EDUCATION/TRAINING PROGRAM

## 2024-10-14 PROCEDURE — 1123F ACP DISCUSS/DSCN MKR DOCD: CPT | Performed by: STUDENT IN AN ORGANIZED HEALTH CARE EDUCATION/TRAINING PROGRAM

## 2024-10-14 RX ORDER — AMLODIPINE BESYLATE 2.5 MG/1
1 TABLET ORAL DAILY
COMMUNITY
Start: 2024-05-20

## 2024-10-14 RX ORDER — LEVOTHYROXINE SODIUM 75 UG/1
75 TABLET ORAL DAILY
COMMUNITY
Start: 2024-04-16

## 2024-10-14 RX ORDER — ATORVASTATIN CALCIUM 20 MG/1
20 TABLET, FILM COATED ORAL DAILY
COMMUNITY

## 2024-10-14 RX ORDER — ASPIRIN 81 MG/1
81 TABLET ORAL DAILY
COMMUNITY
Start: 2024-06-19

## 2024-10-14 RX ORDER — MIRTAZAPINE 15 MG/1
30 TABLET, FILM COATED ORAL NIGHTLY
COMMUNITY

## 2024-10-14 SDOH — ECONOMIC STABILITY: FOOD INSECURITY: WITHIN THE PAST 12 MONTHS, YOU WORRIED THAT YOUR FOOD WOULD RUN OUT BEFORE YOU GOT MONEY TO BUY MORE.: NEVER TRUE

## 2024-10-14 SDOH — ECONOMIC STABILITY: FOOD INSECURITY: WITHIN THE PAST 12 MONTHS, THE FOOD YOU BOUGHT JUST DIDN'T LAST AND YOU DIDN'T HAVE MONEY TO GET MORE.: NEVER TRUE

## 2024-10-14 SDOH — ECONOMIC STABILITY: INCOME INSECURITY: HOW HARD IS IT FOR YOU TO PAY FOR THE VERY BASICS LIKE FOOD, HOUSING, MEDICAL CARE, AND HEATING?: NOT HARD AT ALL

## 2024-10-14 ASSESSMENT — PATIENT HEALTH QUESTIONNAIRE - PHQ9
1. LITTLE INTEREST OR PLEASURE IN DOING THINGS: NOT AT ALL
4. FEELING TIRED OR HAVING LITTLE ENERGY: NOT AT ALL
10. IF YOU CHECKED OFF ANY PROBLEMS, HOW DIFFICULT HAVE THESE PROBLEMS MADE IT FOR YOU TO DO YOUR WORK, TAKE CARE OF THINGS AT HOME, OR GET ALONG WITH OTHER PEOPLE: NOT DIFFICULT AT ALL
SUM OF ALL RESPONSES TO PHQ QUESTIONS 1-9: 0
2. FEELING DOWN, DEPRESSED OR HOPELESS: NOT AT ALL
SUM OF ALL RESPONSES TO PHQ9 QUESTIONS 1 & 2: 0
7. TROUBLE CONCENTRATING ON THINGS, SUCH AS READING THE NEWSPAPER OR WATCHING TELEVISION: NOT AT ALL
9. THOUGHTS THAT YOU WOULD BE BETTER OFF DEAD, OR OF HURTING YOURSELF: NOT AT ALL
SUM OF ALL RESPONSES TO PHQ QUESTIONS 1-9: 0
6. FEELING BAD ABOUT YOURSELF - OR THAT YOU ARE A FAILURE OR HAVE LET YOURSELF OR YOUR FAMILY DOWN: NOT AT ALL
SUM OF ALL RESPONSES TO PHQ QUESTIONS 1-9: 0
SUM OF ALL RESPONSES TO PHQ QUESTIONS 1-9: 0
5. POOR APPETITE OR OVEREATING: NOT AT ALL
8. MOVING OR SPEAKING SO SLOWLY THAT OTHER PEOPLE COULD HAVE NOTICED. OR THE OPPOSITE, BEING SO FIGETY OR RESTLESS THAT YOU HAVE BEEN MOVING AROUND A LOT MORE THAN USUAL: NOT AT ALL
3. TROUBLE FALLING OR STAYING ASLEEP: NOT AT ALL

## 2024-10-14 NOTE — PROGRESS NOTES
\"Have you been to the ER, urgent care clinic since your last visit?  Hospitalized since your last visit?\"    NO    “Have you seen or consulted any other health care providers outside our system since your last visit?”    Dr. Pal// [facility]      “Have you had a colorectal cancer screening such as a colonoscopy/FIT/Cologuard?    YES// 10+ years//     Date of last Colonoscopy: 8/29/2011  No cologuard on file  No FIT/FOBT on file   No flexible sigmoidoscopy on file          
50+ yr smoking hx.     Hypothyroidism: 75 mcg daily    Anxiety and depression: sertraline and mirtazapine.   Followed by psychiatry.        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    Other specified hypothyroidism 06/09/2018    Adhesive capsulitis of left shoulder 06/14/2017    Nontraumatic partial bilateral rotator cuff tear 09/22/2017    Recurrent depression ([facility]) 04/03/2018     Resolved Ambulatory Problems     Diagnosis Date Noted    No Resolved Ambulatory Problems     Past Medical History:   Diagnosis Date    Abdominal pain     Adverse effect of anesthesia     Asthma     Back pain     CAD (coronary artery disease)     Cancer ([facility]) 2015    Cancer ([facility])     Constipation     Cyst of pharynx     Depression     Extrinsic asthma, unspecified     Fatigue     GERD (gastroesophageal reflux disease)     Hyperlipidemia     Insomnia, unspecified     Long term current use of anticoagulant therapy     Other chest pain     Pain in joint, forearm     Psychiatric disorder     Smoker     Thyroid disease          SOCIAL HISTORY:        Objective      /83 (Site: Left Upper Arm, Position: Sitting, Cuff Size: Large Adult)   Pulse 72   Temp 98.2 °F (36.8 °C) (Temporal)   Resp 16   Ht 1.753 m (5' 9.02\")   Wt 79.8 kg (176 lb)   SpO2 98%   BMI 25.98 kg/m²   Physical Exam  Constitutional:       General: He is not in acute distress.     Appearance: Normal appearance. He is not toxic-appearing.   HENT:      Head: Normocephalic and atraumatic.      Mouth/Throat:      Mouth: Mucous membranes are moist.      Pharynx: No posterior oropharyngeal erythema.   Eyes:      Extraocular Movements: Extraocular movements intact.   Cardiovascular:

## 2024-10-15 LAB
ALBUMIN SERPL-MCNC: 3.9 G/DL (ref 3.5–5)
ALBUMIN/GLOB SERPL: 1 (ref 1.1–2.2)
ALP SERPL-CCNC: 102 U/L (ref 45–117)
ALT SERPL-CCNC: 27 U/L (ref 12–78)
ANION GAP SERPL CALC-SCNC: 4 MMOL/L (ref 2–12)
AST SERPL-CCNC: 18 U/L (ref 15–37)
BASOPHILS # BLD: 0 K/UL (ref 0–0.1)
BASOPHILS NFR BLD: 1 % (ref 0–1)
BILIRUB SERPL-MCNC: 0.5 MG/DL (ref 0.2–1)
BUN SERPL-MCNC: 15 MG/DL (ref 6–20)
BUN/CREAT SERPL: 15 (ref 12–20)
CALCIUM SERPL-MCNC: 9.5 MG/DL (ref 8.5–10.1)
CHLORIDE SERPL-SCNC: 104 MMOL/L (ref 97–108)
CHOLEST SERPL-MCNC: 133 MG/DL
CO2 SERPL-SCNC: 30 MMOL/L (ref 21–32)
CREAT SERPL-MCNC: 0.97 MG/DL (ref 0.7–1.3)
DIFFERENTIAL METHOD BLD: NORMAL
EOSINOPHIL # BLD: 0.1 K/UL (ref 0–0.4)
EOSINOPHIL NFR BLD: 2 % (ref 0–7)
ERYTHROCYTE [DISTWIDTH] IN BLOOD BY AUTOMATED COUNT: 13.8 % (ref 11.5–14.5)
EST. AVERAGE GLUCOSE BLD GHB EST-MCNC: 103 MG/DL
GLOBULIN SER CALC-MCNC: 3.9 G/DL (ref 2–4)
GLUCOSE SERPL-MCNC: 79 MG/DL (ref 65–100)
HBA1C MFR BLD: 5.2 % (ref 4–5.6)
HCT VFR BLD AUTO: 47.2 % (ref 36.6–50.3)
HDLC SERPL-MCNC: 40 MG/DL
HDLC SERPL: 3.3 (ref 0–5)
HGB BLD-MCNC: 15 G/DL (ref 12.1–17)
IMM GRANULOCYTES # BLD AUTO: 0 K/UL (ref 0–0.04)
IMM GRANULOCYTES NFR BLD AUTO: 0 % (ref 0–0.5)
LDLC SERPL CALC-MCNC: 75 MG/DL (ref 0–100)
LYMPHOCYTES # BLD: 1.3 K/UL (ref 0.8–3.5)
LYMPHOCYTES NFR BLD: 28 % (ref 12–49)
MCH RBC QN AUTO: 30 PG (ref 26–34)
MCHC RBC AUTO-ENTMCNC: 31.8 G/DL (ref 30–36.5)
MCV RBC AUTO: 94.4 FL (ref 80–99)
MONOCYTES # BLD: 0.5 K/UL (ref 0–1)
MONOCYTES NFR BLD: 10 % (ref 5–13)
NEUTS SEG # BLD: 2.7 K/UL (ref 1.8–8)
NEUTS SEG NFR BLD: 59 % (ref 32–75)
NRBC # BLD: 0 K/UL (ref 0–0.01)
NRBC BLD-RTO: 0 PER 100 WBC
PLATELET # BLD AUTO: 208 K/UL (ref 150–400)
PMV BLD AUTO: 10.3 FL (ref 8.9–12.9)
POTASSIUM SERPL-SCNC: 4.3 MMOL/L (ref 3.5–5.1)
PROT SERPL-MCNC: 7.8 G/DL (ref 6.4–8.2)
PSA SERPL-MCNC: 3.9 NG/ML (ref 0.01–4)
RBC # BLD AUTO: 5 M/UL (ref 4.1–5.7)
SODIUM SERPL-SCNC: 138 MMOL/L (ref 136–145)
T4 FREE SERPL-MCNC: 1.3 NG/DL (ref 0.8–1.5)
TRIGL SERPL-MCNC: 90 MG/DL
TSH SERPL DL<=0.05 MIU/L-ACNC: 1.36 UIU/ML (ref 0.36–3.74)
VLDLC SERPL CALC-MCNC: 18 MG/DL
WBC # BLD AUTO: 4.6 K/UL (ref 4.1–11.1)

## 2024-10-24 ENCOUNTER — HOSPITAL ENCOUNTER (OUTPATIENT)
Facility: HOSPITAL | Age: 69
Discharge: HOME | End: 2024-10-27
Attending: STUDENT IN AN ORGANIZED HEALTH CARE EDUCATION/TRAINING PROGRAM
Payer: MEDICARE

## 2024-10-24 DIAGNOSIS — Z87.891 PERSONAL HISTORY OF TOBACCO USE: ICD-10-CM

## 2024-10-24 DIAGNOSIS — E78.00 PURE HYPERCHOLESTEROLEMIA: ICD-10-CM

## 2024-10-24 DIAGNOSIS — I20.89 OTHER FORMS OF ANGINA PECTORIS (HCC): ICD-10-CM

## 2024-10-24 DIAGNOSIS — J30.9 ALLERGIC RHINITIS, UNSPECIFIED SEASONALITY, UNSPECIFIED TRIGGER: ICD-10-CM

## 2024-10-24 DIAGNOSIS — K21.00 GASTROESOPHAGEAL REFLUX DISEASE WITH ESOPHAGITIS, UNSPECIFIED WHETHER HEMORRHAGE: ICD-10-CM

## 2024-10-24 DIAGNOSIS — F17.200 SMOKING: Primary | ICD-10-CM

## 2024-10-24 DIAGNOSIS — F33.9 RECURRENT DEPRESSION (HCC): ICD-10-CM

## 2024-10-24 DIAGNOSIS — M75.02 ADHESIVE CAPSULITIS OF LEFT SHOULDER: ICD-10-CM

## 2024-10-24 DIAGNOSIS — E03.9 HYPOTHYROIDISM, UNSPECIFIED TYPE: ICD-10-CM

## 2024-10-24 DIAGNOSIS — Z98.890 S/P CARDIAC CATH: ICD-10-CM

## 2024-10-24 PROCEDURE — 71271 CT THORAX LUNG CANCER SCR C-: CPT

## 2024-10-24 NOTE — TELEPHONE ENCOUNTER
PCP: Wei Lopez MD    Last appt: 10/14/2024  Future Appointments   Date Time Provider Department Center   2025  9:00 AM Jayjay Redd MD [facility] AMB       Requested Prescriptions     Pending Prescriptions Disp Refills    albuterol-ipratropium (COMBIVENT RESPIMAT)  MCG/ACT AERS inhaler 2 each 3     Sig: Inhale 1 puff into the lungs every 6 hours as needed for Shortness of Breath or Wheezing    aspirin 81 MG EC tablet 30 tablet 3     Sig: Take 1 tablet by mouth daily    atorvastatin (LIPITOR) 20 MG tablet 30 tablet 3     Sig: Take 1 tablet by mouth daily    fluticasone (FLONASE) 50 MCG/ACT nasal spray 16 g 3     Si sprays by Nasal route daily    gabapentin (NEURONTIN) 400 MG capsule 90 capsule 0     Sig: Take 1 capsule by mouth 3 times daily for 30 days. Max Daily Amount: 1,200 mg    levothyroxine (SYNTHROID) 75 MCG tablet 30 tablet 3     Sig: Take 1 tablet by mouth Daily    mirtazapine (REMERON) 15 MG tablet 30 tablet 3     Sig: Take 2 tablets by mouth nightly    nitroGLYCERIN (NITROSTAT) 0.4 MG SL tablet 25 tablet 3     Sig: Place 1 tablet under the tongue every 5 minutes as needed for Chest pain up to max of 3 total doses. If no relief after 1 dose, call 911.    omeprazole (PRILOSEC) 20 MG delayed release capsule 30 capsule 3     Sig: Take 1 capsule by mouth Daily    sertraline (ZOLOFT) 50 MG tablet 30 tablet 3     Sig: Take 1 tablet by mouth daily    tamsulosin (FLOMAX) 0.4 MG capsule 30 capsule 3     Sig: Take 1 capsule by mouth daily ceived the following from [facility]: Outside name: tamsulosin (FLOMAX) 0.4 mg capsule

## 2024-10-25 RX ORDER — MIRTAZAPINE 15 MG/1
30 TABLET, FILM COATED ORAL NIGHTLY
Qty: 30 TABLET | Refills: 3 | OUTPATIENT
Start: 2024-10-25

## 2024-10-25 RX ORDER — ATORVASTATIN CALCIUM 20 MG/1
20 TABLET, FILM COATED ORAL DAILY
Qty: 30 TABLET | Refills: 3 | Status: SHIPPED | OUTPATIENT
Start: 2024-10-25

## 2024-10-25 RX ORDER — GABAPENTIN 400 MG/1
400 CAPSULE ORAL 3 TIMES DAILY
Qty: 90 CAPSULE | Refills: 0 | Status: SHIPPED | OUTPATIENT
Start: 2024-10-25 | End: 2024-11-24

## 2024-10-25 RX ORDER — TAMSULOSIN HYDROCHLORIDE 0.4 MG/1
0.4 CAPSULE ORAL DAILY
Qty: 30 CAPSULE | Refills: 3 | Status: SHIPPED | OUTPATIENT
Start: 2024-10-25

## 2024-10-25 RX ORDER — LEVOTHYROXINE SODIUM 75 UG/1
75 TABLET ORAL DAILY
Qty: 30 TABLET | Refills: 3 | Status: SHIPPED | OUTPATIENT
Start: 2024-10-25

## 2024-10-25 RX ORDER — FLUTICASONE PROPIONATE 50 MCG
2 SPRAY, SUSPENSION (ML) NASAL DAILY
Qty: 16 G | Refills: 3 | Status: SHIPPED | OUTPATIENT
Start: 2024-10-25

## 2024-10-25 RX ORDER — ASPIRIN 81 MG/1
81 TABLET ORAL DAILY
Qty: 30 TABLET | Refills: 3 | OUTPATIENT
Start: 2024-10-25

## 2024-10-25 RX ORDER — NITROGLYCERIN 0.4 MG/1
0.4 TABLET SUBLINGUAL EVERY 5 MIN PRN
Qty: 25 TABLET | Refills: 3 | OUTPATIENT
Start: 2024-10-25

## 2024-10-25 RX ORDER — ASPIRIN 81 MG/1
81 TABLET, COATED ORAL DAILY
Qty: 30 TABLET | Refills: 0 | Status: SHIPPED | OUTPATIENT
Start: 2024-10-25

## 2024-10-27 RX ORDER — MIRTAZAPINE 30 MG/1
TABLET, FILM COATED ORAL
Qty: 90 TABLET | Refills: 3 | Status: SHIPPED | OUTPATIENT
Start: 2024-10-27

## 2024-10-27 RX ORDER — AMLODIPINE BESYLATE 2.5 MG/1
2.5 TABLET ORAL DAILY
Qty: 90 TABLET | Refills: 1 | Status: SHIPPED | OUTPATIENT
Start: 2024-10-27

## 2024-10-30 NOTE — RESULT ENCOUNTER NOTE
No finding concerning for lung cancer.   It again showed a mass in the pancreas which has been stable at least since 2019, along with stable thickening of the esophagus due to esophagitis.

## 2024-11-01 ENCOUNTER — TELEPHONE (OUTPATIENT)
Age: 69
End: 2024-11-01

## 2024-11-01 NOTE — TELEPHONE ENCOUNTER
Unable to leave message due to voicemail box not being set up. Attempted to schedule an appointment.

## 2024-11-01 NOTE — TELEPHONE ENCOUNTER
Sharon calls to say that pt has really bad ringing in his ears.  Pt went to see another doctor and they told him to see pcp as he most likely had tendinitis.      Please call to schedule as soon as possible as there are no appts until next year.

## 2024-11-04 NOTE — TELEPHONE ENCOUNTER
Returned call    Accepted soonest appt as vv with dr bonilla for ringing in ears  (Ok per mariela)

## 2024-11-05 ENCOUNTER — TELEMEDICINE (OUTPATIENT)
Age: 69
End: 2024-11-05
Payer: MEDICARE

## 2024-11-05 DIAGNOSIS — H93.13 TINNITUS OF BOTH EARS: Primary | ICD-10-CM

## 2024-11-05 PROCEDURE — 99213 OFFICE O/P EST LOW 20 MIN: CPT | Performed by: FAMILY MEDICINE

## 2024-11-05 SDOH — ECONOMIC STABILITY: FOOD INSECURITY: WITHIN THE PAST 12 MONTHS, YOU WORRIED THAT YOUR FOOD WOULD RUN OUT BEFORE YOU GOT MONEY TO BUY MORE.: NEVER TRUE

## 2024-11-05 SDOH — ECONOMIC STABILITY: FOOD INSECURITY: WITHIN THE PAST 12 MONTHS, THE FOOD YOU BOUGHT JUST DIDN'T LAST AND YOU DIDN'T HAVE MONEY TO GET MORE.: NEVER TRUE

## 2024-11-05 SDOH — ECONOMIC STABILITY: INCOME INSECURITY: HOW HARD IS IT FOR YOU TO PAY FOR THE VERY BASICS LIKE FOOD, HOUSING, MEDICAL CARE, AND HEATING?: NOT HARD AT ALL

## 2024-11-05 ASSESSMENT — PATIENT HEALTH QUESTIONNAIRE - PHQ9
SUM OF ALL RESPONSES TO PHQ QUESTIONS 1-9: 0
SUM OF ALL RESPONSES TO PHQ QUESTIONS 1-9: 0
SUM OF ALL RESPONSES TO PHQ9 QUESTIONS 1 & 2: 0
SUM OF ALL RESPONSES TO PHQ QUESTIONS 1-9: 0
2. FEELING DOWN, DEPRESSED OR HOPELESS: NOT AT ALL
SUM OF ALL RESPONSES TO PHQ QUESTIONS 1-9: 0
1. LITTLE INTEREST OR PLEASURE IN DOING THINGS: NOT AT ALL

## 2024-11-05 NOTE — PROGRESS NOTES
Sheng Chaney is a 69 y.o. male patient of Dr. Lopez who presents with concern of ringing in both ears for the past 4 months.    Patient is seen in office by Dr. Lopez on 10/14. Reports normal ear exam at that time.  Patient reports he saw ENT, not sure who he saw, and was told that the ringing in ears was related to neck arthritis.  No medications taken for symptoms. Does not wear hearing aides.        Sheng Chaney, was evaluated through a synchronous (real-time) audio-video encounter. The patient (or guardian if applicable) is aware that this is a billable service, which includes applicable co-pays. This Virtual Visit was conducted with patient's (and/or legal guardian's) consent. Patient identification was verified, and a caregiver was present when appropriate.   The patient was located at Home: [address] [place] [address] [address]  [facility] [address]  Provider was located at Home (Appt Dept State): [place]  Confirm you are appropriately licensed, registered, or certified to deliver care in the state where the patient is located as indicated above. If you are not or unsure, please re-schedule the visit: Yes, I confirm.        Are you appropriately licensed in the patient's state?: Yes      Total time spent for this encounter: Not billed by time    --Jennifer Gilbert MD on 11/5/2024     An electronic signature was used to authenticate this note.       Past Medical History:   Diagnosis Date    Abdominal pain     Adverse effect of anesthesia     heart rate slows down    Asthma     Back pain     CAD (coronary artery disease)     Cancer ([facility]) 2015    Throat cancer treated with chemo & radiation    Cancer ([facility])     testicular cancer, surgically treated    Constipation     Cyst of pharynx     Depression     Extrinsic asthma, unspecified     Fatigue     GERD (gastroesophageal reflux disease)     Hyperlipidemia     Insomnia, unspecified     Long term current use of anticoagulant therapy     Other chest pain     since

## 2024-12-18 RX ORDER — AMLODIPINE BESYLATE 2.5 MG/1
2.5 TABLET ORAL DAILY
Qty: 90 TABLET | Refills: 1 | Status: SHIPPED | OUTPATIENT
Start: 2024-12-18 | End: 2025-06-16

## 2024-12-18 RX ORDER — AMLODIPINE BESYLATE 2.5 MG/1
2.5 TABLET ORAL DAILY
Qty: 30 TABLET | Refills: 0 | Status: SHIPPED | OUTPATIENT
Start: 2024-12-18 | End: 2024-12-18 | Stop reason: SDUPTHER

## 2024-12-18 RX ORDER — ASPIRIN 81 MG/1
81 TABLET, COATED ORAL DAILY
Qty: 30 TABLET | Refills: 0 | Status: SHIPPED | OUTPATIENT
Start: 2024-12-18

## 2025-01-22 RX ORDER — ASPIRIN 81 MG/1
81 TABLET, COATED ORAL DAILY
Qty: 30 TABLET | Refills: 0 | OUTPATIENT
Start: 2025-01-22

## 2025-01-27 NOTE — TELEPHONE ENCOUNTER
Caller requests Refill of:    All meds     Please send to:    [facility] - [place], [place] - [address] - P [phone] - F [phone] [address] [facility] [address]  Phone: [phone] Fax: [phone]         Visit / Appointment History:  Future Appointment at [facility]:  Visit date not found   Last Appointment With PCP:  10/14/2024       Caller confirmed instructions and dosages as correct.    Caller was advised that Meds will be refilled as soon as possible, however there can be a 48-72 business hour turn around on refill requests.   Patient presented with tooth pain as documented. Otherwise afebrile, HD stable, tolerating PO at home, airway patent, clearing secretions without difficulty, speaking full sentences, no tongue elevation or cervical LAD, uvula midline. No signs of abscess on exam. Given toradol in ED with improvement of pain. Given the above, will discharge home with outpatient dental follow up. Patient agreeable with plan. Agrees to return to ED for any new or worsening symptoms.

## 2025-02-19 DIAGNOSIS — E03.9 HYPOTHYROIDISM, UNSPECIFIED TYPE: ICD-10-CM

## 2025-02-19 DIAGNOSIS — K21.00 GASTROESOPHAGEAL REFLUX DISEASE WITH ESOPHAGITIS, UNSPECIFIED WHETHER HEMORRHAGE: ICD-10-CM

## 2025-02-19 RX ORDER — OMEPRAZOLE 20 MG/1
20 CAPSULE, DELAYED RELEASE ORAL DAILY
Qty: 90 CAPSULE | Refills: 2 | Status: SHIPPED | OUTPATIENT
Start: 2025-02-19

## 2025-02-19 RX ORDER — ASPIRIN 81 MG/1
81 TABLET, COATED ORAL DAILY
Qty: 90 TABLET | Refills: 1 | Status: SHIPPED | OUTPATIENT
Start: 2025-02-19

## 2025-02-19 RX ORDER — LEVOTHYROXINE SODIUM 75 UG/1
75 TABLET ORAL DAILY
Qty: 90 TABLET | Refills: 2 | Status: SHIPPED | OUTPATIENT
Start: 2025-02-19

## 2025-02-19 RX ORDER — TAMSULOSIN HYDROCHLORIDE 0.4 MG/1
0.4 CAPSULE ORAL DAILY
Qty: 90 CAPSULE | Refills: 1 | Status: SHIPPED | OUTPATIENT
Start: 2025-02-19

## 2025-03-24 RX ORDER — AMLODIPINE BESYLATE 2.5 MG/1
2.5 TABLET ORAL DAILY
Qty: 90 TABLET | Refills: 1 | Status: SHIPPED | OUTPATIENT
Start: 2025-03-24 | End: 2025-09-20

## 2025-03-24 RX ORDER — MIRTAZAPINE 30 MG/1
TABLET, FILM COATED ORAL
Qty: 30 TABLET | Refills: 1 | Status: SHIPPED | OUTPATIENT
Start: 2025-03-24

## 2025-04-15 ENCOUNTER — TELEPHONE (OUTPATIENT)
Age: 70
End: 2025-04-15

## 2025-04-15 ENCOUNTER — HOSPITAL ENCOUNTER (OUTPATIENT)
Facility: HOSPITAL | Age: 70
Discharge: HOME | End: 2025-04-18
Attending: ORTHOPAEDIC SURGERY
Payer: MEDICARE

## 2025-04-15 DIAGNOSIS — M54.2 CERVICALGIA: ICD-10-CM

## 2025-04-15 DIAGNOSIS — H93.13 TINNITUS OF BOTH EARS: ICD-10-CM

## 2025-04-15 DIAGNOSIS — M47.812 SPONDYLOSIS OF CERVICAL SPINE: ICD-10-CM

## 2025-04-15 DIAGNOSIS — M48.02 FORAMINAL STENOSIS OF CERVICAL REGION: ICD-10-CM

## 2025-04-15 DIAGNOSIS — M43.12 SPONDYLOLISTHESIS OF CERVICAL REGION: ICD-10-CM

## 2025-04-15 DIAGNOSIS — M47.812 CERVICAL SPONDYLOSIS WITHOUT MYELOPATHY: ICD-10-CM

## 2025-04-15 DIAGNOSIS — M50.30 DDD (DEGENERATIVE DISC DISEASE), CERVICAL: ICD-10-CM

## 2025-04-15 DIAGNOSIS — M54.12 CERVICAL RADICULOPATHY: ICD-10-CM

## 2025-04-15 DIAGNOSIS — M48.02 CERVICAL STENOSIS OF SPINE: ICD-10-CM

## 2025-04-15 PROCEDURE — 72141 MRI NECK SPINE W/O DYE: CPT

## 2025-04-15 RX ORDER — KETOCONAZOLE 20 MG/G
CREAM TOPICAL 2 TIMES DAILY
COMMUNITY

## 2025-04-15 RX ORDER — KETOCONAZOLE 20 MG/G
CREAM TOPICAL 2 TIMES DAILY
Qty: 30 G | Refills: 3 | Status: CANCELLED | OUTPATIENT
Start: 2025-04-15

## 2025-04-15 RX ORDER — MUPIROCIN 20 MG/G
OINTMENT TOPICAL 3 TIMES DAILY
COMMUNITY

## 2025-04-15 RX ORDER — MUPIROCIN 20 MG/G
OINTMENT TOPICAL 3 TIMES DAILY
Qty: 22 G | Refills: 3 | Status: CANCELLED | OUTPATIENT
Start: 2025-04-15

## 2025-04-15 NOTE — TELEPHONE ENCOUNTER
Called, no answer left message to call office back.      Re: refill requests for ketoconazole and mupirocin prescribed by previous doctor/ Dr. Gunter. What is he needing this for?

## 2025-04-16 ENCOUNTER — OFFICE VISIT (OUTPATIENT)
Age: 70
End: 2025-04-16
Payer: MEDICARE

## 2025-04-16 VITALS
HEIGHT: 69 IN | HEART RATE: 63 BPM | WEIGHT: 174.6 LBS | DIASTOLIC BLOOD PRESSURE: 69 MMHG | OXYGEN SATURATION: 99 % | RESPIRATION RATE: 15 BRPM | BODY MASS INDEX: 25.86 KG/M2 | SYSTOLIC BLOOD PRESSURE: 115 MMHG | TEMPERATURE: 98.1 F

## 2025-04-16 DIAGNOSIS — Z72.3 INACTIVITY: ICD-10-CM

## 2025-04-16 DIAGNOSIS — F33.9 RECURRENT DEPRESSION: ICD-10-CM

## 2025-04-16 DIAGNOSIS — E78.00 PURE HYPERCHOLESTEROLEMIA: ICD-10-CM

## 2025-04-16 DIAGNOSIS — Z00.00 MEDICARE ANNUAL WELLNESS VISIT, SUBSEQUENT: Primary | ICD-10-CM

## 2025-04-16 DIAGNOSIS — E03.9 HYPOTHYROIDISM, UNSPECIFIED TYPE: ICD-10-CM

## 2025-04-16 DIAGNOSIS — I10 PRIMARY HYPERTENSION: ICD-10-CM

## 2025-04-16 PROCEDURE — 3078F DIAST BP <80 MM HG: CPT | Performed by: STUDENT IN AN ORGANIZED HEALTH CARE EDUCATION/TRAINING PROGRAM

## 2025-04-16 PROCEDURE — 1125F AMNT PAIN NOTED PAIN PRSNT: CPT | Performed by: STUDENT IN AN ORGANIZED HEALTH CARE EDUCATION/TRAINING PROGRAM

## 2025-04-16 PROCEDURE — 1123F ACP DISCUSS/DSCN MKR DOCD: CPT | Performed by: STUDENT IN AN ORGANIZED HEALTH CARE EDUCATION/TRAINING PROGRAM

## 2025-04-16 PROCEDURE — G0439 PPPS, SUBSEQ VISIT: HCPCS | Performed by: STUDENT IN AN ORGANIZED HEALTH CARE EDUCATION/TRAINING PROGRAM

## 2025-04-16 PROCEDURE — 4004F PT TOBACCO SCREEN RCVD TLK: CPT | Performed by: STUDENT IN AN ORGANIZED HEALTH CARE EDUCATION/TRAINING PROGRAM

## 2025-04-16 PROCEDURE — 1159F MED LIST DOCD IN RCRD: CPT | Performed by: STUDENT IN AN ORGANIZED HEALTH CARE EDUCATION/TRAINING PROGRAM

## 2025-04-16 PROCEDURE — G8427 DOCREV CUR MEDS BY ELIG CLIN: HCPCS | Performed by: STUDENT IN AN ORGANIZED HEALTH CARE EDUCATION/TRAINING PROGRAM

## 2025-04-16 PROCEDURE — G8419 CALC BMI OUT NRM PARAM NOF/U: HCPCS | Performed by: STUDENT IN AN ORGANIZED HEALTH CARE EDUCATION/TRAINING PROGRAM

## 2025-04-16 PROCEDURE — 3074F SYST BP LT 130 MM HG: CPT | Performed by: STUDENT IN AN ORGANIZED HEALTH CARE EDUCATION/TRAINING PROGRAM

## 2025-04-16 PROCEDURE — 99214 OFFICE O/P EST MOD 30 MIN: CPT | Performed by: STUDENT IN AN ORGANIZED HEALTH CARE EDUCATION/TRAINING PROGRAM

## 2025-04-16 PROCEDURE — 3017F COLORECTAL CA SCREEN DOC REV: CPT | Performed by: STUDENT IN AN ORGANIZED HEALTH CARE EDUCATION/TRAINING PROGRAM

## 2025-04-16 SDOH — ECONOMIC STABILITY: FOOD INSECURITY: WITHIN THE PAST 12 MONTHS, YOU WORRIED THAT YOUR FOOD WOULD RUN OUT BEFORE YOU GOT MONEY TO BUY MORE.: NEVER TRUE

## 2025-04-16 SDOH — ECONOMIC STABILITY: FOOD INSECURITY: WITHIN THE PAST 12 MONTHS, THE FOOD YOU BOUGHT JUST DIDN'T LAST AND YOU DIDN'T HAVE MONEY TO GET MORE.: NEVER TRUE

## 2025-04-16 ASSESSMENT — PATIENT HEALTH QUESTIONNAIRE - PHQ9
1. LITTLE INTEREST OR PLEASURE IN DOING THINGS: NOT AT ALL
SUM OF ALL RESPONSES TO PHQ QUESTIONS 1-9: 0
2. FEELING DOWN, DEPRESSED OR HOPELESS: NOT AT ALL
SUM OF ALL RESPONSES TO PHQ QUESTIONS 1-9: 0

## 2025-04-16 ASSESSMENT — LIFESTYLE VARIABLES
HOW MANY STANDARD DRINKS CONTAINING ALCOHOL DO YOU HAVE ON A TYPICAL DAY: PATIENT DOES NOT DRINK
HOW OFTEN DO YOU HAVE A DRINK CONTAINING ALCOHOL: NEVER

## 2025-04-16 NOTE — PROGRESS NOTES
Inactivity:  On average, how many days per week do you engage in moderate to strenuous exercise (like a brisk walk)?: 0 days (!) Abnormal  On average, how many minutes do you engage in exercise at this level?: 10 min  Interventions:  See AVS for additional education material    Poor Eating Habits/Diet:  Do you eat balanced/healthy meals regularly?: (!) No  Interventions:  See AVS for additional education material  See A/P for plan and any pertinent orders       Vision Screen:  Do you have difficulty driving, watching TV, or doing any of your daily activities because of your eyesight?: (!) Yes  Have you had an eye exam within the past year?: Yes  Interventions:   Patient encouraged to make appointment with their eye specialist    Safety:  Do you have non-slip mats or non-slip surfaces or shower bars or grab bars in your shower or bathtub?: (!) No  Interventions:  Patient declined any further interventions or treatment     Advanced Directives:  Do you have a Living Will?: (!) No    Intervention:  has NO advanced directive - information provided        Tobacco Use:    Tobacco Use      Smoking status: Every Day        Packs/day: 1.00        Types: Cigarettes      Smokeless tobacco: Never      Tobacco comments: States down to 10 cigarettes a day.     Interventions:  Patient declined any further intervention or treatment                      Objective   Vitals:    04/16/25 1120   BP: 115/69   BP Site: Left Upper Arm   Patient Position: Sitting   Pulse: 63   Resp: 15   Temp: 98.1 °F (36.7 °C)   TempSrc: Temporal   SpO2: 99%   Weight: 79.2 kg (174 lb 9.6 oz)   Height: 1.753 m (5' 9\")      Body mass index is 25.78 kg/m².                    Allergies   Allergen Reactions    Moxifloxacin      Other reaction(s): Unknown (comments)    Penicillins Other (See Comments)     Coma    Codeine Nausea And Vomiting     Prior to Visit Medications    Medication Sig Taking? Authorizing Provider   mupirocin (BACTROBAN) 2 % ointment

## 2025-04-16 NOTE — PATIENT INSTRUCTIONS
[facility]: Phone: [phone]   [facility] spine and pain specialist Dr. Juan Koehler ([facility]) [phone]  [facility] [address] Barber, Dr. Dunham [phone]  [facility] Dr. Cobly [phone]  [facility] ([facility]) [phone]       A Healthy Heart: Care Instructions  Overview     Coronary artery disease, also called heart disease, occurs when a substance called plaque builds up in the vessels that supply oxygen-rich blood to your heart muscle. This can narrow the blood vessels and reduce blood flow. A heart attack happens when blood flow is completely blocked. A high-fat diet, smoking, and other factors increase the risk of heart disease.  Your doctor has found that you have a chance of having heart disease. A heart-healthy lifestyle can help keep your heart healthy and prevent heart disease. This lifestyle includes eating healthy, being active, staying at a weight that's healthy for you, and not smoking or using tobacco. It also includes taking medicines as directed, managing other health conditions, and trying to get a healthy amount of sleep.  Follow-up care is a key part of your treatment and safety. Be sure to make and go to all appointments, and call your doctor if you are having problems. It's also a good idea to know your test results and keep a list of the medicines you take.  How can you care for yourself at home?  Diet    Use less salt when you cook and eat. This helps lower your blood pressure. Taste food before salting. Add only a little salt when you think you need it. With time, your taste buds will adjust to less salt.     Eat fewer snack items, fast foods, canned soups, and other high-salt, high-fat, processed foods.     Read food labels and try to avoid saturated and trans fats. They increase your risk of heart disease by raising cholesterol levels.     Limit the amount of solid fat--butter,

## 2025-05-01 RX ORDER — KETOCONAZOLE 20 MG/G
CREAM TOPICAL 2 TIMES DAILY
Qty: 30 G | Refills: 1 | Status: SHIPPED | OUTPATIENT
Start: 2025-05-01

## 2025-05-01 NOTE — TELEPHONE ENCOUNTER
PCP: Wei Lopez MD    Last appt:   4/16/2025    Future Appointments   Date Time Provider Department Center   5/27/2025  9:00 AM Jayjay Redd MD [facility]       Requested Prescriptions     Pending Prescriptions Disp Refills    ketoconazole (NIZORAL) 2 % cream 30 g 1     Sig: Apply topically 2 times daily

## 2025-05-01 NOTE — TELEPHONE ENCOUNTER
ketoconazole (NIZORAL) 2 % cream  sensitive skin and this helps when he shaves     Refill to [facility] #[phone]  .

## 2025-05-23 DIAGNOSIS — I10 PRIMARY HYPERTENSION: Primary | ICD-10-CM

## 2025-05-23 DIAGNOSIS — F33.9 RECURRENT DEPRESSION: ICD-10-CM

## 2025-05-23 RX ORDER — MIRTAZAPINE 30 MG/1
TABLET, FILM COATED ORAL
Qty: 90 TABLET | Refills: 1 | Status: SHIPPED | OUTPATIENT
Start: 2025-05-23

## 2025-05-23 RX ORDER — AMLODIPINE BESYLATE 2.5 MG/1
2.5 TABLET ORAL DAILY
Qty: 90 TABLET | Refills: 1 | Status: SHIPPED | OUTPATIENT
Start: 2025-05-23 | End: 2025-11-19

## 2025-05-23 NOTE — TELEPHONE ENCOUNTER
PCP: Wei Lopez MD    Last appt: 4/16/2025  Future Appointments   Date Time Provider Department Center   5/27/2025  9:00 AM Jayjay Redd MD [id number] AMB       Requested Prescriptions     Pending Prescriptions Disp Refills    mirtazapine (REMERON) 30 MG tablet [Pharmacy Med Name: MIRTAZAPINE 30 MG TABLET] 30 tablet 0     Sig: TAKE ONE TABLET EVERY DAY BEFORE BEDTIME FOR SLEEP AND DEPRESSION    amLODIPine (NORVASC) 2.5 MG tablet [Pharmacy Med Name: AMLODIPINE BESYLATE 2.5 MG TAB] 30 tablet 0     Sig: TAKE ONE TABLET DAILY    sertraline (ZOLOFT) 50 MG tablet [Pharmacy Med Name: SERTRALINE HCL 50 MG TABLET] 30 tablet 0     Sig: TAKE ONE TABLET BY MOUTH EVERY MORNING FOR DEPRESSION AND ANXIETY

## 2025-05-31 RX ORDER — KETOCONAZOLE 20 MG/G
CREAM TOPICAL 2 TIMES DAILY
Qty: 30 G | Refills: 0 | Status: SHIPPED | OUTPATIENT
Start: 2025-05-31

## 2025-07-21 DIAGNOSIS — I10 PRIMARY HYPERTENSION: ICD-10-CM

## 2025-07-21 DIAGNOSIS — F33.9 RECURRENT DEPRESSION: ICD-10-CM

## 2025-07-21 RX ORDER — AMLODIPINE BESYLATE 2.5 MG/1
2.5 TABLET ORAL DAILY
Qty: 90 TABLET | Refills: 1 | Status: SHIPPED | OUTPATIENT
Start: 2025-07-21 | End: 2026-01-17

## 2025-07-21 RX ORDER — MIRTAZAPINE 30 MG/1
TABLET, FILM COATED ORAL
Qty: 30 TABLET | Refills: 0 | Status: SHIPPED | OUTPATIENT
Start: 2025-07-21

## 2025-07-24 DIAGNOSIS — E78.00 PURE HYPERCHOLESTEROLEMIA: ICD-10-CM

## 2025-07-24 RX ORDER — ATORVASTATIN CALCIUM 20 MG/1
20 TABLET, FILM COATED ORAL DAILY
Qty: 30 TABLET | Refills: 0 | Status: SHIPPED | OUTPATIENT
Start: 2025-07-24

## 2025-08-21 DIAGNOSIS — F33.9 RECURRENT DEPRESSION: ICD-10-CM

## 2025-08-21 DIAGNOSIS — E78.00 PURE HYPERCHOLESTEROLEMIA: ICD-10-CM

## 2025-08-21 RX ORDER — ASPIRIN 81 MG/1
81 TABLET, COATED ORAL DAILY
Qty: 90 TABLET | Refills: 3 | Status: SHIPPED | OUTPATIENT
Start: 2025-08-21

## 2025-08-21 RX ORDER — ATORVASTATIN CALCIUM 20 MG/1
20 TABLET, FILM COATED ORAL DAILY
Qty: 90 TABLET | Refills: 3 | Status: SHIPPED | OUTPATIENT
Start: 2025-08-21

## 2025-08-21 RX ORDER — MIRTAZAPINE 30 MG/1
TABLET, FILM COATED ORAL
Qty: 90 TABLET | Refills: 3 | Status: SHIPPED | OUTPATIENT
Start: 2025-08-21

## 2025-08-27 RX ORDER — KETOCONAZOLE 20 MG/G
CREAM TOPICAL 2 TIMES DAILY
Qty: 30 G | Refills: 0 | Status: SHIPPED | OUTPATIENT
Start: 2025-08-27

## (undated) DEVICE — SUTURE PERMAHAND SZ 2-0 L18IN NONABSORBABLE BLK L26MM PS 1588H

## (undated) DEVICE — REM POLYHESIVE ADULT PATIENT RETURN ELECTRODE: Brand: VALLEYLAB

## (undated) DEVICE — KENDALL DL ECG CABLE AND LEAD WIRE SYSTEM, 3-LEAD, SINGLE PATIENT USE: Brand: KENDALL

## (undated) DEVICE — MAGNETIC DRAPE: Brand: DEVON

## (undated) DEVICE — 1200 GUARD II KIT W/5MM TUBE W/O VAC TUBE: Brand: GUARDIAN

## (undated) DEVICE — SUTURE PERMAHAND SZ 2-0 L30IN NONABSORBABLE BLK SILK W/O A305H

## (undated) DEVICE — INTENDED FOR TISSUE SEPARATION, AND OTHER PROCEDURES THAT REQUIRE A SHARP SURGICAL BLADE TO PUNCTURE OR CUT.: Brand: BARD-PARKER ® CARBON RIB-BACK BLADES

## (undated) DEVICE — (D)PREP SKN CHLRAPRP APPL 3ML -- CONVERT TO ITEM 371830

## (undated) DEVICE — RADIFOCUS OPTITORQUE ANGIOGRAPHIC CATHETER: Brand: OPTITORQUE

## (undated) DEVICE — SOLIDIFIER MEDC 1200ML -- CONVERT TO 356117

## (undated) DEVICE — SPONGE: SPECIALTY PEANUT XR 100/CS: Brand: MEDICAL ACTION INDUSTRIES

## (undated) DEVICE — TOWEL SURG W17XL27IN STD BLU COT NONFENESTRATED PREWASHED

## (undated) DEVICE — COVER,TABLE,60X90,STERILE: Brand: MEDLINE

## (undated) DEVICE — DUAL HOSE W/CPC CONNECTORS: Brand: A.T.S.® TOURNIQUET SYSTEM

## (undated) DEVICE — NEEDLE HYPO 18GA L1.5IN PNK S STL HUB POLYPR SHLD REG BVL

## (undated) DEVICE — STRIP SKIN CLSR W0.25XL4IN WHT SPUNBOUND FBR NYL HI ADH

## (undated) DEVICE — NEEDLE HYPO 25GA L1.5IN BVL ORIENTED ECLIPSE

## (undated) DEVICE — BAG SPEC BIOHZRD 10 X 10 IN --

## (undated) DEVICE — SPLINT WR POS F/ARTERIAL ACC -- BX/10

## (undated) DEVICE — BNDG ELAS HK LOOP 4X5YD NS -- MATRIX

## (undated) DEVICE — SUTURE PERMAHAND SZ 3-0 L30IN NONABSORBABLE BLK SILK BRAID A304H

## (undated) DEVICE — SYRINGE,EAR/ULCER, 2 OZ, STERILE: Brand: MEDLINE

## (undated) DEVICE — CONTINU-FLO SOLUTION SET, 2 INJECTION SITES, MALE LUER LOCK ADAPTER WITH RETRACTABLE COLLAR, LARGE BORE STOPCOCK WITH ROTATING MALE LUER LOCK EXTENSION SET, 2 INJECTION SITES, MALE LUER LOCK ADAPTER WITH RETRACTABLE COLLAR: Brand: INTERLINK/CONTINU-FLO

## (undated) DEVICE — INFECTION CONTROL KIT SYS

## (undated) DEVICE — Device

## (undated) DEVICE — CONTAINER SPEC 20 ML LID NEUT BUFF FORMALIN 10 % POLYPR STS

## (undated) DEVICE — APEX ARTHROSCOPY TUBING SET, ONE CONNECTION: Brand: APEX

## (undated) DEVICE — SURGICAL PROCEDURE PACK BASIN MAJ SET CUST NO CAUT

## (undated) DEVICE — PADDING,UNDERCAST,COTTON, 4"X4YD STERILE: Brand: MEDLINE

## (undated) DEVICE — GLOVE SURG BIOGEL 8.0 STRL -- SKINSENSE

## (undated) DEVICE — NEONATAL-ADULT SPO2 SENSOR: Brand: NELLCOR

## (undated) DEVICE — GLOVE SURG SZ 7 L12IN FNGR THK79MIL GRN LTX FREE

## (undated) DEVICE — BIPOLAR FORCEPS CORD: Brand: VALLEYLAB

## (undated) DEVICE — STERILE POLYISOPRENE POWDER-FREE SURGICAL GLOVES: Brand: PROTEXIS

## (undated) DEVICE — KENDALL SCD EXPRESS SLEEVES, KNEE LENGTH, MEDIUM: Brand: KENDALL SCD

## (undated) DEVICE — SYR 10ML CTRL LR LCK NSAF LF --

## (undated) DEVICE — 1010 S-DRAPE TOWEL DRAPE 10/BX: Brand: STERI-DRAPE™

## (undated) DEVICE — SYR 10ML LUER LOK 1/5ML GRAD --

## (undated) DEVICE — SUT CHRMC 3-0 27IN SH BRN --

## (undated) DEVICE — GOWN,SIRUS,NONRNF,SETINSLV,XL,20/CS: Brand: MEDLINE

## (undated) DEVICE — SOLUTION IRRIG 1000ML 0.9% SOD CHL USP POUR PLAS BTL

## (undated) DEVICE — 3M™ TEGADERM™ TRANSPARENT FILM DRESSING FRAME STYLE, 1626W, 4 IN X 4-3/4 IN (10 CM X 12 CM), 50/CT 4CT/CASE: Brand: 3M™ TEGADERM™

## (undated) DEVICE — SUT ETHLN 3-0 18IN PS1 BLK --

## (undated) DEVICE — HI-TORQUE VERSACORE FLOPPY GUIDE WIRE SYSTEM 145 CM: Brand: HI-TORQUE VERSACORE

## (undated) DEVICE — GLOVE SURG SZ 65 THK91MIL LTX FREE SYN POLYISOPRENE

## (undated) DEVICE — MASTISOL ADHESIVE LIQ 2/3ML

## (undated) DEVICE — NDL SPNE QNCKE 18GX3.5IN LF --

## (undated) DEVICE — GAUZE SPONGES,12 PLY: Brand: CURITY

## (undated) DEVICE — BLOCK BITE ENDOSCP AD 21 MM W/ DIL BLU LF DISP

## (undated) DEVICE — SYR ART 700 CLEAR MARK 7 -- ARTERION

## (undated) DEVICE — PROBE 8225101 5PK STD PRASS FL TIP ROHS

## (undated) DEVICE — 3M™ TEGADERM™ TRANSPARENT FILM DRESSING FRAME STYLE, 1624W, 2-3/8 IN X 2-3/4 IN (6 CM X 7 CM), 100/CT 4CT/CASE: Brand: 3M™ TEGADERM™

## (undated) DEVICE — SYRINGE ANGIO 10 CC BRL STD PRNT POLYCARB LT BLU MEDALLION

## (undated) DEVICE — ROCKER SWITCH PENCIL BLADE ELECTRODE, HOLSTER: Brand: EDGE

## (undated) DEVICE — SUTURE PERMAHAND SZ 4-0 L12X30IN NONABSORBABLE BLK SILK A303H

## (undated) DEVICE — DEVON™ KNEE AND BODY STRAP 60" X 3" (1.5 M X 7.6 CM): Brand: DEVON

## (undated) DEVICE — TUBING SUCT 12FR MAL ALUM SHFT FN CAP VENT UNIV CONN W/ OBT

## (undated) DEVICE — BNDG ADHESIVE SHEER 3/4X3 -- CONVERT TO ITEM 357948

## (undated) DEVICE — GLOVE ORANGE PI 7   MSG9070

## (undated) DEVICE — TUBING PRSS MON L6IN PVC M FEM CONN

## (undated) DEVICE — TR BAND RADIAL ARTERY COMPRESSION DEVICE: Brand: TR BAND

## (undated) DEVICE — PACK PROCEDURE SURG HRT CATH

## (undated) DEVICE — PACK,EENT,TURBAN DRAPE,PK II: Brand: MEDLINE

## (undated) DEVICE — SHOULDER SUSPENSION KIT 6 PER BOX

## (undated) DEVICE — GLIDESHEATH SLENDER ACCESS KIT: Brand: GLIDESHEATH SLENDER

## (undated) DEVICE — NEEDLE HYPO 22GA L1.5IN BLK S STL HUB POLYPR SHLD REG BVL

## (undated) DEVICE — SOLUTION IRRIG 3000ML LAC R FLX CONT

## (undated) DEVICE — SYR 3ML LL TIP 1/10ML GRAD --

## (undated) DEVICE — BIPOLAR FORCEPS CORD,BANANA LEADS: Brand: VALLEYLAB

## (undated) DEVICE — GUARDIAN LVC: Brand: GUARDIAN

## (undated) DEVICE — SOLUTION LACTATED RINGERS INJECTION USP

## (undated) DEVICE — (D)SYR 10ML 1/5ML GRAD NSAF -- PKGING CHANGE USE ITEM 338027

## (undated) DEVICE — FORCEPS BX L160CM DIA8MM GRSP DISECT CUP TIP NONLOCKING ROT

## (undated) DEVICE — SUTURE FIBERWIRE SZ 2-0 L18IN BLU L26.5MM 1/2 CIR TAPR NDL AR7242

## (undated) DEVICE — SUTURE MCRYL SZ 3-0 L27IN ABSRB UD L19MM PS-2 3/8 CIR PRIM Y427H

## (undated) DEVICE — GOWN,SIRUS,FABRNF,XL,20/CS: Brand: MEDLINE

## (undated) DEVICE — SOLUTION IV 1000ML 0.9% SOD CHL

## (undated) DEVICE — ELECTRODE,RADIOTRANSLUCENT,FOAM,5PK: Brand: MEDLINE

## (undated) DEVICE — SPONGE LAPAROTOMY W4XL18IN WHT STRUNG RADPQ PREWASHED ST

## (undated) DEVICE — BLADE OPHTH MINI BEAV SHRP --

## (undated) DEVICE — HAND-MRMCASU: Brand: MEDLINE INDUSTRIES, INC.

## (undated) DEVICE — ADHESIVE TISS CLOSURE 22X4 CM 4 CC HI VISC EXOFIN

## (undated) DEVICE — HANDLE LT SNAP ON ULT DURABLE LENS FOR TRUMPF ALC DISPOSABLE

## (undated) DEVICE — SUTURE CHROMIC GUT SZ 4-0 L18IN ABSRB BRN L13MM P-3 3/8 CIR 1654G

## (undated) DEVICE — (D)PREP SKN CHLRAPRP APPL 26ML -- CONVERT TO ITEM 371833

## (undated) DEVICE — YANKAUER,TAPERED BULBOUS TIP,W/O VENT: Brand: MEDLINE

## (undated) DEVICE — KIT ANGIOGRAPHY CUST MRMC

## (undated) DEVICE — CATH IV AUTOGRD BC PNK 20GA 25 -- INSYTE

## (undated) DEVICE — PAD,ABDOMINAL,5"X9",ST,LF,25/BX: Brand: MEDLINE INDUSTRIES, INC.

## (undated) DEVICE — ZIMMER® STERILE DISPOSABLE TOURNIQUET CUFF WITH PLC, DUAL PORT, SINGLE BLADDER, 18 IN. (46 CM)

## (undated) DEVICE — CATHETER ETER ANGIO L110CM OD5FR ID046IN L75CM 038IN 145DEG CARD

## (undated) DEVICE — 4.5 MM INCISOR STRAIGHT BLADES,                                    POWER/EP-1, LIME GREEN, PACKAGED 6                                    PER BOX, STERILE

## (undated) DEVICE — SYRINGE MED 20ML STD CLR PLAS LUERLOCK TIP N CTRL DISP

## (undated) DEVICE — SOLUTION IRRIG 1000ML H2O STRL BLT

## (undated) DEVICE — SET ADMIN 16ML TBNG L100IN 2 Y INJ SITE IV PIGGY BK DISP

## (undated) DEVICE — LOOP,VESSEL,MAXI,BLUE,2/PK,STERILE: Brand: MEDLINE

## (undated) DEVICE — TUBING, SUCTION, 1/4" X 10', STRAIGHT: Brand: MEDLINE

## (undated) DEVICE — COVER,MAYO STAND,STERILE: Brand: MEDLINE

## (undated) DEVICE — ABDOMINAL PAD: Brand: DERMACEA

## (undated) DEVICE — 5.5 MM ACROMIONIZER STRAIGHT                                    BURRS, POWER/EP-1, BROWN, 8000                                    MAXIMUM RPM, PACKAGED 6 PER BOX, STERILE

## (undated) DEVICE — PACK,SHOULDER,DRAPE,POUCH: Brand: MEDLINE

## (undated) DEVICE — BASIN EMSIS 16OZ GRAPHITE PLAS KID SHP MOLD GRAD FOR ORAL

## (undated) DEVICE — SUPER SPONGES,MEDIUM: Brand: DERMACEA

## (undated) DEVICE — SYR LR LCK 1ML GRAD NSAF 30ML --

## (undated) DEVICE — TOWEL 4 PLY TISS 19X30 SUE WHT

## (undated) DEVICE — Z DISCONTINUED PER MEDLINE LINE GAS SAMPLING O2/CO2 LNG AD 13 FT NSL W/ TBNG FILTERLINE

## (undated) DEVICE — GUIDEWIRE VASC L260CM 0.035IN J TIP L3MM PTFE FIX COR NAMIC

## (undated) DEVICE — MEDI-VAC NON-CONDUCTIVE SUCTION TUBING: Brand: CARDINAL HEALTH